# Patient Record
Sex: FEMALE | Race: WHITE | NOT HISPANIC OR LATINO | Employment: OTHER | URBAN - METROPOLITAN AREA
[De-identification: names, ages, dates, MRNs, and addresses within clinical notes are randomized per-mention and may not be internally consistent; named-entity substitution may affect disease eponyms.]

---

## 2019-02-04 ENCOUNTER — TRANSFERRED RECORDS (OUTPATIENT)
Dept: HEALTH INFORMATION MANAGEMENT | Facility: CLINIC | Age: 54
End: 2019-02-04

## 2019-02-18 ENCOUNTER — TRANSFERRED RECORDS (OUTPATIENT)
Dept: HEALTH INFORMATION MANAGEMENT | Facility: CLINIC | Age: 54
End: 2019-02-18

## 2019-02-18 LAB — TSH SERPL-ACNC: 0.65 UIU/ML (ref 0.45–4.5)

## 2019-05-28 ENCOUNTER — HOSPITAL ENCOUNTER (OUTPATIENT)
Dept: LAB | Facility: CLINIC | Age: 54
Discharge: HOME | End: 2019-05-28
Attending: OPHTHALMOLOGY | Admitting: OPHTHALMOLOGY
Payer: COMMERCIAL

## 2019-05-28 DIAGNOSIS — H05.241 CONSTANT EXOPHTHALMOS OF RIGHT EYE: Primary | ICD-10-CM

## 2019-05-28 PROCEDURE — 36415 COLL VENOUS BLD VENIPUNCTURE: CPT | Performed by: OPHTHALMOLOGY

## 2019-05-28 PROCEDURE — 84445 ASSAY OF TSI GLOBULIN: CPT | Performed by: OPHTHALMOLOGY

## 2019-05-29 LAB — MAMMOGRAM: NORMAL

## 2019-05-31 LAB — TSI SER-ACNC: <1 TSI INDEX

## 2020-05-22 DIAGNOSIS — F33.2 MDD (MAJOR DEPRESSIVE DISORDER), RECURRENT SEVERE, WITHOUT PSYCHOSIS (H): Primary | ICD-10-CM

## 2020-08-27 NOTE — TELEPHONE ENCOUNTER
FUTURE VISIT INFORMATION      FUTURE VISIT INFORMATION:    Date: 11/2/20    Time: 11:45 AM    [place] [facility]  REFERRAL INFORMATION:    Referring provider:  Dr. Mercy Oliveros    Referring providers clinic:  [facility]    Reason for visit/diagnosis: Chronic Sinusitis with Chronic Polyp    RECORDS REQUESTED FROM:       Clinic name Comments Records Status Imaging Status   [facility] 6/19/20 - ENT TV with Dr. Oliveros Care Everywhere    [facility] 2/18/20 - UC OV with Dr. Vo Care Everywhere    [facility] 12/17/19 - UC OV with LEVAR Ferrer Care Everywhere    [facility] 12/4/19 - ENT OV with Dr. Blackman  7/23/19 - ALLERGY OV with Dr. Carlos  3/18/19 - ED OV with LEVAR Daily  2/1/19 - REUM OV with Dr. Koch Care Everywhere    [facility] 4/1/19 - OP Note for FUNCTIONAL ENDOSCOPY SINUS SURGERY, NAVIGATION with Dr. Hutchinson  8/8/18 - OP Note for ENDOSCOPIC MAXILLARY ANTROSTOMY, TISSUE REMOVAL with Dr. Yin Care Everywhere    [facility] - Imaging 3/19/19 - CT Maxillofacial W  3/4/19 -CT Sinuses WO  12/4/18 - CT Sinuses WO  10/4/18 - CT Sinuses WO  7/20/18 - CT Head and Maxillofacial WO  4/25/18 - MRI Face Neck And Orbits Care Everywhere 8/27 Req - In PACs   [facility] 4/29/19 - ED OV with LEVAR Solorio Care Everywhere      * 8/27/20 10:41 AM Faxed req to [facility] for images to be pushed to PACs. - Tere  * 9/10/20 7:38 AM Faxed 2nd req to [facility] for images to be pushed to [facility]s. - Tere  * 9/10/20 3:20 PM Images received from [facility] and attached to Patient in PACs. - Tere

## 2020-09-16 ENCOUNTER — TELEPHONE (OUTPATIENT)
Dept: OTOLARYNGOLOGY | Facility: CLINIC | Age: 55
End: 2020-09-16

## 2020-09-16 NOTE — TELEPHONE ENCOUNTER
Health Call Center    Phone Message    May a detailed message be left on voicemail: yes     Reason for Call: Symptoms or Concerns     If patient has red-flag symptoms, warm transfer to triage line    Current symptom or concern: severe sinus infection/blocked/painful    Symptoms have been present for:   day(s)    Has patient previously been seen for this? No            Are there any new or worsening symptoms? Yes: Pt is new and is being referred to Dr. Jaimes and her appointment isn't until November.  Pt is wondering if she can speak with a nurse about her symptoms to relieve them until her appt.  Please follow up.       Action Taken: Message routed to:  [facility]    Travel Screening: Not Applicable

## 2020-09-16 NOTE — TELEPHONE ENCOUNTER
Spoke with patient in regards to symptoms. Pt is reporting pain in her nose as well as outside, to the point of she can not wear her glasses due to the pain. She is rating her pain a 9 out of 10.she also reports swelling and eyes being puffy. No fever, no drainage. Sinus rinses are not working. Did a virtual visit with urgent care and was prescribed antibiotics, which she has been on for 5 days. pts appt has been moved up due to recent availability.pt verbalized understanding and provider notified.

## 2020-09-21 ENCOUNTER — OFFICE VISIT (OUTPATIENT)
Dept: OTOLARYNGOLOGY | Facility: CLINIC | Age: 55
End: 2020-09-21
Payer: COMMERCIAL

## 2020-09-21 VITALS — WEIGHT: 127 LBS | BODY MASS INDEX: 21.16 KG/M2 | HEIGHT: 65 IN

## 2020-09-21 DIAGNOSIS — F33.2 MDD (MAJOR DEPRESSIVE DISORDER), RECURRENT SEVERE, WITHOUT PSYCHOSIS (H): ICD-10-CM

## 2020-09-21 DIAGNOSIS — J32.4 CHRONIC PANSINUSITIS: Primary | ICD-10-CM

## 2020-09-21 LAB
ANION GAP SERPL CALCULATED.3IONS-SCNC: 7 MMOL/L (ref 3–14)
BASOPHILS # BLD AUTO: 0.1 10E9/L (ref 0–0.2)
BASOPHILS NFR BLD AUTO: 0.6 %
BUN SERPL-MCNC: 18 MG/DL (ref 7–30)
CALCIUM SERPL-MCNC: 9.2 MG/DL (ref 8.5–10.1)
CHLORIDE SERPL-SCNC: 106 MMOL/L (ref 94–109)
CO2 SERPL-SCNC: 26 MMOL/L (ref 20–32)
CREAT SERPL-MCNC: 0.78 MG/DL (ref 0.52–1.04)
CRP SERPL-MCNC: <2.9 MG/L (ref 0–8)
DIFFERENTIAL METHOD BLD: ABNORMAL
EOSINOPHIL # BLD AUTO: 0.3 10E9/L (ref 0–0.7)
EOSINOPHIL NFR BLD AUTO: 3.3 %
ERYTHROCYTE [DISTWIDTH] IN BLOOD BY AUTOMATED COUNT: 13 % (ref 10–15)
GFR SERPL CREATININE-BSD FRML MDRD: 84 ML/MIN/{1.73_M2}
GLUCOSE SERPL-MCNC: 87 MG/DL (ref 70–99)
HCT VFR BLD AUTO: 43.5 % (ref 35–47)
HGB BLD-MCNC: 14.9 G/DL (ref 11.7–15.7)
IMM GRANULOCYTES # BLD: 0 10E9/L (ref 0–0.4)
IMM GRANULOCYTES NFR BLD: 0.3 %
LYMPHOCYTES # BLD AUTO: 2.7 10E9/L (ref 0.8–5.3)
LYMPHOCYTES NFR BLD AUTO: 28 %
MCH RBC QN AUTO: 34.1 PG (ref 26.5–33)
MCHC RBC AUTO-ENTMCNC: 34.3 G/DL (ref 31.5–36.5)
MCV RBC AUTO: 100 FL (ref 78–100)
MONOCYTES # BLD AUTO: 0.6 10E9/L (ref 0–1.3)
MONOCYTES NFR BLD AUTO: 5.9 %
NEUTROPHILS # BLD AUTO: 6 10E9/L (ref 1.6–8.3)
NEUTROPHILS NFR BLD AUTO: 61.9 %
NRBC # BLD AUTO: 0 10*3/UL
NRBC BLD AUTO-RTO: 0 /100
PLATELET # BLD AUTO: 363 10E9/L (ref 150–450)
POTASSIUM SERPL-SCNC: 4 MMOL/L (ref 3.4–5.3)
RBC # BLD AUTO: 4.37 10E12/L (ref 3.8–5.2)
SODIUM SERPL-SCNC: 139 MMOL/L (ref 133–144)
WBC # BLD AUTO: 9.7 10E9/L (ref 4–11)

## 2020-09-21 RX ORDER — DULOXETINE HCL 60 MG
60 CAPSULE,DELAYED RELEASE (ENTERIC COATED) ORAL EVERY MORNING
COMMUNITY
Start: 2020-09-04 | End: 2022-12-27

## 2020-09-21 RX ORDER — ALBUTEROL SULFATE 90 UG/1
1-2 AEROSOL, METERED RESPIRATORY (INHALATION)
COMMUNITY
Start: 2019-11-27 | End: 2021-06-09

## 2020-09-21 RX ORDER — LAMOTRIGINE 200 MG/1
200 TABLET ORAL EVERY MORNING
COMMUNITY
Start: 2020-09-12

## 2020-09-21 RX ORDER — DEXTROAMPHETAMINE SULFATE, DEXTROAMPHETAMINE SACCHARATE, AMPHETAMINE SULFATE AND AMPHETAMINE ASPARTATE 5; 5; 5; 5 MG/1; MG/1; MG/1; MG/1
20 CAPSULE, EXTENDED RELEASE ORAL DAILY PRN
COMMUNITY
Start: 2020-09-04 | End: 2022-07-21

## 2020-09-21 ASSESSMENT — PAIN SCALES - GENERAL: PAINLEVEL: SEVERE PAIN (7)

## 2020-09-21 ASSESSMENT — MIFFLIN-ST. JEOR: SCORE: 1171.95

## 2020-09-21 NOTE — PROGRESS NOTES
Otolaryngology Adult Consultation    Patient: Gracy Bautista   : 1965     Patient presents with:  Consult:   Chief Complaint   Patient presents with     Consult     Chronic sinusitis and polyps        Referring Provider: Mercy Oliveros   Consulting Physician:  Digna Jaimes MD       Assessment/Plan: Patient with long history of sinus disease. Likely contributing factors include low IgG, smoking, chronic infection/inflammation. She has had a bit of a lapse in care with regards to assessment of immune dysfunction. Will order labs today and baseline imaging. Will also start gentamicin irrigations. Will contact patient with results and next steps.      HPI: Gracy Bautista is a 54 year old female seen today in the Otolaryngology Clinic in consultation from Dr. Oliveros for a history of chronic sinus issues.     Prior sinus care at [facility]. Several sinus surgeries and infections. Several infections this year. Was seen at [facility]. Low IgG and ASA sensitivity. Was sent here for care given complex disease and prior immunodeficiency. No recent labs, had been on IV Ig. Did well on that. Last surgery was last summer, did well after that. 3 infection since January. Was on Augmentin 7 days at a time. Not on antibiotics now. C/O fatigue. Blind in R eye, optic nerve issue. Severe arthritis, antiphospholipid syndrome in past, negative forother rheumatic diseases, borderline Sjogren's.     PMH: hard to breath - on albuterol. Had negative allergy testing.     CXR done recently clear.     Smokes tobacco. No EtOH, or drugs.    Work - looking now.         ADDERALL XR 20 MG 24 hr capsule, TK 1 C PO BID  albuterol (PROAIR HFA/PROVENTIL HFA/VENTOLIN HFA) 108 (90 Base) MCG/ACT inhaler, Inhale 1-2 puffs into the lungs  CYMBALTA 60 MG capsule,   LAMICTAL 200 MG tablet, TK 1 T PO D    No current facility-administered medications on file prior to visit.          Allergies   Allergen Reactions     Bee Venom Angioedema and  Swelling     Other reaction(s): Angioedema       Cefdinir GI Disturbance and Other (See Comments)     diarrhea  diarrhea       Levofloxacin Muscle Pain (Myalgia)     Prednisone Anxiety and Other (See Comments)     Hyperactivity and moodiness         No past medical history on file.    Social History     Occupational History     Not on file   Tobacco Use     Smoking status: Current Some Day Smoker     Types: Cigarettes     Smokeless tobacco: Never Used     Tobacco comment: 6 cigarettes per week   Substance and Sexual Activity     Alcohol use: Not on file     Drug use: Not on file     Sexual activity: Not on file        Review of Systems  No flowsheet data found.     14 point ROS neg other than the symptoms noted above.    Physical Exam:    General Assessment   The patient is alert, oriented and in no acute distress.   Head/Face/Scalp  Grossly normal.   Ears  Normal canals, auricles and tympanic membranes.   Nose  External nose is straight, skin is normal. Intranasal exam (anterior rhinoscopy) reveals normal moist mucosa, turbinate tissue without edema, erythema or crusting.  Septum mainly in the midline.    Mouth  Oral cavity shows healthy mucosa with out ulceration, masses or other lesions involving the tongue, palate, buccal mucosa, floor of mouth or gingiva.  Dentition in good repair.  Oropharynx with normal tonsils, posterior wall and base of tongue.  Neck  No significant adenopathy, thyroid or salivary gland abnormality.       Procedure:  Endoscopy indicated for exam of sinus cavities  Topical anesthetic/decongestant spray applied.  Rigid scope used for visualization.  Findings: Thick clear/white discharge, polypoid changes. No purulence or crusting.

## 2020-09-21 NOTE — NURSING NOTE
"Chief Complaint   Patient presents with     Consult     Chronic sinusitis and polyps         Height 1.651 m (5' 5\"), weight 57.6 kg (127 lb).    [place] Davidson, EMT    "

## 2020-09-21 NOTE — PATIENT INSTRUCTIONS
Thank You for choosing [facility]. You were seen in the ENT Clinic today.     has recommended the followin. Labs ordered today, will call with results  2. CT facial bones ordered, will call with results  3. Start Gentimiacin irrigations, prescription sent to [facility]. Instructions below    Gentamicin Nasal Irrigations  -Gentamicin is a compounded antibiotic which means it is made only when ordered and by compounding pharmacies.  [facility]  [address] [place] [address]  [phone]  -Always store your solution in your refrigerator and administer at room temperature.  -The solution should be okay to use for 14 days from the day it was made by the pharmacy.  -Clean your irrigating device often. Wash it with warm, soapy water, and let it air dry. Or follow device cleaning instructions.     Instructions:  1) Measure out the amount of the gentamicin solution you will need.   The usual dose is 30 mL.  2) Let the solution you measure out warm up for 30 minutes before you use it.   Keep the rest of the solution in the refrigerator.  3) With a large syringe, sinus rinse squeeze bottle, or neti pot complete the rinse per doctor s head position recommendation (lying head back, lying head lateral, or head down and forward).  -Use   of the solution for the first nostril.  - the shower or lean over the sink.  -Tilt your head forward and turn it to one side.  -Place the irrigating device in your upper nostril.   -Pour or squirt the solution gently into your nostril.  -Aim for the back of your head, not the top of your head.  -Gravity or gentle pressure should help the solution move through your sinuses and out your lower nostril.  -Never force the solution through your nostrils.  -Some solution may drain into your throat. This is normal. Try gently breathing out through your nose to help the solution drain through your lower nostril and not down your  throat.  4) Repeat these steps with your other nostril.  5) At first, you may feel mild burning or stinging when you rinse with the solution. This should go away after the first few times you use it.          If you have any questions or concerns after your appointment, please  Call [phone].

## 2020-09-21 NOTE — LETTER
2020       RE: Gracy Bautista  [address] [place] [id number]     Dear Colleague,    Thank you for referring your patient, Gracy Bautista, to the [facility] EAR NOSE AND THROAT at [facility]. Please see a copy of my visit note below.    Otolaryngology Adult Consultation    Patient: Gracy Bautista   : 1965     Patient presents with:  Consult:   Chief Complaint   Patient presents with     Consult     Chronic sinusitis and polyps        Referring Provider: Mercy Oliveros   Consulting Physician:  Digna Jaimes MD       Assessment/Plan: Patient with long history of sinus disease. Likely contributing factors include low IgG, smoking, chronic infection/inflammation. She has had a bit of a lapse in care with regards to assessment of immune dysfunction. Will order labs today and baseline imaging. Will also start gentamicin irrigations. Will contact patient with results and next steps.      HPI: Gracy Bautista is a 54 year old female seen today in the Otolaryngology Clinic in consultation from Dr. Oliveros for a history of chronic sinus issues.     Prior sinus care at [place]. Several sinus surgeries and infections. Several infections this year. Was seen at [facility]. Low IgG and ASA sensitivity. Was sent here for care given complex disease and prior immunodeficiency. No recent labs, had been on IV Ig. Did well on that. Last surgery was last summer, did well after that. 3 infection since January. Was on Augmentin 7 days at a time. Not on antibiotics now. C/O fatigue. Blind in R eye, optic nerve issue. Severe arthritis, antiphospholipid syndrome in past, negative forother rheumatic diseases, borderline Sjogren's.     PMH: hard to breath - on albuterol. Had negative allergy testing.     CXR done recently clear.     Smokes tobacco. No EtOH, or drugs.    Work - looking now.         ADDERALL XR 20 MG 24 hr capsule, TK 1 C PO BID  albuterol (PROAIR HFA/PROVENTIL  HFA/VENTOLIN HFA) 108 (90 Base) MCG/ACT inhaler, Inhale 1-2 puffs into the lungs  CYMBALTA 60 MG capsule,   LAMICTAL 200 MG tablet, TK 1 T PO D    No current facility-administered medications on file prior to visit.          Allergies   Allergen Reactions     Bee Venom Angioedema and Swelling     Other reaction(s): Angioedema       Cefdinir GI Disturbance and Other (See Comments)     diarrhea  diarrhea       Levofloxacin Muscle Pain (Myalgia)     Prednisone Anxiety and Other (See Comments)     Hyperactivity and moodiness         No past medical history on file.    Social History     Occupational History     Not on file   Tobacco Use     Smoking status: Current Some Day Smoker     Types: Cigarettes     Smokeless tobacco: Never Used     Tobacco comment: 6 cigarettes per week   Substance and Sexual Activity     Alcohol use: Not on file     Drug use: Not on file     Sexual activity: Not on file        Review of Systems  No flowsheet data found.     14 point ROS neg other than the symptoms noted above.    Physical Exam:    General Assessment   The patient is alert, oriented and in no acute distress.   Head/Face/Scalp  Grossly normal.   Ears  Normal canals, auricles and tympanic membranes.   Nose  External nose is straight, skin is normal. Intranasal exam (anterior rhinoscopy) reveals normal moist mucosa, turbinate tissue without edema, erythema or crusting.  Septum mainly in the midline.    Mouth  Oral cavity shows healthy mucosa with out ulceration, masses or other lesions involving the tongue, palate, buccal mucosa, floor of mouth or gingiva.  Dentition in good repair.  Oropharynx with normal tonsils, posterior wall and base of tongue.  Neck  No significant adenopathy, thyroid or salivary gland abnormality.       Procedure:  Endoscopy indicated for exam of sinus cavities  Topical anesthetic/decongestant spray applied.  Rigid scope used for visualization.  Findings: Thick clear/white discharge, polypoid changes. No  purulence or crusting.           Again, thank you for allowing me to participate in the care of your patient.      Sincerely,    Digna Jaimes MD

## 2020-09-22 ENCOUNTER — TELEPHONE (OUTPATIENT)
Dept: OTOLARYNGOLOGY | Facility: CLINIC | Age: 55
End: 2020-09-22

## 2020-09-22 LAB
IGA SERPL-MCNC: 128 MG/DL (ref 84–499)
IGE SERPL-ACNC: 233 KIU/L (ref 0–114)
IGG SERPL-MCNC: 586 MG/DL (ref 610–1616)
IGG1 SER-MCNC: 354 MG/DL (ref 382–929)
IGG2 SER-MCNC: 124 MG/DL (ref 242–700)
IGG3 SER-MCNC: 29 MG/DL (ref 22–176)
IGG4 SER-MCNC: 2 MG/DL (ref 4–86)
IGM SERPL-MCNC: 244 MG/DL (ref 35–242)

## 2020-09-22 NOTE — TELEPHONE ENCOUNTER
Called patient to schedule CT scan, per 9/21 checkout note. LVM with scheduling instructions and the Imaging Call Center number.

## 2020-09-23 ENCOUNTER — ANCILLARY PROCEDURE (OUTPATIENT)
Dept: CT IMAGING | Facility: CLINIC | Age: 55
End: 2020-09-23
Attending: OTOLARYNGOLOGY
Payer: COMMERCIAL

## 2020-09-23 DIAGNOSIS — J32.4 CHRONIC PANSINUSITIS: ICD-10-CM

## 2020-09-30 ENCOUNTER — TELEPHONE (OUTPATIENT)
Dept: OTOLARYNGOLOGY | Facility: CLINIC | Age: 55
End: 2020-09-30

## 2020-09-30 DIAGNOSIS — D80.8: Primary | ICD-10-CM

## 2020-09-30 NOTE — TELEPHONE ENCOUNTER
Spoke to patient and relayed results of CT scan as per provider. Also advised pt of referral to hematology recommended by provider due to abnormal immunoglobulins. Pt has also been scheduled to see provider on 10/5 at 12:45. Pt verbalized understanding to all of the above information .

## 2020-09-30 NOTE — RESULT ENCOUNTER NOTE
Anh please contact patient to let her know that CT shows sinus disease. I see she is asking for antibiotics. Ideally we should culture her, maybe Monday? She also has abnormal immunoglobulins and should see adult benign hematology. Please place consult. Please contact the patient with these results.     Digna Jaimes MD

## 2020-09-30 NOTE — TELEPHONE ENCOUNTER
Left voicemail message for patient in regards to message received. Wanted to check up on patient and discuss current symptoms.writers call back number for today provided on voicemail.

## 2020-10-02 NOTE — TELEPHONE ENCOUNTER
ONCOLOGY INTAKE: Records Information      APPT INFORMATION:  Referring provider:  Dr. Digna Jaimes MD  Referring provider s clinic:  [facility]  Reason for visit/diagnosis:  Primary immunoglobulin catabolism abnormality   Has patient been notified of appointment date and time?: Yes    RECORDS INFORMATION:  Were the records received with the referral (via [employer])? No,Internal Referral      Has patient been seen for any external appt for this diagnosis? No    If yes, where? NA      ADDITIONAL INFORMATION:  Pt declined virtual visit, scheduled in person.

## 2020-10-05 ENCOUNTER — TELEPHONE (OUTPATIENT)
Dept: OTOLARYNGOLOGY | Facility: CLINIC | Age: 55
End: 2020-10-05

## 2020-10-05 ENCOUNTER — OFFICE VISIT (OUTPATIENT)
Dept: OTOLARYNGOLOGY | Facility: CLINIC | Age: 55
End: 2020-10-05
Payer: COMMERCIAL

## 2020-10-05 VITALS — WEIGHT: 125 LBS | HEART RATE: 77 BPM | BODY MASS INDEX: 20.8 KG/M2 | OXYGEN SATURATION: 98 %

## 2020-10-05 DIAGNOSIS — D80.8: ICD-10-CM

## 2020-10-05 DIAGNOSIS — J32.4 CHRONIC PANSINUSITIS: Primary | ICD-10-CM

## 2020-10-05 PROCEDURE — 99000 SPECIMEN HANDLING OFFICE-LAB: CPT | Performed by: PATHOLOGY

## 2020-10-05 PROCEDURE — 31231 NASAL ENDOSCOPY DX: CPT | Performed by: OTOLARYNGOLOGY

## 2020-10-05 PROCEDURE — 87102 FUNGUS ISOLATION CULTURE: CPT | Mod: 90 | Performed by: PATHOLOGY

## 2020-10-05 PROCEDURE — 99213 OFFICE O/P EST LOW 20 MIN: CPT | Mod: 25 | Performed by: OTOLARYNGOLOGY

## 2020-10-05 PROCEDURE — 87070 CULTURE OTHR SPECIMN AEROBIC: CPT | Mod: 90 | Performed by: PATHOLOGY

## 2020-10-05 ASSESSMENT — PAIN SCALES - GENERAL: PAINLEVEL: EXTREME PAIN (8)

## 2020-10-05 NOTE — TELEPHONE ENCOUNTER
[facility]    Phone Message    May a detailed message be left on voicemail: yes     Reason for Call: Other: Pt states they had an appointment today with Digna Jaimes and a culture was done.  Pt states they discussed waiting for the culture to come back before taking anything but the Pt is wondering if there is any antibiotic she can take before the culture comes back.  Please follow up.      Action Taken: Message routed to:  [facility] ENT    Travel Screening: Not Applicable

## 2020-10-05 NOTE — PROGRESS NOTES
Patient seen recently with history of CRS, immunodeficiency. We confirmed this is an ongoing issue with labs, and she is scheduled to see hematology in 2 weeks. She has increased fatigue and frontal sinus pressure. Has been using antibiotic rinses. She feels like this is a typical sinus infection. I am recommending culture directed antibiotic therapy.      Procedure:  Endoscopy indicated for exam and culture  Topical anesthetic/decongestant spray applied.  Rigid scope used for visualization.  Findings: Scant secretions in R posterior nasal cavity.    A/P:  (J32.4) Chronic pansinusitis  (primary encounter diagnosis)  Comment: exacerbation based on history  Plan: Sinus Culture Aerobic Bacterial, Fungus         Culture, non-blood    Culture pending, start antibiotics based on result.

## 2020-10-05 NOTE — LETTER
Date:October 6, 2020      Patient was self referred, no letter generated. Do not send.        [facility] Health Information

## 2020-10-05 NOTE — NURSING NOTE
Chief Complaint   Patient presents with     RECHECK     Follow up, possible culture         Pulse 77, weight 56.7 kg (125 lb), SpO2 98 %.    Yue Davidson EMT

## 2020-10-05 NOTE — TELEPHONE ENCOUNTER
Returned patient's call to explain that culture was taken in clinic today to direct any antibiotic therapy if needed. Will need to wait for culture to see if any bacteria is growing and, if so, what type of antibiotics the bacteria is sensitive to in order to prescribe the appropriate antibiotic. Culture may take a couple days to result. Will call patient as soon as resulted with recommended antibiotics per Dr. Jaimes. Clinic call back number left on message.    Anh García RN, DNP.  10/5/2020 3:33 PM

## 2020-10-05 NOTE — LETTER
10/5/2020       RE: Gracy Bautista  [address] [place] [id number]     Dear Colleague,    Thank you for referring your patient, Gracy Bautista, to the [facility] EAR NOSE AND THROAT CLINIC [place] at [facility]. Please see a copy of my visit note below.    Patient seen recently with history of CRS, immunodeficiency. We confirmed this is an ongoing issue with labs, and she is scheduled to see hematology in 2 weeks. She has increased fatigue and frontal sinus pressure. Has been using antibiotic rinses. She feels like this is a typical sinus infection. I am recommending culture directed antibiotic therapy.      Procedure:  Endoscopy indicated for exam and culture  Topical anesthetic/decongestant spray applied.  Rigid scope used for visualization.  Findings: Scant secretions in R posterior nasal cavity.    A/P:  (J32.4) Chronic pansinusitis  (primary encounter diagnosis)  Comment: exacerbation based on history  Plan: Sinus Culture Aerobic Bacterial, Fungus         Culture, non-blood    Culture pending, start antibiotics based on result.       Again, thank you for allowing me to participate in the care of your patient.      Sincerely,    Digna Jaimes MD

## 2020-10-06 NOTE — TELEPHONE ENCOUNTER
RECORDS STATUS - ALL OTHER DIAGNOSIS      RECORDS RECEIVED FROM: Epic/ [facility]    DATE RECEIVED: 10/21/2020    NOTES STATUS DETAILS   OFFICE NOTE from referring provider Complete Dr. Digna Jaimes MD  [facility] ENT   OFFICE NOTE from medical oncologist N/A ENT Notes are in Epic    I emailed historical records from [facility] to HIM on 10/21/2020 at 8:22am    DISCHARGE SUMMARY from hospital Complete Epic 1/30/2018 Chronic Pansinusitis in EPIC   DISCHARGE REPORT from the ER     OPERATIVE REPORT Complete [facility]- Nasal Endoscopy 10/5/2020    MEDICATION LIST Complete [facility]   CLINICAL TRIAL TREATMENTS TO DATE     LABS     PATHOLOGY REPORTS N/A    ANYTHING RELATED TO DIAGNOSIS Complete Labs last updated on 10/5/2020    GENONOMIC TESTING     TYPE:     IMAGING (NEED IMAGES & REPORT)     CT SCANS [facility]    [facility] 9/23/2020    Xray Chest  Requested- [facility]  8/26/2019    MRI     MAMMO     ULTRASOUND     PET       Action    Action Taken 10/6/2020 2:34pm   I called pt Gracy - her phone went to  twice.     Email: [email or web address]    1998- [facility] Dr. Jameson Smith - there's mention of Gracy's historical records in CE but the complete notes aren't included     Gracy is going to work on filling out her release tonight. I'm going to give her a call back tomorrow to see how things are going.     10/14/2020 11:59am   I called pt Gracy - she's not feeling well. Gracy requested that I call her back on Friday to follow up on the CAR[place] form.     10/16/2020 12:07pm   I left a  for pt Gracy requesting a call back.     Gracy called me back and Returned her CAROLINA.     I faxed over a request form and CAROLINA to [facility]'s medical records dept.     10/20/2020 8:37am   I called [facility]'s Med Rec Dept [phone] #[phone] to follow up on the request I submitted last Friday. They sent the request to O and the request is being worked on right away.

## 2020-10-07 LAB
BACTERIA SPEC CULT: NORMAL
Lab: NORMAL
SPECIMEN SOURCE: NORMAL

## 2020-10-12 ENCOUNTER — TELEPHONE (OUTPATIENT)
Dept: OTOLARYNGOLOGY | Facility: CLINIC | Age: 55
End: 2020-10-12

## 2020-10-12 NOTE — TELEPHONE ENCOUNTER
[facility] Call [facility]    Phone Message    May a detailed message be left on voicemail: yes     Reason for Call: Symptoms or Concerns     If patient has red-flag symptoms, warm transfer to triage line    Current symptom or concern: swollen eyes.  Throat and ears hurt    Symptoms have been present for:    day(s)    Has patient previously been seen for this? Yes    By Theodore    Date:     Are there any new or worsening symptoms? Yes: Pt is requesting a call back from a nurse.      Action Taken: Message routed to:  [facility] ENT    Travel Screening: Not Applicable

## 2020-10-14 NOTE — TELEPHONE ENCOUNTER
[facility]    Phone Message    May a detailed message be left on voicemail: yes     Reason for Call: Other: Pt calling back as nobody has called her back about her symptoms , Pt would like a call back ASAP, Please call Pt back to discuss  Thank you,    Action Taken: Message routed to:  [facility] ENT    Travel Screening: Not Applicable

## 2020-10-14 NOTE — TELEPHONE ENCOUNTER
[facility] Call [facility]    Phone Message    May a detailed message be left on voicemail: yes     Reason for Call: Other:   Pt calling back about her sinus infection. Writer spoke with clinic team. Clinic is waiting on Jaimes's response based on culture results and is unable to give recommendation until they hear back. Pt was notified of this.     Pt states that Theodore knows her situation and history. Pt is weak, and her head hurts. There is no fever. The nasal irrigation/solution makes situation worst. Pt does not want to go to urgent care and would like a call back asap.     Action Taken: Other:  ENT    Travel Screening: Not Applicable

## 2020-10-15 DIAGNOSIS — J32.4 CHRONIC PANSINUSITIS: Primary | ICD-10-CM

## 2020-10-15 RX ORDER — DOXYCYCLINE HYCLATE 100 MG
100 TABLET ORAL 2 TIMES DAILY
Qty: 14 TABLET | Refills: 0 | Status: SHIPPED | OUTPATIENT
Start: 2020-10-15 | End: 2020-10-21

## 2020-10-15 NOTE — TELEPHONE ENCOUNTER
Spoke to patient. Reviewed culture results as per provider and advised pt a prescription for antibiotic could be sent to her pharmacy if she so wished. Pt would like prescription sent. Pt also asked about a follow up appt, advised pt this would be discussed with provider. Pt verbalized understanding

## 2020-10-21 ENCOUNTER — ONCOLOGY VISIT (OUTPATIENT)
Dept: ONCOLOGY | Facility: CLINIC | Age: 55
End: 2020-10-21
Attending: OTOLARYNGOLOGY
Payer: COMMERCIAL

## 2020-10-21 ENCOUNTER — PRE VISIT (OUTPATIENT)
Dept: ONCOLOGY | Facility: CLINIC | Age: 55
End: 2020-10-21

## 2020-10-21 VITALS
HEIGHT: 65 IN | BODY MASS INDEX: 21.44 KG/M2 | TEMPERATURE: 99 F | WEIGHT: 128.7 LBS | OXYGEN SATURATION: 99 % | HEART RATE: 67 BPM | SYSTOLIC BLOOD PRESSURE: 110 MMHG | DIASTOLIC BLOOD PRESSURE: 74 MMHG | RESPIRATION RATE: 18 BRPM

## 2020-10-21 DIAGNOSIS — R53.83 FATIGUE, UNSPECIFIED TYPE: ICD-10-CM

## 2020-10-21 DIAGNOSIS — J45.909 UNCOMPLICATED ASTHMA, UNSPECIFIED ASTHMA SEVERITY, UNSPECIFIED WHETHER PERSISTENT: ICD-10-CM

## 2020-10-21 DIAGNOSIS — M79.7 FIBROMYALGIA: ICD-10-CM

## 2020-10-21 DIAGNOSIS — D69.9 HEMORRHAGIC DIATHESIS (H): Primary | ICD-10-CM

## 2020-10-21 DIAGNOSIS — D80.8: ICD-10-CM

## 2020-10-21 LAB
APTT PPP: 30 SEC (ref 22–37)
BASOPHILS # BLD AUTO: 0 10E9/L (ref 0–0.2)
BASOPHILS NFR BLD AUTO: 0.3 %
CLOSURE TME COLL+EPINEP BLD: NORMAL SEC
DIFFERENTIAL METHOD BLD: ABNORMAL
EOSINOPHIL # BLD AUTO: 0.2 10E9/L (ref 0–0.7)
EOSINOPHIL NFR BLD AUTO: 2.4 %
ERYTHROCYTE [DISTWIDTH] IN BLOOD BY AUTOMATED COUNT: 12.8 % (ref 10–15)
FIBRINOGEN PPP-MCNC: 328 MG/DL (ref 200–420)
HCT VFR BLD AUTO: 42.2 % (ref 35–47)
HGB BLD-MCNC: 14.6 G/DL (ref 11.7–15.7)
IMM GRANULOCYTES # BLD: 0 10E9/L (ref 0–0.4)
IMM GRANULOCYTES NFR BLD: 0.3 %
INR PPP: 0.91 (ref 0.86–1.14)
LYMPHOCYTES # BLD AUTO: 2.5 10E9/L (ref 0.8–5.3)
LYMPHOCYTES NFR BLD AUTO: 27.3 %
MCH RBC QN AUTO: 33.9 PG (ref 26.5–33)
MCHC RBC AUTO-ENTMCNC: 34.6 G/DL (ref 31.5–36.5)
MCV RBC AUTO: 98 FL (ref 78–100)
MONOCYTES # BLD AUTO: 0.5 10E9/L (ref 0–1.3)
MONOCYTES NFR BLD AUTO: 5.1 %
NEUTROPHILS # BLD AUTO: 5.8 10E9/L (ref 1.6–8.3)
NEUTROPHILS NFR BLD AUTO: 64.6 %
PLATELET # BLD AUTO: 353 10E9/L (ref 150–450)
RBC # BLD AUTO: 4.31 10E12/L (ref 3.8–5.2)
RETICS # AUTO: 56.9 10E9/L (ref 25–95)
RETICS/RBC NFR AUTO: 1.3 % (ref 0.5–2)
TSH SERPL DL<=0.005 MIU/L-ACNC: 0.65 MU/L (ref 0.4–4)
WBC # BLD AUTO: 9.1 10E9/L (ref 4–11)

## 2020-10-21 PROCEDURE — 85290 CLOT FACTOR XIII FIBRIN STAB: CPT | Performed by: INTERNAL MEDICINE

## 2020-10-21 PROCEDURE — 85613 RUSSELL VIPER VENOM DILUTED: CPT | Performed by: INTERNAL MEDICINE

## 2020-10-21 PROCEDURE — 85245 CLOT FACTOR VIII VW RISTOCTN: CPT | Performed by: INTERNAL MEDICINE

## 2020-10-21 PROCEDURE — 85730 THROMBOPLASTIN TIME PARTIAL: CPT | Performed by: INTERNAL MEDICINE

## 2020-10-21 PROCEDURE — 85384 FIBRINOGEN ACTIVITY: CPT | Performed by: INTERNAL MEDICINE

## 2020-10-21 PROCEDURE — 36415 COLL VENOUS BLD VENIPUNCTURE: CPT | Performed by: INTERNAL MEDICINE

## 2020-10-21 PROCEDURE — 85610 PROTHROMBIN TIME: CPT | Performed by: INTERNAL MEDICINE

## 2020-10-21 PROCEDURE — 86146 BETA-2 GLYCOPROTEIN ANTIBODY: CPT | Performed by: INTERNAL MEDICINE

## 2020-10-21 PROCEDURE — 99N1037 PR STATISTIC VON WILLEBRAND MULTIMERS: Performed by: INTERNAL MEDICINE

## 2020-10-21 PROCEDURE — 84443 ASSAY THYROID STIM HORMONE: CPT | Performed by: INTERNAL MEDICINE

## 2020-10-21 PROCEDURE — 82306 VITAMIN D 25 HYDROXY: CPT | Performed by: INTERNAL MEDICINE

## 2020-10-21 PROCEDURE — 85240 CLOT FACTOR VIII AHG 1 STAGE: CPT | Performed by: INTERNAL MEDICINE

## 2020-10-21 PROCEDURE — 85246 CLOT FACTOR VIII VW ANTIGEN: CPT | Performed by: INTERNAL MEDICINE

## 2020-10-21 PROCEDURE — 99204 OFFICE O/P NEW MOD 45 MIN: CPT | Performed by: INTERNAL MEDICINE

## 2020-10-21 PROCEDURE — 86147 CARDIOLIPIN ANTIBODY EA IG: CPT | Performed by: INTERNAL MEDICINE

## 2020-10-21 PROCEDURE — 85025 COMPLETE CBC W/AUTO DIFF WBC: CPT | Performed by: INTERNAL MEDICINE

## 2020-10-21 PROCEDURE — 85060 BLOOD SMEAR INTERPRETATION: CPT | Performed by: INTERNAL MEDICINE

## 2020-10-21 PROCEDURE — 85045 AUTOMATED RETICULOCYTE COUNT: CPT | Performed by: INTERNAL MEDICINE

## 2020-10-21 RX ORDER — ACETAMINOPHEN 500 MG
500-1000 TABLET ORAL EVERY 6 HOURS PRN
COMMUNITY
End: 2022-07-08

## 2020-10-21 ASSESSMENT — MIFFLIN-ST. JEOR: SCORE: 1179.66

## 2020-10-21 ASSESSMENT — PAIN SCALES - GENERAL: PAINLEVEL: MODERATE PAIN (5)

## 2020-10-21 NOTE — NURSING NOTE
"Oncology Rooming Note    October 21, 2020 12:40 PM   Gracy Bautista is a 54 year old female who presents for:    Chief Complaint   Patient presents with     Oncology Clinic Visit     New Patient     Initial Vitals: /74 (BP Location: Left arm)   Pulse 67   Temp 99  F (37.2  C) (Oral)   Resp 18   Ht 1.651 m (5' 5\")   Wt 58.4 kg (128 lb 11.2 oz)   SpO2 99%   BMI 21.42 kg/m   Estimated body mass index is 21.42 kg/m  as calculated from the following:    Height as of this encounter: 1.651 m (5' 5\").    Weight as of this encounter: 58.4 kg (128 lb 11.2 oz). Body surface area is 1.64 meters squared.  Moderate Pain (5) Comment: Data Unavailable   No LMP recorded. Patient is postmenopausal.  Allergies reviewed: Yes  Medications reviewed: Yes    Medications: Medication refills not needed today.  Pharmacy name entered into [facility]: [id number] - [place], [place] - [address] [facility] [address]  & [place] [address] LPN              "

## 2020-10-21 NOTE — PROGRESS NOTES
Hematology initial visit:  Date on this visit: 10/21/2020    Gracy Bautista  is referred by Dr.Holly POOL Jaimes for a hematology consultation. She requires evaluation for chronic sinusitis, easy bruising.    Primary Physician: No Ref-Primary, Physician     History Of Present Illness:  Ms. Bautista is a 54 year old female who has a complicated past medical history and I do not have all the past medical records but I have reviewed her records available to me.  She has a history of severe nasal polyposis and chronic pansinusitis requiring several surgeries in the past, hypogammaglobulinemia who comes in today for evaluation.  She tells me that over the last several years she has had issues with chronic pansinusitis requiring multiple surgeries.  She also has had issues with asthma and pneumonia and she continues to smoke.  About 5 years ago she was noted to have IgG level in high 400s and low 500s so she was given IVIG for about 2-1/2 years but it did not improve her symptoms.  She was also on Nucala for several months about 1-1/2 years ago but it was a stopped because she did not notice much improvement.  She was following with [facility] at that time.  She tells me that over the last several years she has been feeling fatigued although over the last several months it has increased.  She feels exhausted all the time.  She has chronic shortness of breath from asthma.  She continues to have issues with facial pain from sinusitis.  She was diagnosed with fibromyalgia as well as depression.  She has also noticed off and on spontaneous bruising and she bleeds more when she cuts herself.  The bruising is mostly on her forearms.  It usually resolves quickly.  In the past when she has had surgeries, she does not remember heavy bleeding apart from the first sinus surgery when she developed significant bleeding postoperatively.  She has had 2 C-sections, total abdominal hysterectomy/bilateral salpingo-oophorectomy and 4  sinus surgeries.  She denies any other spontaneous bleeding like melena hematochezia or hematuria or hemoptysis, hematemesis or epistaxis.  She denies any drenching night sweats or significant weight loss.  No new swellings apart from off-and-on bilateral leg swelling.  In the past she had positive anticardiolipin IgM antibody and she was on heparin and aspirin during pregnancy in 2001 and she took warfarin for some time after the delivery.  She never has history of clots.  She notices occasional nausea but denies vomiting.  No mouth sores.        ROS:  A comprehensive ROS was otherwise neg    Past Medical/Surgical History:  No past medical history on file.  No past surgical history on file.     Chronic pansinusitis.  Recurrent pneumonia.  Hypogammaglobulinemia.  Asthma.  Anticardiolipin IgM antibody positive.  Fibromyalgia.  Depression.    Allergies:  Allergies as of 10/21/2020 - Reviewed 10/21/2020   Allergen Reaction Noted     Bee venom Angioedema and Swelling 11/05/2013     Cefdinir GI Disturbance and Other (See Comments) 01/30/2018     Levofloxacin Muscle Pain (Myalgia) 04/30/2019     Prednisone Anxiety and Other (See Comments) 09/07/2012     Current Medications:  Current Outpatient Medications   Medication Sig Dispense Refill     acetaminophen (TYLENOL) 500 MG tablet Take 500-1,000 mg by mouth every 6 hours as needed for mild pain       ADDERALL XR 20 MG 24 hr capsule TK 1 C PO BID       albuterol (PROAIR HFA/PROVENTIL HFA/VENTOLIN HFA) 108 (90 Base) MCG/ACT inhaler Inhale 1-2 puffs into the lungs       CYMBALTA 60 MG capsule        LAMICTAL 200 MG tablet TK 1 T PO D       gentamicin 80 mg in 1000 ml 0.9% NaCl (GARAMYCIN) SOLN nasal irrigation Spray 30 mLs in nostril daily Irrigate 30 mL in each nostril once a day (Patient not taking: Reported on 10/21/2020) 1000 mL 3      Family History:  No family history on file.    Mother had vasculitis and atrial fibrillation.  Her brother has blood clots including  pulmonary embolism.  Father had CVA in his 50s.  He was also diabetic which was not well controlled.  She has 2 sons.  1 son has epilepsy and a benign brain tumor.  Other son is healthy.    Social History:  Social History     Socioeconomic History     Marital status:      Spouse name: Not on file     Number of children: Not on file     Years of education: Not on file     Highest education level: Not on file   Occupational History     Not on file   Social Needs     Financial resource strain: Not on file     Food insecurity     Worry: Not on file     Inability: Not on file     Transportation needs     Medical: Not on file     Non-medical: Not on file   Tobacco Use     Smoking status: Current Some Day Smoker     Types: Cigarettes     Smokeless tobacco: Never Used     Tobacco comment: 6 cigarettes per week   Substance and Sexual Activity     Alcohol use: Not on file     Drug use: Not on file     Sexual activity: Not on file   Lifestyle     Physical activity     Days per week: Not on file     Minutes per session: Not on file     Stress: Not on file   Relationships     Social connections     Talks on phone: Not on file     Gets together: Not on file     Attends Scientologist service: Not on file     Active member of club or organization: Not on file     Attends meetings of clubs or organizations: Not on file     Relationship status: Not on file     Intimate partner violence     Fear of current or ex partner: Not on file     Emotionally abused: Not on file     Physically abused: Not on file     Forced sexual activity: Not on file   Other Topics Concern     Not on file   Social History Narrative     Not on file     She has been a chronic a smoker and currently smokes 3 cigarettes a day.  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  She lives with her .  She volunteers at [employer].  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    Physical Exam:  /74 (BP Location: Left arm)    "Pulse 67   Temp 99  F (37.2  C) (Oral)   Resp 18   Ht 1.651 m (5' 5\")   Wt 58.4 kg (128 lb 11.2 oz)   SpO2 99%   BMI 21.42 kg/m      Wt Readings from Last 4 Encounters:   10/21/20 58.4 kg (128 lb 11.2 oz)   10/05/20 56.7 kg (125 lb)   09/21/20 57.6 kg (127 lb)     CONSTITUTIONAL: no acute distress  EYES: PERRLA, no palor or icterus.   ENT/MOUTH: no mouth lesions. Ears normal  CVS: s1s2 no m r g .   RESPIRATORY: clear to auscultation b/l but has decreased bilateral breath sounds.  GI: soft non tender no hepatosplenomegaly  NEURO: AAOX3  Grossly non focal neuro exam  INTEGUMENT: no obvious rashes  LYMPHATIC: no palpable cervical, supraclavicular, axillary or inguinal LAD  MUSCULOSKELETAL: Unremarkable. No bony tenderness.   EXTREMITIES: no edema  PSYCH: Mentation, mood and affect are normal. Decision making capacity is intact    Laboratory/Imaging Studies      Reviewed    ASSESSMENT/PLAN:    Chronic pansinusitis and recurrent pneumonia.  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  She was given IVIG in the past for a couple of years but she did not notice significant improvement in her symptoms although IgG levels were normalized.  Otherwise her CBC/differential is unremarkable.  I would like to check peripheral blood smear.  We will try to get records from [facility].  I recommend follow-up with immunologist to determine if she has some immunodeficiency requiring treatment.  In the past there was some notes which mention eosinophilia but looking back at her labs from last several years I do not find that she had persistent eosinophilia so my suspicion for a primary myeloproliferative hypereosinophilic syndrome is pretty low.  As mentioned above we will check peripheral blood smear.  Serum IgE level is elevated.    Shortness of breath.  She has asthma and she is a chronic a smoker.  She has had recurrent pneumonia.  I strongly encouraged her to stop " smoking and I would recommend that she follows up with pulmonologist.  I gave her referral for that.    Easy bruising/spontaneous bruising.  She could have an underlying bleeding diathesis although for the most part with her previous surgeries she did not have significant bleeding.  She also does not have any other spontaneous bleeding.  I will check a peripheral smear, Comprehensive metabolic panel and baseline coagulation profile- PT, PTT and fibrinogen.  We will check von Willebrand panel and factor XIII levels. I will also check platelet function assay.  Depending on the workup, we can check platelet aggregation,TEG and platelet electron microscopy.    I advised her to avoid aspirin, anti-inflammatory medications, over-the-counter or herbal products as they can affect platelet function.    Fatigue.  It could be multifactorial from fibromyalgia, recurrent chronic pan sinusitis, depression.  I will check TSH and vitamin D screen.  I also believe she would benefit from rheumatology evaluation and I gave her referral for that.    Anticardiolipin IgM antibody positive.  It was mildly positive at 14 in 2011.  I would like to check lupus panel and antibeta-2 glycoprotein antibody and anticardiolipin antibodies.  She does not have any history of clots but previously she received heparin and aspirin during pregnancy and warfarin thereafter.      We will determine the follow-up accordingly.    All questions were answered to her satisfaction.  She is agreeable and comfortable with the plan.    Eunice Heart MD

## 2020-10-21 NOTE — LETTER
10/21/2020         RE: Gracy Bautista  [address] [place] [id number]        Dear Colleague,    Thank you for referring your patient, Gracy Bautista, to the [facility]. Please see a copy of my visit note below.    Hematology initial visit:  Date on this visit: 10/21/2020    Gracy Bautista  is referred by Dr.Holly POOL Jaimes for a hematology consultation. She requires evaluation for chronic sinusitis, easy bruising.    Primary Physician: No Ref-Primary, Physician     History Of Present Illness:  Ms. Bautista is a 54 year old female who has a complicated past medical history and I do not have all the past medical records but I have reviewed her records available to me.  She has a history of severe nasal polyposis and chronic pansinusitis requiring several surgeries in the past, hypogammaglobulinemia who comes in today for evaluation.  She tells me that over the last several years she has had issues with chronic pansinusitis requiring multiple surgeries.  She also has had issues with asthma and pneumonia and she continues to smoke.  About 5 years ago she was noted to have IgG level in high 400s and low 500s so she was given IVIG for about 2-1/2 years but it did not improve her symptoms.  She was also on Nucala for several months about 1-1/2 years ago but it was a stopped because she did not notice much improvement.  She was following with [facility] at that time.  She tells me that over the last several years she has been feeling fatigued although over the last several months it has increased.  She feels exhausted all the time.  She has chronic shortness of breath from asthma.  She continues to have issues with facial pain from sinusitis.  She was diagnosed with fibromyalgia as well as depression.  She has also noticed off and on spontaneous bruising and she bleeds more when she cuts herself.  The bruising is mostly on her forearms.  It usually resolves quickly.  In  the past when she has had surgeries, she does not remember heavy bleeding apart from the first sinus surgery when she developed significant bleeding postoperatively.  She has had 2 C-sections, total abdominal hysterectomy/bilateral salpingo-oophorectomy and 4 sinus surgeries.  She denies any other spontaneous bleeding like melena hematochezia or hematuria or hemoptysis, hematemesis or epistaxis.  She denies any drenching night sweats or significant weight loss.  No new swellings apart from off-and-on bilateral leg swelling.  In the past she had positive anticardiolipin IgM antibody and she was on heparin and aspirin during pregnancy in 2001 and she took warfarin for some time after the delivery.  She never has history of clots.  She notices occasional nausea but denies vomiting.  No mouth sores.        ROS:  A comprehensive ROS was otherwise neg    Past Medical/Surgical History:  No past medical history on file.  No past surgical history on file.     Chronic pansinusitis.  Recurrent pneumonia.  Hypogammaglobulinemia.  Asthma.  Anticardiolipin IgM antibody positive.  Fibromyalgia.  Depression.    Allergies:  Allergies as of 10/21/2020 - Reviewed 10/21/2020   Allergen Reaction Noted     Bee venom Angioedema and Swelling 11/05/2013     Cefdinir GI Disturbance and Other (See Comments) 01/30/2018     Levofloxacin Muscle Pain (Myalgia) 04/30/2019     Prednisone Anxiety and Other (See Comments) 09/07/2012     Current Medications:  Current Outpatient Medications   Medication Sig Dispense Refill     acetaminophen (TYLENOL) 500 MG tablet Take 500-1,000 mg by mouth every 6 hours as needed for mild pain       ADDERALL XR 20 MG 24 hr capsule TK 1 C PO BID       albuterol (PROAIR HFA/PROVENTIL HFA/VENTOLIN HFA) 108 (90 Base) MCG/ACT inhaler Inhale 1-2 puffs into the lungs       CYMBALTA 60 MG capsule        LAMICTAL 200 MG tablet TK 1 T PO D       gentamicin 80 mg in 1000 ml 0.9% NaCl (GARAMYCIN) SOLN nasal irrigation Spray 30  mLs in nostril daily Irrigate 30 mL in each nostril once a day (Patient not taking: Reported on 10/21/2020) 1000 mL 3      Family History:  No family history on file.    Mother had vasculitis and atrial fibrillation.  Her brother has blood clots including pulmonary embolism.  Father had CVA in his 50s.  He was also diabetic which was not well controlled.  She has 2 sons.  1 son has epilepsy and a benign brain tumor.  Other son is healthy.    Social History:  Social History     Socioeconomic History     Marital status:      Spouse name: Not on file     Number of children: Not on file     Years of education: Not on file     Highest education level: Not on file   Occupational History     Not on file   Social Needs     Financial resource strain: Not on file     Food insecurity     Worry: Not on file     Inability: Not on file     Transportation needs     Medical: Not on file     Non-medical: Not on file   Tobacco Use     Smoking status: Current Some Day Smoker     Types: Cigarettes     Smokeless tobacco: Never Used     Tobacco comment: 6 cigarettes per week   Substance and Sexual Activity     Alcohol use: Not on file     Drug use: Not on file     Sexual activity: Not on file   Lifestyle     Physical activity     Days per week: Not on file     Minutes per session: Not on file     Stress: Not on file   Relationships     Social connections     Talks on phone: Not on file     Gets together: Not on file     Attends Yazdanism service: Not on file     Active member of club or organization: Not on file     Attends meetings of clubs or organizations: Not on file     Relationship status: Not on file     Intimate partner violence     Fear of current or ex partner: Not on file     Emotionally abused: Not on file     Physically abused: Not on file     Forced sexual activity: Not on file   Other Topics Concern     Not on file   Social History Narrative     Not on file     She has been a chronic a smoker and currently smokes 3  "cigarettes a day.  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  She lives with her .  She volunteers at [employer].  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    Physical Exam:  /74 (BP Location: Left arm)   Pulse 67   Temp 99  F (37.2  C) (Oral)   Resp 18   Ht 1.651 m (5' 5\")   Wt 58.4 kg (128 lb 11.2 oz)   SpO2 99%   BMI 21.42 kg/m      Wt Readings from Last 4 Encounters:   10/21/20 58.4 kg (128 lb 11.2 oz)   10/05/20 56.7 kg (125 lb)   09/21/20 57.6 kg (127 lb)     CONSTITUTIONAL: no acute distress  EYES: PERRLA, no palor or icterus.   ENT/MOUTH: no mouth lesions. Ears normal  CVS: s1s2 no m r g .   RESPIRATORY: clear to auscultation b/l but has decreased bilateral breath sounds.  GI: soft non tender no hepatosplenomegaly  NEURO: AAOX3  Grossly non focal neuro exam  INTEGUMENT: no obvious rashes  LYMPHATIC: no palpable cervical, supraclavicular, axillary or inguinal LAD  MUSCULOSKELETAL: Unremarkable. No bony tenderness.   EXTREMITIES: no edema  PSYCH: Mentation, mood and affect are normal. Decision making capacity is intact    Laboratory/Imaging Studies      Reviewed    ASSESSMENT/PLAN:    Chronic pansinusitis and recurrent pneumonia.  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  She was given IVIG in the past for a couple of years but she did not notice significant improvement in her symptoms although IgG levels were normalized.  Otherwise her CBC/differential is unremarkable.  I would like to check peripheral blood smear.  We will try to get records from [facility].  I recommend follow-up with immunologist to determine if she has some immunodeficiency requiring treatment.  In the past there was some notes which mention eosinophilia but looking back at her labs from last several years I do not find that she had persistent eosinophilia so my suspicion for a primary myeloproliferative hypereosinophilic " syndrome is pretty low.  As mentioned above we will check peripheral blood smear.  Serum IgE level is elevated.    Shortness of breath.  She has asthma and she is a chronic a smoker.  She has had recurrent pneumonia.  I strongly encouraged her to stop smoking and I would recommend that she follows up with pulmonologist.  I gave her referral for that.    Easy bruising/spontaneous bruising.  She could have an underlying bleeding diathesis although for the most part with her previous surgeries she did not have significant bleeding.  She also does not have any other spontaneous bleeding.  I will check a peripheral smear, Comprehensive metabolic panel and baseline coagulation profile- PT, PTT and fibrinogen.  We will check von Willebrand panel and factor XIII levels. I will also check platelet function assay.  Depending on the workup, we can check platelet aggregation,TEG and platelet electron microscopy.    I advised her to avoid aspirin, anti-inflammatory medications, over-the-counter or herbal products as they can affect platelet function.    Fatigue.  It could be multifactorial from fibromyalgia, recurrent chronic pan sinusitis, depression.  I will check TSH and vitamin D screen.  I also believe she would benefit from rheumatology evaluation and I gave her referral for that.    Anticardiolipin IgM antibody positive.  It was mildly positive at 14 in 2011.  I would like to check lupus panel and antibeta-2 glycoprotein antibody and anticardiolipin antibodies.  She does not have any history of clots but previously she received heparin and aspirin during pregnancy and warfarin thereafter.      We will determine the follow-up accordingly.    All questions were answered to her satisfaction.  She is agreeable and comfortable with the plan.    Eunice Heart MD             Again, thank you for allowing me to participate in the care of your patient.        Sincerely,        Eunice Heart MD

## 2020-10-21 NOTE — PATIENT INSTRUCTIONS
Labs today    Refer to Pulmonology and Rheumatology    Please follow with Immunologist and ENT    We will determine the follow up accordingly

## 2020-10-22 ENCOUNTER — PATIENT OUTREACH (OUTPATIENT)
Dept: ONCOLOGY | Facility: CLINIC | Age: 55
End: 2020-10-22

## 2020-10-22 DIAGNOSIS — M35.9 AUTOIMMUNE DISEASE (H): Primary | ICD-10-CM

## 2020-10-22 DIAGNOSIS — M35.9 AUTOIMMUNE DISEASE (H): ICD-10-CM

## 2020-10-22 DIAGNOSIS — D69.9 HEMORRHAGIC DIATHESIS (H): ICD-10-CM

## 2020-10-22 DIAGNOSIS — D80.8: Primary | ICD-10-CM

## 2020-10-22 DIAGNOSIS — D80.8: ICD-10-CM

## 2020-10-22 LAB
B2 GLYCOPROT1 IGG SERPL IA-ACNC: 0.7 U/ML
B2 GLYCOPROT1 IGM SERPL IA-ACNC: <2.9 U/ML
CARDIOLIPIN ANTIBODY IGG: <1.6 GPL-U/ML (ref 0–19.9)
CARDIOLIPIN ANTIBODY IGM: 9.9 MPL-U/ML (ref 0–19.9)
CLOSURE TME COLL+EPINEP BLD: 91 SEC
COPATH REPORT: NORMAL
DEPRECATED CALCIDIOL+CALCIFEROL SERPL-MC: 34 UG/L (ref 20–75)
FACT VIII ACT/NOR PPP: 149 % (ref 55–200)
VWF CBA/VWF AG PPP IA-RTO: 139 % (ref 50–200)
VWF:AC ACT/NOR PPP IA: 118 % (ref 50–180)

## 2020-10-22 NOTE — PROGRESS NOTES
Patient was contacted that her platelet function closure with reflex was not centrifuged and will need to be redrawn.  Patient will come in today for redraw.  She will not be charged.  Lab is aware.

## 2020-10-23 DIAGNOSIS — R91.8 LUNG NODULES: Primary | ICD-10-CM

## 2020-10-23 LAB
LA PPP-IMP: NEGATIVE
VWF MULTIMERS PPP QL: NORMAL

## 2020-10-23 NOTE — TELEPHONE ENCOUNTER
ONCOLOGY INTAKE: Records Information      APPT INFORMATION:  Referring provider:  WILFRED  Referring provider s clinic:  [facility]  Reason for visit/diagnosis:  Per FLACO [facility]no IB schedule pt for lung nodule and chest CT  Has patient been notified of appointment date and time?: Yes    RECORDS INFORMATION:  Were the records received with the referral (via [employer])? No    Has patient been seen for any external appt for this diagnosis? No    If yes, where? NA    ADDITIONAL INFORMATION:  None

## 2020-10-26 NOTE — PROGRESS NOTES
RHEUMATOLOGY INITIAL CONSULT NOTE    Chief Complaint:    Chief Complaint   Patient presents with     Consult     Possible lupus        Reason for consult: Dr. Heart from hematology/oncology has requested consultation for this patient for evaluation of underlying autoimmune disease    History of Present Illness:    Ms Gracy Bautista is a 55 yo female with pmhx of chronic sinusitis, PNA, nasal polyposis, asthma (diagnosed as an adult ~6 years ago), immunoglobulin (IgG) deficiency, eosinophilia, elevated IgE, severe OA of bilateral hands s/p R CMC suspension arthroplasty and R 3rrd PIP arthroplasty last year, referred to rheumatology for evaluation of autoimmune disease. Pt has a complex medical history and has been evaluated by three other rheumatologists in the past. Pt was diagnosed with asthma around 6 years ago when she presented with severe SOB and went to the ED.   She was seen by ENT most recently on 10/5 and underwent cx of her sinuses, which returned negative. She has been seen by neurology for R hand and R foot hand numbness (felt to be 2/2 R CTS, chronic C7 and chronic L5 radiculopathy) and pulmonology for pulmonary nodules and rheumatology at [facility]. She was started on Nucala (for her sinus issues and her eosinophilia per pt) around 2 years ago and was on it for ~6 months ago, and has been off since 1-1.5 years as she did not feel like it helped her with her sinus infections or asthma symptoms. Pt reports that the biggest thing that is bothering her is b/l leg pain from knee down. She was having lower leg pain 1-2 years ago too and Nucala did not help with that. She feels very weak in the morning. She states she has depression but she does not feel well physically. She has been trying to wear stockings recently and reports that the touch of that made her feel very sensitive. Cold makes her ache in her legs and her hands. L CMC joint pain is worse in the cold too. She does not feel that her asthma is  "active but she used her rescue inhaler several times last week. She is not on any other inhalers. She is scheduled to see pulmonology regarding her pulmonary nodules. She had an episode of weakness in her arms due to weakness while washing her hair. She is able to climb the stairs but it's an effort for her. She states these symptoms of weakness are not persistent but happen on a daily basis, worse in the morning. She also mentions some burning pain in her head sometimes that comes on when she has sinus issues. Pt reports some \"brain zaps\" feeling in her head and feeling \"foggy\" since January, similar to if someone is getting off duloxetine. She is still on duloxetine.  She has tried taking tylenol arthritis strength 1300 mg daily, which helps a little with the pain but not so much. She reports tylenol gives her diarrhea.     Per pt, she has been diagnosed with seronegative Sjogren's by ophthalmology. She likes to do yoga, walking, gardening and biking to get her exercise.     She has had extensive work-up in the past and has had negative BRICE, SSA, SSB, RF, RNP, Smith, Scl-70, C-ANCA, p-ANCA, MPO, PR3. She was previously prescribed methotrexate and HCQ but pt reports she never took it. She has also been on IVIG infusions as well as Nucala in the past, but is currently off all those medications.    OB/GYN hx: 2 living children through IVF, hx of 3 miscarriages (all in the first trimester), denies any personal hx of blood clots    Brief Rheumatological History:  Previous Rheumatologists: Dr Helen Moseley, Dr Isha Belle ([facility]), Dr Haroldo Koch ("[employer] [facility] -6/4/2019)     Per Dr Haroldo Koch's note: \"HPI: I have seen her periodically over the years. She has a history of fibromyalgia and bipolar disorder. In the past she has been on Cymbalta, Lyrica, Depakote, trazodone, cyclobenzaprine, gabapentin, Mirapex. She has a history of alcoholism and has been involved with Alcoholics Anonymous. She had " "concerns about Sjogren's but BRICE, SSA, SSB were negative. She has had some livedo changes and acrocyanosis but as noted, connective tissue disease workup has been negative. She has osteoarthritis of the hands, has had prior cortisone injections in several joints.. She continues to follow with psychiatry for bipolar disorder, PTSD and ADHD and past history of alcohol dependence. I reviewed some outside records from [facility]. 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 It was not felt that her arthritis was related to the acquired hypogammaglobulinemia in fact, clinical findings were suggestive of early degenerative arthritis. She was treated for a while with IVIG. She does have some dryness of the eyes. It was felt she may have seronegative Sjogren's, was given Plaquenil but did not tolerate it. Outside lab work from 2014 showed normal CBC, sedimentation rate of 2, uric acid 4.4, normal creatinine, mildly low IgG level, negative rheumatoid factor, normal CRP, negative CCP, negative BRICE, negative GUNNER. X-rays of the hand showed mild scattered degenerative arthritis, similar findings on the right knee.\"    She reestablished care [facility] 10, 2017. She has been getting care done at [facility] where they diagnosed her with an ill-defined seronegative inflammatory arthritis because of visible swelling at the right 3rd PIP. However my impression was that she had osteoarthritis. She was getting local cortisone injections. She still has some chronic leg pain, etiology uncertain. She had been on Plaquenil in the past, did not tolerate it and she has hypogammaglobulinemia so I did not feel we should use immunosuppressive drug. We stopped the IVIG. Creatinine, CBC were normal, Lyme negative, undetectable CRP, IgG level looked excellent at 1005, negative rheumatoid factor, negative CCP, negative " HLA-B27.    We have injected some of her joints for osteoarthritic pain.    Her IgG level has remained in the normal range.    From an arthritis standpoint she is again having pain at the right 1st CMC and right 3rd PIP joints. At last visit, those joints were injected.    She has been getting a lot of care down at [place] or she had recurrence of significant nasal sinus polyposis and chronic sinusitis and a culture growing Pseudomonas. She was placed on Bactrim and prednisone and had another sinus surgery. She says she will do her very best to avoid any further sinus surgery. She has been told to avoid NSAIDs as it may aggravate her nasal sinus polyposis. She does have an elevated IgE level and leukotriene E4 level. They have worked her up extensively for rheumatic disease including ANCA levels and so forth and those were negative. Sed rate was 3 in 2018.    She continues to be treated by allergy at [facility] and more recently at [facility]. They gave her a trial of Nucala. I did check her ANCA which was negative.       Per Dr Belle's note:  garding the specific questions for me today,   1. Regarding a diagnosis of seronegative Sjogren's syndrome.   She would not meet 2016 ACR Sjogren's criteria at this time given her negative SSA, no documented evidence of positive ocular staining score or Schirmer's test, nor saliva flow rate measurements or positive labial salivary gland biopsy. There has been question brought up along the way for a minor salivary gland lip biopsy. I did offer this to the patient today through our ENT services. She is interested in pursuing this, therefore referral placed. I do think even if she were to have a positive lip biopsy and a diagnosis of seronegative Sjogren's syndrome was made, I do not think this would significantly change treatment strategies at this time, as treatment is typically symptomatic. I would not offer additional immunomodulatory/immunosuppressive therapy  to her if seronegative Sjogren's was diagnosed at this time. I did discuss options including cevimeline or pilocarpine to improve dry mouth. She is interested in trying one of these, therefore she was given a prescription for cevimeline, with recommendations to try once or twice daily as needed to ensure tolerance, and increasing up to 3-4x daily as needed. Reviewed with her conservative strategies including over the counter biotene products, etc, many of which she is already doing. She was encouraged to continue close follow up with her dentist every 6 months.     2. Regarding an autoimmune rheumatic component to her chronic eosinophilic rhinosinusitis.  I do not see that her allergiest/immunologist nor ENT physician have been classifying her disease as a vasculitis (primarily EGPA). She has however had transient pulmonary nodules on chest CT, she does carry a diagnosis of asthma, she has had greater than 10% eosinophilia, and she does have paranasal sinus abnormality, which can suggest EGPA. She has not had a biopsy showing eosinophilic granulomatous vasculitis nor does she have documented mononeuropathy/polyneuropathy (although is now complaining of bilateral upper extremity paresthesias). Given her new paresthesias, I would recommend bilateral upper extremity EMG. I agree with allergy/immunology with the referral to pulmonary and agree with consideration of updating PFT's and repeating CT chest. Regarding her progression in proptosis, it is reassuring a recent CT sinuses does not suggest periorbital/retro-orbital mass. I do agree with ophthalmology follow up. I would seek the above work up and would reconvene with her ENT, allergist, and pulmonlogist before considering shift in management strategies toward a stronger immunosuppressive strategy (such as higher dose nucala).       Past Medical History:   Chronic sinusitis  PNA  nasal polyposis  asthma (diagnosed as an adult ~6 years ago)  immunoglobulin (IgG)  deficiency  Eosinophilia  elevated IgE  severe OA of bilateral hands s/p R CMC suspension arthroplasty and R 3rrd PIP arthroplasty    Past Surgical History:    R CMC suspension arthroplasty and R 3rrd PIP arthroplasty    Total abdominal hysterectomy  Tonsillectomy    Family History:   Mother: hx of vasculitis, GPA, A-fib  Younger brother: PE, A-fib  Denies any known hx of vasculitis  Father:  from a stroke at age of 46    Social History:   Social History     Socioeconomic History     Marital status:      Spouse name: Not on file     Number of children: Not on file     Years of education: Not on file     Highest education level: Not on file   Occupational History     Not on file   Social Needs     Financial resource strain: Not on file     Food insecurity     Worry: Not on file     Inability: Not on file     Transportation needs     Medical: Not on file     Non-medical: Not on file   Tobacco Use     Smoking status: Current Some Day Smoker     Types: Cigarettes     Smokeless tobacco: Never Used     Tobacco comment: 6 cigarettes per week   Substance and Sexual Activity     Alcohol use: Not on file     Drug use: Not on file     Sexual activity: Not on file   Lifestyle     Physical activity     Days per week: Not on file     Minutes per session: Not on file     Stress: Not on file   Relationships     Social connections     Talks on phone: Not on file     Gets together: Not on file     Attends Zoroastrianism service: Not on file     Active member of club or organization: Not on file     Attends meetings of clubs or organizations: Not on file     Relationship status: Not on file     Intimate partner violence     Fear of current or ex partner: Not on file     Emotionally abused: Not on file     Physically abused: Not on file     Forced sexual activity: Not on file   Other Topics Concern     Not on file   Social History Narrative     Not on file     Alcohol use: none  Tobacco use: current smokers, 4 cigs/day;  smoker since age of 17  Occupational hx: currently doing volunteer work  Denies any illicit drug use    Allergies   Allergen Reactions     Bee Venom Angioedema and Swelling     Other reaction(s): Angioedema       Cefdinir GI Disturbance and Other (See Comments)     diarrhea  diarrhea       Levofloxacin Muscle Pain (Myalgia)     Prednisone Anxiety and Other (See Comments)     Hyperactivity and moodiness          There is no immunization history on file for this patient.       Medications:  Current Outpatient Medications   Medication Sig Dispense Refill     acetaminophen (TYLENOL) 500 MG tablet Take 500-1,000 mg by mouth every 6 hours as needed for mild pain       ADDERALL XR 20 MG 24 hr capsule TK 1 C PO BID       albuterol (PROAIR HFA/PROVENTIL HFA/VENTOLIN HFA) 108 (90 Base) MCG/ACT inhaler Inhale 1-2 puffs into the lungs       CYMBALTA 60 MG capsule        LAMICTAL 200 MG tablet TK 1 T PO D       gentamicin 80 mg in 1000 ml 0.9% NaCl (GARAMYCIN) SOLN nasal irrigation Spray 30 mLs in nostril daily Irrigate 30 mL in each nostril once a day (Patient not taking: Reported on 10/21/2020) 1000 mL 3     REVIEW OF SYSTEMS:  Constitutional: Denies fevers, chills, +fatigue, denies weight loss or night sweats  HEENT: +headaches and fullness with her sinus infections, denies blurry vision, redness, drainage, dry eyes, dry mouth, oral/nasal ulcers, hair loss/thinning, +chronic sinus infections  Dermatologic: Reports hx of UE rash that she describes as purplish/red, non-raised, dime sized, some are smaller and some are bigger lesions that do not pro (she does not have picture today) off and on for 7 years. She has some hypopigmented scarring over her UEs recently, most recent episode was 1.5 months ago. She denies any pain or burning with it. skin rash, raynaud's  Respiratory: Denies cough, +shortness of breath w exertion, denies pleurisy  Cardiovascular: Denies chest pain, edema, lightheadedness, dizziness  Gastrointestinal:  "Denies heartburn, difficulty swallowing, abdominal pain, nausea, vomiting, diarrhea, constipation, +intermittent bloating, denies hematochezia or melena  Genitourinary: Denies dysuria or hematuria  Musculoskeletal: see HPI, denies back pain, dactylitis, enthesitis  Neurologic: +intermittent numbness in hands and feet sometimes, +intermittent weakness, reports hx of R carpal tunnel repair last year, denies tingling, falls    PHYSICAL EXAMINATION:   Vital signs:  /72   Pulse 67   Ht 1.648 m (5' 4.88\")   Wt 58.3 kg (128 lb 9.6 oz)   SpO2 100%   BMI 21.48 kg/m      Gen: alert, awake, NAD  Skin: warm, dry, no rashes, LUE with scattered areas of hypopigmentation  HEENT: EOMI, PERRL, MMM, no oral ulcers/lesions, no alopecia  CV: RRR, normal s1 and s2, no m/r/g  Pulm: CTAB, non-labored respirations, no c/r/w  GI: +BS, soft, no TTP  MSK: R 3rd and 4th PIP synovitis (4th >3rd), R CMC and L CMC tenderness, no synovitis of any other joints, no effusions  Neuro: A&O x3, no focal deficits, per pt sensation to superficial touch over dorsal aspects of her b/l feet feels decreased than her thighs, sensation to superficial touch feels decreased over her shins compared to the thighs, she reports the shins and feet feel \"hard\", strength 5/5 throughout  Psych: pleasant, cooperative    Labs:      Antiphospholipid Antibodies  Recent Labs   Lab Test 10/21/20  1353   B2GPG 0.7   B2GPM <2.9   CARDG <1.6   CARDM 9.9   LUPINT Negative     IgG  Recent Labs   Lab Test 09/21/20  1517   *   IGG1 354*   IGG2 124*   IGG3 29   IGG4 2*      *     CBC  Recent Labs   Lab Test 10/21/20  1354 09/21/20  1517   WBC 9.1 9.7   RBC 4.31 4.37   HGB 14.6 14.9   HCT 42.2 43.5   MCV 98 100   RDW 12.8 13.0    363   MCH 33.9* 34.1*   MCHC 34.6 34.3   NEUTROPHIL 64.6 61.9   LYMPH 27.3 28.0   MONOCYTE 5.1 5.9   EOSINOPHIL 2.4 3.3   BASOPHIL 0.3 0.6   ANEU 5.8 6.0   ALYM 2.5 2.7   MARY JO 0.5 0.6   AEOS 0.2 0.3   ABAS 0.0 0.1 "     CMP  Recent Labs   Lab Test 09/21/20  1518      POTASSIUM 4.0   CHLORIDE 106   CO2 26   ANIONGAP 7   GLC 87   BUN 18   CR 0.78   GFRESTIMATED 84   GFRESTBLACK >90   YOAN 9.2     Calcium/VitaminD  Recent Labs   Lab Test 10/21/20  1353 09/21/20  1518   YOAN  --  9.2   VITDT 34  --      ESR/CRP  Recent Labs   Lab Test 09/21/20  1517   CRP <2.9     TSH/T4  Recent Labs   Lab Test 10/21/20  1353 08/27/13  1130   TSH 0.65 0.66     Component      Latest Ref Rng & Units 10/21/2020 10/22/2020   Cardiolipin Antibody IgG      0.0 - 19.9 GPL-U/mL <1.6    Cardiolipin Antibody IgM      0.0 - 19.9 MPL-U/mL 9.9    Lupus Result      NEG:Negative Negative    Beta 2 Glycoprotein 1 Antibody IgM      <7 U/mL <2.9    Beta 2 Glycoprotein 1 Antibody IgG      <7 U/mL 0.7    PLT Funct COL/EPI      <170 sec  91     10/14/18:  IgE elevated (245)  ANCA, MPO, PR3 negative    2/1/19:  ANCA negative    Histopathology:  FINAL DIAGNOSIS 8/2018 surgical path sinus  A.  Sinonasal cavity, right sinonasal contents, curettage:   Polypoid sinonasal mucosa with acute and chronic   inflammation and numerous eosinophils.   B.  Sinonasal cavity, left sinonasal contents, curettage:   Polypoid sinonasal mucosa with acute and chronic   inflammation, numerous eosinophils and eosinophilic mucin.   Special stain (Grocott's methenamine silver) performed on   paraffin sections (blocks B3 and B4) is negative for fungal   Organisms.    4/1/19: surgical path, sinuses  FINAL DIAGNOSIS   A. Sinonasal cavity, bilateral contents, excision:   Sinonasal mucosa with moderate chronic inflammation   including eosinophils.     Component      Latest Ref Rng & Units 9/21/2020   IGG      610 - 1,616 mg/dL 586 (L)   IgG1      382 - 929 mg/dL 354 (L)   IgG2      242 - 700 mg/dL 124 (L)   IgG3      22 - 176 mg/dL 29   IgG4      4 - 86 mg/dL 2 (L)   IGA      84 - 499 mg/dL 128   IGM      35 - 242 mg/dL 244 (H)   CRP Inflammation      0.0 - 8.0 mg/L <2.9   IGE      0 - 114 KIU/L  "233 (H)           Procedures:  EMG (5/24/19)  CLINICAL INTERPRETATION: Abnormal study. There is electrodiagnostic evidence of: 1) an old,   inactive right L5 radiculopathy; and 2) a mild median mononeuropathy at the right wrist (carpal   tunnel syndrome). The borderline low amplitude medial plantar sensory response is of uncertain   significance, but in this clinical context could be in keeping with a very mild distal sensory   peripheral neuropathy. The mild neurogenic motor unit potentials in the right triceps in isolation   could be due to local factors (i.e. prior trauma to the arm) or represent sequelae of an old C7   radiculopathy.      Imaging: CT facial bones (9/23/2020)  Impression:  1. Postsurgical changes of functional endoscopic sinus surgery which  includes bilateral medial antrectomies, bilateral sphenoidotomies,  near complete ethmoidectomies.  2. Continued pansinusitis with residual mucosal thickening, most  pronounced within the superior ethmoid sinuses and frontal sinuses.  Complete opacification of the left frontoethmoidal recess.    Assessment / Plan:    Ms Gracy Bautista is a 53 yo female with pmhx of chronic sinusitis, PNA, nasal polyposis, asthma (diagnosed as an adult ~6 years ago), immunoglobulin (IgG) deficiency, eosinophilia, elevated IgE, severe OA of bilateral hands s/p R CMC suspension arthroplasty and R 3rrd PIP arthroplasty last year, referred to rheumatology for evaluation of autoimmune disease. Pt has a complex medical history and I am the fourth rheumatologist involved in her care. She describes vague symptoms today, particularly b/l LE pain and \"legs feeling hard\" along with \"brain zaps\" and subjective weakness of her UEs and LEs. She has prior hx of immunodeficiency (IgG), chronic sinusitis with eosinophilia on sinus biopsies, and peripheral eosinophilia few years ago, but does not carry a diagnosis of vasculitis. She describes a rash over her UEs which sounds like palpable " purpura; unfortunately, she does not have any pictures of the rash today and does not have a rash on my exam. Etiology of her b/l LE pain/abnormal sensation and transient weakness is unclear. She underwent a recent EMG at [facility] on 5/2019 which showed R CTS and very mild distal sensory peripheral neuropathy. She does not have any symptoms of CTS anymore since her surgery. Constellation of hx of adult onset asthma, peripheral eosinophilia (though no recent eosinophilia noted despite being off Nucala for over a year), elevated IgE, chronic sinusitis, pulmonary nodules, possible purpuric rash, abnormal sensation in LE, and her hand arthritis (though she has been diagnosed with severe OA at [facility] [place] [facility]) makes me suspicious for possible EGPA. However, she has not noted significant symptom improvement with prior treatment of Nucala. She is also hesitant to be on many medications and prefers to keep testing and medication therapies to a minimum if possible. We discussed the possibility of this diagnosis and discussed work-up. She has had ANCA, MPO and PR3 checked on several occasions and they have returned negative, so I'm unsure if it needs to be repeated again today. Her recent inflammatory markers have been negative, which makes me less suspicious for active vasculitic process. At this time, it would be helpful to has histopathology to look for vasculitis - possible sites would include skin biopsy if she has recurrence of her rash vs lung nodule vs nerve vs renal if she has evidence of active sediment in the urine. She is agreeable to the current plan of care.    1. B/l LE pain, transient weakness, abnormal sensation in LEs  -check c3, c4, UA, UPC, SPEP, CK today. No eosinophilia noted on her most recent CBC  -repeat EMG LE pending above labs, will discuss neurology referral with patient for further evaluation of her symptoms  -she is going to have a CT scan soon and is going to see pulmonary soon which I think is  a great idea given that she reports she continues to have SOB on exertion    2. R hand PIP swelling on exam  -has been evaluated for this at [facility] who felt that her symptoms are consistent with severe OA  -x-rays of hands to evaluate for inflammatory changes    3. Hx of rash  -possible purpura?  -advised pt to inform me if she has another episode of rash over her UEs so we can refer her to dermatology and potentially get a biopsy for more information    4. Hx of (IgG) immunodeficiency  -was recently seen by hematology  -advised pt to re-establish with allergy/immunology    RTC 2 months, sooner LUCIA Treviño MD

## 2020-10-28 ENCOUNTER — ANCILLARY PROCEDURE (OUTPATIENT)
Dept: GENERAL RADIOLOGY | Facility: CLINIC | Age: 55
End: 2020-10-28
Attending: STUDENT IN AN ORGANIZED HEALTH CARE EDUCATION/TRAINING PROGRAM
Payer: COMMERCIAL

## 2020-10-28 ENCOUNTER — OFFICE VISIT (OUTPATIENT)
Dept: RHEUMATOLOGY | Facility: CLINIC | Age: 55
End: 2020-10-28
Attending: INTERNAL MEDICINE
Payer: COMMERCIAL

## 2020-10-28 ENCOUNTER — ANCILLARY PROCEDURE (OUTPATIENT)
Dept: CT IMAGING | Facility: CLINIC | Age: 55
End: 2020-10-28
Payer: COMMERCIAL

## 2020-10-28 VITALS
OXYGEN SATURATION: 100 % | HEART RATE: 67 BPM | DIASTOLIC BLOOD PRESSURE: 72 MMHG | WEIGHT: 128.6 LBS | SYSTOLIC BLOOD PRESSURE: 117 MMHG | HEIGHT: 65 IN | BODY MASS INDEX: 21.43 KG/M2

## 2020-10-28 DIAGNOSIS — R91.8 LUNG NODULES: ICD-10-CM

## 2020-10-28 DIAGNOSIS — M35.9 AUTOIMMUNE DISEASE (H): ICD-10-CM

## 2020-10-28 LAB
ALBUMIN UR-MCNC: NEGATIVE MG/DL
APPEARANCE UR: CLEAR
BILIRUB UR QL STRIP: NEGATIVE
CK SERPL-CCNC: 109 U/L (ref 30–225)
COLOR UR AUTO: NORMAL
CREAT UR-MCNC: 28 MG/DL
FACT XIII AG ACT/NOR PPP IA: 102 % (ref 75–155)
GLUCOSE UR STRIP-MCNC: NEGATIVE MG/DL
HGB UR QL STRIP: NEGATIVE
KETONES UR STRIP-MCNC: NEGATIVE MG/DL
LEUKOCYTE ESTERASE UR QL STRIP: NEGATIVE
NITRATE UR QL: NEGATIVE
PH UR STRIP: 6.5 PH (ref 5–7)
PROT UR-MCNC: <0.05 G/L
PROT/CREAT 24H UR: NORMAL G/G CR (ref 0–0.2)
RADIOLOGIST FLAGS: NORMAL
RBC #/AREA URNS AUTO: NORMAL /HPF
SOURCE: NORMAL
SP GR UR STRIP: 1.01 (ref 1–1.03)
UROBILINOGEN UR STRIP-MCNC: NORMAL MG/DL (ref 0–2)
WBC #/AREA URNS AUTO: NORMAL /HPF

## 2020-10-28 PROCEDURE — 82550 ASSAY OF CK (CPK): CPT | Performed by: STUDENT IN AN ORGANIZED HEALTH CARE EDUCATION/TRAINING PROGRAM

## 2020-10-28 PROCEDURE — 84156 ASSAY OF PROTEIN URINE: CPT | Performed by: STUDENT IN AN ORGANIZED HEALTH CARE EDUCATION/TRAINING PROGRAM

## 2020-10-28 PROCEDURE — 36415 COLL VENOUS BLD VENIPUNCTURE: CPT | Performed by: STUDENT IN AN ORGANIZED HEALTH CARE EDUCATION/TRAINING PROGRAM

## 2020-10-28 PROCEDURE — 99204 OFFICE O/P NEW MOD 45 MIN: CPT | Performed by: STUDENT IN AN ORGANIZED HEALTH CARE EDUCATION/TRAINING PROGRAM

## 2020-10-28 PROCEDURE — 84165 PROTEIN E-PHORESIS SERUM: CPT | Performed by: PATHOLOGY

## 2020-10-28 PROCEDURE — 99N1036 PR STATISTIC TOTAL PROTEIN: Performed by: STUDENT IN AN ORGANIZED HEALTH CARE EDUCATION/TRAINING PROGRAM

## 2020-10-28 PROCEDURE — 86160 COMPLEMENT ANTIGEN: CPT | Performed by: STUDENT IN AN ORGANIZED HEALTH CARE EDUCATION/TRAINING PROGRAM

## 2020-10-28 PROCEDURE — 81001 URINALYSIS AUTO W/SCOPE: CPT | Performed by: STUDENT IN AN ORGANIZED HEALTH CARE EDUCATION/TRAINING PROGRAM

## 2020-10-28 PROCEDURE — 73130 X-RAY EXAM OF HAND: CPT | Mod: LT | Performed by: RADIOLOGY

## 2020-10-28 PROCEDURE — 71250 CT THORAX DX C-: CPT | Performed by: RADIOLOGY

## 2020-10-28 ASSESSMENT — MIFFLIN-ST. JEOR: SCORE: 1177.33

## 2020-10-28 ASSESSMENT — PAIN SCALES - GENERAL: PAINLEVEL: MODERATE PAIN (4)

## 2020-10-28 NOTE — PROGRESS NOTES
"Gracy Bautista's goals for this visit include: Consult   She requests these members of her care team be copied on today's visit information:     PCP: No Ref-Primary, Physician    Referring Provider:  Eunice Heart MD  [address] [place] [id number]    /72   Pulse 67   Ht 1.648 m (5' 4.88\")   Wt 58.3 kg (128 lb 9.6 oz)   SpO2 100%   BMI 21.48 kg/m      Do you need any medication refills at today's visit? No    Patricia Aquino CMA    "

## 2020-10-28 NOTE — PATIENT INSTRUCTIONS
1. Please get blood work today and hand x-rays  2. Next time you have an episode of skin rash and take a picture of it. We would like you to see dermatology when that happens so we can get a biopsy  3. I will contact you if we need to do any further tests  4. Return to clinic in 2 months

## 2020-10-28 NOTE — LETTER
10/28/2020        RE: rGacy Bautista  [address] [place] [id number]        RHEUMATOLOGY INITIAL CONSULT NOTE    Chief Complaint:    Chief Complaint   Patient presents with     Consult     Possible lupus        Reason for consult: Dr. Heart from hematology/oncology has requested consultation for this patient for evaluation of underlying autoimmune disease    History of Present Illness:    Ms Gracy Bautista is a 55 yo female with pmhx of chronic sinusitis, PNA, nasal polyposis, asthma (diagnosed as an adult ~6 years ago), immunoglobulin (IgG) deficiency, eosinophilia, elevated IgE, severe OA of bilateral hands s/p R CMC suspension arthroplasty and R 3rrd PIP arthroplasty last year, referred to rheumatology for evaluation of autoimmune disease. Pt has a complex medical history and has been evaluated by three other rheumatologists in the past. Pt was diagnosed with asthma around 6 years ago when she presented with severe SOB and went to the ED.   She was seen by ENT most recently on 10/5 and underwent cx of her sinuses, which returned negative. She has been seen by neurology for R hand and R foot hand numbness (felt to be 2/2 R CTS, chronic C7 and chronic L5 radiculopathy) and pulmonology for pulmonary nodules and rheumatology at [facility]. She was started on Nucala (for her sinus issues and her eosinophilia per pt) around 2 years ago and was on it for ~6 months ago, and has been off since 1-1.5 years as she did not feel like it helped her with her sinus infections or asthma symptoms. Pt reports that the biggest thing that is bothering her is b/l leg pain from knee down. She was having lower leg pain 1-2 years ago too and Nucala did not help with that. She feels very weak in the morning. She states she has depression but she does not feel well physically. She has been trying to wear stockings recently and reports that the touch of that made her feel very sensitive. Cold makes her ache in her legs and her  "hands. L CMC joint pain is worse in the cold too. She does not feel that her asthma is active but she used her rescue inhaler several times last week. She is not on any other inhalers. She is scheduled to see pulmonology regarding her pulmonary nodules. She had an episode of weakness in her arms due to weakness while washing her hair. She is able to climb the stairs but it's an effort for her. She states these symptoms of weakness are not persistent but happen on a daily basis, worse in the morning. She also mentions some burning pain in her head sometimes that comes on when she has sinus issues. Pt reports some \"brain zaps\" feeling in her head and feeling \"foggy\" since January, similar to if someone is getting off duloxetine. She is still on duloxetine.  She has tried taking tylenol arthritis strength 1300 mg daily, which helps a little with the pain but not so much. She reports tylenol gives her diarrhea.     Per pt, she has been diagnosed with seronegative Sjogren's by ophthalmology. She likes to do yoga, walking, gardening and biking to get her exercise.     She has had extensive work-up in the past and has had negative BRICE, SSA, SSB, RF, RNP, Smith, Scl-70, C-ANCA, p-ANCA, MPO, PR3. She was previously prescribed methotrexate and HCQ but pt reports she never took it. She has also been on IVIG infusions as well as Nucala in the past, but is currently off all those medications.    OB/GYN hx: 2 living children through IVF, hx of 3 miscarriages (all in the first trimester), denies any personal hx of blood clots    Brief Rheumatological History:  Previous Rheumatologists: Dr Helen Moseley, Dr Isha Belle ([facility]), Dr Haroldo Koch ([facility] -6/4/2019)     Per Dr Haroldo Koch's note: \"HPI: I have seen her periodically over the years. She has a history of fibromyalgia and bipolar disorder. In the past she has been on Cymbalta, Lyrica, Depakote, trazodone, cyclobenzaprine, gabapentin, Mirapex. She " "has a history of alcoholism and has been involved with Alcoholics Anonymous. She had concerns about Sjogren's but BRICE, SSA, SSB were negative. She has had some livedo changes and acrocyanosis but as noted, connective tissue disease workup has been negative. She has osteoarthritis of the hands, has had prior cortisone injections in several joints.. She continues to follow with psychiatry for bipolar disorder, PTSD and ADHD and past history of alcohol dependence. I reviewed some outside records from [facility]. 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 It was not felt that her arthritis was related to the acquired hypogammaglobulinemia in fact, clinical findings were suggestive of early degenerative arthritis. She was treated for a while with IVIG. She does have some dryness of the eyes. It was felt she may have seronegative Sjogren's, was given Plaquenil but did not tolerate it. Outside lab work from 2014 showed normal CBC, sedimentation rate of 2, uric acid 4.4, normal creatinine, mildly low IgG level, negative rheumatoid factor, normal CRP, negative CCP, negative BRICE, negative GUNNER. X-rays of the hand showed mild scattered degenerative arthritis, similar findings on the right knee.\"    She reestablished care [facility] 10, 2017. She has been getting care done at [facility] where they diagnosed her with an ill-defined seronegative inflammatory arthritis because of visible swelling at the right 3rd PIP. However my impression was that she had osteoarthritis. She was getting local cortisone injections. She still has some chronic leg pain, etiology uncertain. She had been on Plaquenil in the past, did not tolerate it and she has hypogammaglobulinemia so I did not feel we should use immunosuppressive drug. We stopped the IVIG. Creatinine, CBC were normal, Lyme negative, undetectable CRP, IgG level " looked excellent at 1005, negative rheumatoid factor, negative CCP, negative HLA-B27.    We have injected some of her joints for osteoarthritic pain.    Her IgG level has remained in the normal range.    From an arthritis standpoint she is again having pain at the right 1st CMC and right 3rd PIP joints. At last visit, those joints were injected.    She has been getting a lot of care down at [place] or she had recurrence of significant nasal sinus polyposis and chronic sinusitis and a culture growing Pseudomonas. She was placed on Bactrim and prednisone and had another sinus surgery. She says she will do her very best to avoid any further sinus surgery. She has been told to avoid NSAIDs as it may aggravate her nasal sinus polyposis. She does have an elevated IgE level and leukotriene E4 level. They have worked her up extensively for rheumatic disease including ANCA levels and so forth and those were negative. Sed rate was 3 in 2018.    She continues to be treated by allergy at [facility] and more recently at [facility]. They gave her a trial of Nucala. I did check her ANCA which was negative.       Per Dr Belle's note:  garding the specific questions for me today,   1. Regarding a diagnosis of seronegative Sjogren's syndrome.   She would not meet 2016 ACR Sjogren's criteria at this time given her negative SSA, no documented evidence of positive ocular staining score or Schirmer's test, nor saliva flow rate measurements or positive labial salivary gland biopsy. There has been question brought up along the way for a minor salivary gland lip biopsy. I did offer this to the patient today through our ENT services. She is interested in pursuing this, therefore referral placed. I do think even if she were to have a positive lip biopsy and a diagnosis of seronegative Sjogren's syndrome was made, I do not think this would significantly change treatment strategies at this time, as treatment is typically  symptomatic. I would not offer additional immunomodulatory/immunosuppressive therapy to her if seronegative Sjogren's was diagnosed at this time. I did discuss options including cevimeline or pilocarpine to improve dry mouth. She is interested in trying one of these, therefore she was given a prescription for cevimeline, with recommendations to try once or twice daily as needed to ensure tolerance, and increasing up to 3-4x daily as needed. Reviewed with her conservative strategies including over the counter biotene products, etc, many of which she is already doing. She was encouraged to continue close follow up with her dentist every 6 months.     2. Regarding an autoimmune rheumatic component to her chronic eosinophilic rhinosinusitis.  I do not see that her allergiest/immunologist nor ENT physician have been classifying her disease as a vasculitis (primarily EGPA). She has however had transient pulmonary nodules on chest CT, she does carry a diagnosis of asthma, she has had greater than 10% eosinophilia, and she does have paranasal sinus abnormality, which can suggest EGPA. She has not had a biopsy showing eosinophilic granulomatous vasculitis nor does she have documented mononeuropathy/polyneuropathy (although is now complaining of bilateral upper extremity paresthesias). Given her new paresthesias, I would recommend bilateral upper extremity EMG. I agree with allergy/immunology with the referral to pulmonary and agree with consideration of updating PFT's and repeating CT chest. Regarding her progression in proptosis, it is reassuring a recent CT sinuses does not suggest periorbital/retro-orbital mass. I do agree with ophthalmology follow up. I would seek the above work up and would reconvene with her ENT, allergist, and pulmonlogist before considering shift in management strategies toward a stronger immunosuppressive strategy (such as higher dose nucala).       Past Medical History:   Chronic  sinusitis  PNA  nasal polyposis  asthma (diagnosed as an adult ~6 years ago)  immunoglobulin (IgG) deficiency  Eosinophilia  elevated IgE  severe OA of bilateral hands s/p R CMC suspension arthroplasty and R 3rrd PIP arthroplasty    Past Surgical History:    R CMC suspension arthroplasty and R 3rrd PIP arthroplasty    Total abdominal hysterectomy  Tonsillectomy    Family History:   Mother: hx of vasculitis, GPA, A-fib  Younger brother: PE, A-fib  Denies any known hx of vasculitis  Father:  from a stroke at age of 46    Social History:   Social History     Socioeconomic History     Marital status:      Spouse name: Not on file     Number of children: Not on file     Years of education: Not on file     Highest education level: Not on file   Occupational History     Not on file   Social Needs     Financial resource strain: Not on file     Food insecurity     Worry: Not on file     Inability: Not on file     Transportation needs     Medical: Not on file     Non-medical: Not on file   Tobacco Use     Smoking status: Current Some Day Smoker     Types: Cigarettes     Smokeless tobacco: Never Used     Tobacco comment: 6 cigarettes per week   Substance and Sexual Activity     Alcohol use: Not on file     Drug use: Not on file     Sexual activity: Not on file   Lifestyle     Physical activity     Days per week: Not on file     Minutes per session: Not on file     Stress: Not on file   Relationships     Social connections     Talks on phone: Not on file     Gets together: Not on file     Attends [employer] service: Not on file     Active member of club or organization: Not on file     Attends meetings of clubs or organizations: Not on file     Relationship status: Not on file     Intimate partner violence     Fear of current or ex partner: Not on file     Emotionally abused: Not on file     Physically abused: Not on file     Forced sexual activity: Not on file   Other Topics Concern     Not on file   Social  History Narrative     Not on file     Alcohol use: none  Tobacco use: current smokers, 4 cigs/day; smoker since age of 17  Occupational hx: currently doing volunteer work  Denies any illicit drug use    Allergies   Allergen Reactions     Bee Venom Angioedema and Swelling     Other reaction(s): Angioedema       Cefdinir GI Disturbance and Other (See Comments)     diarrhea  diarrhea       Levofloxacin Muscle Pain (Myalgia)     Prednisone Anxiety and Other (See Comments)     Hyperactivity and moodiness          There is no immunization history on file for this patient.       Medications:  Current Outpatient Medications   Medication Sig Dispense Refill     acetaminophen (TYLENOL) 500 MG tablet Take 500-1,000 mg by mouth every 6 hours as needed for mild pain       ADDERALL XR 20 MG 24 hr capsule TK 1 C PO BID       albuterol (PROAIR HFA/PROVENTIL HFA/VENTOLIN HFA) 108 (90 Base) MCG/ACT inhaler Inhale 1-2 puffs into the lungs       CYMBALTA 60 MG capsule        LAMICTAL 200 MG tablet TK 1 T PO D       gentamicin 80 mg in 1000 ml 0.9% NaCl (GARAMYCIN) SOLN nasal irrigation Spray 30 mLs in nostril daily Irrigate 30 mL in each nostril once a day (Patient not taking: Reported on 10/21/2020) 1000 mL 3     REVIEW OF SYSTEMS:  Constitutional: Denies fevers, chills, +fatigue, denies weight loss or night sweats  HEENT: +headaches and fullness with her sinus infections, denies blurry vision, redness, drainage, dry eyes, dry mouth, oral/nasal ulcers, hair loss/thinning, +chronic sinus infections  Dermatologic: Reports hx of UE rash that she describes as purplish/red, non-raised, dime sized, some are smaller and some are bigger lesions that do not pro (she does not have picture today) off and on for 7 years. She has some hypopigmented scarring over her UEs recently, most recent episode was 1.5 months ago. She denies any pain or burning with it. skin rash, raynaud's  Respiratory: Denies cough, +shortness of breath w exertion,  "denies pleurisy  Cardiovascular: Denies chest pain, edema, lightheadedness, dizziness  Gastrointestinal: Denies heartburn, difficulty swallowing, abdominal pain, nausea, vomiting, diarrhea, constipation, +intermittent bloating, denies hematochezia or melena  Genitourinary: Denies dysuria or hematuria  Musculoskeletal: see HPI, denies back pain, dactylitis, enthesitis  Neurologic: +intermittent numbness in hands and feet sometimes, +intermittent weakness, reports hx of R carpal tunnel repair last year, denies tingling, falls    PHYSICAL EXAMINATION:   Vital signs:  /72   Pulse 67   Ht 1.648 m (5' 4.88\")   Wt 58.3 kg (128 lb 9.6 oz)   SpO2 100%   BMI 21.48 kg/m      Gen: alert, awake, NAD  Skin: warm, dry, no rashes, LUE with scattered areas of hypopigmentation  HEENT: EOMI, PERRL, MMM, no oral ulcers/lesions, no alopecia  CV: RRR, normal s1 and s2, no m/r/g  Pulm: CTAB, non-labored respirations, no c/r/w  GI: +BS, soft, no TTP  MSK: R 3rd and 4th PIP synovitis (4th >3rd), R CMC and L CMC tenderness, no synovitis of any other joints, no effusions  Neuro: A&O x3, no focal deficits, per pt sensation to superficial touch over dorsal aspects of her b/l feet feels decreased than her thighs, sensation to superficial touch feels decreased over her shins compared to the thighs, she reports the shins and feet feel \"hard\", strength 5/5 throughout  Psych: pleasant, cooperative    Labs:      Antiphospholipid Antibodies  Recent Labs   Lab Test 10/21/20  1353   B2GPG 0.7   B2GPM <2.9   CARDG <1.6   CARDM 9.9   LUPINT Negative     IgG  Recent Labs   Lab Test 09/21/20  1517   *   IGG1 354*   IGG2 124*   IGG3 29   IGG4 2*      *     CBC  Recent Labs   Lab Test 10/21/20  1354 09/21/20  1517   WBC 9.1 9.7   RBC 4.31 4.37   HGB 14.6 14.9   HCT 42.2 43.5   MCV 98 100   RDW 12.8 13.0    363   MCH 33.9* 34.1*   MCHC 34.6 34.3   NEUTROPHIL 64.6 61.9   LYMPH 27.3 28.0   MONOCYTE 5.1 5.9   EOSINOPHIL 2.4 " 3.3   BASOPHIL 0.3 0.6   ANEU 5.8 6.0   ALYM 2.5 2.7   MARY JO 0.5 0.6   AEOS 0.2 0.3   ABAS 0.0 0.1     CMP  Recent Labs   Lab Test 09/21/20  1518      POTASSIUM 4.0   CHLORIDE 106   CO2 26   ANIONGAP 7   GLC 87   BUN 18   CR 0.78   GFRESTIMATED 84   GFRESTBLACK >90   YOAN 9.2     Calcium/VitaminD  Recent Labs   Lab Test 10/21/20  1353 09/21/20  1518   YOAN  --  9.2   VITDT 34  --      ESR/CRP  Recent Labs   Lab Test 09/21/20  1517   CRP <2.9     TSH/T4  Recent Labs   Lab Test 10/21/20  1353 08/27/13  1130   TSH 0.65 0.66     Component      Latest Ref Rng & Units 10/21/2020 10/22/2020   Cardiolipin Antibody IgG      0.0 - 19.9 GPL-U/mL <1.6    Cardiolipin Antibody IgM      0.0 - 19.9 MPL-U/mL 9.9    Lupus Result      NEG:Negative Negative    Beta 2 Glycoprotein 1 Antibody IgM      <7 U/mL <2.9    Beta 2 Glycoprotein 1 Antibody IgG      <7 U/mL 0.7    PLT Funct COL/EPI      <170 sec  91     10/14/18:  IgE elevated (245)  ANCA, MPO, PR3 negative    2/1/19:  ANCA negative    Histopathology:  FINAL DIAGNOSIS 8/2018 surgical path sinus  A.  Sinonasal cavity, right sinonasal contents, curettage:   Polypoid sinonasal mucosa with acute and chronic   inflammation and numerous eosinophils.   B.  Sinonasal cavity, left sinonasal contents, curettage:   Polypoid sinonasal mucosa with acute and chronic   inflammation, numerous eosinophils and eosinophilic mucin.   Special stain (Grocott's methenamine silver) performed on   paraffin sections (blocks B3 and B4) is negative for fungal   Organisms.    4/1/19: surgical path, sinuses  FINAL DIAGNOSIS   A. Sinonasal cavity, bilateral contents, excision:   Sinonasal mucosa with moderate chronic inflammation   including eosinophils.     Component      Latest Ref Rng & Units 9/21/2020   IGG      610 - 1,616 mg/dL 586 (L)   IgG1      382 - 929 mg/dL 354 (L)   IgG2      242 - 700 mg/dL 124 (L)   IgG3      22 - 176 mg/dL 29   IgG4      4 - 86 mg/dL 2 (L)   IGA      84 - 499 mg/dL 128  "  IGM      35 - 242 mg/dL 244 (H)   CRP Inflammation      0.0 - 8.0 mg/L <2.9   IGE      0 - 114 KIU/L 233 (H)           Procedures:  EMG (5/24/19)  CLINICAL INTERPRETATION: Abnormal study. There is electrodiagnostic evidence of: 1) an old,   inactive right L5 radiculopathy; and 2) a mild median mononeuropathy at the right wrist (carpal   tunnel syndrome). The borderline low amplitude medial plantar sensory response is of uncertain   significance, but in this clinical context could be in keeping with a very mild distal sensory   peripheral neuropathy. The mild neurogenic motor unit potentials in the right triceps in isolation   could be due to local factors (i.e. prior trauma to the arm) or represent sequelae of an old C7   radiculopathy.      Imaging: CT facial bones (9/23/2020)  Impression:  1. Postsurgical changes of functional endoscopic sinus surgery which  includes bilateral medial antrectomies, bilateral sphenoidotomies,  near complete ethmoidectomies.  2. Continued pansinusitis with residual mucosal thickening, most  pronounced within the superior ethmoid sinuses and frontal sinuses.  Complete opacification of the left frontoethmoidal recess.    Assessment / Plan:    Ms Gracy Bautista is a 55 yo female with pmhx of chronic sinusitis, PNA, nasal polyposis, asthma (diagnosed as an adult ~6 years ago), immunoglobulin (IgG) deficiency, eosinophilia, elevated IgE, severe OA of bilateral hands s/p R CMC suspension arthroplasty and R 3rrd PIP arthroplasty last year, referred to rheumatology for evaluation of autoimmune disease. Pt has a complex medical history and I am the fourth rheumatologist involved in her care. She describes vague symptoms today, particularly b/l LE pain and \"legs feeling hard\" along with \"brain zaps\" and subjective weakness of her UEs and LEs. She has prior hx of immunodeficiency (IgG), chronic sinusitis with eosinophilia on sinus biopsies, and peripheral eosinophilia few years ago, but does " not carry a diagnosis of vasculitis. She describes a rash over her UEs which sounds like palpable purpura; unfortunately, she does not have any pictures of the rash today and does not have a rash on my exam. Etiology of her b/l LE pain/abnormal sensation and transient weakness is unclear. She underwent a recent EMG at [facility] on 5/2019 which showed R CTS and very mild distal sensory peripheral neuropathy. She does not have any symptoms of CTS anymore since her surgery. Constellation of hx of adult onset asthma, peripheral eosinophilia (though no recent eosinophilia noted despite being off Nucala for over a year), elevated IgE, chronic sinusitis, pulmonary nodules, possible purpuric rash, abnormal sensation in LE, and her hand arthritis (though she has been diagnosed with severe OA at [facility]) makes me suspicious for possible EGPA. However, she has not noted significant symptom improvement with prior treatment of Nucala. She is also hesitant to be on many medications and prefers to keep testing and medication therapies to a minimum if possible. We discussed the possibility of this diagnosis and discussed work-up. She has had ANCA, MPO and PR3 checked on several occasions and they have returned negative, so I'm unsure if it needs to be repeated again today. Her recent inflammatory markers have been negative, which makes me less suspicious for active vasculitic process. At this time, it would be helpful to has histopathology to look for vasculitis - possible sites would include skin biopsy if she has recurrence of her rash vs lung nodule vs nerve vs renal if she has evidence of active sediment in the urine. She is agreeable to the current plan of care.    1. B/l LE pain, transient weakness, abnormal sensation in LEs  -check c3, c4, UA, UPC, SPEP, CK today. No eosinophilia noted on her most recent CBC  -repeat EMG LE pending above labs, will discuss neurology referral with patient for further evaluation of her  "symptoms  -she is going to have a CT scan soon and is going to see pulmonary soon which I think is a great idea given that she reports she continues to have SOB on exertion    2. R hand PIP swelling on exam  -has been evaluated for this at [facility] who felt that her symptoms are consistent with severe OA  -x-rays of hands to evaluate for inflammatory changes    3. Hx of rash  -possible purpura?  -advised pt to inform me if she has another episode of rash over her UEs so we can refer her to dermatology and potentially get a biopsy for more information    4. Hx of (IgG) immunodeficiency  -was recently seen by hematology  -advised pt to re-establish with allergy/immunology    RTC 2 months, sooner PRN    MD Gracy Marin's goals for this visit include: Consult   She requests these members of her care team be copied on today's visit information:     PCP: No Ref-Primary, Physician    Referring Provider:  Eunice Heart MD  [address] [place] [address]    /72   Pulse 67   Ht 1.648 m (5' 4.88\")   Wt 58.3 kg (128 lb 9.6 oz)   SpO2 100%   BMI 21.48 kg/m      Do you need any medication refills at today's visit? No    Patricia Aquino [facility]          Sincerely,        Tiarra Treviño MD    "

## 2020-10-29 LAB
ALBUMIN SERPL ELPH-MCNC: 4.8 G/DL (ref 3.7–5.1)
ALPHA1 GLOB SERPL ELPH-MCNC: 0.3 G/DL (ref 0.2–0.4)
ALPHA2 GLOB SERPL ELPH-MCNC: 0.8 G/DL (ref 0.5–0.9)
B-GLOBULIN SERPL ELPH-MCNC: 0.8 G/DL (ref 0.6–1)
C3 SERPL-MCNC: 101 MG/DL (ref 81–157)
C4 SERPL-MCNC: 34 MG/DL (ref 13–39)
GAMMA GLOB SERPL ELPH-MCNC: 0.7 G/DL (ref 0.7–1.6)
M PROTEIN SERPL ELPH-MCNC: 0 G/DL
PROT PATTERN SERPL ELPH-IMP: NORMAL

## 2020-11-02 ENCOUNTER — PRE VISIT (OUTPATIENT)
Dept: OTOLARYNGOLOGY | Facility: CLINIC | Age: 55
End: 2020-11-02

## 2020-11-02 ENCOUNTER — MYC MEDICAL ADVICE (OUTPATIENT)
Dept: RHEUMATOLOGY | Facility: CLINIC | Age: 55
End: 2020-11-02

## 2020-11-02 DIAGNOSIS — R29.898 LEG WEAKNESS, BILATERAL: Primary | ICD-10-CM

## 2020-11-02 LAB
FUNGUS SPEC CULT: NORMAL
Lab: NORMAL
SPECIMEN SOURCE: NORMAL

## 2020-11-02 NOTE — TELEPHONE ENCOUNTER
"[facility] message sent:    \"Dear Gracy,       Your labs came back unremarkable for any protein/blood in the urine, muscle inflammation or any abnormal protein in the blood. Given these labs, especially normal urine, and other recent normal labs including normal inflammatory markers, my suspicion for an active vasculitis (inflammation of the blood vessels) or any other rheumatologic condition is low.  I would recommend seeing neurology for your leg symptoms - I will place a referral. Your x-rays of hands also showed wear and tear type of arthritis, which you have already been diagnosed with at [facility]. For the swelling over your R 3rd finger, I would recommend calling your orthopedic doctor (who did the surgery) to make sure there is no infection.     I would recommend seeing your allergist/immunologist and continuing to follow with pulmonology for your lung nodule and shortness of breath.     You can follow up with me as needed.     Please let us know if you have any questions or concerns.       Regards,   Tiarra Treviño MD\"  "

## 2020-11-03 ENCOUNTER — TELEPHONE (OUTPATIENT)
Dept: OTOLARYNGOLOGY | Facility: CLINIC | Age: 55
End: 2020-11-03

## 2020-11-03 ENCOUNTER — DOCUMENTATION ONLY (OUTPATIENT)
Dept: CARE COORDINATION | Facility: CLINIC | Age: 55
End: 2020-11-03

## 2020-11-03 NOTE — TELEPHONE ENCOUNTER
[facility] Call Center    Phone Message    May a detailed message be left on voicemail: yes     Reason for Call: Other: Pt would like Nurse for Dr Jaimes to call pt back, She wanted to schedule with Dr Jaimes but No availability until after 2/2021 , she said she needed to be seen in Nov and requested her Nurse to call her, Please call Pt back to discuss  Thank you,    Action Taken: Message routed to:  [facility] ENT    Travel Screening: Not Applicable

## 2020-11-11 ENCOUNTER — OFFICE VISIT (OUTPATIENT)
Dept: NURSING | Facility: CLINIC | Age: 55
End: 2020-11-11
Payer: COMMERCIAL

## 2020-11-11 VITALS — HEIGHT: 65 IN | WEIGHT: 126.7 LBS | OXYGEN SATURATION: 97 % | BODY MASS INDEX: 21.11 KG/M2 | HEART RATE: 72 BPM

## 2020-11-11 DIAGNOSIS — R06.02 SHORTNESS OF BREATH: Primary | ICD-10-CM

## 2020-11-11 LAB
DLCOUNC-%PRED-PRE: 87 %
DLCOUNC-PRE: 18.59 ML/MIN/MMHG
DLCOUNC-PRED: 21.34 ML/MIN/MMHG
ERV-%PRED-PRE: 131 %
ERV-PRE: 1.46 L
ERV-PRED: 1.1 L
EXPTIME-PRE: 4.8 SEC
FEF2575-%PRED-PRE: 108 %
FEF2575-PRE: 2.78 L/SEC
FEF2575-PRED: 2.55 L/SEC
FEFMAX-%PRED-PRE: 88 %
FEFMAX-PRE: 5.95 L/SEC
FEFMAX-PRED: 6.7 L/SEC
FEV1-%PRED-PRE: 118 %
FEV1-PRE: 3.21 L
FEV1FEV6-PRE: 76 %
FEV1FEV6-PRED: 82 %
FEV1FVC-PRE: 76 %
FEV1FVC-PRED: 80 %
FEV1SVC-PRE: 74 %
FEV1SVC-PRED: 78 %
FIFMAX-PRE: 3.51 L/SEC
FRCPLETH-%PRED-PRE: 128 %
FRCPLETH-PRE: 3.53 L
FRCPLETH-PRED: 2.75 L
FVC-%PRED-PRE: 122 %
FVC-PRE: 4.22 L
FVC-PRED: 3.44 L
IC-%PRED-PRE: 121 %
IC-PRE: 2.91 L
IC-PRED: 2.4 L
RVPLETH-%PRED-PRE: 111 %
RVPLETH-PRE: 2.08 L
RVPLETH-PRED: 1.87 L
TLCPLETH-%PRED-PRE: 126 %
TLCPLETH-PRE: 6.44 L
TLCPLETH-PRED: 5.11 L
VA-%PRED-PRE: 123 %
VA-PRE: 6.24 L
VC-%PRED-PRE: 124 %
VC-PRE: 4.36 L
VC-PRED: 3.5 L

## 2020-11-11 PROCEDURE — 94726 PLETHYSMOGRAPHY LUNG VOLUMES: CPT | Performed by: INTERNAL MEDICINE

## 2020-11-11 PROCEDURE — 94729 DIFFUSING CAPACITY: CPT | Performed by: INTERNAL MEDICINE

## 2020-11-11 PROCEDURE — 94375 RESPIRATORY FLOW VOLUME LOOP: CPT | Performed by: INTERNAL MEDICINE

## 2020-11-11 ASSESSMENT — MIFFLIN-ST. JEOR: SCORE: 1170.59

## 2020-11-20 ENCOUNTER — PATIENT OUTREACH (OUTPATIENT)
Dept: ONCOLOGY | Facility: CLINIC | Age: 55
End: 2020-11-20

## 2020-11-20 ENCOUNTER — TELEPHONE (OUTPATIENT)
Dept: NEUROLOGY | Facility: CLINIC | Age: 55
End: 2020-11-20

## 2020-11-20 NOTE — PROGRESS NOTES
Patient returned call.  Reviewed results from Dr. Heart as noted:      Repeat Lupus test was negative.     All initial tests for an underlying bleeding problem, were unremarkable. We can check platelet aggregation,TEG and platelet electron microscopy if she wants to pursue it further, but most likely, at the end of the day the recommendation would be to avoid aspirin, anti-inflammatory medications, over-the-counter or herbal products as they can affect platelet function     Please ask her what she prefers.    Patient prefers to not have further lab draws.  She states that she does take many herbal supplements.  She will take the advice of Dr. Heart and stop those.  All questions answered and she is satisfied with this plan.

## 2020-11-24 ENCOUNTER — PRE VISIT (OUTPATIENT)
Dept: PULMONOLOGY | Facility: CLINIC | Age: 55
End: 2020-11-24

## 2020-12-16 ENCOUNTER — VIRTUAL VISIT (OUTPATIENT)
Dept: PULMONOLOGY | Facility: CLINIC | Age: 55
End: 2020-12-16
Payer: COMMERCIAL

## 2020-12-16 DIAGNOSIS — J45.41 MODERATE PERSISTENT ASTHMA WITH EXACERBATION: Primary | ICD-10-CM

## 2020-12-16 PROCEDURE — 99204 OFFICE O/P NEW MOD 45 MIN: CPT | Mod: 95 | Performed by: INTERNAL MEDICINE

## 2020-12-16 RX ORDER — MONTELUKAST SODIUM 10 MG/1
10 TABLET ORAL AT BEDTIME
Qty: 30 TABLET | Refills: 3 | Status: SHIPPED | OUTPATIENT
Start: 2020-12-16 | End: 2021-03-17

## 2020-12-16 RX ORDER — BUDESONIDE AND FORMOTEROL FUMARATE DIHYDRATE 80; 4.5 UG/1; UG/1
2 AEROSOL RESPIRATORY (INHALATION)
COMMUNITY
Start: 2020-12-09 | End: 2021-01-04

## 2020-12-16 RX ORDER — PREDNISONE 20 MG/1
40 TABLET ORAL DAILY
Qty: 10 TABLET | Refills: 0 | Status: SHIPPED | OUTPATIENT
Start: 2020-12-16 | End: 2021-01-04

## 2020-12-16 NOTE — PROGRESS NOTES
"Gracy Bautista is a 55 year old female who is being evaluated via a billable video visit.      The patient has been notified of following:     \"This video visit will be conducted via a call between you and your physician/provider. We have found that certain health care needs can be provided without the need for an in-person physical exam.  This service lets us provide the care you need with a video conversation.  If a prescription is necessary we can send it directly to your pharmacy.  If lab work is needed we can place an order for that and you can then stop by our lab to have the test done at a later time.    Video visits are billed at different rates depending on your insurance coverage.  Please reach out to your insurance provider with any questions.    If during the course of the call the physician/provider feels a video visit is not appropriate, you will not be charged for this service.\"    Patient has given verbal consent for Video visit? Yes  How would you like to obtain your AVS? MyChart  If you are dropped from the video visit, the video invite should be resent to: Text to cell phone: Mobile  Will anyone else be joining your video visit? No        Video-Visit Details    Type of service:  Video Visit    Video Start Time: 4:00 PM  Video End Time: 4:45 PM    Originating Location (pt. Location): Home    Distant Location (provider location):  [facility]     Platform used for Video Visit: Suly Bean MD    Pulmonary Clinic New Patient Consult  Reason for Consult: Lung Nodule and asthma  History of Present Illness  I have the pleasure of seeing Ms. Gracy Bautista, who is a pleasant 55 yr old female with a complex history of aspirin induced asthma with severe nasal polyposis, chronic rhinosinusitis with multiple surgeries, IgG deficiency  and active smoking. She presents to clinic for an evaluation of lung nodules and asthma.    Detailed review of records show that she was " closely followed at the [facility] by ENT (Dr. Sandoval) and pulmonary (Dr. Carlos).  She was being managed on a regimen of Nucala, Advair inhaler, Singulair and albuterol as needed. She was started on Nucala based on peripheral eosinophilia, nasal polyps and ? sinus biopsy. However, she doesn't think that the therapy was helpful, she got infusions for about 6 weeks. She has not had any infusions > 1 year and no changes in her symptoms.  She had previously underwent an aspirin challenge. A DRAF III surgery was also performed in April of 2019 for chronic rhinosinusitis and she continued with irrigations of budesonide and prednisolone. She has had additional sinus surgeries in the past. Her asthma was fairly controlled for the most part this year while off Nucala.  Interestingly, for the last several months, she had only albuterol inhaler which she used sparingly.  However, in November 2020, she contracted COVID- 19 and was symptomatic with fevers, increased SOB, cough and wheezing. She presented to the urgent care for asthma exacerbation and was treated with Decadron shot, Duonebs and was given a prescription of Symbicort. Unfortunately, the pharmacy was not stocked with Symbicort and she was unable to get it.  Since her COVID 19 infection, she has been using her albuterol inhaler about 4 times per day, she still doesn't have any ICS. She has daily night time symptoms and she continues to have significant coughing.   - She now wishes to establish care at the [facility] as she cannot handle the long drives to [facility] at [place].  - Recently seen and evaluated by ENT at the [facility] who was concerned about her additional symptoms of arthritis, bruising and other symptoms. She was seen by rheumatology and Hem/onc and they are ongoing evaluations for vasculitis, bleeding disorders etc.  - Previously seen multiple nodules on prior CT scans  - Active smoker, 4 cigarettes per day, <15 pack years of smoking  - Continues  to have joint pain, occasional rash, easy bruisability.    Review of Systems:  10 of 14 systems reviewed and are negative unless otherwise stated in HPI.    No past medical history on file.  Asthma  No past surgical history on file.  DRAF III  No family history on file.    Social History     Socioeconomic History     Marital status:      Spouse name: Not on file     Number of children: Not on file     Years of education: Not on file     Highest education level: Not on file   Occupational History     Not on file   Social Needs     Financial resource strain: Not on file     Food insecurity     Worry: Not on file     Inability: Not on file     Transportation needs     Medical: Not on file     Non-medical: Not on file   Tobacco Use     Smoking status: Current Some Day Smoker     Types: Cigarettes     Smokeless tobacco: Never Used     Tobacco comment: 6 cigarettes per week   Substance and Sexual Activity     Alcohol use: Not on file     Drug use: Not on file     Sexual activity: Not on file   Lifestyle     Physical activity     Days per week: Not on file     Minutes per session: Not on file     Stress: Not on file   Relationships     Social connections     Talks on phone: Not on file     Gets together: Not on file     Attends Jain service: Not on file     Active member of club or organization: Not on file     Attends meetings of clubs or organizations: Not on file     Relationship status: Not on file     Intimate partner violence     Fear of current or ex partner: Not on file     Emotionally abused: Not on file     Physically abused: Not on file     Forced sexual activity: Not on file   Other Topics Concern     Not on file   Social History Narrative     Not on file         Allergies   Allergen Reactions     Bee Venom Angioedema and Swelling     Other reaction(s): Angioedema       Cefdinir GI Disturbance and Other (See Comments)     diarrhea  diarrhea       Levofloxacin Muscle Pain (Myalgia)     Prednisone  Anxiety and Other (See Comments)     Hyperactivity and moodiness           Current Outpatient Medications:      acetaminophen (TYLENOL) 500 MG tablet, Take 500-1,000 mg by mouth every 6 hours as needed for mild pain, Disp: , Rfl:      ADDERALL XR 20 MG 24 hr capsule, PRN, Disp: , Rfl:      albuterol (PROAIR HFA/PROVENTIL HFA/VENTOLIN HFA) 108 (90 Base) MCG/ACT inhaler, Inhale 1-2 puffs into the lungs, Disp: , Rfl:      CYMBALTA 60 MG capsule, , Disp: , Rfl:      LAMICTAL 200 MG tablet, TK 1 T PO D, Disp: , Rfl:      budesonide-formoterol (SYMBICORT) 80-4.5 MCG/ACT Inhaler, Inhale 2 puffs into the lungs Did not get this inhaler filled, Disp: , Rfl:      gentamicin 80 mg in 1000 ml 0.9% NaCl (GARAMYCIN) SOLN nasal irrigation, Spray 30 mLs in nostril daily Irrigate 30 mL in each nostril once a day (Patient not taking: Reported on 10/21/2020), Disp: 1000 mL, Rfl: 3      Physical Exam:  There were no vitals taken for this visit.  GENERAL: Well developed, well nourished, alert, and in no apparent distress.  HEENT: Normocephalic, atraumatic.   RESP:  Normal respiratory effort.    NEURO: AAOx3.  Normal gait.  Fluent speech.  PSYCH: mentation appears normal.   Results:  PFTs:  Most Recent [facility] Pulmonary Function Testing    FVC-Pred   Date Value Ref Range Status   11/11/2020 3.44 L      FVC-Pre   Date Value Ref Range Status   11/11/2020 4.22 L      FVC-%Pred-Pre   Date Value Ref Range Status   11/11/2020 122 %      FEV1-Pre   Date Value Ref Range Status   11/11/2020 3.21 L      FEV1-%Pred-Pre   Date Value Ref Range Status   11/11/2020 118 %      FEV1FVC-Pred   Date Value Ref Range Status   11/11/2020 80 %      FEV1FVC-Pre   Date Value Ref Range Status   11/11/2020 76 %      No results found for: 20029  FEFMax-Pred   Date Value Ref Range Status   11/11/2020 6.70 L/sec      FEFMax-Pre   Date Value Ref Range Status   11/11/2020 5.95 L/sec      FEFMax-%Pred-Pre   Date Value Ref Range Status   11/11/2020 88 %      ExpTime-Pre    Date Value Ref Range Status   11/11/2020 4.80 sec      FIFMax-Pre   Date Value Ref Range Status   11/11/2020 3.51 L/sec      FEV1FEV6-Pred   Date Value Ref Range Status   11/11/2020 82 %      FEV1FEV6-Pre   Date Value Ref Range Status   11/11/2020 76 %      No results found for: 20055  Imaging (personally reviewed in clinic today): 10/2020  Calcified granuloma    Assessment and Plan:   Gracy Bautista is a 55 year old female with a complex history of aspirin induced asthma with severe nasal polyposis, chronic rhinosinusitis with multiple surgeries, IgG deficiency and active smoking who recently contracted COVID 19 with exacerbating symptoms of asthma.  Asthma with mild exacerbation  She has a complex history. However, on review of her medications, it bothers me that she has not been on any ICS, probably has not used them adherently in the past and is not aware of the significance of ICS in asthma therapy. I educated her that ICS are the cornerstone for asthma therapy in general. Explained to her that over reliance on albuterol inhaler instead of ICS can lead to worse outcomes. I will go ahead and prescribe Advair 250-50 mg 2 puffs BID for her to use adherently until her symptoms are controlled. She can use it once a day when her symptoms are controlled. Restart Singulair 10 mg po daily. Continue albuterol as needed. I will give 5 days worth of prednisone 40 mg po.  Not sure Nucala is indicated now given that she likely has not been adherent on ICS and nasal polyposis is not an ongoing issue. I will not recommend continuation for now. Moreover, she has been off Nucala without any eosinophilia on recent testings.  I will obtain an IgE level, eosinophil count and specific allergen IgE levels for reevaluation of biologics needs. It doesn't appear nasal polyps are a big issues and she also follows with ENT.  Constellation of hx of adult onset asthma, peripheral eosinophilia (though no recent eosinophilia noted despite  being off Nucala for over a year), elevated IgE, chronic sinusitis, pulmonary nodules, possible purpuric rash, may be suggestive of possible EGPA, it is very unlikely given spontaneous resolution of previously seen lung nodules, negative ANCA and symptoms not responsive to Nucala. She is being followed closely with Rheumatology and Hem/onc and further work up are being performed.  Lung Nodules  Calcified granuloma on CT scan, and nothing further to do.  Active smoking  Educated on smoking cessation and presence of emphysema on her CT scan. Will refer to [facility]  IgG deficiency  Has received IgG infusions in the past. Recurrent pneumonias are not an issue per se. However, she continues to have sinus issues and she is being followed by ENT. IgG levels continue to be low, she may benefit from IgG replacements.  Chronic Rhinosinusitis  Follows with ENT and Hem/Onc. She continues to have low levels of IgG and she is being evaluated by Hem/onc for possible culture directed antibiotic therapy  Questions and concerns were answered to the patient's satisfaction.  she was provided with my contact information should new questions or concerns arise in the interim.  She should return to clinic in 3 months with PFTs  Up to date on vaccinations  Marco Bean MD  Pulmonary, Critical Care and Sleep Medicine  [facility]  Pager: [phone]      The above note was dictated using voice recognition software and may include typographical errors. Please contact the author for any clarifications.

## 2020-12-16 NOTE — PROGRESS NOTES
"Gracy Bautista is a 55 year old female who is being evaluated via a billable video visit.      The patient has been notified of following:     \"This video visit will be conducted via a call between you and your physician/provider. We have found that certain health care needs can be provided without the need for an in-person physical exam.  This service lets us provide the care you need with a video conversation.  If a prescription is necessary we can send it directly to your pharmacy.  If lab work is needed we can place an order for that and you can then stop by our lab to have the test done at a later time.    Video visits are billed at different rates depending on your insurance coverage.  Please reach out to your insurance provider with any questions.    If during the course of the call the physician/provider feels a video visit is not appropriate, you will not be charged for this service.\"    Video visit - patient will login to My Chart  Patient has given verbal consent for Video visit? Yes  How would you like to obtain your AVS? My Chart  If you are dropped from the video visit, the video invite should be resent to: [phone] (H)  Will anyone else be joining your video visit? No      Donna Gaspar LPN    Rheumatology / Pulmonology  [facility]            "

## 2020-12-18 DIAGNOSIS — J45.41 MODERATE PERSISTENT ASTHMA WITH EXACERBATION: ICD-10-CM

## 2020-12-18 PROCEDURE — 36415 COLL VENOUS BLD VENIPUNCTURE: CPT | Performed by: INTERNAL MEDICINE

## 2020-12-18 PROCEDURE — 82785 ASSAY OF IGE: CPT | Performed by: INTERNAL MEDICINE

## 2020-12-18 PROCEDURE — 86003 ALLG SPEC IGE CRUDE XTRC EA: CPT | Performed by: INTERNAL MEDICINE

## 2020-12-23 ENCOUNTER — TELEPHONE (OUTPATIENT)
Dept: PULMONOLOGY | Facility: CLINIC | Age: 55
End: 2020-12-23

## 2020-12-23 LAB
A ALTERNATA IGE QN: <0.1 KU(A)/L
A FUMIGATUS IGE QN: <0.1 KU(A)/L
C HERBARUM IGE QN: <0.1 KU(A)/L
CAT DANDER IGG QN: <0.1 KU(A)/L
CEDAR IGE QN: <0.1 KU(A)/L
COCKSFOOT IGE QN: <0.1 KU(A)/L
COMMON RAGWEED IGE QN: <0.1 KU(A)/L
COTTONWOOD IGE QN: <0.1 KU(A)/L
D FARINAE IGE QN: <0.1 KU(A)/L
D PTERONYSS IGE QN: <0.1 KU(A)/L
DOG DANDER+EPITH IGE QN: <0.1 KU(A)/L
E PURPURASCENS IGE QN: <0.1 KU(A)/L
EAST WHITE PINE IGE QN: <0.1 KU(A)/L
ENGL PLANTAIN IGE QN: <0.1 KU(A)/L
FIREBUSH IGE QN: <0.1 KU(A)/L
GIANT RAGWEED IGE QN: <0.1 KU(A)/L
GOOSEFOOT IGE QN: <0.1 KU(A)/L
IGE SERPL-ACNC: 174 KIU/L (ref 0–114)
JOHNSON GRASS IGE QN: <0.1 KU(A)/L
MAPLE IGE QN: <0.1 KU(A)/L
MUGWORT IGE QN: <0.1 KU(A)/L
NETTLE IGE QN: <0.1 KU(A)/L
P NOTATUM IGE QN: <0.1 KU(A)/L
RED MULBERRY IGE QN: <0.1 KU(A)/L
SALTWORT IGE QN: <0.1 KU(A)/L
SHEEP SORREL IGE QN: <0.1 KU(A)/L
SILVER BIRCH IGE QN: <0.1 KU(A)/L
TIMOTHY IGE QN: <0.1 KU(A)/L
WHITE ASH IGE QN: <0.1 KU(A)/L
WHITE ELM IGE QN: <0.1 KU(A)/L
WHITE MULBERRY IGE QN: <0.1 KU(A)/L
WHITE OAK IGE QN: <0.1 KU(A)/L
WORMWOOD IGE QN: <0.1 KU(A)/L

## 2020-12-23 NOTE — TELEPHONE ENCOUNTER
Central Prior Authorization Team   Phone: [phone]      PA Initiation    Medication: fluticasone-salmeterol (ADVAIR) 250-50 MCG/DOSE inhaler  Insurance Company: [employer] - Phone [phone] Fax [phone]  Pharmacy Filling the Rx: [id number] - [place], [place] - [address] AT [facility] [address]  Filling Pharmacy Phone: [phone]  Filling Pharmacy Fax:    Start Date: 12/23/2020

## 2020-12-23 NOTE — TELEPHONE ENCOUNTER
Prior Authorization Retail Medication Request    Medication/Dose: fluticasone-salmeterol (ADVAIR) 250-50 MCG/DOSE inhaler    ICD code (if different than what is on RX):    Previously Tried and Failed:    Rationale:      Insurance Name:    Insurance ID:        Pharmacy Information (if different than what is on RX)  Name:    Phone:

## 2020-12-24 LAB — CALIF WALNUT POLN IGE QN: <0.1 KU(A)/L

## 2020-12-28 DIAGNOSIS — J45.41 MODERATE PERSISTENT ASTHMA WITH EXACERBATION: Primary | ICD-10-CM

## 2020-12-28 RX ORDER — BUDESONIDE AND FORMOTEROL FUMARATE DIHYDRATE 160; 4.5 UG/1; UG/1
2 AEROSOL RESPIRATORY (INHALATION) 2 TIMES DAILY
Qty: 1 INHALER | Refills: 3 | Status: SHIPPED | OUTPATIENT
Start: 2020-12-28 | End: 2021-03-17

## 2020-12-28 NOTE — RESULT ENCOUNTER NOTE
I called patient with results. Your IgE is elevated but the environmental allergen panel is mostly negative.

## 2020-12-29 ENCOUNTER — VIRTUAL VISIT (OUTPATIENT)
Dept: RHEUMATOLOGY | Facility: CLINIC | Age: 55
End: 2020-12-29
Payer: COMMERCIAL

## 2020-12-29 DIAGNOSIS — Z91.199 NO-SHOW FOR APPOINTMENT: Primary | ICD-10-CM

## 2020-12-30 NOTE — TELEPHONE ENCOUNTER
PRIOR AUTHORIZATION DENIED    Medication: fluticasone-salmeterol (ADVAIR) 250-50 MCG/DOSE inhaler- DENIED    Denial Date: 12/24/2020    Denial Rational:           Appeal Information:       Phone: [phone]  Fax: [phone]

## 2021-01-04 ENCOUNTER — TELEPHONE (OUTPATIENT)
Dept: PULMONOLOGY | Facility: CLINIC | Age: 56
End: 2021-01-04

## 2021-01-04 NOTE — TELEPHONE ENCOUNTER
Patient called to inform Dr Bean that she was reinfected with COVID.     Patient had her original positive test on December 3. She tested negative via nasal swab PCR testing on December 23.     Patient states that starting last Thursday she was feeling sick, had chills, was exhausted like the last time she had COVID. Patient got a rapid COVID test yesterday that came back positive.    Patient states that her wheezing and cough are back. She also has chills. Right now patient states her symptoms are manageable.     She is wondering if Dr. Bean thinks the inhalers she is using are suppressing her immune system. She is wondering is she should stop her inhalers. She is wondering if he has any other suggestions on how to boost her immune system at this point.      I did confirm patient is currently using her Symbicort inhaler and taking Singulair daily. Patient had not been needing albuterol at all but did use twice yesterday.     Will route to Dr. Bean to get recommendations.     Vonnie Juarez RN   Medical Specialty Clinic Care Coordinator  [facility]   Phone: [phone]  Fax: [phone]

## 2021-01-04 NOTE — TELEPHONE ENCOUNTER
[facility] Call Center    Phone Message    May a detailed message be left on voicemail: yes     Reason for Call: Patient calling stating she is reinfected with covid and wanted to talk to Dr. Bean or someone from his care team to discuss.  Please advise and call patient back. Thank you.    Action Taken: Message routed to:  Adult Clinics: Pulmonology p [id number]    Travel Screening: Not Applicable

## 2021-01-06 NOTE — TELEPHONE ENCOUNTER
Hi Ali,    Inhalers are unlikely the culprit. It is very unlikely that the inhaled steroids is suppressing her immunity. Gracy should reinforce proper measures to prevent her david the virus in the future. She needs the inhalers even more now to prevent her asthma from getting worse in light of the viral infection.    Thank you.    CELESTINA

## 2021-01-06 NOTE — TELEPHONE ENCOUNTER
Left detailed message for the patient. I asked her to call back with questions.     Vonnie Juarez RN   Medical Specialty Clinic Care Coordinator  [facility]   Phone: [phone]  Fax: [phone]

## 2021-01-15 ENCOUNTER — HEALTH MAINTENANCE LETTER (OUTPATIENT)
Age: 56
End: 2021-01-15

## 2021-03-11 DIAGNOSIS — J45.41 MODERATE PERSISTENT ASTHMA WITH EXACERBATION: Primary | ICD-10-CM

## 2021-03-15 DIAGNOSIS — J45.41 MODERATE PERSISTENT ASTHMA WITH EXACERBATION: ICD-10-CM

## 2021-03-15 PROCEDURE — 86003 ALLG SPEC IGE CRUDE XTRC EA: CPT | Performed by: INTERNAL MEDICINE

## 2021-03-15 PROCEDURE — 36415 COLL VENOUS BLD VENIPUNCTURE: CPT | Performed by: INTERNAL MEDICINE

## 2021-03-15 PROCEDURE — 82785 ASSAY OF IGE: CPT | Performed by: INTERNAL MEDICINE

## 2021-03-16 LAB
C HERBARUM IGE QN: <0.1 KU(A)/L
CEDAR IGE QN: <0.1 KU(A)/L
COTTONWOOD IGE QN: <0.1 KU(A)/L
D PTERONYSS IGE QN: <0.1 KU(A)/L
DOG DANDER+EPITH IGE QN: <0.1 KU(A)/L
MAPLE IGE QN: <0.1 KU(A)/L
MUGWORT IGE QN: <0.1 KU(A)/L
SALTWORT IGE QN: <0.1 KU(A)/L
SHEEP SORREL IGE QN: <0.1 KU(A)/L
SILVER BIRCH IGE QN: <0.1 KU(A)/L
WHITE ELM IGE QN: <0.1 KU(A)/L

## 2021-03-17 ENCOUNTER — VIRTUAL VISIT (OUTPATIENT)
Dept: PULMONOLOGY | Facility: CLINIC | Age: 56
End: 2021-03-17
Payer: COMMERCIAL

## 2021-03-17 DIAGNOSIS — J45.41 MODERATE PERSISTENT ASTHMA WITH EXACERBATION: ICD-10-CM

## 2021-03-17 LAB
A ALTERNATA IGE QN: <0.1 KU(A)/L
A FUMIGATUS IGE QN: 0.11 KU(A)/L
CALIF WALNUT POLN IGE QN: <0.1 KU(A)/L
CAT DANDER IGG QN: <0.1 KU(A)/L
COCKSFOOT IGE QN: <0.1 KU(A)/L
COMMON RAGWEED IGE QN: <0.1 KU(A)/L
D FARINAE IGE QN: <0.1 KU(A)/L
E PURPURASCENS IGE QN: <0.1 KU(A)/L
EAST WHITE PINE IGE QN: <0.1 KU(A)/L
ENGL PLANTAIN IGE QN: <0.1 KU(A)/L
FIREBUSH IGE QN: <0.1 KU(A)/L
GIANT RAGWEED IGE QN: <0.1 KU(A)/L
GOOSEFOOT IGE QN: <0.1 KU(A)/L
IGE SERPL-ACNC: 255 KIU/L (ref 0–114)
JOHNSON GRASS IGE QN: <0.1 KU(A)/L
NETTLE IGE QN: <0.1 KU(A)/L
P NOTATUM IGE QN: <0.1 KU(A)/L
RED MULBERRY IGE QN: <0.1 KU(A)/L
TIMOTHY IGE QN: <0.1 KU(A)/L
WHITE ASH IGE QN: <0.1 KU(A)/L
WHITE MULBERRY IGE QN: <0.1 KU(A)/L
WHITE OAK IGE QN: <0.1 KU(A)/L
WORMWOOD IGE QN: <0.1 KU(A)/L

## 2021-03-17 PROCEDURE — 99214 OFFICE O/P EST MOD 30 MIN: CPT | Mod: 95 | Performed by: INTERNAL MEDICINE

## 2021-03-17 RX ORDER — BUDESONIDE AND FORMOTEROL FUMARATE DIHYDRATE 160; 4.5 UG/1; UG/1
2 AEROSOL RESPIRATORY (INHALATION) 2 TIMES DAILY
Qty: 1 G | Refills: 11 | Status: SHIPPED | OUTPATIENT
Start: 2021-03-17 | End: 2021-10-18

## 2021-03-17 RX ORDER — FLUTICASONE PROPIONATE 50 MCG
1 SPRAY, SUSPENSION (ML) NASAL DAILY
Qty: 1 G | Refills: 3 | Status: SHIPPED | OUTPATIENT
Start: 2021-03-17 | End: 2021-06-09

## 2021-03-17 RX ORDER — MONTELUKAST SODIUM 10 MG/1
10 TABLET ORAL AT BEDTIME
Qty: 90 TABLET | Refills: 3 | Status: SHIPPED | OUTPATIENT
Start: 2021-03-17 | End: 2021-10-18

## 2021-03-17 RX ORDER — LORATADINE 10 MG/1
10 TABLET ORAL DAILY
Qty: 30 TABLET | Refills: 4 | Status: SHIPPED | OUTPATIENT
Start: 2021-03-17 | End: 2021-10-18

## 2021-03-17 NOTE — PROGRESS NOTES
Gracy is a 55 year old who is being evaluated via a billable video visit.      How would you like to obtain your AVS? MyChart  If the video visit is dropped, the invitation should be resent by: Text to cell phone: Mobile  Will anyone else be joining your video visit? No      Video Start Time: 11:00 AM        Parish Dubose is a 55 year old who presents for severe persistent asthma    HPI   Ms. Gracy Bautista is a pleasant 55 yr old female with a complex history of aspirin induced asthma with severe nasal polyposis, chronic rhinosinusitis with multiple surgeries, IgG deficiency  and active smoking. She presents to clinic for follow up for lung nodules and asthma.     To briefly review, she was closely followed at the [facility] by ENT (Dr. Sandoval) and pulmonary (Dr. Carlos). She was being managed on a regimen of Nucala, Advair inhaler, Singulair and albuterol as needed. She was started on Nucala based on peripheral eosinophilia, nasal polyps and ? sinus biopsy. However, she doesn't think that theNuclala therapy was helpful and they were stopped for more than a year prior to her initial visit. She did not notice any worsening in her asthma control on stopping Nucala  She had previously underwent an aspirin challenge. A DRAF III surgery was also performed in April of 2019 for chronic rhinosinusitis and she continued with irrigations of budesonide and prednisolone.  She has had additional sinus surgeries in the past.   During the last clinic visit, I was concerned because Gracy was not on any ICS nor Singulair. She has an overdependency on albuterol inhalers and has never been adherent on prescribed ICS nor Singulair. I also wondered if biologics were indicated in the first place given that her adherence to the basic asthma therapy was questionable. I prescribed Advair and Singulair with comprehensive education of the treatment of asthma. I also gave her an asthma action plan. I ordered labs including IgE,  eosinophils and IgE allergens for reevaluation of biologics needs. She was seen and evaluated by ENT at the [facility] who was concerned about her additional symptoms of arthritis, bruising and other symptoms. PFTs obtained showed normal flow volumes and lung volumes with preserved DLCO. CT chest did not show any pulmonary nodules and was unremarkable. She was seen by rheumatology and Hem/onc and they are ongoing evaluations for vasculitis such as Churg Misti syndrome and bleeding disorders etc.    Today, she has some improvement in her asthma control. She is less reliant on her albuterol and hardly uses it. She continues to have significant coughing and sinus symptoms. She has lots of rhinorrhea and post nasal drip. She has not been using the nasal rinse prescribed to her by ENT as they make her symptoms worse.   She also has periodic wheezing. She swears to compliance with her inhalers including high dose Symbicort and Singulair.  Has not had any flares requiring prednisone pills or shots. Denies any chest pain, no fevers, no leg swellings and no GERD.    Gracy got infected with covid 19 twice, most recent in 01/2021. Had very mild symptoms.    - Active smoker, 2 cigarettes per day, <15 pack years of smoking  - Continues to have joint pain, occasional rash, easy bruisability.    Review of Systems   Constitutional, HEENT, cardiovascular, pulmonary, GI, , musculoskeletal, neuro, skin, endocrine and psych systems are negative, except as otherwise noted.      Objective           Vitals:  No vitals were obtained today due to virtual visit.    Physical Exam   GENERAL: Healthy, alert and no distress  EYES: Eyes grossly normal to inspection.  No discharge or erythema, or obvious scleral/conjunctival abnormalities.  RESP: No audible wheeze, cough, or visible cyanosis.  No visible retractions or increased work of breathing.    SKIN: Visible skin clear. No significant rash, abnormal pigmentation or lesions.  NEURO: Cranial  nerves grossly intact.  Mentation and speech appropriate for age.  PSYCH: Mentation appears normal, affect normal/bright, judgement and insight intact, normal speech and appearance well-groomed.       Assessment and Plan:     Severe persistent Asthma with fair control  Complex history with multi-systemic constellation of symptoms which appear to be related including pulmonary symptoms.. Ongoing work up for vasculitis and autoimmune disease. Extensive work up and therapy in the past includinf sinus surgeries and treatment with Nucala.  PFTs showed no obstruction with preserved DLCO. CT chest is reassuring.   Not able to obtain ACT but appears to be some improved control albeit with sub-optimal control. Currently on high dose Symbicort and Singulair. It appears that coughing is her most bothersome symptoms which is likely due to significant sinus and post nasal drip. Unfortunately, she is not on the prescribed nasal rinse from her ENT. I will go ahead and start her on Flonase. She also appears to have a component of seasonal allergies and I advised that she takes daily Claritin. Prescriptions for Symbicort, Singulair, Flonase and Claritin were provided today.  Her labs including IgE and eosinophils are still pending and I will follow to address her possible needs for biologic. She had been on Nucala in the past.  I encouraged her to continue to follow up with her other providers including rheumatology, hem/onc for continued evaluation for vasculitis and systemic disease process that will explain her constellations of symptoms.    Active Smoking  Light smoking now, encouraged to quit. Still cutting back. Not candidate for annual lung cancer screening.    IgG deficiency  Has received IgG infusions in the past. Recurrent pneumonias are not an issue per se. However, she continues to have sinus issues and she is being followed by ENT. IgG levels continue to be low, she may benefit from IgG replacements.No evidence of ILD  on imaging.     Chronic Rhinosinusitis  Follows with ENT. She continues to have low levels of IgG and she is being evaluated by Hem/onc for possible culture directed antibiotic therapy. Started her on Flonase      Questions and concerns were answered to the patient's satisfaction.  she was provided with my contact information should new questions or concerns arise in the interim.    She should return to clinic in 3 months    Up to date on vaccinations  Marco Bean MD  Pulmonary, Critical Care and Sleep Medicine  [facility]  Pager: [phone]           Video-Visit Details    Type of service:  Video Visit    Video End Time:11:54 AM    Originating Location (pt. Location): Home    Distant Location (provider location):  [facility]     Platform used for Video Visit: [employer]

## 2021-03-17 NOTE — PROGRESS NOTES
Gracy is a 55 year old who is being evaluated via a billable video visit.      Video visit - please text invite to: [phone] (H)    How would you like to obtain your AVS? MyChart  If the video visit is dropped, the invitation should be resent by: Text to cell phone: [phone] (H)  Will anyone else be joining your video visit? No       Donna Gaspar LPN  Pulmonary Medicine:  [facility]  Phone: [phone] Fax: [phone]

## 2021-03-20 DIAGNOSIS — J45.41 MODERATE PERSISTENT ASTHMA WITH EXACERBATION: Primary | ICD-10-CM

## 2021-03-22 NOTE — RESULT ENCOUNTER NOTE
I called patient with results. IgE levels are elevated. Environmental IgE allergen tests are overall normal.

## 2021-03-23 ENCOUNTER — TELEPHONE (OUTPATIENT)
Dept: PULMONOLOGY | Facility: CLINIC | Age: 56
End: 2021-03-23

## 2021-03-23 DIAGNOSIS — J45.41 MODERATE PERSISTENT ASTHMA WITH EXACERBATION: Primary | ICD-10-CM

## 2021-03-23 NOTE — TELEPHONE ENCOUNTER
[facility] Call Center    Phone Message    May a detailed message be left on voicemail: yes     Reason for Call: Pt said that Dr. Bean was ordering a test for her and she was supposed to get a call to schedule it and she hasn't heard from anyone yet.  She's requesting a call back to get it scheduled.  She doesn't know the name of the test.  Thanks.    Action Taken: Message routed to:  Adult Clinics: Pulmonology p [id number]    Travel Screening: Not Applicable

## 2021-03-23 NOTE — TELEPHONE ENCOUNTER
left informing patient that Dr. Bean has ordered an eosinophil count blood draw for her.     Informed pt via  that she may contact any [facility] lab to have this blood work drawn. Scheduling # to [facility] lab also given to pt via .     Requested CB to clinic with any further questions.      Donna Gaspar LPN  Pulmonary Medicine:  [facility]  Phone: [phone] Fax: [phone]

## 2021-03-30 DIAGNOSIS — J45.41 MODERATE PERSISTENT ASTHMA WITH EXACERBATION: ICD-10-CM

## 2021-03-30 LAB
BASOPHILS # BLD AUTO: 0 10E9/L (ref 0–0.2)
BASOPHILS NFR BLD AUTO: 0.5 %
DIFFERENTIAL METHOD BLD: NORMAL
EOSINOPHIL # BLD AUTO: 0.4 10E9/L (ref 0–0.7)
EOSINOPHIL NFR BLD AUTO: 4.8 %
LYMPHOCYTES # BLD AUTO: 2.3 10E9/L (ref 0.8–5.3)
LYMPHOCYTES NFR BLD AUTO: 26.8 %
MONOCYTES # BLD AUTO: 0.6 10E9/L (ref 0–1.3)
MONOCYTES NFR BLD AUTO: 6.5 %
NEUTROPHILS # BLD AUTO: 5.4 10E9/L (ref 1.6–8.3)
NEUTROPHILS NFR BLD AUTO: 61.4 %
WBC # BLD AUTO: 8.7 10E9/L (ref 4–11)

## 2021-03-30 PROCEDURE — 85004 AUTOMATED DIFF WBC COUNT: CPT | Performed by: INTERNAL MEDICINE

## 2021-03-30 PROCEDURE — 85048 AUTOMATED LEUKOCYTE COUNT: CPT | Performed by: INTERNAL MEDICINE

## 2021-03-30 PROCEDURE — 36415 COLL VENOUS BLD VENIPUNCTURE: CPT | Performed by: INTERNAL MEDICINE

## 2021-04-07 ENCOUNTER — TELEPHONE (OUTPATIENT)
Dept: PULMONOLOGY | Facility: CLINIC | Age: 56
End: 2021-04-07

## 2021-04-07 NOTE — TELEPHONE ENCOUNTER
Patient called stating she is wheezing and coughing more lately. Wheezing more, coughing frequently. Patient states she has been using her albuterol lately 1-2 times a day, it does help with the wheezing.     She is wondering about starting biologics or maybe a different inhaler.     Will discuss with Dr. Bean and get back to the patient soon.     Vonnie Juarez RN   Medical Specialty Clinic Care Coordinator  [facility]   Phone: [phone]  Fax: [phone]

## 2021-04-07 NOTE — TELEPHONE ENCOUNTER
[facility] Call Center    Phone Message    May a detailed message be left on voicemail: yes     Reason for Call: Symptoms or Concerns     Patient has been wheezing and using inhaler a lot. Wanting to know if a new med maybe should be prescribed?    Note: patient coughing a lot during call.    Action Taken: Message routed to:  Adult Clinics: Pulmonology p [id number]    Travel Screening: Not Applicable

## 2021-04-09 ENCOUNTER — MYC MEDICAL ADVICE (OUTPATIENT)
Dept: PULMONOLOGY | Facility: CLINIC | Age: 56
End: 2021-04-09

## 2021-04-09 DIAGNOSIS — J45.41 MODERATE PERSISTENT ASTHMA WITH EXACERBATION: Primary | ICD-10-CM

## 2021-04-09 NOTE — TELEPHONE ENCOUNTER
Partha Adamsy,    Sorry your breathing is not at the best now. I decided to add on another inhaler - Spiriva to your regimen. You take 2 puffs in the morning only. You will also continue to use your Symbicort (you can go ahead and pick it up). Like we discussed, your blood eosinophils are not elevated and so Nucala is not indicated. Your IgE levels are quite high and we may be able to get you to use a different kind of biologics- Xolair in the future if your asthma continues to be uncontrolled.    Best wishes,    Marco Bean MD  Pulmonary, Critical Care and Sleep Medicine  [facility]  Pager: [phone]

## 2021-04-15 ENCOUNTER — TELEPHONE (OUTPATIENT)
Dept: PULMONOLOGY | Facility: CLINIC | Age: 56
End: 2021-04-15

## 2021-04-15 NOTE — TELEPHONE ENCOUNTER
[facility] Call Center    Phone Message    May a detailed message be left on voicemail: yes     Reason for Call: Symptoms or Concerns     If patient has red-flag symptoms, warm transfer to triage line    Current symptom or concern: Wheezing, coughing    Symptoms have been present for: month(s)    Has patient previously been seen for this? Yes    By: [place]    Date: Ongoing    Are there any new or worsening symptoms? No      Action Taken: Message routed to:  Adult Clinics: Pulmonology p [id number]    Travel Screening: Not Applicable

## 2021-04-15 NOTE — TELEPHONE ENCOUNTER
Patient started her Spiriva on Monday. Since she started she states that her coughing is more frequent, and has a lot of mucus. Her mucus is clear and has no color to it. She also has no fever. Patient also states that her voice has been hoarse since she started the Spiriva.     She is wondering what Dr. Bean would advise.     Vonine Juarez RN   Medical Specialty Clinic Care Coordinator  [facility]   Phone: [phone]  Fax: [phone]

## 2021-04-16 NOTE — TELEPHONE ENCOUNTER
Patient returned CB to the pulmonary clinic to discuss pulmonary recommendations from Dr. Bean.     Writer asked patient if she was rinsing her mouth after each time Symbicort was used. Patient states that she is rinsing her mouth after each time, however, patient states that she will rinse better.    Informed patient of Dr. Bean's recommendations. Patient expressed understanding and had no further questions.      Donna Gaspar LPN  Pulmonary Medicine:  [facility]  Phone: [phone] Fax: [phone]

## 2021-04-16 NOTE — TELEPHONE ENCOUNTER
Hi,    Gracy's hoarseness is not likely due to Spiriva. It sounds more like a side effect from her Symbicort. Is she rinsing her mouth after each use of her Symbicort? Also, it is not unusual to have cough with increased sputum expectoration when getting started on a bronchodilator like Spiriva. She should continue the Spiriva for an additional week and if the symptoms are persistent, she should reach out to let us know.      Thank you.

## 2021-04-16 NOTE — TELEPHONE ENCOUNTER
VM left for patient requesting a CB to the clinic to discuss Dr. Bean's recommendations.      Donna Gaspar LPN  Pulmonary Medicine:  [facility]  Phone: [phone] Fax: [phone]

## 2021-04-19 ENCOUNTER — TELEPHONE (OUTPATIENT)
Dept: PULMONOLOGY | Facility: CLINIC | Age: 56
End: 2021-04-19

## 2021-04-19 NOTE — TELEPHONE ENCOUNTER
"Received budesonide-formoterol (SYMBICORT) 160-4.5 MCG/ACT Inhaler substitution fax request from [facility].     [facility] states that \"this prescription is available in a cost saving generic alternative\". Dr. Bean has reviewed and signed the generic alternative form.     Signed form has been faxed to [place] @ [phone]. A copy has been placed into scanning.      Donna Gaspar LPN  Pulmonary Medicine:  [facility]  Phone: [phone] Fax: [phone]    " No specimen collected. Estimated Blood Loss: <30 mL.

## 2021-06-08 ENCOUNTER — TELEPHONE (OUTPATIENT)
Dept: PULMONOLOGY | Facility: CLINIC | Age: 56
End: 2021-06-08

## 2021-06-08 NOTE — TELEPHONE ENCOUNTER
Contacted patient regarding pulmonary appointments.     Patient states that  06/10/2021 pulmonary appointment was cancelled by mistake. Patient requesting to come into clinic for follow up in order to have an in person exam. Patient was seen in the ED 06/07/2021 for exacerbation of persistent asthma, unspecified asthma severity.      Patient has been scheduled for an in person clinic visit on Wednesday 06/09/2021 @ 0930 with Dr. Bean.     Patient expressed gratitude for the call back and has agreed to date/time of follow up.      Donna Gaspar LPN  Pulmonary Medicine:  [facility]  Phone: [phone] Fax: [phone]

## 2021-06-08 NOTE — TELEPHONE ENCOUNTER
[facility] Call Center    Phone Message    May a detailed message be left on voicemail: no     Reason for Call: Other: Pt had to cancel her PFT due to a family emergency.  She did not know that her doctor appt would be canceled too on 6.10.21.  Pt asking for a call back. Was in the ER with difficulty breathing and still wants to come in without PFT.  Please advise.      Action Taken: Message routed to:  Adult Clinics: Pulmonology p [id number]    Travel Screening: Not Applicable

## 2021-06-09 ENCOUNTER — OFFICE VISIT (OUTPATIENT)
Dept: PULMONOLOGY | Facility: CLINIC | Age: 56
End: 2021-06-09
Payer: COMMERCIAL

## 2021-06-09 VITALS
SYSTOLIC BLOOD PRESSURE: 124 MMHG | TEMPERATURE: 98.4 F | WEIGHT: 119.5 LBS | HEIGHT: 64 IN | OXYGEN SATURATION: 97 % | DIASTOLIC BLOOD PRESSURE: 85 MMHG | RESPIRATION RATE: 18 BRPM | BODY MASS INDEX: 20.4 KG/M2 | HEART RATE: 96 BPM

## 2021-06-09 DIAGNOSIS — J01.40 ACUTE PANSINUSITIS, RECURRENCE NOT SPECIFIED: ICD-10-CM

## 2021-06-09 DIAGNOSIS — J45.50 SEVERE PERSISTENT ASTHMA WITH ALLERGIC RHINITIS, UNSPECIFIED WHETHER COMPLICATED (H): ICD-10-CM

## 2021-06-09 DIAGNOSIS — J45.41 MODERATE PERSISTENT ASTHMA WITH EXACERBATION: Primary | ICD-10-CM

## 2021-06-09 PROBLEM — J45.909: Status: ACTIVE | Noted: 2021-06-09

## 2021-06-09 PROCEDURE — 99214 OFFICE O/P EST MOD 30 MIN: CPT | Performed by: INTERNAL MEDICINE

## 2021-06-09 RX ORDER — ALBUTEROL SULFATE 90 UG/1
1-2 AEROSOL, METERED RESPIRATORY (INHALATION) EVERY 6 HOURS PRN
Qty: 8 G | Refills: 11 | Status: SHIPPED | OUTPATIENT
Start: 2021-06-09 | End: 2022-05-09

## 2021-06-09 RX ORDER — ALBUTEROL SULFATE 0.83 MG/ML
2.5 SOLUTION RESPIRATORY (INHALATION)
Status: CANCELLED | OUTPATIENT
Start: 2021-06-09

## 2021-06-09 RX ORDER — ALBUTEROL SULFATE 90 UG/1
1-2 AEROSOL, METERED RESPIRATORY (INHALATION)
Status: CANCELLED
Start: 2021-06-09

## 2021-06-09 RX ORDER — IBUPROFEN 200 MG
200 TABLET ORAL EVERY 4 HOURS PRN
COMMUNITY
End: 2022-04-18

## 2021-06-09 RX ORDER — DIPHENHYDRAMINE HYDROCHLORIDE 50 MG/ML
50 INJECTION INTRAMUSCULAR; INTRAVENOUS
Status: CANCELLED
Start: 2021-06-09

## 2021-06-09 RX ORDER — PREDNISONE 20 MG/1
20 TABLET ORAL
COMMUNITY
Start: 2021-06-07 | End: 2021-06-12

## 2021-06-09 RX ORDER — FEXOFENADINE HCL 180 MG/1
180 TABLET ORAL DAILY
Qty: 30 TABLET | Refills: 3 | Status: SHIPPED | OUTPATIENT
Start: 2021-06-09 | End: 2021-10-18

## 2021-06-09 RX ORDER — FLUTICASONE PROPIONATE 50 MCG
1 SPRAY, SUSPENSION (ML) NASAL DAILY
Qty: 1 G | Refills: 11 | Status: SHIPPED | OUTPATIENT
Start: 2021-06-09 | End: 2021-12-14

## 2021-06-09 RX ORDER — EPINEPHRINE 1 MG/ML
0.3 INJECTION, SOLUTION, CONCENTRATE INTRAVENOUS EVERY 5 MIN PRN
Status: CANCELLED | OUTPATIENT
Start: 2021-06-09

## 2021-06-09 RX ORDER — MEPERIDINE HYDROCHLORIDE 25 MG/ML
25 INJECTION INTRAMUSCULAR; INTRAVENOUS; SUBCUTANEOUS EVERY 30 MIN PRN
Status: CANCELLED | OUTPATIENT
Start: 2021-06-09

## 2021-06-09 RX ORDER — LORATADINE 10 MG/1
10 TABLET ORAL DAILY
Qty: 90 TABLET | Refills: 3 | Status: CANCELLED | OUTPATIENT
Start: 2021-06-09

## 2021-06-09 RX ORDER — NALOXONE HYDROCHLORIDE 0.4 MG/ML
0.2 INJECTION, SOLUTION INTRAMUSCULAR; INTRAVENOUS; SUBCUTANEOUS
Status: CANCELLED | OUTPATIENT
Start: 2021-06-09

## 2021-06-09 RX ORDER — INHALER, ASSIST DEVICES
SPACER (EA) MISCELLANEOUS
Qty: 1 EACH | Refills: 0 | Status: SHIPPED | OUTPATIENT
Start: 2021-06-09 | End: 2022-04-29

## 2021-06-09 ASSESSMENT — ASTHMA QUESTIONNAIRES
QUESTION_4 LAST FOUR WEEKS HOW OFTEN HAVE YOU USED YOUR RESCUE INHALER OR NEBULIZER MEDICATION (SUCH AS ALBUTEROL): THREE OR MORE TIMES PER DAY
QUESTION_5 LAST FOUR WEEKS HOW WOULD YOU RATE YOUR ASTHMA CONTROL: NOT CONTROLLED AT ALL
QUESTION_1 LAST FOUR WEEKS HOW MUCH OF THE TIME DID YOUR ASTHMA KEEP YOU FROM GETTING AS MUCH DONE AT WORK, SCHOOL OR AT HOME: ALL OF THE TIME
QUESTION_2 LAST FOUR WEEKS HOW OFTEN HAVE YOU HAD SHORTNESS OF BREATH: MORE THAN ONCE A DAY
ACUTE_EXACERBATION_TODAY: NO
ACT_TOTALSCORE: 5
QUESTION_3 LAST FOUR WEEKS HOW OFTEN DID YOUR ASTHMA SYMPTOMS (WHEEZING, COUGHING, SHORTNESS OF BREATH, CHEST TIGHTNESS OR PAIN) WAKE YOU UP AT NIGHT OR EARLIER THAN USUAL IN THE MORNING: FOUR OR MORE NIGHTS A WEEK
EMERGENCY_ROOM_LAST_YEAR_TOTAL: THREE OR MORE

## 2021-06-09 ASSESSMENT — MIFFLIN-ST. JEOR: SCORE: 1117.05

## 2021-06-09 ASSESSMENT — PAIN SCALES - GENERAL: PAINLEVEL: MODERATE PAIN (4)

## 2021-06-09 NOTE — PROGRESS NOTES
"Gracy Bautista's goals for this visit include: Return  She requests these members of her care team be copied on today's visit information: PCP    PCP: [facility]    Referring Provider:  No referring provider defined for this encounter.    /85   Pulse 96   Temp 98.4  F (36.9  C)   Resp 18   Ht 1.626 m (5' 4\")   Wt 54.2 kg (119 lb 8 oz)   SpO2 97%   BMI 20.51 kg/m      Do you need any medication refills at today's visit? TAYLOR Gaspar LPN  Pulmonary Medicine:  [facility]  Phone: [phone] Fax: [phone]    "

## 2021-06-09 NOTE — PROGRESS NOTES
Pulmonary Clinic Return Patient Visit  Reason for Visit: Severe asthma    History of Present Illness  Ms. Gracy Bautista is a pleasant 55 yr old female with a complex history of aspirin induced asthma with severe nasal polyposis, chronic rhinosinusitis with multiple surgeries, IgG deficiency. She presents to clinic for follow up for lung nodules and asthma. I last saw her in clinic on 03/17/2021.  To briefly review, she was closely followed at the [facility] by ENT (Dr. Sandoval) and pulmonary (Dr. Carlos). She was being managed on a regimen of Nucala, Advair inhaler, Singulair and albuterol as needed. She was started on Nucala based on peripheral eosinophilia, nasal polyps and ? sinus biopsy. However, she doesn't think that Nuclala therapy was helpful and they were stopped for more than a year prior to her initial visit. She did not notice any worsening in her asthma control on stopping Nucala  She had previously underwent an aspirin challenge. Multiple surgeries in the past for rhinosinusitis including a DRAF III surgery in April of 2019  When I saw her, she had been off biologics > 1 yr with no change in symptoms. She was not on any ICS with overdependency on albuterol. I prescribed high dose Symbicort and Singulair with comprehensive education of the treatment of asthma. I also gave her an asthma action plan. She still continued to have uncontrolled symptoms and I added Spiriva to her regimen. Gracy has a long history of smoking and quit in 02/2021 and appears to have restarted smoking. I also reevaluated her need for biologics and checked blood eosinophils which were not elevated.Total IgE levels were slightly elevated at 255 but her environmental IgE allergen tests were normal. Allergen A fumigatus was borderline which I determined was of no clinical utility. A CT chest from 10/2020 showed granulomatous disease with no suspicious pulmonary nodule and her PFTs were normal with no overt signs of  obstruction.  She was also seen by rheumatology- Dr Treviño and extensive work up for autoimmune disease and vasculitis returned negative. She was also seen by hematology- Dr. Heart for easy bruising and extensive work up was negative. 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  Unfortunately, she was seen at the [facility] ER for asthma exacerbation and was given a steroid burst as well as nebulizer yesterday and she presents today for a post ER visit. She had > 3- 4 ER visits for asthma exacerbation in the last 6 months.  Today, she continues to have increased sinus pressure, nasal congestion, rhinitis as well as increased SOB, cough and wheezing. She continues to use her regimen of high dose Symbicort, Spiriva, Singulair, Claritin as well as albuterol as needed. She uses albuterol round the clock and continues to struggle with daily activities with walking. She describes a recent incident of increased SOB with picking up her grandchild from the child. She continues to have occasional skin changes due purplish rash mostly on her fingers and toes. She denies any joint changes.  She denies any fevers, no night sweats and no chest pain.  She is back to smoking but she smokes lightly with 1 ppd lasting over several weeks. She also started using nicotine gum which is helpful.        Review of Systems:  10 of 14 systems reviewed and are negative unless otherwise stated in HPI.    No past medical history on file.    No past surgical history on file.    No family history on file.    Social History     Socioeconomic History     Marital status:      Spouse name: Not on file     Number of children: Not on file     Years of education: Not on file     Highest education level: Not on file   Occupational History     Not on file   Social Needs     Financial resource strain: Not on file     Food insecurity     Worry: Not on  file     Inability: Not on file     Transportation needs     Medical: Not on file     Non-medical: Not on file   Tobacco Use     Smoking status: Current Some Day Smoker     Types: Cigarettes     Smokeless tobacco: Never Used     Tobacco comment: 6 cigarettes per week   Substance and Sexual Activity     Alcohol use: Not on file     Drug use: Not on file     Sexual activity: Not on file   Lifestyle     Physical activity     Days per week: Not on file     Minutes per session: Not on file     Stress: Not on file   Relationships     Social connections     Talks on phone: Not on file     Gets together: Not on file     Attends Jain service: Not on file     Active member of club or organization: Not on file     Attends meetings of clubs or organizations: Not on file     Relationship status: Not on file     Intimate partner violence     Fear of current or ex partner: Not on file     Emotionally abused: Not on file     Physically abused: Not on file     Forced sexual activity: Not on file   Other Topics Concern     Not on file   Social History Narrative     Not on file         Allergies   Allergen Reactions     Bee Venom Angioedema and Swelling     Other reaction(s): Angioedema       Cefdinir GI Disturbance and Other (See Comments)     diarrhea  diarrhea       Levofloxacin Muscle Pain (Myalgia)     Prednisone Anxiety and Other (See Comments)     Hyperactivity and moodiness           Current Outpatient Medications:      acetaminophen (TYLENOL) 500 MG tablet, Take 500-1,000 mg by mouth every 6 hours as needed for mild pain, Disp: , Rfl:      ADDERALL XR 20 MG 24 hr capsule, PRN, Disp: , Rfl:      albuterol (PROAIR HFA/PROVENTIL HFA/VENTOLIN HFA) 108 (90 Base) MCG/ACT inhaler, Inhale 1-2 puffs into the lungs, Disp: , Rfl:      budesonide-formoterol (SYMBICORT) 160-4.5 MCG/ACT Inhaler, Inhale 2 puffs into the lungs 2 times daily, Disp: 1 g, Rfl: 11     CYMBALTA 60 MG capsule, , Disp: , Rfl:      fluticasone (FLONASE) 50  "MCG/ACT nasal spray, Spray 1 spray into both nostrils daily, Disp: 1 g, Rfl: 3     gentamicin 80 mg in 1000 ml 0.9% NaCl (GARAMYCIN) SOLN nasal irrigation, Spray 30 mLs in nostril daily Irrigate 30 mL in each nostril once a day (Patient not taking: Reported on 3/17/2021), Disp: 1000 mL, Rfl: 3     LAMICTAL 200 MG tablet, TK 1 T PO D, Disp: , Rfl:      loratadine (CLARITIN) 10 MG tablet, Take 1 tablet (10 mg) by mouth daily, Disp: 30 tablet, Rfl: 4     montelukast (SINGULAIR) 10 MG tablet, Take 1 tablet (10 mg) by mouth At Bedtime, Disp: 90 tablet, Rfl: 3     tiotropium (SPIRIVA RESPIMAT) 2.5 MCG/ACT inhaler, Inhale 2 puffs into the lungs daily, Disp: 1 g, Rfl: 3      Physical Exam:  /85   Pulse 96   Temp 98.4  F (36.9  C)   Resp 18   Ht 1.626 m (5' 4\")   Wt 54.2 kg (119 lb 8 oz)   SpO2 97%   BMI 20.51 kg/m    GENERAL: Well developed, well nourished, alert, and in no apparent distress.  HEENT: Normocephalic, atraumatic. PERRL, EOMI. Oral mucosa is moist. No perioral cyanosis.  NECK: supple, no masses, no thyromegaly.  RESP:  Normal respiratory effort.  Diffuse expiratory wheezes  CV: Normal S1, S2, regular rhythm, normal rate. No murmur.  No LE edema.   ABDOMEN:  Soft, non-tender, non-distended.   SKIN: warm and dry. No rash.  NEURO: AAOx3.  Normal gait.  Fluent speech.  PSYCH: mentation appears normal.       Results:  PFTs:  Imaging (personally reviewed in clinic today):      Assessment and Plan:   Severe persistent Asthma with poor control  ACT score today is 5  Complex history with multi-systemic constellation of symptoms which appear to be related including pulmonary symptoms. Work up for vasculitis and autoimmune disease is negative. Extensive work up and therapy in the past including sinus surgeries and treatment with Nucala.  PFTs showed no obstruction with preserved DLCO. CT chest is reassuring with no evidence of nodularity or ILD changes.  Recent blood eosinophils are not elevated (she was not " on steroids at the time). Total IgE levels were slightly elevated at 255 but her environmental IgE allergen tests were normal. Allergen A fumigatus was borderline. Given her prior use of Nucala with no improvement in symptoms as well as no evidence of peripheral eosinophilia, I am not sure that biologics are indicated. Biologics such as Dupilumab may be indicated for the treatment of chronic rhinosinusitis with nasal polyps but I don't see any evidence of ongoing polyps and she has not been to the ENT doctor lately. I will like for her to reestablish care with our ENT here at [place] as some of her symptoms may be related to her significant sinus disease. I have asked that she calls to get a reppointment.  From an asthma stand point, even though that her total IgE was elevated at 255, blood lab testing for allergen-specific IgE was negative and therefore traditionally Xolair is not overtly indicated. Regardless, given the significance of her symptoms, I am willing to try her on Xolair so I will start a therapy plan and hopefully get a approval from her insurance. I discussed possible side effects from Xolair which includes anaphylaxis. She also knows that I will require infusions from ur infusion center.  Bronchopulmonary aspergillosis is less likely due to the botherline Allergen A fumigatus IgE (which has been negative in the recent past), relatively normal CT chest and IgE that is not very elevated.  In the meantime, will continue her current regimen of high dose Symbicort, Spiriva, Flonase, Singulair and albuterol as needed. Prescriptions were given. I will also switch her Claritin to Allegra for better antihistamine effect as her symptoms are allergic in nature.       Active Smoking  Light smoking now, encouraged to quit. On nicotine gum. 1/2 packs per month.     IgG deficiency  Has received IgG infusions in the past. Recurrent pneumonias are not an issue per se. However, she continues to have sinus issues and  she is being followed by ENT. IgG levels continue to be low, she may benefit from IgG replacements.No evidence of ILD on imaging.      Chronic Rhinosinusitis  Follows with ENT. She continues to have low levels of IgG and she is being evaluated by Hem/onc for possible culture directed antibiotic therapy. Started her on Flonase which she can continue. Appears to have significant changes in nasal secretions with color changes. Will treat with 7 days of Augmentin. Prescriptions were given        Questions and concerns were answered to the patient's satisfaction.  she was provided with my contact information should new questions or concerns arise in the interim.     She should return to clinic in 3 months     Up to date on vaccinations    Marco Bean MD  Pulmonary, Critical Care and Sleep Medicine  [facility]  Pager: [phone]     Marco Bean MD  Pulmonary, Critical Care and Sleep Medicine  [facility]  Office: [phone]      The above note was dictated using voice recognition software and may include typographical errors. Please contact the author for any clarifications.

## 2021-06-10 ENCOUNTER — TELEPHONE (OUTPATIENT)
Dept: PULMONOLOGY | Facility: CLINIC | Age: 56
End: 2021-06-10

## 2021-06-10 ASSESSMENT — ASTHMA QUESTIONNAIRES: ACT_TOTALSCORE: 5

## 2021-06-10 NOTE — TELEPHONE ENCOUNTER
----- Message from ALBIN DONOVAN sent at 6/10/2021  8:38 AM CDT -----  Regarding: Xolair start  Patient starting Xolair - please contact pt to set up Xolair infusion. If patient wanting to start Xolair in [place], have her contact infusion scheduling at [phone] to initiate the PA for insurance approval. If pt not wanting to have Xolair in [place], fax orders to the infusion center of her choice. Patient will need to schedule infusion herself.

## 2021-06-14 NOTE — TELEPHONE ENCOUNTER
Called patient to set up appointment with infusion center to start Xolair PA process. Phone number given to patient. Patient verbalized that she would be calling right now to set up appointment.

## 2021-06-18 ENCOUNTER — TELEPHONE (OUTPATIENT)
Dept: PULMONOLOGY | Facility: CLINIC | Age: 56
End: 2021-06-18

## 2021-06-18 NOTE — TELEPHONE ENCOUNTER
[facility] Call Center    Phone Message    May a detailed message be left on voicemail: yes     Reason for Call: The patient called asking to speak with someone regarding wheezing, cough and sore throat since starting inhaler. Writer sent lync to care team but patient hung up. Please advise. Thank you.    Action Taken: Message routed to:  Adult Clinics: Pulmonology p [phone]    Travel Screening: Not Applicable

## 2021-06-21 NOTE — TELEPHONE ENCOUNTER
"Complaint: ED visit on Saturday from wheezing, coughing persistent, and sore throat. No new inhaler as mentioned from Friday call.     Duration: a month-chronic cough, wheezing. States no different from last visit with Dr. Bean.     Difficult for any type of activity, \"can't even walk my dog\" per patient.     Fever: denies    Sputum: yes, worried for pneumonia. CXR from ED at [facility] negative for pneumonia.     Medications: started nebulizer and mucinex after ED visit. Not improving. Has not started xolair infusions yet, appointment set up.    Patient states CT scan and PFTs suggested by ED PA along with pulmonology follow up. Reports the PA \"believes it is more than asthma\". Will route to Dr. Bean to follow.     Natacha Pabon RN, BSN  Pulmonary Nurse Care Coordinator  Phone: [phone]  Fax: [phone]    "

## 2021-06-21 NOTE — TELEPHONE ENCOUNTER
Complaint:  wheezing, cough and sore throat since starting inhaler. Left voicemail to call back.     Natacha Pabon, RN, BSN  Pulmonary Nurse Care Coordinator  Phone: [phone]  Fax: [phone]

## 2021-06-23 ENCOUNTER — TELEPHONE (OUTPATIENT)
Dept: PULMONOLOGY | Facility: CLINIC | Age: 56
End: 2021-06-23

## 2021-06-23 ENCOUNTER — VIRTUAL VISIT (OUTPATIENT)
Dept: PULMONOLOGY | Facility: CLINIC | Age: 56
End: 2021-06-23
Payer: COMMERCIAL

## 2021-06-23 DIAGNOSIS — J45.909 SEVERE ASTHMA, UNSPECIFIED WHETHER COMPLICATED, UNSPECIFIED WHETHER PERSISTENT: Primary | ICD-10-CM

## 2021-06-23 PROCEDURE — 99214 OFFICE O/P EST MOD 30 MIN: CPT | Mod: 95 | Performed by: INTERNAL MEDICINE

## 2021-06-23 RX ORDER — GUAIFENESIN 1200 MG/1
1200 TABLET, EXTENDED RELEASE ORAL
COMMUNITY
Start: 2021-06-19 | End: 2021-07-03

## 2021-06-23 NOTE — PROGRESS NOTES
Gracy is a 55 year old who is being evaluated via a billable telephone visit.      What phone number would you like to be contacted at? [phone] (H)  How would you like to obtain your AVS? Dee Gaspar LPN  Pulmonary Medicine:  [facility]  Phone: [phone] Fax: [phone]

## 2021-06-23 NOTE — TELEPHONE ENCOUNTER
Added on to schedule for today for appointment with Dr. Bean.     Natacha Pabon, RN, BSN  Pulmonary Nurse Care Coordinator  Phone: [phone]  Fax: [phone]

## 2021-06-23 NOTE — TELEPHONE ENCOUNTER
Contacted [facility] @ [phone]. Requested all CXR from [phone] be pushed to  PACS for review.      Donna Gaspar LPN  Pulmonary Medicine:  [facility]  Phone: [phone] Fax: [phone]

## 2021-06-27 ENCOUNTER — APPOINTMENT (OUTPATIENT)
Dept: CT IMAGING | Facility: CLINIC | Age: 56
End: 2021-06-27
Attending: EMERGENCY MEDICINE
Payer: COMMERCIAL

## 2021-06-27 ENCOUNTER — HOSPITAL ENCOUNTER (EMERGENCY)
Facility: CLINIC | Age: 56
Discharge: HOME | End: 2021-06-27
Attending: EMERGENCY MEDICINE | Admitting: EMERGENCY MEDICINE
Payer: COMMERCIAL

## 2021-06-27 VITALS
RESPIRATION RATE: 26 BRPM | DIASTOLIC BLOOD PRESSURE: 48 MMHG | WEIGHT: 120 LBS | OXYGEN SATURATION: 95 % | HEART RATE: 77 BPM | SYSTOLIC BLOOD PRESSURE: 102 MMHG | TEMPERATURE: 96.8 F | BODY MASS INDEX: 19.99 KG/M2 | HEIGHT: 65 IN

## 2021-06-27 DIAGNOSIS — R91.8 PULMONARY NODULES: ICD-10-CM

## 2021-06-27 DIAGNOSIS — J44.1 COPD EXACERBATION (H): ICD-10-CM

## 2021-06-27 LAB
ALBUMIN SERPL-MCNC: 3.8 G/DL (ref 3.4–5)
ALP SERPL-CCNC: 118 U/L (ref 40–150)
ALT SERPL W P-5'-P-CCNC: 27 U/L (ref 0–50)
ANION GAP SERPL CALCULATED.3IONS-SCNC: 4 MMOL/L (ref 3–14)
AST SERPL W P-5'-P-CCNC: 18 U/L (ref 0–45)
BASOPHILS # BLD AUTO: 0.1 10E9/L (ref 0–0.2)
BASOPHILS NFR BLD AUTO: 0.4 %
BILIRUB SERPL-MCNC: 0.5 MG/DL (ref 0.2–1.3)
BUN SERPL-MCNC: 15 MG/DL (ref 7–30)
CALCIUM SERPL-MCNC: 9.3 MG/DL (ref 8.5–10.1)
CHLORIDE SERPL-SCNC: 106 MMOL/L (ref 94–109)
CO2 SERPL-SCNC: 26 MMOL/L (ref 20–32)
CREAT SERPL-MCNC: 0.89 MG/DL (ref 0.52–1.04)
DIFFERENTIAL METHOD BLD: ABNORMAL
EOSINOPHIL # BLD AUTO: 0.5 10E9/L (ref 0–0.7)
EOSINOPHIL NFR BLD AUTO: 3.4 %
ERYTHROCYTE [DISTWIDTH] IN BLOOD BY AUTOMATED COUNT: 12.7 % (ref 10–15)
GFR SERPL CREATININE-BSD FRML MDRD: 72 ML/MIN/{1.73_M2}
GLUCOSE SERPL-MCNC: 107 MG/DL (ref 70–99)
HCT VFR BLD AUTO: 44.9 % (ref 35–47)
HGB BLD-MCNC: 15.7 G/DL (ref 11.7–15.7)
IMM GRANULOCYTES # BLD: 0.1 10E9/L (ref 0–0.4)
IMM GRANULOCYTES NFR BLD: 0.5 %
LACTATE BLD-SCNC: 1.4 MMOL/L (ref 0.7–2)
LYMPHOCYTES # BLD AUTO: 2.1 10E9/L (ref 0.8–5.3)
LYMPHOCYTES NFR BLD AUTO: 15.3 %
MCH RBC QN AUTO: 33.8 PG (ref 26.5–33)
MCHC RBC AUTO-ENTMCNC: 35 G/DL (ref 31.5–36.5)
MCV RBC AUTO: 97 FL (ref 78–100)
MONOCYTES # BLD AUTO: 0.8 10E9/L (ref 0–1.3)
MONOCYTES NFR BLD AUTO: 5.6 %
NEUTROPHILS # BLD AUTO: 10 10E9/L (ref 1.6–8.3)
NEUTROPHILS NFR BLD AUTO: 74.8 %
NRBC # BLD AUTO: 0 10*3/UL
NRBC BLD AUTO-RTO: 0 /100
NT-PROBNP SERPL-MCNC: 58 PG/ML (ref 0–900)
PLATELET # BLD AUTO: 341 10E9/L (ref 150–450)
POTASSIUM SERPL-SCNC: 4.7 MMOL/L (ref 3.4–5.3)
PROT SERPL-MCNC: 7.4 G/DL (ref 6.8–8.8)
RBC # BLD AUTO: 4.64 10E12/L (ref 3.8–5.2)
SODIUM SERPL-SCNC: 136 MMOL/L (ref 133–144)
TROPONIN I SERPL-MCNC: <0.015 UG/L (ref 0–0.04)
WBC # BLD AUTO: 13.4 10E9/L (ref 4–11)

## 2021-06-27 PROCEDURE — 83880 ASSAY OF NATRIURETIC PEPTIDE: CPT | Performed by: EMERGENCY MEDICINE

## 2021-06-27 PROCEDURE — 99285 EMERGENCY DEPT VISIT HI MDM: CPT | Mod: 25

## 2021-06-27 PROCEDURE — 71275 CT ANGIOGRAPHY CHEST: CPT

## 2021-06-27 PROCEDURE — 84484 ASSAY OF TROPONIN QUANT: CPT | Performed by: EMERGENCY MEDICINE

## 2021-06-27 PROCEDURE — 80053 COMPREHEN METABOLIC PANEL: CPT | Performed by: EMERGENCY MEDICINE

## 2021-06-27 PROCEDURE — 85025 COMPLETE CBC W/AUTO DIFF WBC: CPT | Performed by: EMERGENCY MEDICINE

## 2021-06-27 PROCEDURE — 250N000011 HC RX IP 250 OP 636: Performed by: EMERGENCY MEDICINE

## 2021-06-27 PROCEDURE — 94640 AIRWAY INHALATION TREATMENT: CPT

## 2021-06-27 PROCEDURE — 250N000009 HC RX 250: Performed by: EMERGENCY MEDICINE

## 2021-06-27 PROCEDURE — 250N000013 HC RX MED GY IP 250 OP 250 PS 637: Performed by: EMERGENCY MEDICINE

## 2021-06-27 PROCEDURE — 93005 ELECTROCARDIOGRAM TRACING: CPT

## 2021-06-27 PROCEDURE — 96374 THER/PROPH/DIAG INJ IV PUSH: CPT | Mod: 59

## 2021-06-27 PROCEDURE — 83605 ASSAY OF LACTIC ACID: CPT | Performed by: EMERGENCY MEDICINE

## 2021-06-27 RX ORDER — DOXYCYCLINE 100 MG/1
100 CAPSULE ORAL ONCE
Status: COMPLETED | OUTPATIENT
Start: 2021-06-27 | End: 2021-06-27

## 2021-06-27 RX ORDER — IPRATROPIUM BROMIDE AND ALBUTEROL SULFATE 2.5; .5 MG/3ML; MG/3ML
3 SOLUTION RESPIRATORY (INHALATION) ONCE
Status: COMPLETED | OUTPATIENT
Start: 2021-06-27 | End: 2021-06-27

## 2021-06-27 RX ORDER — PREDNISONE 20 MG/1
TABLET ORAL
Qty: 10 TABLET | Refills: 0 | Status: SHIPPED | OUTPATIENT
Start: 2021-06-27 | End: 2021-09-17

## 2021-06-27 RX ORDER — DOXYCYCLINE 100 MG/1
100 CAPSULE ORAL 2 TIMES DAILY
Qty: 20 CAPSULE | Refills: 0 | Status: SHIPPED | OUTPATIENT
Start: 2021-06-27 | End: 2021-07-07

## 2021-06-27 RX ORDER — IOPAMIDOL 755 MG/ML
556 INJECTION, SOLUTION INTRAVASCULAR ONCE
Status: COMPLETED | OUTPATIENT
Start: 2021-06-27 | End: 2021-06-27

## 2021-06-27 RX ORDER — IPRATROPIUM BROMIDE AND ALBUTEROL SULFATE 2.5; .5 MG/3ML; MG/3ML
3 SOLUTION RESPIRATORY (INHALATION) ONCE
Status: DISCONTINUED | OUTPATIENT
Start: 2021-06-27 | End: 2021-06-27 | Stop reason: HOSPADM

## 2021-06-27 RX ORDER — METHYLPREDNISOLONE SODIUM SUCCINATE 125 MG/2ML
125 INJECTION, POWDER, LYOPHILIZED, FOR SOLUTION INTRAMUSCULAR; INTRAVENOUS ONCE
Status: COMPLETED | OUTPATIENT
Start: 2021-06-27 | End: 2021-06-27

## 2021-06-27 RX ADMIN — IPRATROPIUM BROMIDE AND ALBUTEROL SULFATE 3 ML: .5; 3 SOLUTION RESPIRATORY (INHALATION) at 15:35

## 2021-06-27 RX ADMIN — SODIUM CHLORIDE 83 ML: 9 INJECTION, SOLUTION INTRAVENOUS at 16:41

## 2021-06-27 RX ADMIN — METHYLPREDNISOLONE SODIUM SUCCINATE 125 MG: 125 INJECTION, POWDER, FOR SOLUTION INTRAMUSCULAR; INTRAVENOUS at 16:10

## 2021-06-27 RX ADMIN — DOXYCYCLINE 100 MG: 100 CAPSULE ORAL at 18:23

## 2021-06-27 RX ADMIN — IOPAMIDOL 56 ML: 755 INJECTION, SOLUTION INTRAVENOUS at 16:40

## 2021-06-27 ASSESSMENT — MIFFLIN-ST. JEOR: SCORE: 1135.2

## 2021-06-27 NOTE — ED TRIAGE NOTES
Pt reports shortness of breath despite use of inhalers and nebulizer speaking in full sentences, audible wheezes and cough noted in triage

## 2021-06-27 NOTE — ED PROVIDER NOTES
"  History   Chief Complaint:  Shortness of Breath       HPI   Gracy Bautista is a 55 year old female with history of COPD who presents with shortness of breath.  She states that she has been dealing with this for the last 4 to 6 weeks.  She has been on Augmentin and prednisone, as recently as 2 weeks ago.  She uses her nebulizers and rescue inhaler frequently without much relief.  She continues to cough quite a bit and has ongoing wheezing.  She denies any known fevers.  She does have some chest tightness, but no other symptoms other than fatigue. Of note, she has been vaccinated against COVID-19 and apparently has had Covid twice as well.    Review of Systems   All other systems reviewed and are negative.        Allergies:  Cefdinir  Levofloxacin  Prednisone    Medications:  Albuterol inhaler  Adderall  Lamictal  Singulair  Cymbalta    Past Medical History:    Vasculitis  Sjogren's disease  Alcohol dependence  PTSD  ADD  COPD  Fibromyalgia  Hyperlipidemia    Past Surgical History:    Total hysterectomy  Blepharoplasty    Tonsillectomy  Sinus surgery  Joint replacement  Wrist surgery    Family History:    Mood disorder  Diabetes    Social History:  She is here alone.    Physical Exam     Patient Vitals for the past 24 hrs:   BP Temp Temp src Pulse Resp SpO2 Height Weight   21 1520 102/48 96.8  F (36  C) Temporal 80 26 95 % 1.651 m (5' 5\") 54.4 kg (120 lb)       Physical Exam  Nursing note and vitals reviewed.  Constitutional:  Oriented to person, place, and time. Cooperative.  In moderate respiratory distress.  HENT:   Nose:    Nose normal.   Mouth/Throat:   Mucous membranes are normal.   Eyes:    Conjunctivae normal and EOM are normal.      Pupils are equal, round, and reactive to light.   Neck:    Trachea normal.   Cardiovascular:  Normal rate, regular rhythm, normal heart sounds and normal pulses. No murmur heard.  Pulmonary/Chest:  Increased work of breathing and tachypneic.  Diffuse expiratory " wheezes present.  Abdominal:   Soft. Normal appearance and bowel sounds are normal.      There is no tenderness.      There is no rebound and no CVA tenderness.   Musculoskeletal:  Extremities atraumatic x 4.   Lymphadenopathy:  No cervical adenopathy.   Neurological:   Alert and oriented to person, place, and time. Normal strength.      No cranial nerve deficit or sensory deficit. GCS eye subscore is 4. GCS verbal subscore is 5. GCS motor subscore is 6.   Skin:    Skin is intact. No rash noted.   Psychiatric:   Normal mood and affect.      Emergency Department Course   ECG  ECG taken at 1526, ECG read at 1528  NSR with sinus arrthymia  Biatrial enlargement  Possible Anterior infarct, age undetermined   Rate 85 bpm. IA interval 138 ms. QRS duration 74  ms. QT/QTc 380/452 ms. P-R-T axes 82 75 73.     Imaging:  CT Chest Pulmonary Embolism w Contrast   Final Result   IMPRESSION:   1.  No evidence of pulmonary embolism. Thoracic aorta is unremarkable.      2.  Multiple bilateral mucoid impactions without significant   bronchiectasis. Centrilobular emphysema is present. Question whether   findings could reflect chronic bronchitis. Follow-up as clinically   warranted.      3.  Indeterminate right upper lobe lung nodule measuring 0.3 cm. This   was not definitely evident on prior exam.      Recommendations for one or multiple incidental lung nodules < 6mm :     Low risk patients: No routine follow-up.     High risk patients: Optional follow-up CT at 12 months; if   unchanged, no further follow-up.      *Low Risk: Minimal or absent history of smoking or other known risk   factors.   *Nonsolid (ground glass) or partly solid nodules may require longer   follow-up to exclude indolent adenocarcinoma.   *Recommendations based on Guidelines for the Management of Incidental   Pulmonary Nodules Detected at CT: From the Fleischner Society 2017,   Radiology 2017.      TALAT HORTON MD          Laboratory:  CBC: WBC 13.4(H), HGB 15.7,     CMP: Normal  Troponin : <0.015  Lactic acid (Resulted 1621): 1.4  BNP: 58         Emergency Department Course:    Reviewed:  I reviewed nursing notes, vitals, past medical history and care everywhere    Assessments:  1542 I obtained history and examined the patient as noted above.       Interventions:  1535 Albuterol-Ipratropium 2.5mg-0.5mg/3ml, inhalation solution, Nebulizer  1610 Methylprednisolone sodium succinate, 125 mg, IV  Medications   ipratropium - albuterol 0.5 mg/2.5 mg/3 mL (DUONEB) neb solution 3 mL (has no administration in time range)   ipratropium - albuterol 0.5 mg/2.5 mg/3 mL (DUONEB) neb solution 3 mL (3 mLs Nebulization Given 6/27/21 1535)   methylPREDNISolone sodium succinate (solu-MEDROL) injection 125 mg (125 mg Intravenous Given 6/27/21 1610)   Saline (83 mLs As instructed Given 6/27/21 1641)   iopamidol (ISOVUE-370) solution 556 mL (56 mLs Intravenous Given 6/27/21 1640)       Disposition:  The patient was discharged to home.       Impression & Plan   Medical Decision Making:  This is a 55-year-old female with known COPD, who came in for further evaluation of worsening shortness of breath. She was not hypoxic here, but she did have diffuse expiratory wheezes and had some increased work of breathing. I felt it was reasonable and appropriate to proceed with the above work-up here including blood work, EKG, and CT scan of her chest. I wanted to rule out pulmonary embolism but also pneumonia. She was provided the above interventions here with some relief of her symptoms. Her CT scan shows findings consistent with chronic bronchitis but no pneumonia. She also does have a pulmonary nodule, which I informed her of. I discussed staying in the hospital, but she prefers to go home. She understands that there is some risk in going home. I think it is reasonable to treat her with prednisone again as well as antibiotics, and she was provided her first oral dose of doxycycline here. She knows to  return with any concerns or worsening symptoms and to follow-up with her pulmonologist as soon as possible.    Diagnosis:    ICD-10-CM    1. COPD exacerbation (H)  J44.1    2. 3mm Right upper lobe pulmonary nodule  R91.8        Discharge Medications:  Discharge Medication List as of 6/27/2021  6:28 PM      START taking these medications    Details   doxycycline hyclate (VIBRAMYCIN) 100 MG capsule Take 1 capsule (100 mg) by mouth 2 times daily for 10 days, Disp-20 capsule, R-0, Local Print      predniSONE (DELTASONE) 20 MG tablet Take two tablets (= 40mg) each day for 5 (five) days, Disp-10 tablet, R-0, Local Print             Scribe Disclosure:  I, Randolph Davidson, am serving as a scribe at 3:41 PM on 6/27/2021 to document services personally performed by Marshal Barrios MD based on my observations and the provider's statements to me.            Marshal Barrios MD  06/27/21 1936

## 2021-06-28 ENCOUNTER — TELEPHONE (OUTPATIENT)
Dept: PULMONOLOGY | Facility: CLINIC | Age: 56
End: 2021-06-28

## 2021-06-28 LAB — INTERPRETATION ECG - MUSE: NORMAL

## 2021-06-28 NOTE — TELEPHONE ENCOUNTER
[facility] Call Center    Phone Message    May a detailed message be left on voicemail: no     Reason for Call: Other: Pt was in [facility] yesterday. Scan as done on lungs and pt needs to talk to [facility] regarding symptoms.  Please call.      Action Taken: Message routed to:  Adult Clinics: Pulmonology p [id number]    Travel Screening: Not Applicable

## 2021-06-29 DIAGNOSIS — J44.1 COPD EXACERBATION (H): Primary | ICD-10-CM

## 2021-06-29 RX ORDER — AZITHROMYCIN 250 MG/1
250 TABLET, FILM COATED ORAL
Qty: 12 TABLET | Refills: 3 | Status: SHIPPED | OUTPATIENT
Start: 2021-06-29 | End: 2022-10-17

## 2021-06-29 NOTE — TELEPHONE ENCOUNTER
I did call her and discuss her recent admission. I sent a prescription for azithromycin 250 mg MWF and she can pick it up today.    Thank you

## 2021-06-30 DIAGNOSIS — J45.901 SEVERE ASTHMA WITH ACUTE EXACERBATION, UNSPECIFIED WHETHER PERSISTENT: Primary | ICD-10-CM

## 2021-07-09 DIAGNOSIS — J45.50 SEVERE PERSISTENT ASTHMA, UNSPECIFIED WHETHER COMPLICATED (H): Primary | ICD-10-CM

## 2021-07-09 RX ORDER — PREDNISONE 20 MG/1
40 TABLET ORAL DAILY
Qty: 10 TABLET | Refills: 0 | Status: SHIPPED | OUTPATIENT
Start: 2021-07-09 | End: 2021-07-14

## 2021-07-27 ENCOUNTER — INFUSION THERAPY VISIT (OUTPATIENT)
Dept: INFUSION THERAPY | Facility: CLINIC | Age: 56
End: 2021-07-27
Payer: COMMERCIAL

## 2021-07-27 VITALS
BODY MASS INDEX: 20.62 KG/M2 | OXYGEN SATURATION: 98 % | DIASTOLIC BLOOD PRESSURE: 78 MMHG | HEART RATE: 76 BPM | WEIGHT: 123.9 LBS | RESPIRATION RATE: 18 BRPM | TEMPERATURE: 98.5 F | SYSTOLIC BLOOD PRESSURE: 128 MMHG

## 2021-07-27 DIAGNOSIS — J45.50 SEVERE PERSISTENT ASTHMA WITH ALLERGIC RHINITIS, UNSPECIFIED WHETHER COMPLICATED (H): Primary | ICD-10-CM

## 2021-07-27 PROCEDURE — 99207 PR NO CHARGE LOS: CPT | Performed by: NURSE PRACTITIONER

## 2021-07-27 PROCEDURE — 96372 THER/PROPH/DIAG INJ SC/IM: CPT | Performed by: NURSE PRACTITIONER

## 2021-07-27 RX ORDER — ALBUTEROL SULFATE 0.83 MG/ML
2.5 SOLUTION RESPIRATORY (INHALATION)
Status: CANCELLED | OUTPATIENT
Start: 2021-08-03

## 2021-07-27 RX ORDER — METHYLPREDNISOLONE SODIUM SUCCINATE 125 MG/2ML
125 INJECTION, POWDER, LYOPHILIZED, FOR SOLUTION INTRAMUSCULAR; INTRAVENOUS
Status: CANCELLED
Start: 2021-08-03

## 2021-07-27 RX ORDER — ALBUTEROL SULFATE 90 UG/1
1-2 AEROSOL, METERED RESPIRATORY (INHALATION)
Status: CANCELLED
Start: 2021-08-03

## 2021-07-27 RX ORDER — DIPHENHYDRAMINE HYDROCHLORIDE 50 MG/ML
50 INJECTION INTRAMUSCULAR; INTRAVENOUS
Status: CANCELLED
Start: 2021-08-03

## 2021-07-27 RX ORDER — NALOXONE HYDROCHLORIDE 0.4 MG/ML
0.2 INJECTION, SOLUTION INTRAMUSCULAR; INTRAVENOUS; SUBCUTANEOUS
Status: CANCELLED | OUTPATIENT
Start: 2021-08-03

## 2021-07-27 RX ORDER — EPINEPHRINE 1 MG/ML
0.3 INJECTION, SOLUTION INTRAMUSCULAR; SUBCUTANEOUS EVERY 5 MIN PRN
Status: CANCELLED | OUTPATIENT
Start: 2021-08-03

## 2021-07-27 RX ORDER — MEPERIDINE HYDROCHLORIDE 25 MG/ML
25 INJECTION INTRAMUSCULAR; INTRAVENOUS; SUBCUTANEOUS EVERY 30 MIN PRN
Status: CANCELLED | OUTPATIENT
Start: 2021-08-03

## 2021-07-27 ASSESSMENT — PAIN SCALES - GENERAL: PAINLEVEL: NO PAIN (0)

## 2021-07-27 NOTE — PROGRESS NOTES
Infusion Nursing Note:  Gracy Bautista presents today for C1 Xolair.    Patient seen by provider today: No   present during visit today: Not Applicable.    Note: Today is Gracy's first Xoliar.  Patient declined the covid-19 test.    Intravenous Access:  No Intravenous access/labs at this visit.    Treatment Conditions:  Not Applicable.      Post Infusion Assessment:  Patient tolerated injection without incident.  Patient observed for 120 minutes post injection per protocol.       Discharge Plan:   Patient discharged in stable condition accompanied by: self.      Dariana Fernández RN

## 2021-08-31 ENCOUNTER — TELEPHONE (OUTPATIENT)
Dept: PULMONOLOGY | Facility: CLINIC | Age: 56
End: 2021-08-31

## 2021-08-31 DIAGNOSIS — J45.50 SEVERE PERSISTENT ASTHMA, UNSPECIFIED WHETHER COMPLICATED (H): Primary | ICD-10-CM

## 2021-08-31 RX ORDER — PREDNISONE 20 MG/1
40 TABLET ORAL DAILY
Qty: 10 TABLET | Refills: 0 | Status: SHIPPED | OUTPATIENT
Start: 2021-08-31 | End: 2021-09-05

## 2021-09-02 ENCOUNTER — TELEPHONE (OUTPATIENT)
Dept: OTOLARYNGOLOGY | Facility: CLINIC | Age: 56
End: 2021-09-02

## 2021-09-02 NOTE — TELEPHONE ENCOUNTER
[facility] Call Center    Phone Message    May a detailed message be left on voicemail: yes     Reason for Call:  Please call Pt as she is having a lot of brown/black mucus from her nose. Asthma has been flaring up as well. She is unable to get in with Dr. Jaimes until January. Can she see a different provider? Please call to discuss options.  Thank you.    Action Taken: Message routed to:  [facility] ENT    Travel Screening: Not Applicable

## 2021-09-02 NOTE — TELEPHONE ENCOUNTER
Writer called pt and scheduled a sooner appointment with Dr. Jaimes for 10/4/21 at 11:15am. Pt expressed understanding.      Ashley Rosen, EMT

## 2021-09-04 ENCOUNTER — HEALTH MAINTENANCE LETTER (OUTPATIENT)
Age: 56
End: 2021-09-04

## 2021-09-08 ENCOUNTER — TELEPHONE (OUTPATIENT)
Dept: OTOLARYNGOLOGY | Facility: CLINIC | Age: 56
End: 2021-09-08

## 2021-09-08 DIAGNOSIS — J32.4 CHRONIC PANSINUSITIS: ICD-10-CM

## 2021-09-08 NOTE — TELEPHONE ENCOUNTER
Spoke to patient in regards to pts visit to urgent care and scan done at that time and plan of care from that visit. Pt was prescribed antibiotics and prednisone for treatment. Advised pt, this information would be relayed to provider in case there was anything additional the provider recommended. In meantime, writer sent prescription for gentamicin irrigations, as previously recommended by provider, to the [facility]. Pt verbalized understanding on how to use irrigations and copy of instructions also mailed to patient. Encouraged pt to call if any further questions or concerns.

## 2021-09-08 NOTE — TELEPHONE ENCOUNTER
[facility] Call Center    Phone Message    May a detailed message be left on voicemail: yes     Reason for Call: Other:   Pt would like Anh, Nurse to call pt back to discuss pt's scans and prescription.     Action Taken: Other:  ent    Travel Screening: Not Applicable

## 2021-09-10 ENCOUNTER — TELEPHONE (OUTPATIENT)
Dept: OTOLARYNGOLOGY | Facility: CLINIC | Age: 56
End: 2021-09-10

## 2021-09-10 NOTE — TELEPHONE ENCOUNTER
[facility] Call [facility]    Phone Message    May a detailed message be left on voicemail: yes     Reason for Call: Other: PTt calling back said she needs a call back ASAP, she is in severe  Pain ,Please call Pt back to discuss    Thank you,    Action Taken: Message routed to:  [facility]    Travel Screening: Not Applicable

## 2021-09-10 NOTE — TELEPHONE ENCOUNTER
[facility] Call [facility]    Phone Message    May a detailed message be left on voicemail: yes     Reason for Call: Symptoms or Concerns     If patient has red-flag symptoms, warm transfer to triage line    Current symptom or concern: Sinus/eye pain. Pt states she performed the gentamicin irrigations twice yesterday and once today, and nothing is coming out and Pt isn't getting any relief yet. Wants to speak w/Dr. Jaimes's nurse.    Symptoms have been present for:  3 day(s)    Has patient previously been seen for this? Yes    By : Dr. Jaimes    Date:     Are there any new or worsening symptoms? Yes: Sinus and eye pain/pressure      Action Taken: Message routed to:  [facility] ENT    Travel Screening: Not Applicable

## 2021-09-10 NOTE — TELEPHONE ENCOUNTER
Attempted to call patient to discuss symptoms and recommendations.unable to reach patient so left  message. Advised pt that if irrigations are not helping in any way, she should discontinue at this point and if she continues to be in severe pain which she is unable to control, she should go to urgent care or ED. Advised pt, would update provider, but most likely will not have a response until Monday morning.advised this writer would follow up with her on Monday. Writers call back number left on vm.

## 2021-09-14 ENCOUNTER — TELEPHONE (OUTPATIENT)
Dept: OTOLARYNGOLOGY | Facility: CLINIC | Age: 56
End: 2021-09-14

## 2021-09-14 NOTE — TELEPHONE ENCOUNTER
[facility] Call [facility]    Phone Message    May a detailed message be left on voicemail: yes     Reason for Call: Other: Pt calling to update Dr Jaimes/ Anh Nurse from last weeks Symptoms , Pt wouldn't provide any information just wanted a call back, Please call pt back to discuss    Thank you,    Action Taken: Message routed to:  [facility] ENT    Travel Screening: Not Applicable

## 2021-09-15 NOTE — TELEPHONE ENCOUNTER
Attempted calling patient but call disconnected. Pt returned call to writer via call center. Spoke to claudia in regards to symptoms she is experiencing. She is not wanting to go in to urgent care or ED again as she doesn't feel they are helping. Advised patient writer would follow up with provider for any recommendations and would be in touch with patient with a plan of care. Pt verbalized understanding.

## 2021-09-15 NOTE — TELEPHONE ENCOUNTER
[facility] Call [facility]    Phone Message    May a detailed message be left on voicemail: yes     Reason for Call: Other: Pt called again to speak w/Anh and would like a call back.     Action Taken: Message routed to:  [facility]    Travel Screening: Not Applicable

## 2021-09-17 ENCOUNTER — OFFICE VISIT (OUTPATIENT)
Dept: OTOLARYNGOLOGY | Facility: CLINIC | Age: 56
End: 2021-09-17
Payer: COMMERCIAL

## 2021-09-17 VITALS
TEMPERATURE: 97.2 F | DIASTOLIC BLOOD PRESSURE: 78 MMHG | OXYGEN SATURATION: 100 % | SYSTOLIC BLOOD PRESSURE: 119 MMHG | BODY MASS INDEX: 19.81 KG/M2 | WEIGHT: 119.05 LBS | HEART RATE: 69 BPM

## 2021-09-17 DIAGNOSIS — J32.4 CHRONIC PANSINUSITIS: Primary | ICD-10-CM

## 2021-09-17 DIAGNOSIS — D80.8: ICD-10-CM

## 2021-09-17 PROCEDURE — 31231 NASAL ENDOSCOPY DX: CPT | Performed by: OTOLARYNGOLOGY

## 2021-09-17 PROCEDURE — 87077 CULTURE AEROBIC IDENTIFY: CPT | Mod: 90 | Performed by: PATHOLOGY

## 2021-09-17 PROCEDURE — 87070 CULTURE OTHR SPECIMN AEROBIC: CPT | Mod: 90 | Performed by: PATHOLOGY

## 2021-09-17 PROCEDURE — 87186 SC STD MICRODIL/AGAR DIL: CPT | Mod: 90 | Performed by: PATHOLOGY

## 2021-09-17 PROCEDURE — 99212 OFFICE O/P EST SF 10 MIN: CPT | Mod: 25 | Performed by: OTOLARYNGOLOGY

## 2021-09-17 PROCEDURE — 87102 FUNGUS ISOLATION CULTURE: CPT | Mod: 90 | Performed by: PATHOLOGY

## 2021-09-17 RX ORDER — PREDNISONE 20 MG/1
TABLET ORAL
Qty: 15 TABLET | Refills: 0 | Status: SHIPPED | OUTPATIENT
Start: 2021-09-17 | End: 2021-10-07

## 2021-09-17 ASSESSMENT — PAIN SCALES - GENERAL: PAINLEVEL: EXTREME PAIN (8)

## 2021-09-17 NOTE — PATIENT INSTRUCTIONS
1.  You were seen in the ENT Clinic today by . If you have any questions or concerns after your appointment, please call [phone]. Press option #1 for scheduling related needs. Press option #3 for Nurse advice.    2.    has recommended the following:   - Prednisone as instructed. Prescription sent to your pharmacy   -Referral to Allergy- Dr.Yuriy Lee   -sinus culture taken. We will contact you with results    3.  Plan is to return to clinic dependent on above.      Anh Trujillo LPN  [phone]  [facility] Otolaryngology

## 2021-09-17 NOTE — PROGRESS NOTES
Gracy has an acute exacerbation of sinus symptoms. She has periorbital pain and pressure, thick secretions. Did not respond to antibiotics  (Augmentin) and prednisone. Feels lungs are affected. Will be starting Xolair. Prior sinus surgery and is known to have immune dysfunction.     Exam:  Procedure:  Endoscopy indicated for exam, cleaning and culture  Topical anesthetic/decongestant spray applied.  Rigid scope used for visualization.  Findings: thick secretion bilateral, left worse than right. Culture taken. Moderate inflammatory disease upper ethmoids bilateral.     A/P: Appears mostly inflammatory - will repeat prednisone course. Culture pending, will review for need for antibiotics. Will also initiate an allergy/immunology referral. Continue to follow with pulmonary. She will contact us by [employer] to let us know how she is doing.     10 minutes spent on the date of the encounter doing chart review, history and exam, documentation and further activities per the note. This time is in addition to separately billable procedure.

## 2021-09-17 NOTE — LETTER
9/17/2021       RE: Gracy Bautista  [address] [place] [id number]     Dear Colleague,    Thank you for referring your patient, Gracy Bautista, to the [facility] [place] at [facility]. Please see a copy of my visit note below.    Gracy has an acute exacerbation of sinus symptoms. She has periorbital pain and pressure, thick secretions. Did not respond to antibiotics  (Augmentin) and prednisone. Feels lungs are affected. Will be starting Xolair. Prior sinus surgery and is known to have immune dysfunction.     Exam:  Procedure:  Endoscopy indicated for exam, cleaning and culture  Topical anesthetic/decongestant spray applied.  Rigid scope used for visualization.  Findings: thick secretion bilateral, left worse than right. Culture taken. Moderate inflammatory disease upper ethmoids bilateral.     A/P: Appears mostly inflammatory - will repeat prednisone course. Culture pending, will review for need for antibiotics. Will also initiate an allergy/immunology referral. Continue to follow with pulmonary. She will contact us by [email or web address] to let us know how she is doing.     10 minutes spent on the date of the encounter doing chart review, history and exam, documentation and further activities per the note. This time is in addition to separately billable procedure.        Again, thank you for allowing me to participate in the care of your patient.      Sincerely,    Digna Jaimes MD

## 2021-09-17 NOTE — NURSING NOTE
Chief Complaint   Patient presents with     RECHECK     follow up-facial pain ,post nasal drip     Blood pressure 119/78, pulse 69, temperature 97.2  F (36.2  C), weight 54 kg (119 lb 0.8 oz), SpO2 100 %.    Jer Cherry LPN

## 2021-09-20 ENCOUNTER — INFUSION THERAPY VISIT (OUTPATIENT)
Dept: INFUSION THERAPY | Facility: CLINIC | Age: 56
End: 2021-09-20
Payer: COMMERCIAL

## 2021-09-20 VITALS
TEMPERATURE: 98.9 F | HEART RATE: 64 BPM | DIASTOLIC BLOOD PRESSURE: 74 MMHG | WEIGHT: 119.7 LBS | OXYGEN SATURATION: 95 % | SYSTOLIC BLOOD PRESSURE: 113 MMHG | RESPIRATION RATE: 18 BRPM | BODY MASS INDEX: 19.92 KG/M2

## 2021-09-20 DIAGNOSIS — J45.50 SEVERE PERSISTENT ASTHMA WITH ALLERGIC RHINITIS, UNSPECIFIED WHETHER COMPLICATED (H): Primary | ICD-10-CM

## 2021-09-20 PROCEDURE — 99207 PR NO CHARGE LOS: CPT

## 2021-09-20 PROCEDURE — 96372 THER/PROPH/DIAG INJ SC/IM: CPT | Performed by: NURSE PRACTITIONER

## 2021-09-20 RX ORDER — DIPHENHYDRAMINE HYDROCHLORIDE 50 MG/ML
50 INJECTION INTRAMUSCULAR; INTRAVENOUS
Status: CANCELLED
Start: 2021-09-21

## 2021-09-20 RX ORDER — METHYLPREDNISOLONE SODIUM SUCCINATE 125 MG/2ML
125 INJECTION, POWDER, LYOPHILIZED, FOR SOLUTION INTRAMUSCULAR; INTRAVENOUS
Status: CANCELLED
Start: 2021-09-21

## 2021-09-20 RX ORDER — EPINEPHRINE 1 MG/ML
0.3 INJECTION, SOLUTION INTRAMUSCULAR; SUBCUTANEOUS EVERY 5 MIN PRN
Status: CANCELLED | OUTPATIENT
Start: 2021-09-21

## 2021-09-20 RX ORDER — MEPERIDINE HYDROCHLORIDE 25 MG/ML
25 INJECTION INTRAMUSCULAR; INTRAVENOUS; SUBCUTANEOUS EVERY 30 MIN PRN
Status: CANCELLED | OUTPATIENT
Start: 2021-09-21

## 2021-09-20 RX ORDER — NALOXONE HYDROCHLORIDE 0.4 MG/ML
0.2 INJECTION, SOLUTION INTRAMUSCULAR; INTRAVENOUS; SUBCUTANEOUS
Status: CANCELLED | OUTPATIENT
Start: 2021-09-21

## 2021-09-20 RX ORDER — ALBUTEROL SULFATE 0.83 MG/ML
2.5 SOLUTION RESPIRATORY (INHALATION)
Status: CANCELLED | OUTPATIENT
Start: 2021-09-21

## 2021-09-20 RX ORDER — ALBUTEROL SULFATE 90 UG/1
1-2 AEROSOL, METERED RESPIRATORY (INHALATION)
Status: CANCELLED
Start: 2021-09-21

## 2021-09-20 ASSESSMENT — PAIN SCALES - GENERAL: PAINLEVEL: NO PAIN (0)

## 2021-09-22 LAB
BACTERIA SPEC CULT: ABNORMAL

## 2021-09-23 ENCOUNTER — TELEPHONE (OUTPATIENT)
Dept: OTOLARYNGOLOGY | Facility: CLINIC | Age: 56
End: 2021-09-23

## 2021-09-23 NOTE — TELEPHONE ENCOUNTER
Spoke to patient and discussed her symptoms. Gracy is reporting not feeling any better. She does not feel that the prednisone is helping she is coughing a lot and can not sleep. She feels exhausted. She feels a bit short of breath. Still reports nothing is coming out of her sinuses, pain in head and chest and still reporting a bad taste in her mouth. Advised patient that this information would be relayed to provider and also have provider look at culture results for recommendations on antibiotics if any. Pt is aware that she may not receive a response until Monday as this writer will be out tomorrow, but pt was advised to present to urgent care or ED, which she prefers not to do, if things become worse.

## 2021-09-23 NOTE — TELEPHONE ENCOUNTER
[facility] Call [facility]    Phone Message    May a detailed message be left on voicemail: yes     Reason for Call: Other:   Pt would like to speak to Jaimes's nurse, Anh about her condition. Pt states that she hasn't gotten any better and is actually getting worst. Please follow-up asap.     Action Taken: Other:  ent    Travel Screening:

## 2021-09-24 NOTE — RESULT ENCOUNTER NOTE
Could retreat with a longer course of Augmentin. The bacteria are sensitive to that. Could also try bactrim 1 DS PO BID for 14 days. Will discuss with patient that may need further surgery to deal with fungus in sinus. It is not dangerous, but may be increasing the inflammation in her sinuses. If she wants to go that route I will try to get her scheduled soon.  Please contact the patient.    Digna Jaimes MD

## 2021-09-27 ENCOUNTER — TELEPHONE (OUTPATIENT)
Dept: PULMONOLOGY | Facility: CLINIC | Age: 56
End: 2021-09-27

## 2021-09-27 DIAGNOSIS — J45.50 SEVERE PERSISTENT ASTHMA WITH ALLERGIC RHINITIS, UNSPECIFIED WHETHER COMPLICATED (H): Primary | ICD-10-CM

## 2021-09-27 NOTE — TELEPHONE ENCOUNTER
"Patient called as her symptoms have not improved. Still having persistent harsh cough, both dry and productive at times. Reports white, thick mucus when productive. Difficulty tolerating activity, having to stop often. Reports wheezing/crackles in chest. Reports lungs feel like they are \"burning like when your throat hurts with strep throat\".    Went to urgent care on 9/17 and was prescribed Augmentin for sinus infection. Prescribed prednisone as well. On [id number] followed by [id number]. Extended Augmentin due to cultures resulting.     Patient reports having 2 infusions of Xolair completed, next scheduled for 10/18. RN explained it a few infusions to gain benefits and with some delay between infusions for her due to difficulty getting to appointments, this may be part of the reason she isn't feeling benefits yet. RN stated she would send to Dr Bean to ask for plan and any recommendations as patient reports feeling miserable.    Natacha Pabon RN, BSN  GI/Pulmonary Nurse Care Coordinator  Phone: [phone]  Fax: [phone]        "

## 2021-09-27 NOTE — TELEPHONE ENCOUNTER
[facility] Call Center    Phone Message    May a detailed message be left on voicemail: yes     Reason for Call: patient called wanting to discuss her asthma and that it isn't getting better. Please advise. Thank you.    Action Taken: Message routed to:  Adult Clinics: Pulmonology p [id number]    Travel Screening: Not Applicable

## 2021-09-28 ENCOUNTER — TELEPHONE (OUTPATIENT)
Dept: PULMONOLOGY | Facility: CLINIC | Age: 56
End: 2021-09-28

## 2021-09-28 RX ORDER — AZITHROMYCIN 250 MG/1
250 TABLET, FILM COATED ORAL
Qty: 12 TABLET | Refills: 3 | Status: SHIPPED | OUTPATIENT
Start: 2021-09-29 | End: 2022-01-12

## 2021-09-28 RX ORDER — PREDNISONE 20 MG/1
40 TABLET ORAL DAILY
Qty: 10 TABLET | Refills: 0 | Status: SHIPPED | OUTPATIENT
Start: 2021-09-28 | End: 2021-10-03

## 2021-09-28 NOTE — TELEPHONE ENCOUNTER
See prior encounter from 9/27/2021.    Natacha Pabon, RN, BSN  GI/Pulmonary Nurse Care Coordinator  Phone: [phone]  Fax: [phone]

## 2021-09-28 NOTE — TELEPHONE ENCOUNTER
Partha Manzano,    I called Gracy but left a voice mail.  She should get back on schedule with the Xolair infusions. It may take several doses of Xolair to get a significant response. She should continue to use her inhalers adherently for now. If she feels like she is having an exacerbation, I can write for additional prednisone for her until her next infusion.    Thank you

## 2021-09-28 NOTE — TELEPHONE ENCOUNTER
Partha Manzano,    Chronic azithromycin may be helpful as she does have radiologic evidence of emphysema. I have also written for 5 days of prednisone for her. I reviewed her EKG, her QTc is borderline, she can use azithromycin    Thank you.

## 2021-09-28 NOTE — TELEPHONE ENCOUNTER
[facility] Call Center    Phone Message    May a detailed message be left on voicemail: yes     Reason for Call: Pt didn't want to explain what she is calling about.  She said it's too long of a story and is requesting a call back from Natacha.  Thanks.    Action Taken: Message routed to:  Adult Clinics: Pulmonology p [id number]    Travel Screening: Not Applicable

## 2021-09-28 NOTE — TELEPHONE ENCOUNTER
Patient called back. RN gave message from Dr Bean. Patient reports feeling miserable and as she does not want to be on prednisone again and again, she is miserable and feels like she is having exacerbation.     Patient voiced that she is sorry for bothering us over and over. RN explained that we are here for her and working on this with her to get her feeling better. She doesn't want to go to the ED again. Patient very emotional and states she is miserable and sick of feeling sick and she has been for so long. RN states she will reach out to Dr Bean about prednisone. Also asking about azithromycin every other day and she has not been taking this. RN does not see this listed in her meds. Will ask Dr Bean about this as well.    Routing to Dr Bean.    Natacha Pabon RN, BSN  GI/Pulmonary Nurse Care Coordinator  Phone: [phone]  Fax: [phone]

## 2021-10-01 ENCOUNTER — TELEPHONE (OUTPATIENT)
Dept: OTOLARYNGOLOGY | Facility: CLINIC | Age: 56
End: 2021-10-01

## 2021-10-01 NOTE — TELEPHONE ENCOUNTER
[facility] Call Center    Phone Message    May a detailed message be left on voicemail: yes     Reason for Call: Other: Pt calling in to notify care team that she would like to hold off on surgery until the end of November, and is looking for recommendation on continued use of nasal irrigation. Pt states she is also okay with having the antibitoics (augmentin) called in to her pharmacy, [facility] [address] in [place]. Please follow up with Pt if there are futher concerns.     Action Taken: Message routed to:  [facility] E[facility]    Travel Screening: Not Applicable

## 2021-10-04 ENCOUNTER — TELEPHONE (OUTPATIENT)
Dept: ALLERGY | Facility: CLINIC | Age: 56
End: 2021-10-04

## 2021-10-04 DIAGNOSIS — J32.4 CHRONIC PANSINUSITIS: Primary | ICD-10-CM

## 2021-10-04 NOTE — TELEPHONE ENCOUNTER
Reason for Call:  Other call back    Detailed comments: Pt called stating she would like to get a sooner appt than scheduled (11/4/2021) Pt states she has been sick for over a month and a half -having asthma attacks, sinuses have been cultured and there is signs of fungus in the sinuses. Pt states it is a chore to walk her dog, for reference. Pt also has a lot of mucus. Pt states she would only like to see Dr. Lee.  Please advise.     Phone Number Patient can be reached at: Home number on file [phone] (home)    Best Time: any    Can we leave a detailed message on this number? YES    Call taken on 10/4/2021 at 11:18 AM by Tiera Jurado

## 2021-10-04 NOTE — TELEPHONE ENCOUNTER
Spoke with patient regarding a sooner appointment. Appointment changed to Oct. 27 at the [facility].  Patient informed that he has been added to the wait list.  Allergy staff will also contact patient if Dr. Lee has cancellation before Oct. 27 at either location.   Patient is available anytime for appointment.    Brina MAI RN  Specialty/Allergy Clinic

## 2021-10-05 DIAGNOSIS — J32.4 CHRONIC PANSINUSITIS: Primary | ICD-10-CM

## 2021-10-05 RX ORDER — SULFAMETHOXAZOLE/TRIMETHOPRIM 800-160 MG
1 TABLET ORAL 2 TIMES DAILY
Qty: 28 TABLET | Refills: 0 | Status: SHIPPED | OUTPATIENT
Start: 2021-10-05 | End: 2021-10-18

## 2021-10-05 NOTE — TELEPHONE ENCOUNTER
Left vm message for patient in regards to recommendations from provider and plan of care at this time. Writers call back number provided on vm.

## 2021-10-05 NOTE — TELEPHONE ENCOUNTER
Called patient to confirm she got prednisone and chronic azithromycin. Checking in how patient was feeling.     Left VM and waiting for call back.    Natacha Pabon RN, BSN  GI/Pulmonary Nurse Care Coordinator  Phone: [phone]  Fax: [phone]

## 2021-10-06 ENCOUNTER — ANCILLARY PROCEDURE (OUTPATIENT)
Dept: CT IMAGING | Facility: CLINIC | Age: 56
End: 2021-10-06
Attending: OTOLARYNGOLOGY
Payer: COMMERCIAL

## 2021-10-06 ENCOUNTER — ANCILLARY PROCEDURE (OUTPATIENT)
Dept: CT IMAGING | Facility: CLINIC | Age: 56
End: 2021-10-06
Attending: INTERNAL MEDICINE
Payer: COMMERCIAL

## 2021-10-06 DIAGNOSIS — J45.50 SEVERE PERSISTENT ASTHMA WITH ALLERGIC RHINITIS, UNSPECIFIED WHETHER COMPLICATED (H): ICD-10-CM

## 2021-10-06 DIAGNOSIS — J32.4 CHRONIC PANSINUSITIS: ICD-10-CM

## 2021-10-06 PROCEDURE — 71250 CT THORAX DX C-: CPT | Performed by: RADIOLOGY

## 2021-10-06 PROCEDURE — 70486 CT MAXILLOFACIAL W/O DYE: CPT | Mod: GC | Performed by: RADIOLOGY

## 2021-10-06 NOTE — TELEPHONE ENCOUNTER
Partha Manzano,     It is reasonable for her to get a ct chest while she gets the scans for her sinuses. I have placed an order.     Thank you.

## 2021-10-06 NOTE — TELEPHONE ENCOUNTER
Left detailed message for patient about ordered chest CT to get scheduled with her facial bones CT tomorrow in [place]. To please call back with any further questions.    Natacha Pabon RN, BSN  GI/Pulmonary Nurse Care Coordinator  Phone: [phone]  Fax: [phone]

## 2021-10-07 ENCOUNTER — OFFICE VISIT (OUTPATIENT)
Dept: ALLERGY | Facility: CLINIC | Age: 56
End: 2021-10-07
Payer: COMMERCIAL

## 2021-10-07 VITALS
HEART RATE: 67 BPM | BODY MASS INDEX: 19.99 KG/M2 | WEIGHT: 120.15 LBS | TEMPERATURE: 97 F | DIASTOLIC BLOOD PRESSURE: 80 MMHG | SYSTOLIC BLOOD PRESSURE: 124 MMHG | OXYGEN SATURATION: 95 %

## 2021-10-07 DIAGNOSIS — J32.8 OTHER CHRONIC SINUSITIS: ICD-10-CM

## 2021-10-07 DIAGNOSIS — J45.50 SEVERE PERSISTENT ASTHMA, UNSPECIFIED WHETHER COMPLICATED (H): Primary | ICD-10-CM

## 2021-10-07 DIAGNOSIS — D80.1 HYPOGAMMAGLOBULINEMIA (H): ICD-10-CM

## 2021-10-07 DIAGNOSIS — Z23 NEED FOR PROPHYLACTIC VACCINATION AND INOCULATION AGAINST INFLUENZA: ICD-10-CM

## 2021-10-07 LAB
ALBUMIN SERPL-MCNC: 3.9 G/DL (ref 3.4–5)
ALP SERPL-CCNC: 80 U/L (ref 40–150)
ALT SERPL W P-5'-P-CCNC: 26 U/L (ref 0–50)
ANION GAP SERPL CALCULATED.3IONS-SCNC: 7 MMOL/L (ref 3–14)
AST SERPL W P-5'-P-CCNC: 20 U/L (ref 0–45)
BASOPHILS # BLD AUTO: 0 10E3/UL (ref 0–0.2)
BASOPHILS NFR BLD AUTO: 0 %
BILIRUB SERPL-MCNC: 0.3 MG/DL (ref 0.2–1.3)
BUN SERPL-MCNC: 21 MG/DL (ref 7–30)
CALCIUM SERPL-MCNC: 9 MG/DL (ref 8.5–10.1)
CD19 CELLS # BLD: 447 CELLS/UL (ref 107–698)
CD19 CELLS NFR BLD: 13 % (ref 6–27)
CD3 CELLS # BLD: 2840 CELLS/UL (ref 603–2990)
CD3 CELLS NFR BLD: 82 % (ref 49–84)
CD3+CD4+ CELLS # BLD: 2050 CELLS/UL (ref 441–2156)
CD3+CD4+ CELLS NFR BLD: 59 % (ref 28–63)
CD3+CD4+ CELLS/CD3+CD8+ CLL BLD: 2.49 % (ref 1.4–2.6)
CD3+CD8+ CELLS # BLD: 822 CELLS/UL (ref 125–1312)
CD3+CD8+ CELLS NFR BLD: 24 % (ref 10–40)
CD3-CD16+CD56+ CELLS # BLD: 148 CELLS/UL (ref 95–640)
CD3-CD16+CD56+ CELLS NFR BLD: 4 % (ref 4–25)
CHLORIDE BLD-SCNC: 103 MMOL/L (ref 94–109)
CO2 SERPL-SCNC: 26 MMOL/L (ref 20–32)
CREAT SERPL-MCNC: 0.75 MG/DL (ref 0.52–1.04)
EOSINOPHIL # BLD AUTO: 0.2 10E3/UL (ref 0–0.7)
EOSINOPHIL NFR BLD AUTO: 2 %
ERYTHROCYTE [DISTWIDTH] IN BLOOD BY AUTOMATED COUNT: 11.9 % (ref 10–15)
GFR SERPL CREATININE-BSD FRML MDRD: 89 ML/MIN/1.73M2
GLUCOSE BLD-MCNC: 87 MG/DL (ref 70–99)
HCT VFR BLD AUTO: 45.9 % (ref 35–47)
HGB BLD-MCNC: 16.3 G/DL (ref 11.7–15.7)
LYMPHOCYTES # BLD AUTO: 3.2 10E3/UL (ref 0.8–5.3)
LYMPHOCYTES NFR BLD AUTO: 33 %
MCH RBC QN AUTO: 34.8 PG (ref 26.5–33)
MCHC RBC AUTO-ENTMCNC: 35.5 G/DL (ref 31.5–36.5)
MCV RBC AUTO: 98 FL (ref 78–100)
MONOCYTES # BLD AUTO: 0.9 10E3/UL (ref 0–1.3)
MONOCYTES NFR BLD AUTO: 9 %
NEUTROPHILS # BLD AUTO: 5.4 10E3/UL (ref 1.6–8.3)
NEUTROPHILS NFR BLD AUTO: 56 %
PLATELET # BLD AUTO: 375 10E3/UL (ref 150–450)
POTASSIUM BLD-SCNC: 3.7 MMOL/L (ref 3.4–5.3)
PROT SERPL-MCNC: 7.5 G/DL (ref 6.8–8.8)
RBC # BLD AUTO: 4.68 10E6/UL (ref 3.8–5.2)
SODIUM SERPL-SCNC: 136 MMOL/L (ref 133–144)
T CELL EXTENDED COMMENT: NORMAL
WBC # BLD AUTO: 9.6 10E3/UL (ref 4–11)

## 2021-10-07 PROCEDURE — 82784 ASSAY IGA/IGD/IGG/IGM EACH: CPT | Performed by: ALLERGY & IMMUNOLOGY

## 2021-10-07 PROCEDURE — 36415 COLL VENOUS BLD VENIPUNCTURE: CPT | Performed by: ALLERGY & IMMUNOLOGY

## 2021-10-07 PROCEDURE — 86317 IMMUNOASSAY INFECTIOUS AGENT: CPT | Mod: 90 | Performed by: ALLERGY & IMMUNOLOGY

## 2021-10-07 PROCEDURE — 90682 RIV4 VACC RECOMBINANT DNA IM: CPT | Performed by: ALLERGY & IMMUNOLOGY

## 2021-10-07 PROCEDURE — 82785 ASSAY OF IGE: CPT | Mod: 90 | Performed by: ALLERGY & IMMUNOLOGY

## 2021-10-07 PROCEDURE — 86003 ALLG SPEC IGE CRUDE XTRC EA: CPT | Mod: 90 | Performed by: ALLERGY & IMMUNOLOGY

## 2021-10-07 PROCEDURE — 82104 ALPHA-1-ANTITRYPSIN PHENO: CPT | Mod: 90 | Performed by: ALLERGY & IMMUNOLOGY

## 2021-10-07 PROCEDURE — 86606 ASPERGILLUS ANTIBODY: CPT | Mod: 90 | Performed by: ALLERGY & IMMUNOLOGY

## 2021-10-07 PROCEDURE — 99205 OFFICE O/P NEW HI 60 MIN: CPT | Mod: 25 | Performed by: ALLERGY & IMMUNOLOGY

## 2021-10-07 PROCEDURE — 86359 T CELLS TOTAL COUNT: CPT | Performed by: ALLERGY & IMMUNOLOGY

## 2021-10-07 PROCEDURE — 82785 ASSAY OF IGE: CPT | Performed by: ALLERGY & IMMUNOLOGY

## 2021-10-07 PROCEDURE — 90471 IMMUNIZATION ADMIN: CPT | Performed by: ALLERGY & IMMUNOLOGY

## 2021-10-07 PROCEDURE — 86255 FLUORESCENT ANTIBODY SCREEN: CPT | Performed by: ALLERGY & IMMUNOLOGY

## 2021-10-07 PROCEDURE — 86355 B CELLS TOTAL COUNT: CPT | Performed by: ALLERGY & IMMUNOLOGY

## 2021-10-07 PROCEDURE — 83516 IMMUNOASSAY NONANTIBODY: CPT | Performed by: ALLERGY & IMMUNOLOGY

## 2021-10-07 PROCEDURE — 86357 NK CELLS TOTAL COUNT: CPT | Performed by: ALLERGY & IMMUNOLOGY

## 2021-10-07 PROCEDURE — 80053 COMPREHEN METABOLIC PANEL: CPT | Performed by: ALLERGY & IMMUNOLOGY

## 2021-10-07 PROCEDURE — 83876 ASSAY MYELOPEROXIDASE: CPT | Performed by: ALLERGY & IMMUNOLOGY

## 2021-10-07 PROCEDURE — 86256 FLUORESCENT ANTIBODY TITER: CPT | Performed by: ALLERGY & IMMUNOLOGY

## 2021-10-07 PROCEDURE — 82103 ALPHA-1-ANTITRYPSIN TOTAL: CPT | Mod: 90 | Performed by: ALLERGY & IMMUNOLOGY

## 2021-10-07 PROCEDURE — 86360 T CELL ABSOLUTE COUNT/RATIO: CPT | Performed by: ALLERGY & IMMUNOLOGY

## 2021-10-07 PROCEDURE — 85025 COMPLETE CBC W/AUTO DIFF WBC: CPT | Performed by: ALLERGY & IMMUNOLOGY

## 2021-10-07 RX ORDER — IPRATROPIUM BROMIDE AND ALBUTEROL SULFATE 2.5; .5 MG/3ML; MG/3ML
SOLUTION RESPIRATORY (INHALATION)
COMMUNITY
Start: 2021-09-04 | End: 2021-10-18

## 2021-10-07 RX ORDER — AZELASTINE 1 MG/ML
2 SPRAY, METERED NASAL 2 TIMES DAILY PRN
Qty: 30 ML | Refills: 3 | Status: SHIPPED | OUTPATIENT
Start: 2021-10-07 | End: 2021-11-10

## 2021-10-07 ASSESSMENT — ENCOUNTER SYMPTOMS
ARTHRALGIAS: 0
CHEST TIGHTNESS: 0
FEVER: 0
RHINORRHEA: 0
MYALGIAS: 0
DIZZINESS: 1
WEAKNESS: 1
SINUS PAIN: 1
FACIAL SWELLING: 0
CHILLS: 0
EYE ITCHING: 0
JOINT SWELLING: 0
EYE DISCHARGE: 0
VOMITING: 0
HEADACHES: 0
WHEEZING: 1
ADENOPATHY: 0
SHORTNESS OF BREATH: 1
NAUSEA: 0
COUGH: 1
SINUS PRESSURE: 1
DIARRHEA: 0
ACTIVITY CHANGE: 0
EYE REDNESS: 0

## 2021-10-07 NOTE — PATIENT INSTRUCTIONS
Stop Symbicort and Spiriva.  We will start Trelegy Ellipta 200/62.5/25 mcg 1 puff once daily. Make sure that you rinse, gargle, spit water after using it.  Continue Singulair 10 mg by mouth once daily at bedtime.  Continue using albuterol inhaler 2 to 4 puffs every 4 hours as needed for persistent cough or chest tightness or wheezing or shortness of breath.    Continue with Flonase 1-2 sprays in each nostril once daily.  -Use azelastine 2 sprays in each nostril twice a day when necessary.    Get the blood work done.    Complete Bactrim as prescribed.  Restart azithromycin 3 times a week after you are done with Bactrim.     Get the lab work done.    Make Claritin as needed medicine                Allergy Staff Appt Hours Shot Hours Locations    Physician     Doni Lee MD       Support Staff     Aleksandra Gaitan LPN     Fahad CMA    Tuesday:   [place] 7-5 Wednesday:  [place]: 7-5 Thursday:         [facility] [place] 7-5 Friday:  [place] 7-3  [place]        Thursday: 7-5:20        Friday: 7-12:20     [place]        Tuesday: 7- 4:20 Wednesday: 7-4:20 [place] Monday: 7-2:20 Tuesday: 9-5:20 Thursday: 7-2:20    [place]       Tues & Wed: 7-5:20 Mon & Thurs: 7-4:20       Fri: 7-2:20           [facility]  [address] [place] [address]  Appt Line: [phone]      [facility]  [address] [place] [address]  Appt Line: [phone]       Important Scheduling Information (if recommended by provider):  Aspirin Desensitization: Appt will last 2 clinic days. Please call the Allergy RN line for your clinic to schedule. Discontinue antihistamines 7 days prior to the appointment.     Food Challenges: Appt will last 3-4 hours. Please call the Allergy RN line for your clinic to schedule. Discontinue antihistamines 7 days prior to the appointment.     Penicillin Testing: Appt will last 2-3 hours. Please call the Allergy RN line for your clinic to  schedule. Discontinue antihistamines 7 days prior to the appointment.     Skin Testing: Appt will about 40 minutes. Call the appointment line for your clinic to schedule. Discontinue antihistamines 7 days prior to the appointment.     Thank you for trusting us with your Allergy, Asthma, and Immunology care. Please feel free to contact us with any questions or concerns you may have.

## 2021-10-07 NOTE — PROGRESS NOTES
SUBJECTIVE:                                                                   Gracy Bautista presents today to our Allergy Clinic at [facility]; She is being seen in consultation at the request of Digna Jaimes MD for a new patient visit. She is a 55-year-old female with chronic sinusitis and a history of asthma.  The patient states that approximately when she was [age]-cohh-old, she developed episodes of significant facial pressure and pain in the frontal, periorbital, and maxillary areas.  Would be associated with headaches, nasal congestion, and rhinorrhea.  Had sinus CT in the past that was consistent with pansinusitis.  Since then, she has had at least 3 sinus surgeries.  Ever since she got her surgeries, she has had daily issues with sinus pressure and sinus pain, frequently with postnasal drainage.  She states that she could not tolerate budesonide rinses or other sinus irrigations.  She uses intranasal fluticasone 1 spray in each nostril once daily.  Sometimes, she would use 2 sprays in each nostril once daily, but she cannot use that dose all the time because it hurts her nose.  She takes loratadine 10 mg by mouth once daily, but does not find it helpful.  At some point, she was told that she had nasal polyps.  Was on will call about stopped it because it was not effective.  Most recent culture showed Alternaria growth.  Each time she is on antibiotics, they seem to help, but once she stops them she has issues again.  She was recently started on azithromycin on Mondays, Wednesdays, and Fridays.  These days, she stopped it because she was started on Bactrim for 14 days.  She denies having any issues with NSAIDs in the past but went through an aspirin challenge was on 650 mg twice daily.  Because she did not feel that it was helpful, she stopped it.  She was on Nucala and did not find it helpful either.  She stopped it.    She has smoked cigarettes 3 to 4/day since the age of 15  years.  She states that she quit smoking 3 weeks ago, but still with occasional breakthrough episodes of smoking.  She started having wheezing, shortness of breath, chest tightness, and mainly productive cough at the same time when she started having issues with sinuses.  She finds that exertion, heat, and cold air would trigger symptoms.  Albuterol inhaler seems to help.  She was prescribed Symbicort 160/4.5 mcg and Spiriva 2.5 mcg.  She admits using Spiriva 2.5 mcg 1 puff once daily.  She uses Symbicort just 1 puff once daily and not all the time.  Admits that it is difficult for her to adhere to the regimen.  She has been taking montelukast for years.  She has been on and off prednisone.  She is not sure whether it is helpful or not.  Serum IgE for the regional aeroallergen panel in December 2020 was negative.  In March 2021, the panel was repeated.  Slight sensitivity to Aspergillus was noted.  The rest was negative.  Since her symptoms were not well controlled, she was recently started on omalizumab 300 mg every 28 days.  So far, she has not noticed any difference since she started it.  She had a pulmonary function test done and November 2020.  It showed normal pulmonary function.  The patient reports a history of hypogammaglobulinemia.  She states that she was on IVIG, and it was stopped because her levels normalized.  She does not remember whether infusions helped with her sinus and/or chest symptoms.      CT chest without contrast     INDICATION: Severe persistent asthma with allergic rhinitis.     Contrast: None     COMPARISON: CT pulmonary angiogram 6/27/2021     FINDINGS: Included thyroid appears unremarkable. Nonenlarged  aortopulmonary window lymph nodes appears similar, as does a  nonenlarged right lower paratracheal node. No pleural or pericardial  effusion. No enlarged axillary or hilar lymph nodes.  The included upper abdomen appears unremarkable.  4 mm right upper lobe nodule (series 6 image 61)  unchanged. Calcified  5 mm right upper lobe granuloma (series 6 image 132). Calcified  paravertebral right lower lobe granuloma measuring 3 mm (series 6  image 252). 2 mm lateral left lower lobe nodule (series 6 image 266).  [place] 5 x 2 mm left lower lobe nodule (series 6 image 283) is less  rounded than previous. Therefore, this likely represents  intrapulmonary lymph node. Right lower lobe mucous plug (series 6  image 245) noted. Other previous right lower lobe mucus plugging has  cleared. Bones multilevel degenerative changes throughout the thoracic  spine with prominent bone spurring. The bones are osteopenic.  Extensive lower cervical spondylosis also present. There is  hemidiaphragm flattening with upper lung oligemia.                                                                      IMPRESSION: Unchanged 4 mm right upper lobe nodule. Unchanged right  lower lobe mucous plug with clearing of other right lower lobe mucous  plug. Hyperinflated lungs. Nonenlarged mediastinal nodes.  Cervicothoracic spondylosis. Osteopenia.     RICHARDSON KELLY MD       [facility] CT imaging for purposes of stereotactic evaluation     Provided History: Chronic pansinusitis  ICD-10: Chronic pansinusitis     Comparison: CT head 9/23/2020     Technique: CT imaging performed with axial, sagittal, and coronal  reconstructed images obtained without intravenous contrast.     Findings: Limited imaging for stereotactic localization. Stable  postsurgical changes of endoscopic sinus surgery including bilateral  medial antrectomies, bilateral sphenoidotomies, near complete  ethmoidectomies and bilateral frontal sinusotomies. The antrectomy  defects are patent bilaterally. There is moderate diffuse mucosal  thickening involving the maxillary, sphenoid, frontal, and residual  ethmoid sinuses. Bilateral lateral turbinectomies. The bony walls of  the paranasal sinuses are intact. There is no evidence of acute  intracranial hemorrhage, mass  effect, or midline shift. Mastoid air  cells are clear. The ventricles are not enlarged. The orbits are  grossly unremarkable.     Normal retromaxillary and pterygopalatine fat. In prone mandibular  joints are normal bilaterally.     The adenoid tonsils in the nasopharynx are unremarkable.                                                                      Impression:   1. Limited imaging performed primarily for the purposes of  stereotactic localization.  2. Stable postsurgical changes of functional endoscopic sinus surgery  including bilateral medial lumpectomy, bilateral sphenoidotomies, and  near-complete ethmoidectomies.  3. Continued pansinusitis with moderate diffuse mucosal thickening of  the sinuses.     I have personally reviewed the examination and initial interpretation  and I agree with the findings.     MARIO LI MD          Patient Active Problem List   Diagnosis     Severe asthma with allergic rhinitis       Past Medical History:   Diagnosis Date     Allergic rhinitis      Arthritis      Chronic sinusitis      Depressive disorder      Hoarseness      Reduced vision      Uncomplicated asthma       Problem (# of Occurrences) Relation (Name,Age of Onset)    Bleeding Disorder (2) Brother (Bill), Mother (Darlyn)    Cancer (1) Brother (Bill)    Cerebrovascular Disease (1) Father (Jer)    Diabetes (1) Father (Jer)    Heart Disease (1) Father (Jer)        Past Surgical History:   Procedure Laterality Date     NASAL/SINUS POLYPECTOMY       Social History     Socioeconomic History     Marital status:      Spouse name: None     Number of children: None     Years of education: None     Highest education level: None   Occupational History     Occupation: HOMEMAKER   Tobacco Use     Smoking status: Light Tobacco Smoker     Packs/day: 0.00     Years: 15.00     Pack years: 0.00     Types: Cigarettes     Start date: 9/17/2021     Smokeless tobacco: Never Used     Tobacco comment: 6 cigarettes  per week   Substance and Sexual Activity     Alcohol use: Not Currently     Drug use: Never     Sexual activity: Yes     Partners: Male     Birth control/protection: None   Other Topics Concern     None   Social History Narrative    October 7, 2021    ENVIRONMENTAL HISTORY: [place] The home is heated with a electric furnace. They do have central air conditioning. The patient's bedroom is furnished with carpeting in bedroom, allergen mattress cover, and allergen pillowcase cover.  Pets inside the house include 1 dog. There is no history of cockroach or mice infestation. There is/are 0 smokers in the house.  The house does have a damp basement.      Social Determinants of Health     Financial Resource Strain:      Difficulty of Paying Living Expenses:    Food Insecurity:      Worried About Running Out of Food in the Last Year:      Ran Out of Food in the Last Year:    Transportation Needs:      Lack of Transportation (Medical):      Lack of Transportation (Non-Medical):    Physical Activity:      Days of Exercise per Week:      Minutes of Exercise per Session:    Stress:      Feeling of Stress :    Social Connections:      Frequency of Communication with Friends and Family:      Frequency of Social Gatherings with Friends and Family:      Attends Sikhism Services:      Active Member of Clubs or Organizations:      Attends Club or Organization Meetings:      Marital Status:    Intimate Partner Violence:      Fear of Current or Ex-Partner:      Emotionally Abused:      Physically Abused:      Sexually Abused:            Review of Systems   Constitutional: Negative for activity change, chills and fever.   HENT: Positive for sinus pressure and sinus pain. Negative for congestion, dental problem, ear pain, facial swelling, nosebleeds, postnasal drip, rhinorrhea and sneezing.    Eyes: Negative for discharge, redness and itching.   Respiratory: Positive for cough, shortness of  breath and wheezing. Negative for chest tightness.    Cardiovascular: Negative for chest pain.        Rapid heartbeat   Gastrointestinal: Negative for diarrhea, nausea and vomiting.   Musculoskeletal: Negative for arthralgias, joint swelling and myalgias.   Skin: Negative for rash.   Neurological: Positive for dizziness and weakness. Negative for headaches.   Hematological: Negative for adenopathy.   Psychiatric/Behavioral: Negative for behavioral problems and self-injury.           Current Outpatient Medications:      acetaminophen (TYLENOL) 500 MG tablet, Take 500-1,000 mg by mouth every 6 hours as needed for mild pain, Disp: , Rfl:      albuterol (PROAIR HFA/PROVENTIL HFA/VENTOLIN HFA) 108 (90 Base) MCG/ACT inhaler, Inhale 1-2 puffs into the lungs every 6 hours as needed for shortness of breath / dyspnea or wheezing, Disp: 8 g, Rfl: 11     azelastine (ASTELIN) 0.1 % nasal spray, Spray 2 sprays into both nostrils 2 times daily as needed for rhinitis, Disp: 30 mL, Rfl: 3     azithromycin (ZITHROMAX) 250 MG tablet, Take 1 tablet (250 mg) by mouth Every Mon, Wed, Fri Morning, Disp: 12 tablet, Rfl: 3     budesonide-formoterol (SYMBICORT) 160-4.5 MCG/ACT Inhaler, Inhale 2 puffs into the lungs 2 times daily, Disp: 1 g, Rfl: 11     CYMBALTA 60 MG capsule, , Disp: , Rfl:      Fluticasone-Umeclidin-Vilanterol (TRELEGY ELLIPTA) 200-62.5-25 MCG/INH oral inhaler, Inhale 1 puff into the lungs daily, Disp: 60 each, Rfl: 3     guaiFENesin (MUCINEX PO), , Disp: , Rfl:      ibuprofen (ADVIL/MOTRIN) 200 MG tablet, Take 200 mg by mouth every 4 hours as needed for mild pain, Disp: , Rfl:      LAMICTAL 200 MG tablet, TK 1 T PO D, Disp: , Rfl:      loratadine (CLARITIN) 10 MG tablet, Take 1 tablet (10 mg) by mouth daily, Disp: 30 tablet, Rfl: 4     montelukast (SINGULAIR) 10 MG tablet, Take 1 tablet (10 mg) by mouth At Bedtime, Disp: 90 tablet, Rfl: 3     nicotine polacrilex (NICORETTE) 4 MG gum, TAKE 1 BY MOUTH FOUR TIMES DAILY, Disp:  , Rfl:      spacer (OPTICHAMBER ELBA) holding chamber, Use with albuterol (PROAIR HFA/PROVENTIL HFA/VENTOLIN HFA) 108 (90 Base) MCG/ACT inhaler, Disp: 1 each, Rfl: 0     sulfamethoxazole-trimethoprim (BACTRIM DS) 800-160 MG tablet, Take 1 tablet by mouth 2 times daily for 14 days, Disp: 28 tablet, Rfl: 0     tiotropium (SPIRIVA RESPIMAT) 2.5 MCG/ACT inhaler, Inhale 2 puffs into the lungs daily, Disp: 1 g, Rfl: 11     ADDERALL XR 20 MG 24 hr capsule, PRN (Patient not taking: Reported on 10/7/2021), Disp: , Rfl:      fexofenadine (ALLEGRA) 180 MG tablet, Take 1 tablet (180 mg) by mouth daily (Patient not taking: Reported on 10/7/2021), Disp: 30 tablet, Rfl: 3     fluticasone (FLONASE) 50 MCG/ACT nasal spray, Spray 1 spray into both nostrils daily (Patient not taking: Reported on 10/7/2021), Disp: 1 g, Rfl: 11     gentamicin 80 mg in 1000 ml 0.9% NaCl (GARAMYCIN) SOLN nasal irrigation, Irrigate 30 mL between each nostril twice daily (Patient not taking: Reported on 10/7/2021), Disp: 1000 mL, Rfl: 3     ipratropium - albuterol 0.5 mg/2.5 mg/3 mL (DUONEB) 0.5-2.5 (3) MG/3ML neb solution, USE 3 ML VIA NEBULIZER EVERY 6 HOURS AS NEEDED FOR WHEEZING, Disp: , Rfl:   Immunization History   Administered Date(s) Administered     COVID-19,PF,Pfizer 03/20/2021, 04/10/2021, 08/19/2021     FLU 6-35 months 11/21/2011     Influenza (IIV3) PF 11/04/2005, 10/23/2006, 10/20/2009     Influenza Quad, Recombinant, pf(RIV4) (Flublok) 10/07/2021     Influenza Vaccine IM > 6 months Valent IIV4 (Alfuria,Fluzone) 10/13/2016, 09/07/2017, 10/25/2019     Pneumo Conj 13-V (2010&after) 09/07/2017     Pneumococcal 23 valent 11/04/2005, 02/18/2019     Td (Adult), Adsorbed 01/01/2001, 10/25/2008     Tdap (Adacel,Boostrix) 08/03/2011     Allergies   Allergen Reactions     Bee Venom Angioedema and Swelling     Other reaction(s): Angioedema       Cefdinir Diarrhea and GI Disturbance              Hydrocodone-Acetaminophen      Other reaction(s):  Vomiting     Levofloxacin Muscle Pain (Myalgia)     Prednisone Anxiety and Other (See Comments)     Hyperactivity and moodiness. Doesn't like how it makes her feel.       OBJECTIVE:                                                                 /80 (BP Location: Left arm, Patient Position: Sitting, Cuff Size: Adult Regular)   Pulse 67   Temp 97  F (36.1  C) (Tympanic)   Wt 54.5 kg (120 lb 2.4 oz)   SpO2 95%   BMI 19.99 kg/m          Physical Exam  Vitals and nursing note reviewed.   Constitutional:       General: She is not in acute distress.     Appearance: She is not ill-appearing, toxic-appearing or diaphoretic.   HENT:      Head: Normocephalic and atraumatic.      Right Ear: Tympanic membrane, ear canal and external ear normal.      Left Ear: Tympanic membrane, ear canal and external ear normal.      Nose: No congestion or rhinorrhea.      Right Turbinates: Enlarged (Erythematous).      Left Turbinates: Enlarged (Erythematous).      Mouth/Throat:      Lips: Pink.      Mouth: Mucous membranes are moist.      Pharynx: Oropharynx is clear. No pharyngeal swelling, oropharyngeal exudate, posterior oropharyngeal erythema or uvula swelling.   Eyes:      General:         Right eye: No discharge.         Left eye: No discharge.      Conjunctiva/sclera: Conjunctivae normal.   Cardiovascular:      Rate and Rhythm: Normal rate and regular rhythm.      Heart sounds: Normal heart sounds.   Pulmonary:      Effort: Pulmonary effort is normal. No respiratory distress.      Breath sounds: No stridor. Wheezing (End expiratory) present. No decreased breath sounds or rales.   Lymphadenopathy:      Cervical: No cervical adenopathy.   Skin:     General: Skin is warm.      Capillary Refill: Capillary refill takes less than 2 seconds.   Neurological:      Mental Status: She is alert and oriented to person, place, and time.   Psychiatric:         Mood and Affect: Mood normal.         Behavior: Behavior normal.            ASSESSMENT/PLAN:    Severe persistent asthma, unspecified whether complicated  Historical diagnosis.  Chronic bronchitis and ABPA perhaps could be considered in the differential as well.  Interestingly, her pulmonary function test was not as bad as her current clinical presentation.  She is not compliant with Symbicort.  Uses Spiriva 1 inhalation once daily.  -Stop Spiriva and Symbicort.  -Start Trelegy Ellipta 200/62.5/25 mcg 1 puff once daily.  Hopefully, that will improve compliance with the controller med.  -Continue montelukast 10 mg by mouth once daily.  -Continue albuterol inhaler 2 to 4 puffs every 4 hours as needed for persistent cough/chest tightness/wheezing/shortness of breath.  -Also ordered ABPA panel and alpha-1 antitrypsin.  She did have modestly elevated total serum IgE, but negative serum IgE for regional aeroallergen panel, except slightly elevated serum IgE for Aspergillus.  Since there is not much evidence of sensitivity to a perennial allergen, omalizumab may not necessarily be helpful.  -I would suggest to complete 4 months of the treatment, and if there is no improvement, to consider dupilumab.  Failed mepolizumab in the past.    - Fluticasone-Umeclidin-Vilanterol (TRELEGY ELLIPTA) 200-62.5-25 MCG/INH oral inhaler  Dispense: 60 each; Refill: 3  - Bronchopulm Aspergillosis Allergic Panel  - Alpha 1 Antitrypsin      Other chronic sinusitis  Hypogammaglobulinemia (H)  Chronic pansinusitis with a history of nasal polyposis.  Unclear presentation of aspirin sensitization, questioning AERD as the diagnosis.    -Continue to nasal fluticasone 1 spray in each nostril once daily.  -Start azelastine 2 sprays in each nostril twice daily as needed.  -Once she completes Bactrim, I recommend to restart azithromycin on Mondays, Wednesdays, and Fridays.  I would hope that helps with both sinus and chest symptoms.    Considering history of hypogammaglobinemia, I ordered immunoglobulin levels,  comprehensive metabolic panel (protein loss?),  Tetanus and strep pneumo antibody levels, CBC with differential, vasculitis markers, and total complement.  If she does have primary immunodeficiency, it could explain chronic bronchitis and pansinusitis.  No reason for skin testing for aeroallergens since she he is on omalizumab.    - azelastine (ASTELIN) 0.1 % nasal spray  Dispense: 30 mL; Refill: 3  - Comprehensive metabolic panel  - IgA  - IgE  - IgG  - IgM  - Strep pneumo IgG Abys 23 Serotypes  - T cell subset extended profile  - Tetanus antibody  - CBC with Platelets & Differential  - Myeloperoxidase and Proteinase 3 Panel  - ANCA IgG by IFA with Reflex to Titer  -Complement CH 50      Need for prophylactic vaccination and inoculation against influenza    - INFLUENZA QUAD, RECOMBINANT, P-FREE (RIV4) (FLUBLOK)       75 minutes spent on the date of the encounter during chart review, history and exam, documentation, and further activities as noted above.    Return in about 6 weeks (around 11/18/2021), or if symptoms worsen or fail to improve.    Thank you for allowing us to participate in the care of this patient. Please feel free to contact us if there are any questions or concerns about the patient.    Disclaimer: This note consists of symbols derived from keyboarding, dictation and/or voice recognition software. As a result, there may be errors in the script that have gone undetected. Please consider this when interpreting information found in this chart.    Doni Lee MD, FAAAAI, FACAAI  Allergy, Asthma and Immunology     [facility]

## 2021-10-07 NOTE — LETTER
10/7/2021         RE: Gracy Bautista  [address] [place] [address]        Dear Colleague,    Thank you for referring your patient, Gracy Bautista, to the [facility]. Please see a copy of my visit note below.    SUBJECTIVE:                                                                   Gracy Bautista presents today to our Allergy Clinic at [facility]; She is being seen in consultation at the request of Digna Jaimes MD for a new patient visit. She is a 55-year-old female with chronic sinusitis and a history of asthma.  The patient states that approximately when she was 41-67-yvty-old, she developed episodes of significant facial pressure and pain in the frontal, periorbital, and maxillary areas.  Would be associated with headaches, nasal congestion, and rhinorrhea.  Had sinus CT in the past that was consistent with pansinusitis.  Since then, she has had at least 3 sinus surgeries.  Ever since she got her surgeries, she has had daily issues with sinus pressure and sinus pain, frequently with postnasal drainage.  She states that she could not tolerate budesonide rinses or other sinus irrigations.  She uses intranasal fluticasone 1 spray in each nostril once daily.  Sometimes, she would use 2 sprays in each nostril once daily, but she cannot use that dose all the time because it hurts her nose.  She takes loratadine 10 mg by mouth once daily, but does not find it helpful.  At some point, she was told that she had nasal polyps.  Was on will call about stopped it because it was not effective.  Most recent culture showed Alternaria growth.  Each time she is on antibiotics, they seem to help, but once she stops them she has issues again.  She was recently started on azithromycin on Mondays, Wednesdays, and Fridays.  These days, she stopped it because she was started on Bactrim for 14 days.  She denies having any issues with NSAIDs in the past but went  through an aspirin challenge was on 650 mg twice daily.  Because she did not feel that it was helpful, she stopped it.  She was on Nucala and did not find it helpful either.  She stopped it.    She has smoked cigarettes 3 to 4/day since the age of 15 years.  She states that she quit smoking 3 weeks ago, but still with occasional breakthrough episodes of smoking.  She started having wheezing, shortness of breath, chest tightness, and mainly productive cough at the same time when she started having issues with sinuses.  She finds that exertion, heat, and cold air would trigger symptoms.  Albuterol inhaler seems to help.  She was prescribed Symbicort 160/4.5 mcg and Spiriva 2.5 mcg.  She admits using Spiriva 2.5 mcg 1 puff once daily.  She uses Symbicort just 1 puff once daily and not all the time.  Admits that it is difficult for her to adhere to the regimen.  She has been taking montelukast for years.  She has been on and off prednisone.  She is not sure whether it is helpful or not.  Serum IgE for the regional aeroallergen panel in December 2020 was negative.  In March 2021, the panel was repeated.  Slight sensitivity to Aspergillus was noted.  The rest was negative.  Since her symptoms were not well controlled, she was recently started on omalizumab 300 mg every 28 days.  So far, she has not noticed any difference since she started it.  She had a pulmonary function test done and November 2020.  It showed normal pulmonary function.  The patient reports a history of hypogammaglobulinemia.  She states that she was on IVIG, and it was stopped because her levels normalized.  She does not remember whether infusions helped with her sinus and/or chest symptoms.      CT chest without contrast     INDICATION: Severe persistent asthma with allergic rhinitis.     Contrast: None     COMPARISON: CT pulmonary angiogram 6/27/2021     FINDINGS: Included thyroid appears unremarkable. Nonenlarged  aortopulmonary window lymph nodes  appears similar, as does a  nonenlarged right lower paratracheal node. No pleural or pericardial  effusion. No enlarged axillary or hilar lymph nodes.  The included upper abdomen appears unremarkable.  4 mm right upper lobe nodule (series 6 image 61) unchanged. Calcified  5 mm right upper lobe granuloma (series 6 image 132). Calcified  paravertebral right lower lobe granuloma measuring 3 mm (series 6  image 252). 2 mm lateral left lower lobe nodule (series 6 image 266).  Bonsall 5 x 2 mm left lower lobe nodule (series 6 image 283) is less  rounded than previous. Therefore, this likely represents  intrapulmonary lymph node. Right lower lobe mucous plug (series 6  image 245) noted. Other previous right lower lobe mucus plugging has  cleared. Bones multilevel degenerative changes throughout the thoracic  spine with prominent bone spurring. The bones are osteopenic.  Extensive lower cervical spondylosis also present. There is  hemidiaphragm flattening with upper lung oligemia.                                                                      IMPRESSION: Unchanged 4 mm right upper lobe nodule. Unchanged right  lower lobe mucous plug with clearing of other right lower lobe mucous  plug. Hyperinflated lungs. Nonenlarged mediastinal nodes.  Cervicothoracic spondylosis. Osteopenia.     RICHARDSON KELLY MD       [facility] CT imaging for purposes of stereotactic evaluation     Provided History: Chronic pansinusitis  ICD-10: Chronic pansinusitis     Comparison: CT head 9/23/2020     Technique: CT imaging performed with axial, sagittal, and coronal  reconstructed images obtained without intravenous contrast.     Findings: Limited imaging for stereotactic localization. Stable  postsurgical changes of endoscopic sinus surgery including bilateral  medial antrectomies, bilateral sphenoidotomies, near complete  ethmoidectomies and bilateral frontal sinusotomies. The antrectomy  defects are patent bilaterally. There is moderate  diffuse mucosal  thickening involving the maxillary, sphenoid, frontal, and residual  ethmoid sinuses. Bilateral lateral turbinectomies. The bony walls of  the paranasal sinuses are intact. There is no evidence of acute  intracranial hemorrhage, mass effect, or midline shift. Mastoid air  cells are clear. The ventricles are not enlarged. The orbits are  grossly unremarkable.     Normal retromaxillary and pterygopalatine fat. In prone mandibular  joints are normal bilaterally.     The adenoid tonsils in the nasopharynx are unremarkable.                                                                      Impression:   1. Limited imaging performed primarily for the purposes of  stereotactic localization.  2. Stable postsurgical changes of functional endoscopic sinus surgery  including bilateral medial lumpectomy, bilateral sphenoidotomies, and  near-complete ethmoidectomies.  3. Continued pansinusitis with moderate diffuse mucosal thickening of  the sinuses.     I have personally reviewed the examination and initial interpretation  and I agree with the findings.     MARIO LI MD          Patient Active Problem List   Diagnosis     Severe asthma with allergic rhinitis       Past Medical History:   Diagnosis Date     Allergic rhinitis      Arthritis      Chronic sinusitis      Depressive disorder      Hoarseness      Reduced vision      Uncomplicated asthma       Problem (# of Occurrences) Relation (Name,Age of Onset)    Bleeding Disorder (2) Brother (Bill), Mother (Darlyn)    Cancer (1) Brother (Bill)    Cerebrovascular Disease (1) Father (Jer)    Diabetes (1) Father (Jer)    Heart Disease (1) Father (Jer)        Past Surgical History:   Procedure Laterality Date     NASAL/SINUS POLYPECTOMY       Social History     Socioeconomic History     Marital status:      Spouse name: None     Number of children: None     Years of education: None     Highest education level: None   Occupational History      Occupation: HOMEMAKER   Tobacco Use     Smoking status: Light Tobacco Smoker     Packs/day: 0.00     Years: 15.00     Pack years: 0.00     Types: Cigarettes     Start date: 9/17/2021     Smokeless tobacco: Never Used     Tobacco comment: 6 cigarettes per week   Substance and Sexual Activity     Alcohol use: Not Currently     Drug use: Never     Sexual activity: Yes     Partners: Male     Birth control/protection: None   Other Topics Concern     None   Social History Narrative    October 7, 2021    ENVIRONMENTAL HISTORY: The [place] The home is heated with a electric furnace. They do have central air conditioning. The patient's bedroom is furnished with carpeting in bedroom, allergen mattress cover, and allergen pillowcase cover.  Pets inside the house include 1 dog. There is no history of cockroach or mice infestation. There is/are 0 smokers in the house.  The house does have a damp basement.      Social Determinants of Health     Financial Resource Strain:      Difficulty of Paying Living Expenses:    Food Insecurity:      Worried About Running Out of Food in the Last Year:      Ran Out of Food in the Last Year:    Transportation Needs:      Lack of Transportation (Medical):      Lack of Transportation (Non-Medical):    Physical Activity:      Days of Exercise per Week:      Minutes of Exercise per Session:    Stress:      Feeling of Stress :    Social Connections:      Frequency of Communication with Friends and Family:      Frequency of Social Gatherings with Friends and Family:      Attends [employer] Services:      Active Member of Clubs or Organizations:      Attends Club or Organization Meetings:      Marital Status:    Intimate Partner Violence:      Fear of Current or Ex-Partner:      Emotionally Abused:      Physically Abused:      Sexually Abused:            Review of Systems   Constitutional: Negative for activity change, chills and fever.   HENT: Positive for sinus  pressure and sinus pain. Negative for congestion, dental problem, ear pain, facial swelling, nosebleeds, postnasal drip, rhinorrhea and sneezing.    Eyes: Negative for discharge, redness and itching.   Respiratory: Positive for cough, shortness of breath and wheezing. Negative for chest tightness.    Cardiovascular: Negative for chest pain.        Rapid heartbeat   Gastrointestinal: Negative for diarrhea, nausea and vomiting.   Musculoskeletal: Negative for arthralgias, joint swelling and myalgias.   Skin: Negative for rash.   Neurological: Positive for dizziness and weakness. Negative for headaches.   Hematological: Negative for adenopathy.   Psychiatric/Behavioral: Negative for behavioral problems and self-injury.           Current Outpatient Medications:      acetaminophen (TYLENOL) 500 MG tablet, Take 500-1,000 mg by mouth every 6 hours as needed for mild pain, Disp: , Rfl:      albuterol (PROAIR HFA/PROVENTIL HFA/VENTOLIN HFA) 108 (90 Base) MCG/ACT inhaler, Inhale 1-2 puffs into the lungs every 6 hours as needed for shortness of breath / dyspnea or wheezing, Disp: 8 g, Rfl: 11     azelastine (ASTELIN) 0.1 % nasal spray, Spray 2 sprays into both nostrils 2 times daily as needed for rhinitis, Disp: 30 mL, Rfl: 3     azithromycin (ZITHROMAX) 250 MG tablet, Take 1 tablet (250 mg) by mouth Every Mon, Wed, Fri Morning, Disp: 12 tablet, Rfl: 3     budesonide-formoterol (SYMBICORT) 160-4.5 MCG/ACT Inhaler, Inhale 2 puffs into the lungs 2 times daily, Disp: 1 g, Rfl: 11     CYMBALTA 60 MG capsule, , Disp: , Rfl:      Fluticasone-Umeclidin-Vilanterol (TRELEGY ELLIPTA) 200-62.5-25 MCG/INH oral inhaler, Inhale 1 puff into the lungs daily, Disp: 60 each, Rfl: 3     guaiFENesin (MUCINEX PO), , Disp: , Rfl:      ibuprofen (ADVIL/MOTRIN) 200 MG tablet, Take 200 mg by mouth every 4 hours as needed for mild pain, Disp: , Rfl:      LAMICTAL 200 MG tablet, TK 1 T PO D, Disp: , Rfl:      loratadine (CLARITIN) 10 MG tablet, Take 1  tablet (10 mg) by mouth daily, Disp: 30 tablet, Rfl: 4     montelukast (SINGULAIR) 10 MG tablet, Take 1 tablet (10 mg) by mouth At Bedtime, Disp: 90 tablet, Rfl: 3     nicotine polacrilex (NICORETTE) 4 MG gum, TAKE 1 BY MOUTH FOUR TIMES DAILY, Disp: , Rfl:      spacer (OPTICHAMBER ELBA) holding chamber, Use with albuterol (PROAIR HFA/PROVENTIL HFA/VENTOLIN HFA) 108 (90 Base) MCG/ACT inhaler, Disp: 1 each, Rfl: 0     sulfamethoxazole-trimethoprim (BACTRIM DS) 800-160 MG tablet, Take 1 tablet by mouth 2 times daily for 14 days, Disp: 28 tablet, Rfl: 0     tiotropium (SPIRIVA RESPIMAT) 2.5 MCG/ACT inhaler, Inhale 2 puffs into the lungs daily, Disp: 1 g, Rfl: 11     ADDERALL XR 20 MG 24 hr capsule, PRN (Patient not taking: Reported on 10/7/2021), Disp: , Rfl:      fexofenadine (ALLEGRA) 180 MG tablet, Take 1 tablet (180 mg) by mouth daily (Patient not taking: Reported on 10/7/2021), Disp: 30 tablet, Rfl: 3     fluticasone (FLONASE) 50 MCG/ACT nasal spray, Spray 1 spray into both nostrils daily (Patient not taking: Reported on 10/7/2021), Disp: 1 g, Rfl: 11     gentamicin 80 mg in 1000 ml 0.9% NaCl (GARAMYCIN) SOLN nasal irrigation, Irrigate 30 mL between each nostril twice daily (Patient not taking: Reported on 10/7/2021), Disp: 1000 mL, Rfl: 3     ipratropium - albuterol 0.5 mg/2.5 mg/3 mL (DUONEB) 0.5-2.5 (3) MG/3ML neb solution, USE 3 ML VIA NEBULIZER EVERY 6 HOURS AS NEEDED FOR WHEEZING, Disp: , Rfl:   Immunization History   Administered Date(s) Administered     COVID-19,PF,Pfizer 03/20/2021, 04/10/2021, 08/19/2021     FLU 6-35 months 11/21/2011     Influenza (IIV3) PF 11/04/2005, 10/23/2006, 10/20/2009     Influenza Quad, Recombinant, pf(RIV4) (Flublok) 10/07/2021     Influenza Vaccine IM > 6 months Valent IIV4 (Alfuria,Fluzone) 10/13/2016, 09/07/2017, 10/25/2019     Pneumo Conj 13-V (2010&after) 09/07/2017     Pneumococcal 23 valent 11/04/2005, 02/18/2019     Td (Adult), Adsorbed 01/01/2001, 10/25/2008     Tdap  (Adacel,Boostrix) 08/03/2011     Allergies   Allergen Reactions     Bee Venom Angioedema and Swelling     Other reaction(s): Angioedema       Cefdinir Diarrhea and GI Disturbance              Hydrocodone-Acetaminophen      Other reaction(s): Vomiting     Levofloxacin Muscle Pain (Myalgia)     Prednisone Anxiety and Other (See Comments)     Hyperactivity and moodiness. Doesn't like how it makes her feel.       OBJECTIVE:                                                                 /80 (BP Location: Left arm, Patient Position: Sitting, Cuff Size: Adult Regular)   Pulse 67   Temp 97  F (36.1  C) (Tympanic)   Wt 54.5 kg (120 lb 2.4 oz)   SpO2 95%   BMI 19.99 kg/m          Physical Exam  Vitals and nursing note reviewed.   Constitutional:       General: She is not in acute distress.     Appearance: She is not ill-appearing, toxic-appearing or diaphoretic.   HENT:      Head: Normocephalic and atraumatic.      Right Ear: Tympanic membrane, ear canal and external ear normal.      Left Ear: Tympanic membrane, ear canal and external ear normal.      Nose: No congestion or rhinorrhea.      Right Turbinates: Enlarged (Erythematous).      Left Turbinates: Enlarged (Erythematous).      Mouth/Throat:      Lips: Pink.      Mouth: Mucous membranes are moist.      Pharynx: Oropharynx is clear. No pharyngeal swelling, oropharyngeal exudate, posterior oropharyngeal erythema or uvula swelling.   Eyes:      General:         Right eye: No discharge.         Left eye: No discharge.      Conjunctiva/sclera: Conjunctivae normal.   Cardiovascular:      Rate and Rhythm: Normal rate and regular rhythm.      Heart sounds: Normal heart sounds.   Pulmonary:      Effort: Pulmonary effort is normal. No respiratory distress.      Breath sounds: No stridor. Wheezing (End expiratory) present. No decreased breath sounds or rales.   Lymphadenopathy:      Cervical: No cervical adenopathy.   Skin:     General: Skin is warm.      Capillary  Refill: Capillary refill takes less than 2 seconds.   Neurological:      Mental Status: She is alert and oriented to person, place, and time.   Psychiatric:         Mood and Affect: Mood normal.         Behavior: Behavior normal.           ASSESSMENT/PLAN:    Severe persistent asthma, unspecified whether complicated  Historical diagnosis.  Chronic bronchitis and ABPA perhaps could be considered in the differential as well.  Interestingly, her pulmonary function test was not as bad as her current clinical presentation.  She is not compliant with Symbicort.  Uses Spiriva 1 inhalation once daily.  -Stop Spiriva and Symbicort.  -Start Trelegy Ellipta 200/62.5/25 mcg 1 puff once daily.  Hopefully, that will improve compliance with the controller med.  -Continue montelukast 10 mg by mouth once daily.  -Continue albuterol inhaler 2 to 4 puffs every 4 hours as needed for persistent cough/chest tightness/wheezing/shortness of breath.  -Also ordered ABPA panel and alpha-1 antitrypsin.  She did have modestly elevated total serum IgE, but negative serum IgE for regional aeroallergen panel, except slightly elevated serum IgE for Aspergillus.  Since there is not much evidence of sensitivity to a perennial allergen, omalizumab may not necessarily be helpful.  -I would suggest to complete 4 months of the treatment, and if there is no improvement, to consider dupilumab.  Failed mepolizumab in the past.    - Fluticasone-Umeclidin-Vilanterol (TRELEGY ELLIPTA) 200-62.5-25 MCG/INH oral inhaler  Dispense: 60 each; Refill: 3  - Bronchopulm Aspergillosis Allergic Panel  - Alpha 1 Antitrypsin      Other chronic sinusitis  Hypogammaglobulinemia (H)  Chronic pansinusitis with a history of nasal polyposis.  Unclear presentation of aspirin sensitization, questioning AERD as the diagnosis.    -Continue to nasal fluticasone 1 spray in each nostril once daily.  -Start azelastine 2 sprays in each nostril twice daily as needed.  -Once she completes  Bactrim, I recommend to restart azithromycin on Mondays, Wednesdays, and Fridays.  I would hope that helps with both sinus and chest symptoms.    Considering history of hypogammaglobinemia, I ordered immunoglobulin levels, comprehensive metabolic panel (protein loss?),  Tetanus and strep pneumo antibody levels, CBC with differential, vasculitis markers, and total complement.  If she does have primary immunodeficiency, it could explain chronic bronchitis and pansinusitis.  No reason for skin testing for aeroallergens since she he is on omalizumab.    - azelastine (ASTELIN) 0.1 % nasal spray  Dispense: 30 mL; Refill: 3  - Comprehensive metabolic panel  - IgA  - IgE  - IgG  - IgM  - Strep pneumo IgG Abys 23 Serotypes  - T cell subset extended profile  - Tetanus antibody  - CBC with Platelets & Differential  - Myeloperoxidase and Proteinase 3 Panel  - ANCA IgG by IFA with Reflex to Titer  -Complement CH 50      Need for prophylactic vaccination and inoculation against influenza    - INFLUENZA QUAD, RECOMBINANT, P-FREE (RIV4) (FLUBLOK)       75 minutes spent on the date of the encounter during chart review, history and exam, documentation, and further activities as noted above.    Return in about 6 weeks (around 11/18/2021), or if symptoms worsen or fail to improve.    Thank you for allowing us to participate in the care of this patient. Please feel free to contact us if there are any questions or concerns about the patient.    Disclaimer: This note consists of symbols derived from keyboarding, dictation and/or voice recognition software. As a result, there may be errors in the script that have gone undetected. Please consider this when interpreting information found in this chart.    Doni Lee MD, FAAAAI, FACAAI  Allergy, Asthma and Immunology     [facility]         Again, thank you for allowing me to participate in the care of your patient.         Sincerely,        Doni Lee MD

## 2021-10-08 ENCOUNTER — TELEPHONE (OUTPATIENT)
Dept: OTOLARYNGOLOGY | Facility: CLINIC | Age: 56
End: 2021-10-08

## 2021-10-08 DIAGNOSIS — J32.4 CHRONIC PANSINUSITIS: Primary | ICD-10-CM

## 2021-10-08 LAB
ANCA AB PATTERN SER IF-IMP: NORMAL
C-ANCA TITR SER IF: NORMAL {TITER}
IGA SERPL-MCNC: 141 MG/DL (ref 84–499)
IGG SERPL-MCNC: 643 MG/DL (ref 610–1616)
IGM SERPL-MCNC: 313 MG/DL (ref 35–242)
MYELOPEROXIDASE AB SER IA-ACNC: <0.3 U/ML
MYELOPEROXIDASE AB SER IA-ACNC: NEGATIVE
PROTEINASE3 AB SER IA-ACNC: <1 U/ML
PROTEINASE3 AB SER IA-ACNC: NEGATIVE

## 2021-10-08 NOTE — RESULT ENCOUNTER NOTE
Ct chest reveals some emphysematous changes. Stable findings of unchanged 4 mm right upper lobe nodule. Unchanged right lower lobe mucous plug with clearing of other right lower lobe mucous plug. Hyperinflated lungs.

## 2021-10-08 NOTE — TELEPHONE ENCOUNTER
Spoke to patient after discussing with provider and provider advised to switch back to a course of augmentin and stop the bactrim. Prescription has been sent to patients pharmacy and should be available today. Pt is notified of this information and pt verbalized understanding. Encouraged to keep us updated and call with any additional concerns.

## 2021-10-08 NOTE — TELEPHONE ENCOUNTER
[facility] Call Center    Phone Message    May a detailed message be left on voicemail: yes     Reason for Call: Other:   Pt is calling about her Bactrim antibiotic. Pt states that it is helping but pt has been throwing up for the last couple of days.    Please follow-up with pt to advise.     Action Taken: Other:  ENT    Travel Screening: Not Applicable

## 2021-10-08 NOTE — TELEPHONE ENCOUNTER
HRCT has been completed 10/06/2021. Will await results / recommendations from .      Donna Gaspar LPN  Pulmonary Medicine:  [facility]  Phone: [phone] Fax: [phone]

## 2021-10-10 LAB
A1AT PHENOTYP SERPL-IMP: NORMAL
A1AT SERPL-MCNC: 130 MG/DL
DEPRECATED MISC ALLERGEN IGE RAST QL: NORMAL

## 2021-10-11 LAB — IGE SERPL-ACNC: 119 KU/L (ref 0–114)

## 2021-10-12 DIAGNOSIS — J45.41 MODERATE PERSISTENT ASTHMA WITH EXACERBATION: Primary | ICD-10-CM

## 2021-10-12 RX ORDER — PREDNISONE 20 MG/1
TABLET ORAL
Qty: 20 TABLET | Refills: 0 | Status: SHIPPED | OUTPATIENT
Start: 2021-10-12 | End: 2021-12-28

## 2021-10-12 NOTE — TELEPHONE ENCOUNTER
Reviewed Ct chest, Mucous plugs have improved.   - She has significant sinusitis on CT facial bones. Prescribed her a longer coure of steroids taper and antibiotics.     Jesse Cassidy MD

## 2021-10-12 NOTE — TELEPHONE ENCOUNTER
VM left for pt requesting CB to pulmonary clinic to discuss HRCT results / recommendations from Dr. Cassidy. Will await CB.      Donna Gaspar LPN  Pulmonary Medicine:  [facility]  Phone: [phone] Fax: [phone]

## 2021-10-12 NOTE — TELEPHONE ENCOUNTER
Informed patient that the antibiotic prescribed today, 10/12/2021 was amoxicillin-clavulanate (AUGMENTIN) 875-125 MG tablet and not sulfamethoxazole-trimethoprim (BACTRIM DS) 800-160 MG tablet.    Informed patient that a 10 day course of Augmentin was prescribed. Patient verbalized understanding and had no further questions.      Donna Gaspar LPN  Pulmonary Medicine:  [facility]  Phone: [phone] Fax: [phone]

## 2021-10-12 NOTE — TELEPHONE ENCOUNTER
Patient returned CB to pulmonary clinic.     Explained Dr. Cassidy's recommendations. Requested patient follow up with pulmonary clinic after the Prednisone taper / 14 day Bactrim course has been completed.    Patient expressed understanding and had no further questions.      Donna Gaspar LPN  Pulmonary Medicine:  [facility]  Phone: [phone] Fax: [phone]

## 2021-10-13 LAB
A FUMIGATUS IGE QN: <0.1 KU/L
A FUMIGATUS1 AB SER QL ID: DETECTED
A FUMIGATUS6 AB SER QL ID: ABNORMAL
IGE SERPL-ACNC: 116 KU/L

## 2021-10-15 ENCOUNTER — PREP FOR PROCEDURE (OUTPATIENT)
Dept: OTOLARYNGOLOGY | Facility: CLINIC | Age: 56
End: 2021-10-15

## 2021-10-15 DIAGNOSIS — J32.4 CHRONIC PANSINUSITIS: Primary | ICD-10-CM

## 2021-10-15 LAB
BACTERIA SPEC CULT: ABNORMAL
S PN DA SERO 19F IGG SER-MCNC: 2.8 MCG/ML
S PNEUM DA 1 IGG SER-MCNC: 30.7 MCG/ML
S PNEUM DA 10A IGG SER-MCNC: 0.7 MCG/ML
S PNEUM DA 11A IGG SER-MCNC: 1 MCG/ML
S PNEUM DA 12F IGG SER-MCNC: <0.4 MCG/ML
S PNEUM DA 14 IGG SER-MCNC: 0.5 MCG/ML
S PNEUM DA 15B IGG SER-MCNC: 2.4 MCG/ML
S PNEUM DA 17F IGG SER-MCNC: 1.7 MCG/ML
S PNEUM DA 18C IGG SER-MCNC: 0.9 MCG/ML
S PNEUM DA 19A IGG SER-MCNC: 33.2 MCG/ML
S PNEUM DA 2 IGG SER-MCNC: 2.4 MCG/ML
S PNEUM DA 20A IGG SER-MCNC: 0.4 MCG/ML
S PNEUM DA 22F IGG SER-MCNC: 11.8 MCG/ML
S PNEUM DA 23F IGG SER-MCNC: 13.9 MCG/ML
S PNEUM DA 3 IGG SER-MCNC: 0.8 MCG/ML
S PNEUM DA 33F IGG SER-MCNC: 1.3 MCG/ML
S PNEUM DA 4 IGG SER-MCNC: 0.5 MCG/ML
S PNEUM DA 5 IGG SER-MCNC: 8.1 MCG/ML
S PNEUM DA 6B IGG SER-MCNC: 0.8 MCG/ML
S PNEUM DA 7F IGG SER-MCNC: 29.2 MCG/ML
S PNEUM DA 8 IGG SER-MCNC: 2.6 MCG/ML
S PNEUM DA 9N IGG SER-MCNC: NORMAL MCG/ML
S PNEUM DA 9V IGG SER-MCNC: 8 MCG/ML

## 2021-10-15 RX ORDER — DEXAMETHASONE SODIUM PHOSPHATE 4 MG/ML
10 INJECTION, SOLUTION INTRA-ARTICULAR; INTRALESIONAL; INTRAMUSCULAR; INTRAVENOUS; SOFT TISSUE ONCE
Status: CANCELLED | OUTPATIENT
Start: 2021-10-15 | End: 2021-10-15

## 2021-10-18 ENCOUNTER — INFUSION THERAPY VISIT (OUTPATIENT)
Dept: INFUSION THERAPY | Facility: CLINIC | Age: 56
End: 2021-10-18
Payer: COMMERCIAL

## 2021-10-18 ENCOUNTER — TELEPHONE (OUTPATIENT)
Dept: PULMONOLOGY | Facility: CLINIC | Age: 56
End: 2021-10-18

## 2021-10-18 VITALS
BODY MASS INDEX: 19.97 KG/M2 | OXYGEN SATURATION: 99 % | HEART RATE: 84 BPM | TEMPERATURE: 98.1 F | DIASTOLIC BLOOD PRESSURE: 79 MMHG | RESPIRATION RATE: 16 BRPM | WEIGHT: 120 LBS | SYSTOLIC BLOOD PRESSURE: 117 MMHG

## 2021-10-18 DIAGNOSIS — J45.50 SEVERE PERSISTENT ASTHMA WITH ALLERGIC RHINITIS, UNSPECIFIED WHETHER COMPLICATED (H): Primary | ICD-10-CM

## 2021-10-18 PROCEDURE — 96372 THER/PROPH/DIAG INJ SC/IM: CPT | Performed by: NURSE PRACTITIONER

## 2021-10-18 PROCEDURE — 99207 PR NO CHARGE LOS: CPT

## 2021-10-18 RX ORDER — NALOXONE HYDROCHLORIDE 0.4 MG/ML
0.2 INJECTION, SOLUTION INTRAMUSCULAR; INTRAVENOUS; SUBCUTANEOUS
Status: CANCELLED | OUTPATIENT
Start: 2021-11-15

## 2021-10-18 RX ORDER — ALBUTEROL SULFATE 0.83 MG/ML
2.5 SOLUTION RESPIRATORY (INHALATION)
Status: CANCELLED | OUTPATIENT
Start: 2021-11-15

## 2021-10-18 RX ORDER — METHYLPREDNISOLONE SODIUM SUCCINATE 125 MG/2ML
125 INJECTION, POWDER, LYOPHILIZED, FOR SOLUTION INTRAMUSCULAR; INTRAVENOUS
Status: CANCELLED
Start: 2021-11-15

## 2021-10-18 RX ORDER — MEPERIDINE HYDROCHLORIDE 25 MG/ML
25 INJECTION INTRAMUSCULAR; INTRAVENOUS; SUBCUTANEOUS EVERY 30 MIN PRN
Status: CANCELLED | OUTPATIENT
Start: 2021-11-15

## 2021-10-18 RX ORDER — EPINEPHRINE 1 MG/ML
0.3 INJECTION, SOLUTION INTRAMUSCULAR; SUBCUTANEOUS EVERY 5 MIN PRN
Status: CANCELLED | OUTPATIENT
Start: 2021-11-15

## 2021-10-18 RX ORDER — ALBUTEROL SULFATE 90 UG/1
1-2 AEROSOL, METERED RESPIRATORY (INHALATION)
Status: CANCELLED
Start: 2021-11-15

## 2021-10-18 RX ORDER — DIPHENHYDRAMINE HYDROCHLORIDE 50 MG/ML
50 INJECTION INTRAMUSCULAR; INTRAVENOUS
Status: CANCELLED
Start: 2021-11-15

## 2021-10-18 NOTE — TELEPHONE ENCOUNTER
Patient called asking if she can get Xolair infusion with prednisone taper she is on. Nurse verified with Dr Bean, beth to get infusion. Encouraged patient to please get infusion.     No further questions at this time.    Natacha Pabon RN, BSN  GI/Pulmonary Nurse Care Coordinator  Phone: [phone]  Fax: [phone]

## 2021-10-18 NOTE — PROGRESS NOTES
Infusion Nursing Note:  Gracy Bautista presents today for  Xolair.    Patient seen by provider today: No   present during visit today: Not Applicable.    Note: Patient stayed for apx 1hr of observation. Patient declined to stay the full two hours.       Intravenous Access:  No Intravenous access/labs at this visit.    Treatment Conditions:  Not Applicable.      Post Infusion Assessment:  Patient tolerated injection without incident.       Discharge Plan:   [facility]  Patient will return 11/15/2021 for next appointment.   Patient discharged in stable condition accompanied by: self.  Departure Mode: Ambulatory.      Mis Reece RN

## 2021-10-23 ENCOUNTER — TELEPHONE (OUTPATIENT)
Dept: OTOLARYNGOLOGY | Facility: CLINIC | Age: 56
End: 2021-10-23

## 2021-10-23 PROBLEM — J32.4 CHRONIC PANSINUSITIS: Status: ACTIVE | Noted: 2021-10-23

## 2021-10-23 NOTE — TELEPHONE ENCOUNTER
Called patient to schedule surgery with Dr. Jaimes. Patient was driving but agreeable to scheduling surgery. Wants specifically after 12/10. [facility]. Aware of location.     Patient is new to [place] and would like to do pre-op with [facility] PAC appt scheduled and pre-op covid-19 testing the same day. Aware that arrival time and instructions will be given at appointment.     Instructed patient to call back with any questions after she review packet.     Karen Davidson on 10/23/2021 at 1:21 PM

## 2021-10-25 NOTE — TELEPHONE ENCOUNTER
FUTURE VISIT INFORMATION      SURGERY INFORMATION:    Date: 2021    [place] [facility] [place] [place]    Surgeon:  Digna Jaimes MD    Anesthesia Type:  General    Procedure: stealth guided, bilateral ethmoidectomy, bilateral frontal sinusotomy    Consult: 2021    RECORDS REQUESTED FROM:       Primary Care Provider: Lainey Michel MD ([address] )    Most recent EKG+ Tracin2021    Most recent ECHO: 10/27/2008 ([facility])     Most recent PFT's: 2020     Most recent Sleep Study: 2019 ([facility])

## 2021-11-03 DIAGNOSIS — J32.8 OTHER CHRONIC SINUSITIS: Primary | ICD-10-CM

## 2021-11-03 NOTE — RESULT ENCOUNTER NOTE
[employer] message sent:     I have been waiting for the results of total complement and tetanus antibody level to provide a complete interpretation of the labs, and then realized that they were never done.  -The patient can get it at any [place] facility.    CBC with differential with hemoglobin being slightly high.      Comprehensive metabolic panel including liver profile is within normal limits.  T and B cell panel is within normal limits.  Immunoglobulin G is within normal limits.  I know that in the past, the patient had hypogammaglobulinemia, but it does not seem to be the case as of now.  IgA is within normal limits.    Immunoglobulin M is elevated.  It could be for a variety of reasons.  Infection, liver problem, monoclonal gammopathy.  -I will repeat IgM and will order, SPEP, antimitochondrial antibodies and antinuclear antibodies.  Immunoglobulin E is slightly elevated.  Improved compared with previous levels, most likely due to omalizumab.    Positive Aspergillus antibody, but negative serum IgE for Aspergillus and IgE is not as high.  On the other hand, it could be masked by omalizumab binding IgE.  I will forward the results to Pulmonology. Is ABPA a potential diagnosis explaining uncontrolled asthma?  Pneumococcal antibody levels are 13/22 protective.  It is a suboptimal level for somebody who was immunized in the past.  The patient received pneumococcal antibody in 2019.  I recommend repeating Pneumovax and recheck the levels.

## 2021-11-10 ENCOUNTER — OFFICE VISIT (OUTPATIENT)
Dept: PULMONOLOGY | Facility: CLINIC | Age: 56
End: 2021-11-10
Payer: COMMERCIAL

## 2021-11-10 VITALS
WEIGHT: 120 LBS | BODY MASS INDEX: 19.97 KG/M2 | DIASTOLIC BLOOD PRESSURE: 81 MMHG | HEART RATE: 84 BPM | OXYGEN SATURATION: 100 % | RESPIRATION RATE: 17 BRPM | SYSTOLIC BLOOD PRESSURE: 124 MMHG | TEMPERATURE: 98.2 F

## 2021-11-10 DIAGNOSIS — Z71.6 ENCOUNTER FOR SMOKING CESSATION COUNSELING: ICD-10-CM

## 2021-11-10 DIAGNOSIS — J45.50 SEVERE PERSISTENT ASTHMA WITH ALLERGIC RHINITIS, UNSPECIFIED WHETHER COMPLICATED (H): Primary | ICD-10-CM

## 2021-11-10 DIAGNOSIS — J43.2 CENTRILOBULAR EMPHYSEMA (H): ICD-10-CM

## 2021-11-10 PROCEDURE — 99214 OFFICE O/P EST MOD 30 MIN: CPT | Performed by: INTERNAL MEDICINE

## 2021-11-10 RX ORDER — IPRATROPIUM BROMIDE AND ALBUTEROL SULFATE 2.5; .5 MG/3ML; MG/3ML
3 SOLUTION RESPIRATORY (INHALATION)
COMMUNITY
Start: 2021-09-04 | End: 2021-12-28

## 2021-11-10 RX ORDER — MONTELUKAST SODIUM 10 MG/1
10 TABLET ORAL
COMMUNITY
Start: 2019-07-25 | End: 2022-01-28

## 2021-11-10 RX ORDER — CETIRIZINE HYDROCHLORIDE 10 MG/1
10 TABLET ORAL
COMMUNITY
End: 2022-05-02

## 2021-11-10 ASSESSMENT — ASTHMA QUESTIONNAIRES
QUESTION_3 LAST FOUR WEEKS HOW OFTEN DID YOUR ASTHMA SYMPTOMS (WHEEZING, COUGHING, SHORTNESS OF BREATH, CHEST TIGHTNESS OR PAIN) WAKE YOU UP AT NIGHT OR EARLIER THAN USUAL IN THE MORNING: FOUR OR MORE NIGHTS A WEEK
QUESTION_2 LAST FOUR WEEKS HOW OFTEN HAVE YOU HAD SHORTNESS OF BREATH: MORE THAN ONCE A DAY
ACT_TOTALSCORE: 7
QUESTION_1 LAST FOUR WEEKS HOW MUCH OF THE TIME DID YOUR ASTHMA KEEP YOU FROM GETTING AS MUCH DONE AT WORK, SCHOOL OR AT HOME: MOST OF THE TIME
ACUTE_EXACERBATION_TODAY: NO
QUESTION_5 LAST FOUR WEEKS HOW WOULD YOU RATE YOUR ASTHMA CONTROL: POORLY CONTROLLED
QUESTION_4 LAST FOUR WEEKS HOW OFTEN HAVE YOU USED YOUR RESCUE INHALER OR NEBULIZER MEDICATION (SUCH AS ALBUTEROL): THREE OR MORE TIMES PER DAY
EMERGENCY_ROOM_LAST_YEAR_TOTAL: THREE OR MORE

## 2021-11-10 ASSESSMENT — PAIN SCALES - GENERAL: PAINLEVEL: NO PAIN (0)

## 2021-11-10 NOTE — PROGRESS NOTES
Gracy Bautista's goals for this visit include: Return  She requests these members of her care team be copied on today's visit information: PCP    PCP: No Ref-Primary, Physician    Referring Provider:  No referring provider defined for this encounter.    /81   Pulse 84   Temp 98.2  F (36.8  C)   Resp 17   Wt 54.4 kg (120 lb)   SpO2 100%   BMI 19.97 kg/m      Do you need any medication refills at today's visit? N      Donna Gaspar LPN  Pulmonary Medicine:  [facility]  Phone: [phone] Fax: [phone]

## 2021-11-10 NOTE — PROGRESS NOTES
Pulmonary Clinic Return Patient Visit  Reason for visit: Severe asthma/COPD  History of Present Illness  Ms. Gracy Bautista is a pleasant 55 yr old female with a complex history of asthma with severe nasal polyposis, chronic rhinosinusitis with multiple surgeries, IgG deficiency. She presents to clinic for follow up for lung nodules and asthma. I last saw her in clinic on 06/2021  To briefly review, very complicated history. She was being managed on a regimen of Nucala, Advair inhaler, Singulair and albuterol as needed in the past while being seen at [place]. She was started on Nucala based on peripheral eosinophilia, nasal polyps and ? sinus biopsy. However, she doesn't think that Nuclala therapy was helpful and they were stopped for more than a year prior to her initial visit with me. She did not notice any worsening in her asthma control on stopping Nucala  She had previously underwent an aspirin challenge. Multiple surgeries in the past for rhinosinusitis including a DRAF III surgery in April of 2019.  She had been without any maintenance inhaler for a while prior to seeing me.  I had started her on high-dose Symbicort, Spiriva in addition to Singulair.  Gracy still continues to have uncontrolled symptoms of increased shortness of breath, wheezing and chest tightness and has had innumerable visits to the ER for steroid burst and albuterol nebulizers.  She usually quickly gets better and has not required an admission to the hospital.  Her asthma control has been somewhat difficult due to ongoing smoking.  In fact, most recent CT chest are suggestive of emphysematous changes and I had emphasized to her to try to completely quit smoking to help with her overall control of her symptoms.  I also thought that she had a combination of asthma/COPD based on the emphysematous findings on radiologic images.  Of note, she has had previous extensive surgeries.  Environmental IgE allergen test were normal.  Allergen A fumigatus  was borderline which I determined was of no clinical utility as she did not have any additional findings that was highly suggestive of allergic bronchopulmonary aspergillosis.  Her IgE was not overly elevated- 255.  Blood peripheral eosinophils were normal.  Despite that she had unremarkable environmental IgE allergy test and unimpressive IgE elevations, I elected to trial on Xolair infusions to see if she will derive any benefit and so far she has received 4 infusions with no significant benefit.  She was also seen by allergy who switched her inhaler regimen to Trelegy for improved compliance.  Today, she continues to have increased sinus pressure, nasal congestion, rhinitis as well as increased SOB, cough and wheezing.  She was recently seen in the ER at [place] on 11/5/2021 and was treated with again another round of prednisone shots with taper in addition to DuoNeb treatment.  She was discharged from the ER on the same day.  She appears to be feeling slightly better today and is less reliant on her albuterol.  She is scheduled for stealth guided, bilateral ethmoidectomy, bilateral frontal sinusotomy by Digna Jaimes MD on 12/17/2021.  Still continues to struggle with increased sinus discharges that are usually thick and purulent.  Still continues to smoke.  She tries to smoke a few cigarettes here and there.  She is also using nicotine gum to help with complete abstinence which has not been achieved yet.    Review of Systems:  10 of 14 systems reviewed and are negative unless otherwise stated in HPI.    Past Medical History:   Diagnosis Date     Allergic rhinitis      Arthritis      Chronic sinusitis      Depressive disorder      Hoarseness      Reduced vision      Uncomplicated asthma        Past Surgical History:   Procedure Laterality Date     NASAL/SINUS POLYPECTOMY         Family History   Problem Relation Age of Onset     Cancer Brother      Bleeding Disorder Brother      Diabetes Father      Heart  Disease Father      Cerebrovascular Disease Father      Bleeding Disorder Mother        Social History     Socioeconomic History     Marital status:      Spouse name: Not on file     Number of children: Not on file     Years of education: Not on file     Highest education level: Not on file   Occupational History     Occupation: HOMEMAKER   Tobacco Use     Smoking status: Light Tobacco Smoker     Packs/day: 0.00     Years: 15.00     Pack years: 0.00     Types: Cigarettes     Start date: 9/17/2021     Smokeless tobacco: Never Used     Tobacco comment: 6 cigarettes per week   Substance and Sexual Activity     Alcohol use: Not Currently     Drug use: Never     Sexual activity: Yes     Partners: Male     Birth control/protection: None   Other Topics Concern     Not on file   Social History Narrative    October 7, 2021    ENVIRONMENTAL HISTORY: The family [place]. The home is heated with a electric furnace. They do have central air conditioning. The patient's bedroom is furnished with carpeting in bedroom, allergen mattress cover, and allergen pillowcase cover.  Pets inside the house include 1 dog. There is no history of cockroach or mice infestation. There is/are 0 smokers in the house.  The house does have a damp basement.      Social Determinants of Health     Financial Resource Strain: Not on file   Food Insecurity: Not on file   Transportation Needs: Not on file   Physical Activity: Not on file   Stress: Not on file   Social Connections: Not on file   Intimate Partner Violence: Not on file   Housing Stability: Not on file         Allergies   Allergen Reactions     Bee Venom Angioedema and Swelling     Other reaction(s): Angioedema       Cefdinir Diarrhea and GI Disturbance              Hydrocodone-Acetaminophen      Other reaction(s): Vomiting     Levofloxacin Muscle Pain (Myalgia)     Prednisone Anxiety and Other (See Comments)     Hyperactivity and moodiness. Doesn't like how  it makes her feel.           Current Outpatient Medications:      acetaminophen (TYLENOL) 500 MG tablet, Take 500-1,000 mg by mouth every 6 hours as needed for mild pain, Disp: , Rfl:      ADDERALL XR 20 MG 24 hr capsule, PRN, Disp: , Rfl:      albuterol (PROAIR HFA/PROVENTIL HFA/VENTOLIN HFA) 108 (90 Base) MCG/ACT inhaler, Inhale 1-2 puffs into the lungs every 6 hours as needed for shortness of breath / dyspnea or wheezing, Disp: 8 g, Rfl: 11     cetirizine (ZYRTEC) 10 MG tablet, Take 10 mg by mouth, Disp: , Rfl:      CYMBALTA 60 MG capsule, , Disp: , Rfl:      fluticasone (FLONASE) 50 MCG/ACT nasal spray, Spray 1 spray into both nostrils daily, Disp: 1 g, Rfl: 11     ipratropium - albuterol 0.5 mg/2.5 mg/3 mL (DUONEB) 0.5-2.5 (3) MG/3ML neb solution, Inhale 3 mLs into the lungs, Disp: , Rfl:      LAMICTAL 200 MG tablet, TK 1 T PO D, Disp: , Rfl:      montelukast (SINGULAIR) 10 MG tablet, Take 10 mg by mouth, Disp: , Rfl:      nicotine (NICODERM CQ) 7 MG/24HR 24 hr patch, Place 1 patch onto the skin every 24 hours, Disp: 30 patch, Rfl: 3     nicotine polacrilex (NICORETTE) 4 MG gum, TAKE 1 BY MOUTH FOUR TIMES DAILY, Disp: , Rfl:      omalizumab (XOLAIR) 150 MG/ML SOSY injection, , Disp: , Rfl:      predniSONE (DELTASONE) 20 MG tablet, Take 3 tabs by mouth daily x 3 days, then 2 tabs daily x 3 days, then 1 tab daily x 3 days, then 1/2 tab daily x 3 days., Disp: 20 tablet, Rfl: 0     spacer (OPTICHAMBER ELBA) holding chamber, Use with albuterol (PROAIR HFA/PROVENTIL HFA/VENTOLIN HFA) 108 (90 Base) MCG/ACT inhaler, Disp: 1 each, Rfl: 0     Fluticasone-Umeclidin-Vilanterol (TRELEGY ELLIPTA) 200-62.5-25 MCG/INH oral inhaler, Inhale 1 puff into the lungs daily (Patient not taking: Reported on 11/10/2021), Disp: 60 each, Rfl: 3     ibuprofen (ADVIL/MOTRIN) 200 MG tablet, Take 200 mg by mouth every 4 hours as needed for mild pain (Patient not taking: Reported on 11/10/2021), Disp: , Rfl:       Physical Exam:  /81    Pulse 84   Temp 98.2  F (36.8  C)   Resp 17   Wt 54.4 kg (120 lb)   SpO2 100%   BMI 19.97 kg/m    GENERAL: Well developed, well nourished, alert, and in no apparent distress.  HEENT: Normocephalic, atraumatic. PERRL, EOMI. Oral mucosa is moist. No perioral cyanosis.  NECK: supple, no masses, no thyromegaly.  RESP: Mild expiratory wheezing, no cyanosis or clubbing.  CV: Normal S1, S2, regular rhythm, normal rate. No murmur.  No LE edema.   ABDOMEN:  Soft, non-tender, non-distended.   SKIN: warm and dry. No rash.  NEURO: AAOx3.  Normal gait.  Fluent speech.  PSYCH: mentation appears normal.   Assessment and Plan:   Severe persistent Asthma with poor control/COPD   Very difficult to control asthma able with triple inhaler regimens and now on high-dose trelegy in addition to albuterol as needed, Singulair and Xolair infusions.  Compliance with her maintenance inhalers has been very difficult to ascertain, for example, she reported that she was not using her Trelegy inhalers during the check-in today but she confirmed during my encounter with her that she is in fact using her inhalers.  I am quite skeptical that she is going to derive significant help from using Xolair infusions as her environment allergy IgE test were negative.  Although her Aspergillus antibody is positive, and a prior specific Aspergillus IgE test was borderline, it is unlikely that she has allergic bronchopulmonary aspergillosis given her unremarkable IgE level while she was not on any biologics and no supporting radiologic findings.  Her peripheral blood eosinophils have not been elevated on prior to use of Xolair.  More importantly, on close review of her CT chest, there are in fact some emphysematous changes although very mild.  Gracy continues to smoke and I suspect that she underestimate how much she smokes.  I explained to her that although her PFTs do not show overt obstruction, it is possible that she may have underlying COPD combined  with asthma.  I emphasized that is important that she stays completely abstinent from smoking as her symptoms may be related to underlying COPD flares.  I did order a nicotine patch for her with refills in clinic today and prescriptions were sent and she knows to continue to use her nicotine gums as needed.  I will not escalate her care until it has been established as she is completely smoking free and she reliably is adherent with her Trelegy inhaler.    Chronic Rhinosinusitis  She is scheduled for sinus surgery on 12/17/2021.  Sinus biopsy may be beneficial for further evaluation.  In the meantime, I advised that she continue to use her Flonase    Questions and concerns were answered to the patient's satisfaction.  she was provided with my contact information should new questions or concerns arise in the interim.  I spent a total of 30 minutes face to face with Gracy Bautista during today's office visit. Over 50% of this time was spent counseling the patient and/or coordinating care regarding their pulmonary disease.      She should return to clinic after 3 months with PFTs  Up to date on vaccinations  Marco Bean MD  Pulmonary, Critical Care and Sleep Medicine  [facility]  Pager: [phone]        The above note was dictated using voice recognition software and may include typographical errors. Please contact the author for any clarifications.

## 2021-11-11 ASSESSMENT — ASTHMA QUESTIONNAIRES: ACT_TOTALSCORE: 7

## 2021-11-11 NOTE — RESULT ENCOUNTER NOTE
Doubt that ABPA can explain her symptoms. Even before she was started on Xolair, her IgE was not very high and her serum IgE was barely positive. There are no findings on her ct chest that will support ABPA. Moreover, you may expect some improvement in ABPA while on Xolair. She does have mild emphysema on her imaging and she still continues to smoke. She may have underlying COPD that is not overtly evident on pfts. I am having her get repeat pfts in 3 months and I strongly encouraged her to stop smoking.

## 2021-11-26 DIAGNOSIS — Z11.59 ENCOUNTER FOR SCREENING FOR OTHER VIRAL DISEASES: ICD-10-CM

## 2021-12-13 ENCOUNTER — TELEPHONE (OUTPATIENT)
Dept: OTOLARYNGOLOGY | Facility: CLINIC | Age: 56
End: 2021-12-13

## 2021-12-13 ENCOUNTER — PRE VISIT (OUTPATIENT)
Dept: SURGERY | Facility: CLINIC | Age: 56
End: 2021-12-13

## 2021-12-13 NOTE — TELEPHONE ENCOUNTER
"Patient called and stated she was exposed to covid-19 via close contact. Patient states that she cancelled her pre-op and does not have a covid-19 test scheduled. Pt wants to wait to see if she develops symptoms. She states the only symptom that she has is she is \"tired\" but unsure if this is related. Patient states that she was does not want to reschedule her surgery at this time. She will call back to reschedule at a different time. Surgery cancelled.       Karen Davidson on 12/13/2021 at 11:36 AM    "

## 2021-12-14 DIAGNOSIS — J45.41 MODERATE PERSISTENT ASTHMA WITH EXACERBATION: ICD-10-CM

## 2021-12-14 RX ORDER — FLUTICASONE PROPIONATE 50 MCG
1 SPRAY, SUSPENSION (ML) NASAL DAILY
Qty: 1 G | Refills: 11 | Status: SHIPPED | OUTPATIENT
Start: 2021-12-14 | End: 2022-04-18

## 2021-12-14 NOTE — TELEPHONE ENCOUNTER
Writer received a refill request from: Nathanael in [place].     Medication:     fluticasone (FLONASE) 50 MCG/ACT nasal spray 1 g 11 6/9/2021  No   Sig - Route: Spray 1 spray into both nostrils daily - Both Nostrils       Date last written: 06/09/2021  Dispensed amount: 1 g  Refills: 11        Pt's last office visit: 11/10/2021  Next scheduled office visit: 02/09/2022      Donna Gaspar LPN  Pulmonary Medicine:  [facility]  Phone: [phone] Fax: [phone]

## 2021-12-28 DIAGNOSIS — Z11.59 ENCOUNTER FOR SCREENING FOR OTHER VIRAL DISEASES: ICD-10-CM

## 2021-12-28 DIAGNOSIS — J43.2 CENTRILOBULAR EMPHYSEMA (H): Primary | ICD-10-CM

## 2021-12-28 DIAGNOSIS — J45.41 MODERATE PERSISTENT ASTHMA WITH EXACERBATION: ICD-10-CM

## 2021-12-28 RX ORDER — PREDNISONE 20 MG/1
TABLET ORAL
Qty: 20 TABLET | Refills: 0 | Status: SHIPPED | OUTPATIENT
Start: 2021-12-28 | End: 2022-04-18

## 2021-12-28 RX ORDER — IPRATROPIUM BROMIDE AND ALBUTEROL SULFATE 2.5; .5 MG/3ML; MG/3ML
3 SOLUTION RESPIRATORY (INHALATION) EVERY 4 HOURS PRN
Qty: 90 ML | Refills: 3 | Status: SHIPPED | OUTPATIENT
Start: 2021-12-28 | End: 2022-02-16

## 2021-12-28 NOTE — TELEPHONE ENCOUNTER
VM left requesting CB to pulmonary clinic to discuss current sxs.    Donna Gaspar LPN  Pulmonary Medicine:  [facility]  Phone: [phone] Fax: [phone]

## 2021-12-28 NOTE — TELEPHONE ENCOUNTER
Patient contacted pulmonary clinic c/o SOB w/activity as well as wheezing. Patient states that she was to have sinus surgery, however, she cancelled the procedure because she was exposed to someone who tested positive for COVID-19 the 1st week in December. Patient herself did not test for COVID at that time.    Patient confirms that she is using Trelegy once daily, however, states that the Trelegy seems to cause an increase in mucous production and cough. Patient requesting an alternative inhaler. Confirmed that patient is using Albuterol rescue inhaler.     Encouraged patient to utilize the ipratropium - albuterol 0.5 mg/2.5 mg/3 mL (DUONEB) 0.5-2.5 (3) MG/3ML neb solution that is listed on MAR as an active medicine. Patient states that her nebulizer machine is broken so she is unable to use any nebs. Patient is requesting a Prednisone taper.     Encouraged patient to be seen in UC/ED if breathing worsens. Patient agreeable. Message sent to Dr. Bean for review.        Donna Gaspar LPN  Pulmonary Medicine:  [facility]  Phone: [phone] Fax: [phone]

## 2021-12-28 NOTE — TELEPHONE ENCOUNTER
Patient called back to reschedule. Pt states that she cancelled due to high risk exposure. Patient states she never had any symptoms. She is vaccinated. She had covid-19 test done.     Informed patient that she will need pre-op covid-19 test within 4 days. If covid-19 test comes back positive, she will need to be rescheduled. Pt states she understands.     Elected for first available 1/21/2022 at the [facility]. Wants in clinic PAC appt with covid-19 test same day. Scheduled for patient. Knows instructions will be given at that time.

## 2021-12-28 NOTE — TELEPHONE ENCOUNTER
[facility] Call Center    Phone Message    May a detailed message be left on voicemail: no     Reason for Call: Symptoms or Concerns     If patient has red-flag symptoms, warm transfer to triage line    Current symptom or concern: wheezing increasing for the past 4 days.     Ask for a call back today from nurse.  Possible prednisone???       [id number] [place] [place] - [address] AT [facility] [address]    Action Taken: Message routed to:  Adult Clinics: Pulmonology p [id number]    Travel Screening: Not Applicable

## 2021-12-29 NOTE — TELEPHONE ENCOUNTER
FUTURE VISIT INFORMATION        SURGERY INFORMATION:    Date: 22    Location: [facility] [place]    Surgeon:  Digna Jaimes MD    Anesthesia Type:  General    Procedure: stealth guided, bilateral ethmoidectomy, bilateral frontal sinusotomy    Consult: 2021     RECORDS REQUESTED FROM:         Primary Care Provider: Lainey Michel MD ([address] )     Most recent EKG+ Tracin2021     Most recent ECHO: 10/27/2008 ([facility])      Most recent PFT's: 2020     Most recent Sleep Study: 2019 ([place])

## 2021-12-29 NOTE — TELEPHONE ENCOUNTER
Verbal orders received from Dr. Bean for Prednisone taper, Rx refill of Duoneb, and a new neb machine.  Informed patient of medication refills. Orders were sent to the [facility] in [place] per patient request.     Also informed patient that alternative to Trelegy will be discussed upon Dr. Bean's return to clinic Monday 01/03/2022. Patient agreeable and appreciative of the call back.    Donna Gaspar LPN  Pulmonary Medicine:  [facility]  Phone: [phone] Fax: [phone]

## 2022-01-12 DIAGNOSIS — J45.50 SEVERE PERSISTENT ASTHMA WITH ALLERGIC RHINITIS, UNSPECIFIED WHETHER COMPLICATED (H): Primary | ICD-10-CM

## 2022-01-12 RX ORDER — AZITHROMYCIN 250 MG/1
TABLET, FILM COATED ORAL
Qty: 36 TABLET | Refills: 3 | Status: SHIPPED | OUTPATIENT
Start: 2022-01-12 | End: 2022-07-21

## 2022-01-12 NOTE — TELEPHONE ENCOUNTER
Medication:   azithromycin (ZITHROMAX) 250 MG tablet 12 tablet 3 9/29/2021 10/29/2021 --   Sig - Route: Take 1 tablet (250 mg) by mouth Every Mon, Wed, Fri Morning - Oral     Date last written: 09/29/2021  Dispensed amount: 12  Refills: 3      Pt's last office visit: 11/10/2021  Next scheduled office visit: 02/09/2022      Donna Gaspar LPN  Pulmonary Medicine:  [facility]  Phone: [phone] Fax: [phone]

## 2022-01-17 ENCOUNTER — ANESTHESIA EVENT (OUTPATIENT)
Dept: SURGERY | Facility: AMBULATORY SURGERY CENTER | Age: 57
End: 2022-01-17
Payer: COMMERCIAL

## 2022-01-17 ENCOUNTER — OFFICE VISIT (OUTPATIENT)
Dept: SURGERY | Facility: CLINIC | Age: 57
End: 2022-01-17
Payer: COMMERCIAL

## 2022-01-17 ENCOUNTER — LAB (OUTPATIENT)
Dept: LAB | Facility: CLINIC | Age: 57
End: 2022-01-17
Payer: COMMERCIAL

## 2022-01-17 ENCOUNTER — PRE VISIT (OUTPATIENT)
Dept: SURGERY | Facility: CLINIC | Age: 57
End: 2022-01-17

## 2022-01-17 VITALS
RESPIRATION RATE: 20 BRPM | HEIGHT: 65 IN | HEART RATE: 81 BPM | BODY MASS INDEX: 20.21 KG/M2 | OXYGEN SATURATION: 99 % | SYSTOLIC BLOOD PRESSURE: 123 MMHG | WEIGHT: 121.3 LBS | DIASTOLIC BLOOD PRESSURE: 86 MMHG | TEMPERATURE: 98.6 F

## 2022-01-17 DIAGNOSIS — Z01.818 PREOP EXAMINATION: Primary | ICD-10-CM

## 2022-01-17 DIAGNOSIS — J32.4 CHRONIC PANSINUSITIS: ICD-10-CM

## 2022-01-17 DIAGNOSIS — Z11.59 ENCOUNTER FOR SCREENING FOR OTHER VIRAL DISEASES: ICD-10-CM

## 2022-01-17 LAB — SARS-COV-2 RNA RESP QL NAA+PROBE: NEGATIVE

## 2022-01-17 PROCEDURE — 99203 OFFICE O/P NEW LOW 30 MIN: CPT | Performed by: PHYSICIAN ASSISTANT

## 2022-01-17 PROCEDURE — U0003 INFECTIOUS AGENT DETECTION BY NUCLEIC ACID (DNA OR RNA); SEVERE ACUTE RESPIRATORY SYNDROME CORONAVIRUS 2 (SARS-COV-2) (CORONAVIRUS DISEASE [COVID-19]), AMPLIFIED PROBE TECHNIQUE, MAKING USE OF HIGH THROUGHPUT TECHNOLOGIES AS DESCRIBED BY CMS-2020-01-R: HCPCS | Mod: 90 | Performed by: PATHOLOGY

## 2022-01-17 PROCEDURE — U0005 INFEC AGEN DETEC AMPLI PROBE: HCPCS | Mod: 90 | Performed by: PATHOLOGY

## 2022-01-17 PROCEDURE — 99000 SPECIMEN HANDLING OFFICE-LAB: CPT | Performed by: PATHOLOGY

## 2022-01-17 RX ORDER — IPRATROPIUM BROMIDE AND ALBUTEROL SULFATE 2.5; .5 MG/3ML; MG/3ML
3 SOLUTION RESPIRATORY (INHALATION) ONCE
Status: CANCELLED | OUTPATIENT
Start: 2022-01-17 | End: 2022-01-17

## 2022-01-17 RX ORDER — SODIUM CHLORIDE, SODIUM LACTATE, POTASSIUM CHLORIDE, CALCIUM CHLORIDE 600; 310; 30; 20 MG/100ML; MG/100ML; MG/100ML; MG/100ML
INJECTION, SOLUTION INTRAVENOUS CONTINUOUS
Status: CANCELLED | OUTPATIENT
Start: 2022-01-17

## 2022-01-17 RX ORDER — LIDOCAINE 40 MG/G
CREAM TOPICAL
Status: CANCELLED | OUTPATIENT
Start: 2022-01-17

## 2022-01-17 ASSESSMENT — COPD QUESTIONNAIRES: COPD: 1

## 2022-01-17 ASSESSMENT — MIFFLIN-ST. JEOR: SCORE: 1136.09

## 2022-01-17 ASSESSMENT — PAIN SCALES - GENERAL: PAINLEVEL: NO PAIN (0)

## 2022-01-17 ASSESSMENT — ENCOUNTER SYMPTOMS: SEIZURES: 0

## 2022-01-17 ASSESSMENT — LIFESTYLE VARIABLES: TOBACCO_USE: 1

## 2022-01-17 NOTE — PATIENT INSTRUCTIONS
Preparing for Your Surgery      Name:  Gracy Bautista   MRN:  [id number]   :  1965   Today's Date:  2022         Arriving for surgery:  Surgery date:  22  Arrival time:  5:45AM    Restrictions due to COVID 19:    Effective 22 [facility] are implementing a No Visitor Policy     parking is available for anyone with mobility limitations or disabilities. (Monday- Friday 7 am- 5 pm)    Please come to:    [facility]  [address] [place] [id number]    Please check in on the 5th floor at the Ambulatory Surgery Center       What can I eat or drink?    -  You may eat and drink normally until 8 hours before surgery. (Until: 22, 11:15PM)  -  You may have clear liquids up to 4 hours before surgery. (Until 22, 3:15AM)  Examples of clear liquids:  Water  Clear broth  Juices (apple, white grape, white cranberry  and cider) without pulp  Noncarbonated, powder based beverages  (lemonade and Luis-Aid)  Sodas (Sprite, 7-Up, ginger ale and seltzer)  Coffee or tea (without milk or cream)  Gatorade    --No alcohol for at least 24 hours before surgery    Which medicines can I take?    Hold Aspirin for 7 days before surgery.   Hold Multivitamins for 7 days before surgery.  Hold Supplements for 7 days before surgery.  Hold Ibuprofen (Advil, Motrin) for 1 day before surgery--unless otherwise directed by surgeon.  Hold Naproxen (Aleve) for 4 days before surgery.    -  DO NOT take the following medications the day of surgery:    Adderall   Nicorette gum    -  PLEASE TAKE the following medications the day of surgery     Acetaminophen(Tylenol) as needed      Albuterol Inhaler as needed and bring the day of surgery    Cymbalta    Flonase nasal spray    Duoneb Neb    Lamictal          How do I prepare myself?  - Please take 2 showers before surgery using Scrubcare or Hibiclens soap.    Use this soap only from the neck to your toes.      Leave the soap on your skin for one minute--then rinse thoroughly.      You may use your own shampoo and conditioner; no other hair products.   - Please remove all jewelry and body piercings.  - No lotions, deodorants or fragrance.  - No makeup or fingernail polish.   - Bring your ID and insurance card.    -If you have a Deep Brain Stimulator, a Spinal Cord Stimulator or any implanted Neuro Device you must bring the remote to the Surgery Center         - All patients are required to have a Covid-19 test within 4 days of surgery/procedure.      -Patients will be contacted by the [facility] scheduling team within 1 week of surgery to make an appointment.      - Patients may call the Scheduling team at [phone] if they have not been scheduled within 4 days of  surgery.      ALL PATIENTS ARE REQUIRED TO HAVE A RESPONSIBLE ADULT TO DRIVE AND BE IN ATTENDANCE WITH THEM FOR 24 HOURS FOLLOWING SURGERY       Questions or Concerns:    -For questions regarding the day of surgery please contact the Ambulatory Surgery Center at [phone].    -If you have health changes between today and your surgery please contact your surgeon.     For questions after surgery please call your surgeons office.

## 2022-01-17 NOTE — H&P
Pre-Operative H & P     CC:  Preoperative exam to assess for increased cardiopulmonary risk while undergoing surgery and anesthesia.    Date of Encounter: 1/17/2022  Primary Care Physician:  No Ref-Primary, Physician     Reason for visit:   Encounter Diagnoses   Name Primary?     Preop examination Yes     Chronic pansinusitis        HPI  Gracy Bautista is a 56 year old female who presents for pre-operative H & P in preparation for stealth guided, bilateral ethmoidectomy, bilateral frontal sinusotomy with Dr. Jaimes on 1/21/22 at [facility] [place].     Gracy is a 56-year-old female with past medical history significant for asthma, COPD, allergic rhinitis, chronic sinusitis, smoking, ADD, depression.  Patient is a longstanding history of allergic rhinitis and chronic sinusitis.  She has had multiple previous ENT surgeries at [facility] in the past.  She was evaluated by Dr. Jaimes and found to have acute sinusitis with periorbital pain, head pressure, and thick secretions.  This did not respond to antibiotics and prednisone.  The above procedure is now planned.    History is obtained from the patient and chart review      Hx of abnormal bleeding or anti-platelet use: denies    Menstrual history: No LMP recorded. Patient has had a hysterectomy.:     Prior to Admission Medications  Current Outpatient Medications   Medication Sig Dispense Refill     acetaminophen (TYLENOL) 500 MG tablet Take 500-1,000 mg by mouth every 6 hours as needed for mild pain       ADDERALL XR 20 MG 24 hr capsule PRN       albuterol (PROAIR HFA/PROVENTIL HFA/VENTOLIN HFA) 108 (90 Base) MCG/ACT inhaler Inhale 1-2 puffs into the lungs every 6 hours as needed for shortness of breath / dyspnea or wheezing 8 g 11     azithromycin (ZITHROMAX) 250 MG tablet TAKE 1 TABLET BY MOUTH EVERY MONDAY, WEDNESDAY, AND FRIDAY MORNING 36 tablet 3     CYMBALTA 60 MG capsule Take 60 mg by mouth every morning        fluticasone (FLONASE) 50 MCG/ACT  nasal spray Spray 1 spray into both nostrils daily (Patient taking differently: Spray 1 spray into both nostrils every morning ) 1 g 11     ipratropium - albuterol 0.5 mg/2.5 mg/3 mL (DUONEB) 0.5-2.5 (3) MG/3ML neb solution Inhale 1 vial (3 mLs) into the lungs every 4 hours as needed for shortness of breath / dyspnea or wheezing 90 mL 3     LAMICTAL 200 MG tablet Take 200 mg by mouth every morning        montelukast (SINGULAIR) 10 MG tablet Take 10 mg by mouth        nicotine polacrilex (NICORETTE) 4 MG gum TAKE 1 BY MOUTH FOUR TIMES DAILY       cetirizine (ZYRTEC) 10 MG tablet Take 10 mg by mouth (Patient not taking: Reported on 2022)       Fluticasone-Umeclidin-Vilanterol (TRELEGY ELLIPTA) 200-62.5-25 MCG/INH oral inhaler Inhale 1 puff into the lungs daily (Patient not taking: Reported on 11/10/2021) 60 each 3     ibuprofen (ADVIL/MOTRIN) 200 MG tablet Take 200 mg by mouth every 4 hours as needed for mild pain (Patient not taking: Reported on 11/10/2021)       nicotine (NICODERM CQ) 7 MG/24HR 24 hr patch Place 1 patch onto the skin every 24 hours (Patient not taking: Reported on 2022) 30 patch 3     omalizumab (XOLAIR) 150 MG/ML SOSY injection  (Patient not taking: Reported on 2022)       predniSONE (DELTASONE) 20 MG tablet Take 3 tabs by mouth daily x 3 days, then 2 tabs daily x 3 days, then 1 tab daily x 3 days, then 1/2 tab daily x 3 days. (Patient not taking: Reported on 2022) 20 tablet 0     spacer (OPTICHAMBER ELBA) holding chamber Use with albuterol (PROAIR HFA/PROVENTIL HFA/VENTOLIN HFA) 108 (90 Base) MCG/ACT inhaler 1 each 0       Family History  Family History   Problem Relation Age of Onset     Cancer Brother      Bleeding Disorder Brother      Diabetes Father      Heart Disease Father      Cerebrovascular Disease Father      Bleeding Disorder Mother            Anesthesia Pre-Procedure Evaluation    Patient: Gracy Bautista   MRN: [id number] : 1965        Preoperative  Diagnosis: * No surgery found *    Procedure :   PAC EVALUATION       Past Medical History:   Diagnosis Date     Allergic rhinitis      Arthritis      Chronic sinusitis      Depressive disorder      Hoarseness      Reduced vision      Uncomplicated asthma       Past Surgical History:   Procedure Laterality Date     NASAL/SINUS POLYPECTOMY        Allergies   Allergen Reactions     Bee Venom Angioedema, Swelling and Hives     Other reaction(s): Angioedema       Cefdinir Diarrhea and GI Disturbance              Hydrocodone-Acetaminophen      Other reaction(s): Vomiting     Levofloxacin Muscle Pain (Myalgia)     Prednisone Anxiety and Other (See Comments)     Hyperactivity and moodiness. Doesn't like how it makes her feel.        Social History     Tobacco Use     Smoking status: Light Tobacco Smoker     Packs/day: 0.00     Years: 15.00     Pack years: 0.00     Types: Cigarettes     Start date: 9/17/2021     Smokeless tobacco: Never Used     Tobacco comment: 6 cigarettes per week   Substance Use Topics     Alcohol use: Not Currently      Wt Readings from Last 1 Encounters:   11/10/21 54.4 kg (120 lb)            Anesthesia Evaluation   Pt has had prior anesthetic.     No history of anesthetic complications     The complete review of systems is negative other than noted in the HPI or here.       ROS/MED HX  ENT/Pulmonary: Comment: Pt reports negative sleep study ~3 years ago at [facility]    (+) allergic rhinitis, tobacco use (1 cigarette 2 x per week), Current use, Severe Persistent, asthma (worse in cold weather, night time cough) Treatment: Inhaler prn and Nebulizer prn,  COPD (emphasematous changes on recent CT scan),     Neurologic: Comment: ADD, Adderall prn   (-) no seizures and migraines   Cardiovascular:     (+) -----Previous cardiac testing   Echo: Date: Results:    Stress Test: Date: Results:    ECG Reviewed: Date: 1/2/22 Results:  Normal sinus rhythm   Normal ECG   Cath: Date: Results:      METS/Exercise Tolerance:  ">4 METS    Hematologic:  - neg hematologic  ROS  (-) history of blood clots and history of blood transfusion   Musculoskeletal: Comment: fibromyalgia      GI/Hepatic:  - neg GI/hepatic ROS     Renal/Genitourinary:  - neg Renal ROS     Endo:  - neg endo ROS     Psychiatric/Substance Use:     (+) psychiatric history depression     Infectious Disease:  - neg infectious disease ROS  (-) Recent Fever   Malignancy:  - neg malignancy ROS     Other:               OUTSIDE LABS:  CBC:   Lab Results   Component Value Date    WBC 9.6 10/07/2021    WBC 13.4 (H) 06/27/2021    HGB 16.3 (H) 10/07/2021    HGB 15.7 06/27/2021    HCT 45.9 10/07/2021    HCT 44.9 06/27/2021     10/07/2021     06/27/2021     BMP:   Lab Results   Component Value Date     10/07/2021     06/27/2021    POTASSIUM 3.7 10/07/2021    POTASSIUM 4.7 06/27/2021    CHLORIDE 103 10/07/2021    CHLORIDE 106 06/27/2021    CO2 26 10/07/2021    CO2 26 06/27/2021    BUN 21 10/07/2021    BUN 15 06/27/2021    CR 0.75 10/07/2021    CR 0.89 06/27/2021    GLC 87 10/07/2021     (H) 06/27/2021     COAGS:   Lab Results   Component Value Date    PTT 30 10/21/2020    INR 0.91 10/21/2020    FIBR 328 10/21/2020     POC: No results found for: BGM, HCG, HCGS  HEPATIC:   Lab Results   Component Value Date    ALBUMIN 3.9 10/07/2021    PROTTOTAL 7.5 10/07/2021    ALT 26 10/07/2021    AST 20 10/07/2021    ALKPHOS 80 10/07/2021    BILITOTAL 0.3 10/07/2021     OTHER:   Lab Results   Component Value Date    LACT 1.4 06/27/2021    YOAN 9.0 10/07/2021    TSH 0.65 10/21/2020    CRP <2.9 09/21/2020           /86 (BP Location: Right arm, Patient Position: Sitting, Cuff Size: Adult Regular)   Pulse 81   Temp 98.6  F (37  C) (Oral)   Resp 20   Ht 1.651 m (5' 5\")   Wt 55 kg (121 lb 4.8 oz)   SpO2 99%   Breastfeeding No   BMI 20.19 kg/m           Physical Exam  Constitutional: Awake, alert, cooperative, no apparent distress, and appears stated age.  Eyes: " Pupils equal, round and reactive to light, extra ocular muscles intact, sclera clear, conjunctiva normal.  HENT: Normocephalic, oral pharynx with moist mucus membranes, good dentition. No goiter appreciated.   Respiratory: Clear to auscultation bilaterally, no crackles or wheezing.  Cardiovascular: Regular rate and rhythm, normal S1 and S2, and no murmur noted.  Carotids +2, no bruits. No edema. Palpable pulses to radial and PT arteries.   GI: Normal bowel sounds, soft abdomen  Lymph/Hematologic: No cervical lymphadenopathy and no supraclavicular lymphadenopathy.  Genitourinary:  deferred  Skin: Warm and dry.  No rashes at anticipated surgical site.   Musculoskeletal: Full ROM of neck. There is no redness, warmth, or swelling of the joints. Gross motor strength is normal.    Neurologic: Awake, alert, oriented to name, place and time. Cranial nerves II-XII are grossly intact. Gait is normal.   Neuropsychiatric: Calm, cooperative. Normal affect.     PRIOR LABS/DIAGNOSTIC STUDIES:   All labs and imaging personally reviewed   1/2/22  WHITE BLOOD COUNT         4.5 - 11.0 thou/cu mm 10.2    RED BLOOD COUNT           4.00 - 5.20 mil/cu mm 4.45    HEMOGLOBIN                12.0 - 16.0 g/dL 15.1    HEMATOCRIT                33.0 - 51.0 % 43.4    MCV                       80 - 100 fL 98    MCH                       26.0 - 34.0 pg 33.9    MCHC                      32.0 - 36.0 g/dL 34.8    RDW                       11.5 - 15.5 % 12.3    PLATELET COUNT            140 - 440 thou/cu mm 413    MPV                       6.5 - 11.0 fL 8.9      SODIUM 135 - 145 mmol/L 139    POTASSIUM 3.5 - 5.0 mmol/L 4.3    CHLORIDE 98 - 110 mmol/L 104    CO2,TOTAL 21 - 31 mmol/L 23    ANION GAP 5 - 18 12    GLUCOSE 65 - 100 mg/dL 70    CALCIUM 8.5 - 10.5 mg/dL 9.9    BUN 8 - 25 mg/dL 22    CREATININE 0.57 - 1.11 mg/dL 0.83    BUN/CREAT RATIO           10 - 20 27 High     GFR if African American >60 ml/min/1.73m2 >60    GFR if not   ">60 ml/min/1.73m2 >60        EKG/ stress test - if available please see in ROS above       PFT Latest Ref Rng & Units 11/11/2020   FVC L 4.22   FEV1 L 3.21   FVC% % 122   FEV1% % 118     CXR 1/2/22:  FINDINGS:   Lungs are hyperinflated. No airspace consolidation. No pleural effusion or pneumothorax. Pulmonary vasculature and cardiomediastinal silhouette are normal. Degenerative changes of the spine.        Outside records reviewed from: care everywhere        ASSESSMENT and PLAN  Gracy Bautista is a 56 year old female scheduled for stealth guided, bilateral ethmoidectomy, bilateral frontal sinusotomy on 1/21/22 by Dr. Jaimes in treatment of chronic pansinusitis.  PAC referral for risk assessment and optimization for anesthesia with comorbid conditions of asthma, chronic sinusitis, smoking, ADD, depression:    Pre-operative considerations:  1.  Cardiac:  Functional status- METS >4.  Low risk surgery with 0.4% (RCRI #) risk of major adverse cardiac event.   --denies chest pain, chest pressure, SOB,   --no cardiac history.  Recent EKG shows SR as above    2.  Pulm:  Airway feasible.  SEBASTIAN risk: Low  --current smoker 2 cigarettes per week.  Working on quitting and using Nicorette gum  --severe, persistent asthma with poor control/COPD: on high-dose trelegy in addition to albuterol as needed, Singulair.  Trelegy increasing her phlegm and causing voice changes; she is working with pulmonology on new inhalers and checking with her insurance for coverage  --CT chest shows mild emphysematous changes.  Followed by pulmonology, Dr. Bean, last visit 11/10/21 with 3 month f/u planned with PFTs.  --pt denies recent/current wheeze or SOB.    --PFTs 11/11/20: FEV1: 3.21L, 118% predicted.  \"Normal Pulmonary Function.  The study should be interpreted with caution given the reported difficulties with testing.\"   --several recent ED visits for nebulizer treatments and steroids, no admissions.  Last ED visit in 1/2/22  --recommend DuoNeb " in preop  --continue Zithromax M/W/F  --last steroid burst/taper: 2-3 weeks ago    3.  GI:  Risk of PONV score = 2.  If > 2, anti-emetic intervention recommended.    4.  ENT:  --severe nasal polyposis, continue Flonase.  Has had several prior ENT surgeries  --allergic rhinitis    5.  Neuro/psych  --h/o depression, continue lamictal  --hold Adderall DOS    VTE risk: 0.25%    Patient is optimized and is acceptable candidate for the proposed procedure.  No further diagnostic evaluation is needed.                  On the day of service:     Prep time: 10 minutes  Visit time: 13 minutes  Documentation time: 15 minutes  ------------------------------------------  Total time: 38 minutes      Rosalinda Moraes PA-C  Preoperative Assessment Center  [facility]  Phone: [phone]  Fax: [phone]

## 2022-01-17 NOTE — ANESTHESIA PREPROCEDURE EVALUATION
Anesthesia Pre-Procedure Evaluation    Patient: Gracy Bautista   MRN: [id number] : 1965        Preoperative Diagnosis: * No surgery found *    Procedure :   PAC EVALUATION       Past Medical History:   Diagnosis Date     Allergic rhinitis      Arthritis      Chronic sinusitis      Depressive disorder      Hoarseness      Reduced vision      Uncomplicated asthma       Past Surgical History:   Procedure Laterality Date     NASAL/SINUS POLYPECTOMY        Allergies   Allergen Reactions     Bee Venom Angioedema, Swelling and Hives     Other reaction(s): Angioedema       Cefdinir Diarrhea and GI Disturbance              Hydrocodone-Acetaminophen      Other reaction(s): Vomiting     Levofloxacin Muscle Pain (Myalgia)     Prednisone Anxiety and Other (See Comments)     Hyperactivity and moodiness. Doesn't like how it makes her feel.        Social History     Tobacco Use     Smoking status: Light Tobacco Smoker     Packs/day: 0.00     Years: 15.00     Pack years: 0.00     Types: Cigarettes     Start date: 2021     Smokeless tobacco: Never Used     Tobacco comment: 6 cigarettes per week   Substance Use Topics     Alcohol use: Not Currently      Wt Readings from Last 1 Encounters:   11/10/21 54.4 kg (120 lb)        Anesthesia Evaluation   Pt has had prior anesthetic.     No history of anesthetic complications       ROS/MED HX  ENT/Pulmonary: Comment: Pt reports negative sleep study ~3 years ago at [place] [facility]    (+) allergic rhinitis, tobacco use (1 cigarette 2 x per week), Current use, Severe Persistent, asthma (worse in cold weather, night time cough) Treatment: Inhaler prn and Nebulizer prn,  COPD (emphasematous changes on recent CT scan),     Neurologic: Comment: ADD, Adderall prn   (-) no seizures and migraines   Cardiovascular:     (+) -----Previous cardiac testing   Echo: Date: Results:    Stress Test: Date: Results:    ECG Reviewed: Date: 22 Results:  Normal sinus rhythm   Normal ECG   Cath: Date:  Results:      METS/Exercise Tolerance: >4 METS    Hematologic:  - neg hematologic  ROS  (-) history of blood clots and history of blood transfusion   Musculoskeletal: Comment: fibromyalgia      GI/Hepatic:  - neg GI/hepatic ROS     Renal/Genitourinary:  - neg Renal ROS     Endo:  - neg endo ROS     Psychiatric/Substance Use:     (+) psychiatric history depression     Infectious Disease:  - neg infectious disease ROS  (-) Recent Fever   Malignancy:  - neg malignancy ROS     Other:            Physical Exam    Airway  airway exam normal           Respiratory Devices and Support         Dental  no notable dental history         Cardiovascular   cardiovascular exam normal          Pulmonary   pulmonary exam normal                OUTSIDE LABS:  CBC:   Lab Results   Component Value Date    WBC 9.6 10/07/2021    WBC 13.4 (H) 06/27/2021    HGB 16.3 (H) 10/07/2021    HGB 15.7 06/27/2021    HCT 45.9 10/07/2021    HCT 44.9 06/27/2021     10/07/2021     06/27/2021     BMP:   Lab Results   Component Value Date     10/07/2021     06/27/2021    POTASSIUM 3.7 10/07/2021    POTASSIUM 4.7 06/27/2021    CHLORIDE 103 10/07/2021    CHLORIDE 106 06/27/2021    CO2 26 10/07/2021    CO2 26 06/27/2021    BUN 21 10/07/2021    BUN 15 06/27/2021    CR 0.75 10/07/2021    CR 0.89 06/27/2021    GLC 87 10/07/2021     (H) 06/27/2021     COAGS:   Lab Results   Component Value Date    PTT 30 10/21/2020    INR 0.91 10/21/2020    FIBR 328 10/21/2020     POC: No results found for: BGM, HCG, HCGS  HEPATIC:   Lab Results   Component Value Date    ALBUMIN 3.9 10/07/2021    PROTTOTAL 7.5 10/07/2021    ALT 26 10/07/2021    AST 20 10/07/2021    ALKPHOS 80 10/07/2021    BILITOTAL 0.3 10/07/2021     OTHER:   Lab Results   Component Value Date    LACT 1.4 06/27/2021    YOAN 9.0 10/07/2021    TSH 0.65 10/21/2020    CRP <2.9 09/21/2020       Anesthesia Plan    ASA Status:  2   NPO Status:  NPO Appropriate    Anesthesia Type:  General.     - Airway: ETT   Induction: Intravenous.   Maintenance: Balanced.        Consents    Anesthesia Plan(s) and associated risks, benefits, and realistic alternatives discussed. Questions answered and patient/representative(s) expressed understanding.    - Discussed:     - Discussed with:  Patient      - Extended Intubation/Ventilatory Support Discussed: No.      - Patient is DNR/DNI Status: No    Use of blood products discussed: No .     Postoperative Care    Pain management: Multi-modal analgesia, IV analgesics.   PONV prophylaxis: Dexamethasone or Solumedrol, Ondansetron (or other 5HT-3)     Comments:    Other Comments: Duo-neb in pre-op prior to OR          PAC Discussion and Assessment    ASA Classification: 3  Case is suitable for: ASC  Anesthetic techniques and relevant risks discussed: GA  Invasive monitoring and risk discussed: No    Possibility and Risk of blood transfusion discussed: No            PAC Resident/NP Anesthesia Assessment: Gracy Bautista is a 56 year old female scheduled for stealth guided, bilateral ethmoidectomy, bilateral frontal sinusotomy on 1/21/22 by Dr. Jaimes in treatment of chronic pansinusitis.  PAC referral for risk assessment and optimization for anesthesia with comorbid conditions of asthma, COPD, allergic rhinitis, smoking, ADD, depression:    Pre-operative considerations:  1.  Cardiac:  Functional status- METS >4.  Low risk surgery with 0.4% (RCRI #) risk of major adverse cardiac event.   --denies chest pain, chest pressure, SOB,   --no cardiac history.  Recent EKG shows SR as above    2.  Pulm:  Airway feasible.  SEBASTIAN risk: Low  --current smoker 2 cigarettes per week.  Working on quitting and using Nicorette gum  --severe, persistent asthma with poor control/COPD: on high-dose trelegy in addition to albuterol as needed, Singulair.  Trelegy increasing her phlegm and causing voice changes; she is working with pulmonology on new inhalers and checking with her insurance for  "coverage  --CT chest shows mild emphysematous changes.  Followed by pulmonology, Dr. Bean, last visit 11/10/21 with 3 month f/u planned with PFTs.  --pt denies recent/current wheeze or SOB.    --PFTs 11/11/20: FEV1: 3.21L, 118% predicted.  \"Normal Pulmonary Function.  The study should be interpreted with caution given the reported difficulties with testing.\"   --several recent ED visits for nebulizer treatments and steroids, no admissions.  Last ED visit in 1/2/22  --recommend DuoNeb in preop  --continue Zithromax M/W/F  --last steroid burst/taper: 2-3 weeks ago    3.  GI:  Risk of PONV score = 2.  If > 2, anti-emetic intervention recommended.    4.  ENT:  --severe nasal polyposis, continue Flonase.  Has had several prior ENT surgeries  --allergic rhinitis    5.  Neuro/psych  --h/o depression, continue lamictal  --hold Adderall DOS, she takes this prn    VTE risk: 0.25%    Patient is optimized and is acceptable candidate for the proposed procedure.  No further diagnostic evaluation is needed.       **For further details of assessment, testing, and physical exam please see H and P completed on same date.    Final plan per attending anesthesiologist JUDIT Moraes PA-C, [facility]    Reviewed and Signed by PAC Mid-Level Provider/Resident  Mid-Level Provider/Resident: Rosalinda Moraes  Date: 1/17/22                                 JOSE Smalls MD  Staff Anesthesiologist  [phone]    "

## 2022-01-17 NOTE — H&P (VIEW-ONLY)
Pre-Operative H & P     CC:  Preoperative exam to assess for increased cardiopulmonary risk while undergoing surgery and anesthesia.    Date of Encounter: 1/17/2022  Primary Care Physician:  No Ref-Primary, Physician     Reason for visit:   Encounter Diagnoses   Name Primary?     Preop examination Yes     Chronic pansinusitis        HPI  Gracy Bautista is a 56 year old female who presents for pre-operative H & P in preparation for stealth guided, bilateral ethmoidectomy, bilateral frontal sinusotomy with Dr. Jaimes on 1/21/22 at [facility].     Gracy is a 56-year-old female with past medical history significant for asthma, COPD, allergic rhinitis, chronic sinusitis, smoking, ADD, depression.  Patient is a longstanding history of allergic rhinitis and chronic sinusitis.  She has had multiple previous ENT surgeries at [facility] in the past.  She was evaluated by Dr. Jaimes and found to have acute sinusitis with periorbital pain, head pressure, and thick secretions.  This did not respond to antibiotics and prednisone.  The above procedure is now planned.    History is obtained from the patient and chart review      Hx of abnormal bleeding or anti-platelet use: denies    Menstrual history: No LMP recorded. Patient has had a hysterectomy.:     Prior to Admission Medications  Current Outpatient Medications   Medication Sig Dispense Refill     acetaminophen (TYLENOL) 500 MG tablet Take 500-1,000 mg by mouth every 6 hours as needed for mild pain       ADDERALL XR 20 MG 24 hr capsule PRN       albuterol (PROAIR HFA/PROVENTIL HFA/VENTOLIN HFA) 108 (90 Base) MCG/ACT inhaler Inhale 1-2 puffs into the lungs every 6 hours as needed for shortness of breath / dyspnea or wheezing 8 g 11     azithromycin (ZITHROMAX) 250 MG tablet TAKE 1 TABLET BY MOUTH EVERY MONDAY, WEDNESDAY, AND FRIDAY MORNING 36 tablet 3     CYMBALTA 60 MG capsule Take 60 mg by mouth every morning        fluticasone (FLONASE) 50 MCG/ACT  nasal spray Spray 1 spray into both nostrils daily (Patient taking differently: Spray 1 spray into both nostrils every morning ) 1 g 11     ipratropium - albuterol 0.5 mg/2.5 mg/3 mL (DUONEB) 0.5-2.5 (3) MG/3ML neb solution Inhale 1 vial (3 mLs) into the lungs every 4 hours as needed for shortness of breath / dyspnea or wheezing 90 mL 3     LAMICTAL 200 MG tablet Take 200 mg by mouth every morning        montelukast (SINGULAIR) 10 MG tablet Take 10 mg by mouth        nicotine polacrilex (NICORETTE) 4 MG gum TAKE 1 BY MOUTH FOUR TIMES DAILY       cetirizine (ZYRTEC) 10 MG tablet Take 10 mg by mouth (Patient not taking: Reported on 2022)       Fluticasone-Umeclidin-Vilanterol (TRELEGY ELLIPTA) 200-62.5-25 MCG/INH oral inhaler Inhale 1 puff into the lungs daily (Patient not taking: Reported on 11/10/2021) 60 each 3     ibuprofen (ADVIL/MOTRIN) 200 MG tablet Take 200 mg by mouth every 4 hours as needed for mild pain (Patient not taking: Reported on 11/10/2021)       nicotine (NICODERM CQ) 7 MG/24HR 24 hr patch Place 1 patch onto the skin every 24 hours (Patient not taking: Reported on 2022) 30 patch 3     omalizumab (XOLAIR) 150 MG/ML SOSY injection  (Patient not taking: Reported on 2022)       predniSONE (DELTASONE) 20 MG tablet Take 3 tabs by mouth daily x 3 days, then 2 tabs daily x 3 days, then 1 tab daily x 3 days, then 1/2 tab daily x 3 days. (Patient not taking: Reported on 2022) 20 tablet 0     spacer (OPTICHAMBER ELBA) holding chamber Use with albuterol (PROAIR HFA/PROVENTIL HFA/VENTOLIN HFA) 108 (90 Base) MCG/ACT inhaler 1 each 0       Family History  Family History   Problem Relation Age of Onset     Cancer Brother      Bleeding Disorder Brother      Diabetes Father      Heart Disease Father      Cerebrovascular Disease Father      Bleeding Disorder Mother            Anesthesia Pre-Procedure Evaluation    Patient: Gracy Bautista   MRN: [id number] : 1965        Preoperative  Diagnosis: * No surgery found *    Procedure :   PAC EVALUATION       Past Medical History:   Diagnosis Date     Allergic rhinitis      Arthritis      Chronic sinusitis      Depressive disorder      Hoarseness      Reduced vision      Uncomplicated asthma       Past Surgical History:   Procedure Laterality Date     NASAL/SINUS POLYPECTOMY        Allergies   Allergen Reactions     Bee Venom Angioedema, Swelling and Hives     Other reaction(s): Angioedema       Cefdinir Diarrhea and GI Disturbance              Hydrocodone-Acetaminophen      Other reaction(s): Vomiting     Levofloxacin Muscle Pain (Myalgia)     Prednisone Anxiety and Other (See Comments)     Hyperactivity and moodiness. Doesn't like how it makes her feel.        Social History     Tobacco Use     Smoking status: Light Tobacco Smoker     Packs/day: 0.00     Years: 15.00     Pack years: 0.00     Types: Cigarettes     Start date: 9/17/2021     Smokeless tobacco: Never Used     Tobacco comment: 6 cigarettes per week   Substance Use Topics     Alcohol use: Not Currently      Wt Readings from Last 1 Encounters:   11/10/21 54.4 kg (120 lb)            Anesthesia Evaluation   Pt has had prior anesthetic.     No history of anesthetic complications     The complete review of systems is negative other than noted in the HPI or here.       ROS/MED HX  ENT/Pulmonary: Comment: Pt reports negative sleep study ~3 years ago at [facility]    (+) allergic rhinitis, tobacco use (1 cigarette 2 x per week), Current use, Severe Persistent, asthma (worse in cold weather, night time cough) Treatment: Inhaler prn and Nebulizer prn,  COPD (emphasematous changes on recent CT scan),     Neurologic: Comment: ADD, Adderall prn   (-) no seizures and migraines   Cardiovascular:     (+) -----Previous cardiac testing   Echo: Date: Results:    Stress Test: Date: Results:    ECG Reviewed: Date: 1/2/22 Results:  Normal sinus rhythm   Normal ECG   Cath: Date: Results:      METS/Exercise Tolerance:  ">4 METS    Hematologic:  - neg hematologic  ROS  (-) history of blood clots and history of blood transfusion   Musculoskeletal: Comment: fibromyalgia      GI/Hepatic:  - neg GI/hepatic ROS     Renal/Genitourinary:  - neg Renal ROS     Endo:  - neg endo ROS     Psychiatric/Substance Use:     (+) psychiatric history depression     Infectious Disease:  - neg infectious disease ROS  (-) Recent Fever   Malignancy:  - neg malignancy ROS     Other:               OUTSIDE LABS:  CBC:   Lab Results   Component Value Date    WBC 9.6 10/07/2021    WBC 13.4 (H) 06/27/2021    HGB 16.3 (H) 10/07/2021    HGB 15.7 06/27/2021    HCT 45.9 10/07/2021    HCT 44.9 06/27/2021     10/07/2021     06/27/2021     BMP:   Lab Results   Component Value Date     10/07/2021     06/27/2021    POTASSIUM 3.7 10/07/2021    POTASSIUM 4.7 06/27/2021    CHLORIDE 103 10/07/2021    CHLORIDE 106 06/27/2021    CO2 26 10/07/2021    CO2 26 06/27/2021    BUN 21 10/07/2021    BUN 15 06/27/2021    CR 0.75 10/07/2021    CR 0.89 06/27/2021    GLC 87 10/07/2021     (H) 06/27/2021     COAGS:   Lab Results   Component Value Date    PTT 30 10/21/2020    INR 0.91 10/21/2020    FIBR 328 10/21/2020     POC: No results found for: BGM, HCG, HCGS  HEPATIC:   Lab Results   Component Value Date    ALBUMIN 3.9 10/07/2021    PROTTOTAL 7.5 10/07/2021    ALT 26 10/07/2021    AST 20 10/07/2021    ALKPHOS 80 10/07/2021    BILITOTAL 0.3 10/07/2021     OTHER:   Lab Results   Component Value Date    LACT 1.4 06/27/2021    YOAN 9.0 10/07/2021    TSH 0.65 10/21/2020    CRP <2.9 09/21/2020           /86 (BP Location: Right arm, Patient Position: Sitting, Cuff Size: Adult Regular)   Pulse 81   Temp 98.6  F (37  C) (Oral)   Resp 20   Ht 1.651 m (5' 5\")   Wt 55 kg (121 lb 4.8 oz)   SpO2 99%   Breastfeeding No   BMI 20.19 kg/m           Physical Exam  Constitutional: Awake, alert, cooperative, no apparent distress, and appears stated age.  Eyes: " Pupils equal, round and reactive to light, extra ocular muscles intact, sclera clear, conjunctiva normal.  HENT: Normocephalic, oral pharynx with moist mucus membranes, good dentition. No goiter appreciated.   Respiratory: Clear to auscultation bilaterally, no crackles or wheezing.  Cardiovascular: Regular rate and rhythm, normal S1 and S2, and no murmur noted.  Carotids +2, no bruits. No edema. Palpable pulses to radial and PT arteries.   GI: Normal bowel sounds, soft abdomen  Lymph/Hematologic: No cervical lymphadenopathy and no supraclavicular lymphadenopathy.  Genitourinary:  deferred  Skin: Warm and dry.  No rashes at anticipated surgical site.   Musculoskeletal: Full ROM of neck. There is no redness, warmth, or swelling of the joints. Gross motor strength is normal.    Neurologic: Awake, alert, oriented to name, place and time. Cranial nerves II-XII are grossly intact. Gait is normal.   Neuropsychiatric: Calm, cooperative. Normal affect.     PRIOR LABS/DIAGNOSTIC STUDIES:   All labs and imaging personally reviewed   1/2/22  WHITE BLOOD COUNT         4.5 - 11.0 thou/cu mm 10.2    RED BLOOD COUNT           4.00 - 5.20 mil/cu mm 4.45    HEMOGLOBIN                12.0 - 16.0 g/dL 15.1    HEMATOCRIT                33.0 - 51.0 % 43.4    MCV                       80 - 100 fL 98    MCH                       26.0 - 34.0 pg 33.9    MCHC                      32.0 - 36.0 g/dL 34.8    RDW                       11.5 - 15.5 % 12.3    PLATELET COUNT            140 - 440 thou/cu mm 413    MPV                       6.5 - 11.0 fL 8.9      SODIUM 135 - 145 mmol/L 139    POTASSIUM 3.5 - 5.0 mmol/L 4.3    CHLORIDE 98 - 110 mmol/L 104    CO2,TOTAL 21 - 31 mmol/L 23    ANION GAP 5 - 18 12    GLUCOSE 65 - 100 mg/dL 70    CALCIUM 8.5 - 10.5 mg/dL 9.9    BUN 8 - 25 mg/dL 22    CREATININE 0.57 - 1.11 mg/dL 0.83    BUN/CREAT RATIO           10 - 20 27 High     GFR if African American >60 ml/min/1.73m2 >60    GFR if not   ">60 ml/min/1.73m2 >60        EKG/ stress test - if available please see in ROS above       PFT Latest Ref Rng & Units 11/11/2020   FVC L 4.22   FEV1 L 3.21   FVC% % 122   FEV1% % 118     CXR 1/2/22:  FINDINGS:   Lungs are hyperinflated. No airspace consolidation. No pleural effusion or pneumothorax. Pulmonary vasculature and cardiomediastinal silhouette are normal. Degenerative changes of the spine.        Outside records reviewed from: care everywhere        ASSESSMENT and PLAN  Gracy Bautista is a 56 year old female scheduled for stealth guided, bilateral ethmoidectomy, bilateral frontal sinusotomy on 1/21/22 by Dr. Jaimes in treatment of chronic pansinusitis.  PAC referral for risk assessment and optimization for anesthesia with comorbid conditions of asthma, chronic sinusitis, smoking, ADD, depression:    Pre-operative considerations:  1.  Cardiac:  Functional status- METS >4.  Low risk surgery with 0.4% (RCRI #) risk of major adverse cardiac event.   --denies chest pain, chest pressure, SOB,   --no cardiac history.  Recent EKG shows SR as above    2.  Pulm:  Airway feasible.  SEBASTIAN risk: Low  --current smoker 2 cigarettes per week.  Working on quitting and using Nicorette gum  --severe, persistent asthma with poor control/COPD: on high-dose trelegy in addition to albuterol as needed, Singulair.  Trelegy increasing her phlegm and causing voice changes; she is working with pulmonology on new inhalers and checking with her insurance for coverage  --CT chest shows mild emphysematous changes.  Followed by pulmonology, Dr. Bean, last visit 11/10/21 with 3 month f/u planned with PFTs.  --pt denies recent/current wheeze or SOB.    --PFTs 11/11/20: FEV1: 3.21L, 118% predicted.  \"Normal Pulmonary Function.  The study should be interpreted with caution given the reported difficulties with testing.\"   --several recent ED visits for nebulizer treatments and steroids, no admissions.  Last ED visit in 1/2/22  --recommend DuoNeb " in preop  --continue Zithromax M/W/F  --last steroid burst/taper: 2-3 weeks ago    3.  GI:  Risk of PONV score = 2.  If > 2, anti-emetic intervention recommended.    4.  ENT:  --severe nasal polyposis, continue Flonase.  Has had several prior ENT surgeries  --allergic rhinitis    5.  Neuro/psych  --h/o depression, continue lamictal  --hold Adderall DOS    VTE risk: 0.25%    Patient is optimized and is acceptable candidate for the proposed procedure.  No further diagnostic evaluation is needed.                  On the day of service:     Prep time: 10 minutes  Visit time: 13 minutes  Documentation time: 15 minutes  ------------------------------------------  Total time: 38 minutes      Rosalinda Moraes PA-C  Preoperative Assessment Center  [facility]  Phone: [phone]  Fax: [phone]

## 2022-01-21 ENCOUNTER — ANESTHESIA (OUTPATIENT)
Dept: SURGERY | Facility: AMBULATORY SURGERY CENTER | Age: 57
End: 2022-01-21
Payer: COMMERCIAL

## 2022-01-21 ENCOUNTER — HOSPITAL ENCOUNTER (OUTPATIENT)
Facility: AMBULATORY SURGERY CENTER | Age: 57
End: 2022-01-21
Attending: OTOLARYNGOLOGY
Payer: COMMERCIAL

## 2022-01-21 VITALS
TEMPERATURE: 97.2 F | SYSTOLIC BLOOD PRESSURE: 117 MMHG | DIASTOLIC BLOOD PRESSURE: 77 MMHG | WEIGHT: 121.3 LBS | HEIGHT: 65 IN | RESPIRATION RATE: 15 BRPM | OXYGEN SATURATION: 95 % | BODY MASS INDEX: 20.21 KG/M2 | HEART RATE: 75 BPM

## 2022-01-21 DIAGNOSIS — J32.4 CHRONIC PANSINUSITIS: ICD-10-CM

## 2022-01-21 LAB
GRAM STAIN RESULT: ABNORMAL
GRAM STAIN RESULT: ABNORMAL

## 2022-01-21 PROCEDURE — 31256 EXPLORATION MAXILLARY SINUS: CPT | Mod: 50 | Performed by: OTOLARYNGOLOGY

## 2022-01-21 PROCEDURE — 61782 SCAN PROC CRANIAL EXTRA: CPT | Performed by: OTOLARYNGOLOGY

## 2022-01-21 PROCEDURE — 31253 NSL/SINS NDSC TOTAL: CPT | Mod: LT

## 2022-01-21 PROCEDURE — 88304 TISSUE EXAM BY PATHOLOGIST: CPT | Mod: TC | Performed by: OTOLARYNGOLOGY

## 2022-01-21 PROCEDURE — 88304 TISSUE EXAM BY PATHOLOGIST: CPT | Mod: 26 | Performed by: PATHOLOGY

## 2022-01-21 PROCEDURE — 31256 EXPLORATION MAXILLARY SINUS: CPT | Mod: RT

## 2022-01-21 PROCEDURE — 31253 NSL/SINS NDSC TOTAL: CPT | Mod: 50 | Performed by: OTOLARYNGOLOGY

## 2022-01-21 RX ORDER — GLYCOPYRROLATE 0.2 MG/ML
INJECTION, SOLUTION INTRAMUSCULAR; INTRAVENOUS PRN
Status: DISCONTINUED | OUTPATIENT
Start: 2022-01-21 | End: 2022-01-21

## 2022-01-21 RX ORDER — METHYLPREDNISOLONE 4 MG
TABLET, DOSE PACK ORAL
Qty: 21 TABLET | Refills: 0 | Status: SHIPPED | OUTPATIENT
Start: 2022-01-21 | End: 2022-04-18

## 2022-01-21 RX ORDER — IPRATROPIUM BROMIDE AND ALBUTEROL SULFATE 2.5; .5 MG/3ML; MG/3ML
3 SOLUTION RESPIRATORY (INHALATION) ONCE
Status: COMPLETED | OUTPATIENT
Start: 2022-01-21 | End: 2022-01-21

## 2022-01-21 RX ORDER — LIDOCAINE HYDROCHLORIDE 20 MG/ML
INJECTION, SOLUTION INFILTRATION; PERINEURAL PRN
Status: DISCONTINUED | OUTPATIENT
Start: 2022-01-21 | End: 2022-01-21

## 2022-01-21 RX ORDER — PROPOFOL 10 MG/ML
INJECTION, EMULSION INTRAVENOUS PRN
Status: DISCONTINUED | OUTPATIENT
Start: 2022-01-21 | End: 2022-01-21

## 2022-01-21 RX ORDER — FENTANYL CITRATE 50 UG/ML
INJECTION, SOLUTION INTRAMUSCULAR; INTRAVENOUS PRN
Status: DISCONTINUED | OUTPATIENT
Start: 2022-01-21 | End: 2022-01-21

## 2022-01-21 RX ORDER — SODIUM CHLORIDE, SODIUM LACTATE, POTASSIUM CHLORIDE, CALCIUM CHLORIDE 600; 310; 30; 20 MG/100ML; MG/100ML; MG/100ML; MG/100ML
INJECTION, SOLUTION INTRAVENOUS CONTINUOUS
Status: DISCONTINUED | OUTPATIENT
Start: 2022-01-21 | End: 2022-01-21 | Stop reason: HOSPADM

## 2022-01-21 RX ORDER — FENTANYL CITRATE 50 UG/ML
25 INJECTION, SOLUTION INTRAMUSCULAR; INTRAVENOUS EVERY 5 MIN PRN
Status: DISCONTINUED | OUTPATIENT
Start: 2022-01-21 | End: 2022-01-22 | Stop reason: HOSPADM

## 2022-01-21 RX ORDER — PROPOFOL 10 MG/ML
INJECTION, EMULSION INTRAVENOUS CONTINUOUS PRN
Status: DISCONTINUED | OUTPATIENT
Start: 2022-01-21 | End: 2022-01-21

## 2022-01-21 RX ORDER — FENTANYL CITRATE 50 UG/ML
25 INJECTION, SOLUTION INTRAMUSCULAR; INTRAVENOUS
Status: DISCONTINUED | OUTPATIENT
Start: 2022-01-21 | End: 2022-01-22 | Stop reason: HOSPADM

## 2022-01-21 RX ORDER — GABAPENTIN 300 MG/1
300 CAPSULE ORAL
Status: COMPLETED | OUTPATIENT
Start: 2022-01-21 | End: 2022-01-21

## 2022-01-21 RX ORDER — LIDOCAINE 40 MG/G
CREAM TOPICAL
Status: DISCONTINUED | OUTPATIENT
Start: 2022-01-21 | End: 2022-01-21 | Stop reason: HOSPADM

## 2022-01-21 RX ORDER — OXYMETAZOLINE HYDROCHLORIDE 0.05 G/100ML
SPRAY NASAL PRN
Status: DISCONTINUED | OUTPATIENT
Start: 2022-01-21 | End: 2022-01-21 | Stop reason: HOSPADM

## 2022-01-21 RX ORDER — HYDROCODONE BITARTRATE AND ACETAMINOPHEN 5; 325 MG/1; MG/1
1-2 TABLET ORAL ONCE
Status: COMPLETED | OUTPATIENT
Start: 2022-01-21 | End: 2022-01-21

## 2022-01-21 RX ORDER — METOPROLOL TARTRATE 1 MG/ML
1-2 INJECTION, SOLUTION INTRAVENOUS EVERY 5 MIN PRN
Status: DISCONTINUED | OUTPATIENT
Start: 2022-01-21 | End: 2022-01-22 | Stop reason: HOSPADM

## 2022-01-21 RX ORDER — OXYMETAZOLINE HCL 0.05 %
SPRAY, NON-AEROSOL (ML) NASAL
Qty: 20 ML | Refills: 0 | Status: SHIPPED | OUTPATIENT
Start: 2022-01-21 | End: 2022-04-18

## 2022-01-21 RX ORDER — ONDANSETRON 4 MG/1
4 TABLET, ORALLY DISINTEGRATING ORAL EVERY 30 MIN PRN
Status: DISCONTINUED | OUTPATIENT
Start: 2022-01-21 | End: 2022-01-22 | Stop reason: HOSPADM

## 2022-01-21 RX ORDER — ONDANSETRON 2 MG/ML
INJECTION INTRAMUSCULAR; INTRAVENOUS PRN
Status: DISCONTINUED | OUTPATIENT
Start: 2022-01-21 | End: 2022-01-21

## 2022-01-21 RX ORDER — SODIUM CHLORIDE, SODIUM LACTATE, POTASSIUM CHLORIDE, CALCIUM CHLORIDE 600; 310; 30; 20 MG/100ML; MG/100ML; MG/100ML; MG/100ML
INJECTION, SOLUTION INTRAVENOUS CONTINUOUS
Status: DISCONTINUED | OUTPATIENT
Start: 2022-01-21 | End: 2022-01-22 | Stop reason: HOSPADM

## 2022-01-21 RX ORDER — OXYCODONE HYDROCHLORIDE 5 MG/1
5 TABLET ORAL EVERY 4 HOURS PRN
Status: DISCONTINUED | OUTPATIENT
Start: 2022-01-21 | End: 2022-01-22 | Stop reason: HOSPADM

## 2022-01-21 RX ORDER — ACETAMINOPHEN 325 MG/1
975 TABLET ORAL ONCE
Status: COMPLETED | OUTPATIENT
Start: 2022-01-21 | End: 2022-01-21

## 2022-01-21 RX ORDER — HYDRALAZINE HYDROCHLORIDE 20 MG/ML
2.5-5 INJECTION INTRAMUSCULAR; INTRAVENOUS EVERY 10 MIN PRN
Status: DISCONTINUED | OUTPATIENT
Start: 2022-01-21 | End: 2022-01-22 | Stop reason: HOSPADM

## 2022-01-21 RX ORDER — HYDROCODONE BITARTRATE AND ACETAMINOPHEN 5; 325 MG/1; MG/1
1-2 TABLET ORAL EVERY 4 HOURS PRN
Qty: 6 TABLET | Refills: 0 | Status: SHIPPED | OUTPATIENT
Start: 2022-01-21 | End: 2022-04-18

## 2022-01-21 RX ORDER — HYDROMORPHONE HYDROCHLORIDE 1 MG/ML
0.2 INJECTION, SOLUTION INTRAMUSCULAR; INTRAVENOUS; SUBCUTANEOUS EVERY 5 MIN PRN
Status: DISCONTINUED | OUTPATIENT
Start: 2022-01-21 | End: 2022-01-22 | Stop reason: HOSPADM

## 2022-01-21 RX ORDER — ONDANSETRON 2 MG/ML
4 INJECTION INTRAMUSCULAR; INTRAVENOUS EVERY 30 MIN PRN
Status: DISCONTINUED | OUTPATIENT
Start: 2022-01-21 | End: 2022-01-22 | Stop reason: HOSPADM

## 2022-01-21 RX ORDER — MEPERIDINE HYDROCHLORIDE 25 MG/ML
12.5 INJECTION INTRAMUSCULAR; INTRAVENOUS; SUBCUTANEOUS
Status: DISCONTINUED | OUTPATIENT
Start: 2022-01-21 | End: 2022-01-22 | Stop reason: HOSPADM

## 2022-01-21 RX ORDER — DEXAMETHASONE SODIUM PHOSPHATE 10 MG/ML
10 INJECTION, SOLUTION INTRAMUSCULAR; INTRAVENOUS ONCE
Status: COMPLETED | OUTPATIENT
Start: 2022-01-21 | End: 2022-01-21

## 2022-01-21 RX ORDER — LIDOCAINE HYDROCHLORIDE AND EPINEPHRINE 10; 10 MG/ML; UG/ML
INJECTION, SOLUTION INFILTRATION; PERINEURAL PRN
Status: DISCONTINUED | OUTPATIENT
Start: 2022-01-21 | End: 2022-01-21 | Stop reason: HOSPADM

## 2022-01-21 RX ADMIN — HYDROMORPHONE HYDROCHLORIDE 0.2 MG: 1 INJECTION, SOLUTION INTRAMUSCULAR; INTRAVENOUS; SUBCUTANEOUS at 09:40

## 2022-01-21 RX ADMIN — FENTANYL CITRATE 100 MCG: 50 INJECTION, SOLUTION INTRAMUSCULAR; INTRAVENOUS at 07:25

## 2022-01-21 RX ADMIN — Medication 0.5 MG: at 07:50

## 2022-01-21 RX ADMIN — ACETAMINOPHEN 975 MG: 325 TABLET ORAL at 06:32

## 2022-01-21 RX ADMIN — IPRATROPIUM BROMIDE AND ALBUTEROL SULFATE 3 ML: 2.5; .5 SOLUTION RESPIRATORY (INHALATION) at 07:00

## 2022-01-21 RX ADMIN — PROPOFOL 50 MG: 10 INJECTION, EMULSION INTRAVENOUS at 08:55

## 2022-01-21 RX ADMIN — FENTANYL CITRATE 25 MCG: 50 INJECTION, SOLUTION INTRAMUSCULAR; INTRAVENOUS at 09:28

## 2022-01-21 RX ADMIN — SODIUM CHLORIDE, SODIUM LACTATE, POTASSIUM CHLORIDE, CALCIUM CHLORIDE: 600; 310; 30; 20 INJECTION, SOLUTION INTRAVENOUS at 06:32

## 2022-01-21 RX ADMIN — GLYCOPYRROLATE 0.2 MG: 0.2 INJECTION, SOLUTION INTRAMUSCULAR; INTRAVENOUS at 07:25

## 2022-01-21 RX ADMIN — HYDROCODONE BITARTRATE AND ACETAMINOPHEN 1 TABLET: 5; 325 TABLET ORAL at 09:48

## 2022-01-21 RX ADMIN — PROPOFOL 50 MG: 10 INJECTION, EMULSION INTRAVENOUS at 07:55

## 2022-01-21 RX ADMIN — ONDANSETRON 4 MG: 2 INJECTION INTRAMUSCULAR; INTRAVENOUS at 07:25

## 2022-01-21 RX ADMIN — Medication 50 MG: at 07:30

## 2022-01-21 RX ADMIN — GABAPENTIN 300 MG: 300 CAPSULE ORAL at 06:32

## 2022-01-21 RX ADMIN — FENTANYL CITRATE 25 MCG: 50 INJECTION, SOLUTION INTRAMUSCULAR; INTRAVENOUS at 09:34

## 2022-01-21 RX ADMIN — PROPOFOL 50 MG: 10 INJECTION, EMULSION INTRAVENOUS at 08:26

## 2022-01-21 RX ADMIN — PROPOFOL 50 MG: 10 INJECTION, EMULSION INTRAVENOUS at 08:07

## 2022-01-21 RX ADMIN — LIDOCAINE HYDROCHLORIDE 100 MG: 20 INJECTION, SOLUTION INFILTRATION; PERINEURAL at 07:29

## 2022-01-21 RX ADMIN — PROPOFOL 150 MG: 10 INJECTION, EMULSION INTRAVENOUS at 07:29

## 2022-01-21 RX ADMIN — PROPOFOL 200 MCG/KG/MIN: 10 INJECTION, EMULSION INTRAVENOUS at 07:30

## 2022-01-21 RX ADMIN — ONDANSETRON 4 MG: 2 INJECTION INTRAMUSCULAR; INTRAVENOUS at 09:28

## 2022-01-21 RX ADMIN — DEXAMETHASONE SODIUM PHOSPHATE 10 MG: 10 INJECTION, SOLUTION INTRAMUSCULAR; INTRAVENOUS at 07:32

## 2022-01-21 ASSESSMENT — MIFFLIN-ST. JEOR: SCORE: 1136.08

## 2022-01-21 NOTE — ANESTHESIA CARE TRANSFER NOTE
Patient: Gracy Bautista    Procedure: Procedure(s):  stealth guided, bilateral ethmoidectomy, bilateral frontal sinusotomy, bilateral maxillary antrostomies       Diagnosis: Chronic pansinusitis [J32.4]  Diagnosis Additional Information: No value filed.    Anesthesia Type:   General     Note:    Oropharynx: oropharynx clear of all foreign objects  Level of Consciousness: drowsy  Oxygen Supplementation: face mask  Level of Supplemental Oxygen (L/min / FiO2): 6  Independent Airway: airway patency satisfactory and stable  Dentition: dentition unchanged  Vital Signs Stable: post-procedure vital signs reviewed and stable  Report to RN Given: handoff report given  Patient transferred to: PACU    Handoff Report: Identifed the Patient, Identified the Reponsible Provider, Reviewed the pertinent medical history, Discussed the surgical course, Reviewed Intra-OP anesthesia mangement and issues during anesthesia, Set expectations for post-procedure period and Allowed opportunity for questions and acknowledgement of understanding      Vitals:  Vitals Value Taken Time   /86 01/21/22 0918   Temp     Pulse 76 01/21/22 0919   Resp 15 01/21/22 0919   SpO2 100 % 01/21/22 0919   Vitals shown include unvalidated device data.    Electronically Signed By: PAT Nichole CRNA  January 21, 2022  9:21 AM

## 2022-01-21 NOTE — BRIEF OP NOTE
[facility] Brief Operative Note    Pre-operative diagnosis: Chronic pansinusitis [J32.4]   Post-operative diagnosis same   Procedure: Procedure(s):  stealth guided, bilateral ethmoidectomy, bilateral frontal sinusotomy, bilateral revision maxillary antrostomy   Surgeon(s): Surgeon(s) and Role:     * Digna Jaimes MD - Primary   Estimated blood loss: 50cc    Specimens: ID Type Source Tests Collected by Time Destination   1 : siun contents Tissue Other SURGICAL PATHOLOGY EXAM Digna Jaimes MD 1/21/2022  7:49 AM    A : Left maxillary sinus Tissue Sinus, Maxillary, Left GRAM STAIN, FUNGAL OR YEAST CULTURE ROUTINE, AEROBIC BACTERIAL CULTURE ROUTINE Digna Jaimes MD 1/21/2022  7:41 AM       Findings: Diffuse mucosal disease, blockage of frontal outflow, impacted secretions left maxillary sinus, mucocele left ethmoid. Impacted secretions bilateral sphenoid.      Statement Selected

## 2022-01-21 NOTE — ANESTHESIA POSTPROCEDURE EVALUATION
Patient: Gracy Bautista    Procedure: Procedure(s):  stealth guided, bilateral ethmoidectomy, bilateral frontal sinusotomy, bilateral maxillary antrostomies       Diagnosis:Chronic pansinusitis [J32.4]  Diagnosis Additional Information: No value filed.    Anesthesia Type:  General    Note:  Disposition: Outpatient   Postop Pain Control: Uneventful            Sign Out: Well controlled pain   PONV: No   Neuro/Psych: Uneventful            Sign Out: Acceptable/Baseline neuro status   Airway/Respiratory: Uneventful            Sign Out: Acceptable/Baseline resp. status   CV/Hemodynamics: Uneventful            Sign Out: Acceptable CV status; No obvious hypovolemia; No obvious fluid overload   Other NRE: NONE   DID A NON-ROUTINE EVENT OCCUR? No           Last vitals:  Vitals Value Taken Time   /86 01/21/22 0948   Temp 36.3  C (97.3  F) 01/21/22 0948   Pulse 76 01/21/22 0951   Resp 24 01/21/22 0951   SpO2 98 % 01/21/22 0951   Vitals shown include unvalidated device data.    Electronically Signed By: Dameon Garcia MD  January 21, 2022  10:57 AM

## 2022-01-21 NOTE — OP NOTE
Procedure Date: 01/21/2022    PREOPERATIVE DIAGNOSIS:  Chronic pansinusitis.    POSTOPERATIVE DIAGNOSIS:  Chronic pansinusitis.    PROCEDURE PERFORMED:  Stealth-guided endoscopic bilateral ethmoidectomy, bilateral frontal sinusotomy, bilateral revision maxillary antrostomy.    SURGEON:  Digna Jaimes MD    ASSISTANT:     BLOOD LOSS:  50 mL.      ANESTHESIA:  General endotracheal.    FINDINGS:  Diffuse mucosal disease blockage of the frontal outflow tract, impacted secretions left maxillary sinus, left ethmoid mucocele, impacted secretions bilateral sphenoid sinus.    INDICATIONS FOR PROCEDURE:  The patient presents with a history of chronic sinus disease, asthma and immunologic dysfunction.  She has had prior endoscopic sinus surgery, but presents with symptoms and exam demonstrating gross inflammation and purulence.  Imaging shows several areas of mucosal thickening, a possible mucocele in the left ethmoid and she presents for the above-stated procedure.    DESCRIPTION OF PROCEDURE:  After risks and benefits were explained to the patient, the patient signed consent was obtained.  She was taken to the operating room and placed supine on the table.  General anesthesia was administered, she was endotracheally intubated.  She was sterilely draped.  The registration calibration verification was performed and image guidance was used throughout the procedure.  A 0- and 30-degree rigid endoscope were used for visualization.  The left anterior and posterior ethmoids are dissected first using a curette and shaver.  Partition emanating from the skull base was taken down to widen the ethmoid labyrinth.  The sphenoid sinus was opened and inspissated secretions are suctioned out of the sinus.  The maxillary antrum was then addressed.  There are thick inspissated secretions sitting on the floor of the maxillary sinus.  It appears that there is a significant mucociliary dysfunction.  At this point, an inferior meatal window is  created using a curette Merrick-Cut forceps and shaver and also the posterior aspect of the previously created antrostomy site is taken down inferiorly nearly to the floor of the nose to provide better access for gravitational drainage and irrigation.  Next, the frontal sinus recess is identified and noted to be completely blocked with polypoid tissue.  Straight and curved shaver were used to remove the polypoid tissue and open the outflow tract.  Additional opening at the level of the ostium was performed as there is a shelf of bone posteriorly emanating from the skull base.  At this point, there was also noted to be a significant degree of remaining partition between the frontal sinuses despite a previous modified endoscopic [place] procedure.  This was taken down using a sinus Merrick-Cut forceps.  The procedure was repeated on the right side with the exact same findings except on the left side, there was a mucocele noted in the left ethmoid as the ethmoids were dissected on the right.  There was no evidence of mucocele.  There was diffuse polypoid thickening, some bony partitions and on this side, an inferior meatal window was not performed, just a widening of the maxillary antrostomy, especially posteriorly and inferiorly.  The patient's sinuses were copiously irrigated.  The completed the procedure.  She was turned over to Anesthesia for wakeup.    Digna Jaimes MD        D: 2022   T: 2022   MT: JOSE    Name:     ADRYAN CHAKRABORTY  MRN:      [id number]        Account:        [id number]   :      1965           Procedure Date: 2022     Document: [id number]

## 2022-01-21 NOTE — DISCHARGE INSTRUCTIONS
[facility]  Home Care Following Anesthesia  For 24 hours after surgery:  1. Get plenty of rest.  A responsible adult must stay with you for at least 24 hours after you leave the surgery center.  2. Do not drive or use heavy equipment.  If you have weakness or tingling, don't drive or use heavy equipment until this feeling goes away.   3. Do not drink alcohol.   4. Avoid strenuous or risky activities.  Ask for help when climbing stairs.  5. You may feel lightheaded.  IF so, sit for a few minutes before standing.  Have someone help you get up.   6. If you have nausea (feel sick to your stomach): Drink only clear liquids such as apple juice, ginger ale, broth or 7-Up.  Rest may also help.  Be sure to drink enough fluids.  Move to a regular diet as you feel able.   7. You may have a slight fever.  Call the doctor if your fever is over 100 F (37.7 C) (taken under the tongue) or lasts longer than 24 hours.  8. You may have a dry mouth, a sore throat, muscle aches or trouble sleeping. These should go away after 24 hours.  9. Do not make important or legal decisions.   10. It is recommended to avoid smoking.               Tips for taking pain medications  To get the best pain relief possible, remember these points:    Take pain medications as directed, before pain becomes severe.    Pain medication can upset your stomach: taking it with food may help.    Constipation is a common side effect of pain medication. Drink plenty of  fluids.    Eat foods high in fiber. Take a stool softener if recommended by your doctor or pharmacist.    Do not drink alcohol, drive or operate machinery while taking pain medications.    Ask about other ways to control pain, such as with heat, ice or relaxation.    Tylenol/Acetaminophen Consumption  To help encourage the safe use of acetaminophen, the makers of TYLENOL  have lowered the maximum daily dose for single-ingredient Extra Strength TYLENOL   (acetaminophen) products sold in the [place] from 8 pills per day (4,000 mg) to 6 pills per day (3,000 mg). The dosing interval has also changed from 2 pills every 4-6 hours to 2 pills every 6 hours.    If you feel your pain relief is insufficient, you may take Tylenol/Acetaminophen in addition to your narcotic pain medication.     Be careful not to exceed 3,000 mg of Tylenol/Acetaminophen in a 24 hour period from all sources.    If you are taking extra strength Tylenol/acetaminophen (500 mg), the maximum dose is 6 tablets in 24 hours.    If you are taking regular strength acetaminophen (325 mg), the maximum dose is 9 tablets in 24 hours.    Call a doctor for any of the followin. Signs of infection (fever, growing tenderness at the surgery site, a large amount of drainage or bleeding, severe pain, foul-smelling drainage, redness, swelling).  2. It has been over 8 to 10 hours since surgery and you are still not able to urinate (pass water).  3. Headache for over 24 hours.  4. Numbness, tingling or weakness the day after surgery (if you had spinal anesthesia).  5. Signs of Covid-19 infection (temperature over 100 degrees, shortness of breath, cough, loss of taste/smell, generalized body aches, persistent headache, chills, sore throat, nausea/vomiting/diarrhea)  Your doctor is:  Dr. Digna Jaimes, ENT Otolaryngology: [phone]                    Or dial [phone] and ask for the resident on call for:  ENT Otolaryngology  For emergency care, call the:  [facility]:  [phone] (TTY for hearing impaired: [phone])

## 2022-01-24 LAB
BACTERIA TISS BX CULT: ABNORMAL
BACTERIA TISS BX CULT: ABNORMAL

## 2022-01-26 LAB
PATH REPORT.COMMENTS IMP SPEC: NORMAL
PATH REPORT.COMMENTS IMP SPEC: NORMAL
PATH REPORT.FINAL DX SPEC: NORMAL
PATH REPORT.GROSS SPEC: NORMAL
PATH REPORT.MICROSCOPIC SPEC OTHER STN: NORMAL
PATH REPORT.RELEVANT HX SPEC: NORMAL
PHOTO IMAGE: NORMAL

## 2022-01-27 DIAGNOSIS — J43.2 CENTRILOBULAR EMPHYSEMA (H): ICD-10-CM

## 2022-01-27 DIAGNOSIS — J45.50 SEVERE PERSISTENT ASTHMA WITH ALLERGIC RHINITIS, UNSPECIFIED WHETHER COMPLICATED (H): Primary | ICD-10-CM

## 2022-01-28 RX ORDER — MONTELUKAST SODIUM 10 MG/1
TABLET ORAL
Qty: 90 TABLET | Refills: 3 | Status: SHIPPED | OUTPATIENT
Start: 2022-01-28 | End: 2022-05-02

## 2022-01-28 NOTE — TELEPHONE ENCOUNTER
Medication:   montelukast (SINGULAIR) 10 MG tablet   7/25/2019  --   Sig - Route: Take 10 mg by mouth  - Oral     Date last written: 07/25/2019  Dispensed amount: 30  Refills: 3    Pt's last office visit: 11/10/2021  Next scheduled office visit: Unknown      Donna Gaspar LPN  Pulmonary Medicine:  [facility]  Phone: [phone] Fax: [phone]

## 2022-01-31 ENCOUNTER — TELEPHONE (OUTPATIENT)
Dept: OTOLARYNGOLOGY | Facility: CLINIC | Age: 57
End: 2022-01-31
Payer: COMMERCIAL

## 2022-01-31 ENCOUNTER — TELEPHONE (OUTPATIENT)
Facility: CLINIC | Age: 57
End: 2022-01-31
Payer: COMMERCIAL

## 2022-01-31 DIAGNOSIS — J32.4 CHRONIC PANSINUSITIS: ICD-10-CM

## 2022-01-31 DIAGNOSIS — J32.9 SINUS INFECTION: Primary | ICD-10-CM

## 2022-01-31 RX ORDER — LEVOFLOXACIN 500 MG/1
500 TABLET, FILM COATED ORAL DAILY
Qty: 7 TABLET | Refills: 0 | Status: SHIPPED | OUTPATIENT
Start: 2022-01-31 | End: 2022-02-07

## 2022-01-31 NOTE — TELEPHONE ENCOUNTER
Contacted [facility] @ [phone]. Requested CXR images from today be pushed to  PACS for review.    Donna Gaspar LPN  Pulmonary Medicine:  [facility]  Phone: [phone] Fax: [phone]

## 2022-01-31 NOTE — TELEPHONE ENCOUNTER
Contacted pt regarding ED visit from 01/31/2022. Patient states that she went to the ED today for ATKINSON.    Pt had sinus surgery 01/21/2022 - was feeling well day of surgery. Patient was experiencing post nasal drainage after procedure. Pt states that Friday 01/28/2022 breathing became increasingly difficult.     Patient seen in the ED today for SOB/wheezing. Patient given Prednisone taper (40 mg X4days). Patient has been using Duoneb and Albuterol rescue inhaler more than usual. Patient is also requesting an alternative to Trelegy, as pt feels that her cough is worse with Trelegy.     Telephone visit has been scheduled with Dr. Bean on Wednesday 02/02/2022 for ED follow up as well as to discuss inhaler options. Patient agreeable.    Donna Gaspar LPN  Pulmonary Medicine:  [facility]  Phone: [phone] Fax: [phone]

## 2022-01-31 NOTE — TELEPHONE ENCOUNTER
Spoke to patient and gave recommendations as per provider. We will sent prescription for antibiotic to her pharmacy as instructed by provider and will also order gentamicin irrigation solution from the specialty pharmacy. Pt verbalized understanding of plan and will follow up with provider as scheduled on 2/7/22.

## 2022-02-02 ENCOUNTER — VIRTUAL VISIT (OUTPATIENT)
Dept: PULMONOLOGY | Facility: CLINIC | Age: 57
End: 2022-02-02
Payer: COMMERCIAL

## 2022-02-02 DIAGNOSIS — J43.2 CENTRILOBULAR EMPHYSEMA (H): ICD-10-CM

## 2022-02-02 DIAGNOSIS — J45.50 SEVERE PERSISTENT ASTHMA WITH ALLERGIC RHINITIS, UNSPECIFIED WHETHER COMPLICATED (H): Primary | ICD-10-CM

## 2022-02-02 PROCEDURE — 99215 OFFICE O/P EST HI 40 MIN: CPT | Mod: 95 | Performed by: INTERNAL MEDICINE

## 2022-02-02 RX ORDER — FLUTICASONE PROPIONATE 50 MCG
1 SPRAY, SUSPENSION (ML) NASAL DAILY
Qty: 16 G | Refills: 11 | Status: SHIPPED | OUTPATIENT
Start: 2022-02-02 | End: 2022-07-08

## 2022-02-02 NOTE — PROGRESS NOTES
Gracy is a 56 year old who is being evaluated via a billable telephone visit.      What phone number would you like to be contacted at? [phone] (H)    How would you like to obtain your AVS? Dee Gaspar LPN  Pulmonary Medicine:  [facility]  Phone: [phone] Fax: [phone]

## 2022-02-02 NOTE — PROGRESS NOTES
Gracy is a 56 year old who is being evaluated via a billable telephone visit.      What phone number would you like to be contacted at? Mobile  How would you like to obtain your AVS? Dee Dubose is a 56 year old who presents for copd/asthma    HPI   Ms. Gracy Bautista is a pleasant 56 yr old female with a complex history of asthma with severe nasal polyposis, chronic rhinosinusitis with multiple surgeries, IgG deficiency. She presents to clinic for follow up for lung nodules and asthma. I last saw her in clinic on 11/2021  To briefly review, very complicated history. She was being managed on a regimen of Nucala, Advair inhaler, Singulair and albuterol as needed in the past while being seen at [facility]. She was started on Nucala based on peripheral eosinophilia, nasal polyps and sinus biopsy. However, she doesn't think that Nuclala therapy was helpful and they were stopped for more than a year prior to her initial visit with me. She did not notice any worsening in her asthma control on stopping Nucala  She had previously underwent an aspirin challenge. Multiple surgeries in the past for rhinosinusitis including a DRAF III surgery in April of 2019.  She continued to have frequent exacerbations despite maximal inhaler therapy. I had accessed her needs for repeat biologics  And her total IgE was elevated with normal eosinophils.  Even though her environmental allergen test were unremarkable, I started her on Xolair infusions since she was having frequent ER visit and excessive dependence on prednisone.  Of note, she had continued to smoke and there was interval findings of emphysema on her more recent CT chest, and I thought that her symptoms are likely due to a combination of asthma and COPD.  Her inhaler regimen had been escalated to high-dose Trelegy in addition to chronic azithromycin as well as nasal corticosteroids and Singulair, but she continues to have frequent exacerbations and repeated ER  visits and needs for prednisone burst.  I had recently referred her to ENT and she underwent sinus surgery with Dr. Jaimes and stealth guided, bilateral ethmoidectomy, bilateral frontal sinusotomy  was performed on 12/17/2021.  Biopsy showed predominance of inflammatory cells and eosinophils as well as growth of Pseudomonas and staph epidermidis.  She has since been prescribed gentamicin saline rinses which she has not procured yet and she had recently started 7 days of Levaquin.   She still continues to struggle with increased sinus discharges that are usually thick and purulent.  She still has frequent wheezing, shortness of breath and chest tightness.  She was again recently seen in the ER, where she was given a prednisone taper in addition to duo nebs.      Review of Systems   Constitutional, HEENT, cardiovascular, pulmonary, GI, , musculoskeletal, neuro, skin, endocrine and psych systems are negative, except as otherwise noted.      Objective           Vitals:  No vitals were obtained today due to virtual visit.    Physical Exam   healthy, alert and no distress  PSYCH: Alert and oriented times 3; coherent speech, normal   rate and volume, able to articulate logical thoughts, able   to abstract reason, no tangential thoughts, no hallucinations   or delusions  Her affect is normal  RESP: No cough, no audible wheezing, able to talk in full sentences  Remainder of exam unable to be completed due to telephone visits    Diagnostics  Surgical pathology sinus-discussed and reviewed with patient  A. SINUS CONTENTS, EXCISION:  -Fragments of sinonasal mucosa with chronic inflammation and markedly increased eosinophils.    Assessment and Plan:   Severe persistent Asthma with poor control/COPD   Complicated history and continued persistent symptoms despite maximum therapy with triple inhaler regimen with Trelegy, as well as Singulair, Xolair infusions, chronic azithromycin therapy, Flonase and albuterol.  Of note, she had  been previously placed on Nucala which was not helpful.  More recent CT chest are suggestive of emphysema and I suspect that she has combined asthma and COPD.  Her history of smoking is not very clear as some of her recent clinic visits shows that she continues to smoke although she tells me that she does not.  She seems to think that Trelegy inhalers are not very helpful and I think it is reasonable to switch her to something else.  Prescriptions for high-dose Breo and Incruse were given in clinic today with refills.  She will continue with chronic azithromycin therapy, Singulair as well as Flonase.  We discussed trigger avoidance, complete smoking cessation and an asthma action plan was formulated in clinic today.  In the future, we may consider a trial of a different Biologics such as Dupixent since her sinus surgery biopsy appears to show inflammatory cells with a predominance of eosinophils.  She does not appear to have significant peripheral eosinophilia.    Eosinophilic Chronic Sinusitis    Recent sinus biopsy shows significant eosinophils indicative of eosinophilic chronic sinusitis.  Interestingly, her prior sinus biopsy done at [facility] showed similar picture.  She has been on Nucala in the past with no improvement in her symptoms.  It may be reasonable to try a different agent such as Dupixent to see if she will derive any benefit.  I will reach out to Dr. Jaimes to hear her thoughts particularly as she is starting a new nasal rinse with gentamicin rinses.       Questions and concerns were answered to the patient's satisfaction.  she was provided with my contact information should new questions or concerns arise in the interim.    I spent a total of 10 minutes minutes reviewing her chart and records in addition to 30 minutes of telephone conversation with mobile maximal with Gracy Bautista during today's office visit. Over 50% of this time was spent counseling the patient and/or coordinating care regarding  their pulmonary disease.        She should return to clinic after 6 months with PFTs as well as started doing  Up to date on vaccinations    Marco Bean MD  Pulmonary, Critical Care and Sleep Medicine  [facility]  Pager: [phone]           Phone call duration: 30 minutes

## 2022-02-07 ENCOUNTER — OFFICE VISIT (OUTPATIENT)
Dept: OTOLARYNGOLOGY | Facility: CLINIC | Age: 57
End: 2022-02-07
Payer: COMMERCIAL

## 2022-02-07 VITALS
HEIGHT: 65 IN | BODY MASS INDEX: 20.83 KG/M2 | WEIGHT: 125 LBS | OXYGEN SATURATION: 100 % | TEMPERATURE: 97.3 F | DIASTOLIC BLOOD PRESSURE: 84 MMHG | HEART RATE: 78 BPM | SYSTOLIC BLOOD PRESSURE: 127 MMHG

## 2022-02-07 DIAGNOSIS — J32.4 CHRONIC PANSINUSITIS: Primary | ICD-10-CM

## 2022-02-07 PROCEDURE — 31231 NASAL ENDOSCOPY DX: CPT | Performed by: OTOLARYNGOLOGY

## 2022-02-07 PROCEDURE — 99212 OFFICE O/P EST SF 10 MIN: CPT | Mod: 25 | Performed by: OTOLARYNGOLOGY

## 2022-02-07 ASSESSMENT — MIFFLIN-ST. JEOR: SCORE: 1152.88

## 2022-02-07 ASSESSMENT — PAIN SCALES - GENERAL: PAINLEVEL: MILD PAIN (2)

## 2022-02-07 NOTE — LETTER
Date:February 8, 2022      Patient was self referred, no letter generated. Do not send.        [facility] Health Information

## 2022-02-07 NOTE — PATIENT INSTRUCTIONS
1.  You were seen in the ENT Clinic today by . If you have any questions or concerns after your appointment, please call [phone]. Press option #1 for scheduling related needs. Press option #3 for Nurse advice.    2.    has recommended the following:   - continue with the gentamicin rinses and the steroid spray    3.  Plan is to return to clinic in 3-4 months      Ahn Trujillo LPN  [phone]  [facility] Otolaryngology

## 2022-02-07 NOTE — LETTER
2/7/2022       RE: Gracy Bautista  [address] [place] [id number]     Dear Colleague,    Thank you for referring your patient, Gracy Bautista, to the [facility] [place] [facility]. Please see a copy of my visit note below.    Gracy is s/p FESS. This is her first post op visit. She is well except an episode of breathing trouble for which she was seen in the ED.     1/21/2022:Optical tracking system endoscopic sinus surgery (Bilateral) Procedure: stealth guided, bilateral ethmoidectomy, bilateral frontal sinusotomy, bilateral maxillary antrostomies;  Surgeon: Digna Jaimes MD;  Location: [facility] [place]    Exam:    Procedure:  Endoscopy indicated for exam, cleaning   Topical anesthetic/decongestant spray applied.  Rigid scope used for visualization.  Findings:Bilateral frontal recess patent with thick secretions removed with suction.     A/P: Post op visit. No fungus on culture so far, bacteria only. Staph epi and pseudomonas sensitive to Levaquin. She took levaquin but experienced diarrhea. Will continue to use Gentamicin rinses and nasal steroid sprays. See in in 3-4 months.     10 minutes spent on the date of the encounter doing chart review, history and exam, documentation and further activities per the note. This time is in addition to separately billable procedure.        Again, thank you for allowing me to participate in the care of your patient.      Sincerely,    Digna Jaimes MD

## 2022-02-07 NOTE — PROGRESS NOTES
Gracy is s/p FESS. This is her first post op visit. She is well except an episode of breathing trouble for which she was seen in the ED.     1/21/2022:Optical tracking system endoscopic sinus surgery (Bilateral) Procedure: stealth guided, bilateral ethmoidectomy, bilateral frontal sinusotomy, bilateral maxillary antrostomies;  Surgeon: Digna Jaimes MD;  Location: [facility] OR    Exam:    Procedure:  Endoscopy indicated for exam, cleaning   Topical anesthetic/decongestant spray applied.  Rigid scope used for visualization.  Findings:Bilateral frontal recess patent with thick secretions removed with suction.     A/P: Post op visit. No fungus on culture so far, bacteria only. Staph epi and pseudomonas sensitive to Levaquin. She took levaquin but experienced diarrhea. Will continue to use Gentamicin rinses and nasal steroid sprays. See in in 3-4 months.     10 minutes spent on the date of the encounter doing chart review, history and exam, documentation and further activities per the note. This time is in addition to separately billable procedure.

## 2022-02-07 NOTE — NURSING NOTE
"Chief Complaint   Patient presents with     RECHECK     post op surgery 1/21    Blood pressure 127/84, pulse 78, temperature 97.3  F (36.3  C), temperature source Temporal, height 1.651 m (5' 5\"), weight 56.7 kg (125 lb), SpO2 100 %, not currently breastfeeding. Yue Harvey, EMT  "

## 2022-02-15 ENCOUNTER — TELEPHONE (OUTPATIENT)
Dept: PULMONOLOGY | Facility: CLINIC | Age: 57
End: 2022-02-15
Payer: COMMERCIAL

## 2022-02-15 NOTE — TELEPHONE ENCOUNTER
Returned CB to patient. Per patient, on Saturday 02/12/2022 she was visiting a friend who lives in a piero older home and pt experienced an asthma exacerbation. Patient was taken to [facility] via ambulance. Patient was admitted over the weekend for asthma exacerbation.    Patient states that prior to admission, she had been using nebs with no success. Patient states that Breo seems to be too strong and would like to discuss an alternative inhaler option at pulmonary follow up 02/16/2022.     Per patient report, during hospital admission, patient was  taken off of Duonebs and put on Xoponex nebs.  Patient was also given Prednisone taper - 40 mg, Doxycycline, and Tessalon Perles. Will discuss further at pulmonary follow up 02/16/2022.    Donna Gaspar LPN  Pulmonary Medicine:  [facility]  Phone: [phone] Fax: [phone]

## 2022-02-15 NOTE — TELEPHONE ENCOUNTER
[facility] Call Center    Phone Message    May a detailed message be left on voicemail: no     Reason for Call: Other: Pt is in the hospital [place] in [place].  Is asking for a call back from [facility] to explain the situation to her.  Please call. Pt does have an appt tomorrow with Dr Bean.      Action Taken: Message routed to:  Adult Clinics: Pulmonology p [phone]    Travel Screening: Not Applicable

## 2022-02-16 ENCOUNTER — OFFICE VISIT (OUTPATIENT)
Dept: PULMONOLOGY | Facility: CLINIC | Age: 57
End: 2022-02-16
Payer: COMMERCIAL

## 2022-02-16 VITALS
SYSTOLIC BLOOD PRESSURE: 124 MMHG | BODY MASS INDEX: 20.83 KG/M2 | WEIGHT: 125 LBS | OXYGEN SATURATION: 99 % | HEIGHT: 65 IN | DIASTOLIC BLOOD PRESSURE: 85 MMHG | HEART RATE: 72 BPM | RESPIRATION RATE: 18 BRPM

## 2022-02-16 DIAGNOSIS — J45.50 SEVERE PERSISTENT ASTHMA, UNSPECIFIED WHETHER COMPLICATED (H): Primary | ICD-10-CM

## 2022-02-16 PROCEDURE — 99215 OFFICE O/P EST HI 40 MIN: CPT | Performed by: INTERNAL MEDICINE

## 2022-02-16 RX ORDER — DOXYCYCLINE 100 MG/1
100 TABLET ORAL
COMMUNITY
End: 2022-04-18

## 2022-02-16 RX ORDER — LEVALBUTEROL INHALATION SOLUTION 0.31 MG/3ML
1 SOLUTION RESPIRATORY (INHALATION) EVERY 4 HOURS PRN
COMMUNITY
End: 2022-05-02

## 2022-02-16 ASSESSMENT — PAIN SCALES - GENERAL: PAINLEVEL: NO PAIN (0)

## 2022-02-16 NOTE — PROGRESS NOTES
"Gracy Bautista's goals for this visit include: Return  She requests these members of her care team be copied on today's visit information: PCP    PCP: No Ref-Primary, Physician    Referring Provider:  No referring provider defined for this encounter.    /85   Pulse 72   Resp 18   Ht 1.651 m (5' 5\")   Wt 56.7 kg (125 lb)   SpO2 99%   BMI 20.80 kg/m      Do you need any medication refills at today's visit? N    Donna Gaspar LPN  Pulmonary Medicine:  [facility]  Phone: [phone] Fax: [phone]      "

## 2022-02-16 NOTE — PROGRESS NOTES
Pulmonary Clinic Return Patient Visit  Reason for Visit: Severe Asthma and COPD  History of Present Illness  Ms. Gracy Bautista is a pleasant 56 yr old female with a complex history of asthma with severe nasal polyposis, chronic rhinosinusitis with multiple surgeries, IgG deficiency. She presents to clinic for follow up for recent ER visit for asthma exacerbation. I last saw her in clinic on 02/2022.  Please refer to my clinic office notes on 02/02/2022 for an overview of her complicated history.  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 Continues to have repeated asthma flares despite maximal inhaler regimen. Has been tried on Xolair and Nucala at different times with no improvement. Work up for ABPA, Churg-Misti disease and other vasculitic/autoimmune work up have returned negative. Chest imaging shows mostly subtle emphysema and low suspicion for conditions such as DIPNECH etc. I suspected that some of her persistent symptoms may be due to ongoing smoking although she states that she has quit.  Recent sinus surgery with eosinophilic predominance as well as growth of pseudomonas and was treated with Levaqui and continued with gentamicin nasal rinses.  Presented to the ER yesterday with asthma flare and treated with Nebulized albuterol/ipratropium as well as given steroid taper. She has been tried on different triple inhaler regimen and the most recent was Incruse and Breo inhalers in addition to Singulair, Flonase and Gentamicin nasal rinses. Had tried chronic azithromycin therapy in the past.  She still continues to struggle with increased sinus discharges that are usually thick and purulent.  She still has frequent wheezing, shortness of breath and chest tightness.  She was again recently seen in the ER, where she was given a prednisone taper in addition to duoNebs.  Today, she has stopped all her inhalers. She is  adamant that they cause her to to have increased tachycardia and she doesn't want to use them any longer. She has also stopped going for her Xolair infusions.  She was given Xopenex nebs and she is able to tolerate them with no issues with tachycardia.     Review of Systems:  10 of 14 systems reviewed and are negative unless otherwise stated in HPI.    Past Medical History:   Diagnosis Date     Allergic rhinitis      Arthritis      Chronic sinusitis      Depressive disorder      Hoarseness      Reduced vision      Uncomplicated asthma        Past Surgical History:   Procedure Laterality Date     NASAL/SINUS POLYPECTOMY       OPTICAL TRACKING SYSTEM ENDOSCOPIC SINUS SURGERY Bilateral 1/21/2022    Procedure: stealth guided, bilateral ethmoidectomy, bilateral frontal sinusotomy, bilateral maxillary antrostomies;  Surgeon: Digna Jaimes MD;  [place] [facility] [place]       Family History   Problem Relation Age of Onset     Cancer Brother      Bleeding Disorder Brother      Diabetes Father      Heart Disease Father      Cerebrovascular Disease Father      Bleeding Disorder Mother        Social History     Socioeconomic History     Marital status:      Spouse name: Not on file     Number of children: Not on file     Years of education: Not on file     Highest education level: Not on file   Occupational History     Occupation: HOMEMAKER   Tobacco Use     Smoking status: Light Tobacco Smoker     Packs/day: 0.00     Years: 15.00     Pack years: 0.00     Types: Cigarettes     Start date: 9/17/2021     Smokeless tobacco: Never Used     Tobacco comment: 6 cigarettes per week   Substance and Sexual Activity     Alcohol use: Not Currently     Drug use: Never     Sexual activity: Yes     Partners: Male     Birth control/protection: None   Other Topics Concern     Not on file   Social History Narrative    October 7, 2021    ENVIRONMENTAL HISTORY: The [place] The home is heated with a  electric furnace. They do have central air conditioning. The patient's bedroom is furnished with carpeting in bedroom, allergen mattress cover, and allergen pillowcase cover.  Pets inside the house include 1 dog. There is no history of cockroach or mice infestation. There is/are 0 smokers in the house.  The house does have a damp basement.      Social Determinants of Health     Financial Resource Strain: Not on file   Food Insecurity: Not on file   Transportation Needs: Not on file   Physical Activity: Not on file   Stress: Not on file   Social Connections: Not on file   Intimate Partner Violence: Not on file   Housing Stability: Not on file         Allergies   Allergen Reactions     Bee Venom Angioedema, Swelling and Hives     Other reaction(s): Angioedema       Cefdinir Diarrhea and GI Disturbance              Levofloxacin Muscle Pain (Myalgia)     Prednisone Anxiety and Other (See Comments)     Hyperactivity and moodiness. Doesn't like how it makes her feel.           Current Outpatient Medications:      acetaminophen (TYLENOL) 500 MG tablet, Take 500-1,000 mg by mouth every 6 hours as needed for mild pain, Disp: , Rfl:      ADDERALL XR 20 MG 24 hr capsule, PRN, Disp: , Rfl:      albuterol (PROAIR HFA/PROVENTIL HFA/VENTOLIN HFA) 108 (90 Base) MCG/ACT inhaler, Inhale 1-2 puffs into the lungs every 6 hours as needed for shortness of breath / dyspnea or wheezing, Disp: 8 g, Rfl: 11     azithromycin (ZITHROMAX) 250 MG tablet, TAKE 1 TABLET BY MOUTH EVERY MONDAY, WEDNESDAY, AND FRIDAY MORNING, Disp: 36 tablet, Rfl: 3     beclomethasone HFA (QVAR REDIHALER) 80 MCG/ACT inhaler, Inhale 1 puff into the lungs 2 times daily, Disp: 10.6 g, Rfl: 11     cetirizine (ZYRTEC) 10 MG tablet, Take 10 mg by mouth , Disp: , Rfl:      CYMBALTA 60 MG capsule, Take 60 mg by mouth every morning , Disp: , Rfl:      doxycycline monohydrate (ADOXA) 100 MG tablet, Take 100 mg by mouth, Disp: , Rfl:      fluticasone (FLONASE) 50 MCG/ACT nasal  spray, Spray 1 spray into both nostrils daily, Disp: 16 g, Rfl: 11     fluticasone (FLONASE) 50 MCG/ACT nasal spray, Spray 1 spray into both nostrils daily (Patient taking differently: Spray 1 spray into both nostrils every morning ), Disp: 1 g, Rfl: 11     gentamicin 80 mg in 1000 ml 0.9% NaCl (GARAMYCIN) SOLN nasal irrigation, Irrigate 30 mL between each nostril twice daily, Disp: 1000 mL, Rfl: 3     HYDROcodone-acetaminophen (NORCO) 5-325 MG tablet, Take 1-2 tablets by mouth every 4 hours as needed (Moderate to Severe Pain), Disp: 6 tablet, Rfl: 0     ibuprofen (ADVIL/MOTRIN) 200 MG tablet, Take 200 mg by mouth every 4 hours as needed for mild pain , Disp: , Rfl:      LAMICTAL 200 MG tablet, Take 200 mg by mouth every morning , Disp: , Rfl:      levalbuterol (XOPENEX) 0.31 MG/3ML neb solution, Take 1 ampule by nebulization every 4 hours as needed for wheezing or shortness of breath / dyspnea, Disp: , Rfl:      methylPREDNISolone (MEDROL DOSEPAK) 4 MG tablet therapy pack, Take as directed per patient instruction card, Disp: 21 tablet, Rfl: 0     montelukast (SINGULAIR) 10 MG tablet, TAKE 1 TABLET(10 MG) BY MOUTH AT BEDTIME, Disp: 90 tablet, Rfl: 3     nicotine (NICODERM CQ) 7 MG/24HR 24 hr patch, Place 1 patch onto the skin every 24 hours, Disp: 30 patch, Rfl: 3     nicotine polacrilex (NICORETTE) 4 MG gum, TAKE 1 BY MOUTH FOUR TIMES DAILY, Disp: , Rfl:      oxymetazoline (AFRIN NASAL SPRAY) 0.05 % nasal spray, Use 2 sprays to each nare two times daily for up to 3 days., Disp: 20 mL, Rfl: 0     predniSONE (DELTASONE) 20 MG tablet, Take 3 tabs by mouth daily x 3 days, then 2 tabs daily x 3 days, then 1 tab daily x 3 days, then 1/2 tab daily x 3 days., Disp: 20 tablet, Rfl: 0     sodium chloride (OCEAN) 0.65 % nasal spray, Spray 1-2 sprays in nostril every hour as needed for congestion (nasal dryness) Use in EACH nostril., Disp: 480 mL, Rfl: 0     spacer (OPTICHAMBER ELBA) holding chamber, Use with albuterol  "(PROAIR HFA/PROVENTIL HFA/VENTOLIN HFA) 108 (90 Base) MCG/ACT inhaler, Disp: 1 each, Rfl: 0     fluticasone-vilanterol (BREO ELLIPTA) 200-25 MCG/INH inhaler, Inhale 1 puff into the lungs daily (Patient not taking: Reported on 2/16/2022), Disp: 60 each, Rfl: 11     umeclidinium (INCRUSE ELLIPTA) 62.5 MCG/INH inhaler, Inhale 1 puff into the lungs daily (Patient not taking: Reported on 2/16/2022), Disp: 30 each, Rfl: 11      Physical Exam:  /85   Pulse 72   Resp 18   Ht 1.651 m (5' 5\")   Wt 56.7 kg (125 lb)   SpO2 99%   BMI 20.80 kg/m    GENERAL: Well developed, well nourished, alert, and in no apparent distress.  HEENT: Normocephalic, atraumatic. PERRL, EOMI. Oral mucosa is moist. No perioral cyanosis.  NECK: supple, no masses, no thyromegaly.  RESP:  Normal respiratory effort.  CTAB.  No rales, wheezes, rhonchi.  No cyanosis or clubbing.  CV: Normal S1, S2, regular rhythm, normal rate. No murmur.  No LE edema.   ABDOMEN:  Soft, non-tender, non-distended.   SKIN: warm and dry. No rash.  NEURO: AAOx3.  Normal gait.  Fluent speech.  PSYCH: mentation appears normal.     Assessment and Plan:   Severe persistent Asthma with poor control/COPD   Complicated history and continued persistent symptoms despite maximum therapy with different triple inhaler regimen  as well as Singulair, Xolair infusions, chronic azithromycin therapy, Flonase and albuterol.  Of note, she had been previously placed on Nucala which was not helpful. No longer using any inhalers as she is unable to tolerate them due to tachycardia.  Severe asthma with combined COPD (emphysema on imaging and hx of smoking) complicated with nasal polyps and eosinophilic sinus disease s/p multiple sinus surgeries. Continues to have repeated asthma flares despite maximal inhaler regimen. Has been tried on Xolair and Nucala at different times with no improvement. Work up for ABPA, Churg-Misti disease and other vasculitic/autoimmune work up have returned " negative. Chest imaging shows mostly subtle emphysema and low suspicion for conditions such as DIPNECH etc. I suspected that some of her persistent symptoms may be due to ongoing smoking although she states that she has quit.  I educated her that inhaler corticosteroids is a important part of her asthma regimen and I will start her on high dose QVAR today and prescriptions were given . She is hesitant towards long acting bronchodilators and wants no part of it. I encouraged her to use her Xopenex Nebs up to 3-4 times daily in a a scheduled fashion.  With regards Biologics, she may benefit from Dupixent as she has concomitant chronic eosinophilic rhinosinusitis  with polyps and I will go ahead and try to set her up for one. Of note, she did not have any benefit with Nucala in the past.   Eosinophilic Chronic Sinusitis   Recent sinus biopsy shows significant eosinophils indicative of eosinophilic chronic sinusitis.  Interestingly, her prior sinus biopsy done at [place] showed similar picture.  She has been on Nucala in the past with no improvement in her symptoms.  It may be reasonable to try a different agent such as Dupixent to see if she will derive any benefit. I will try another agent- Dupixent to see if she derives any benefit from using it.      Questions and concerns were answered to the patient's satisfaction.  she was provided with my contact information should new questions or concerns arise in the interim.  I spent a total of 40 minutes face to face with Gracy Bautista during today's office visit. Over 50% of this time was spent counseling the patient and/or coordinating care regarding their pulmonary disease.    She should return to clinic after 3 months   Up to date on vaccinations  Marco Bean MD  Pulmonary, Critical Care and Sleep Medicine  [facility]  Pager: [phone]              The above note was dictated using voice recognition software and may include typographical errors.  Please contact the author for any clarifications.

## 2022-02-18 LAB — BACTERIA TISS BX CULT: NO GROWTH

## 2022-02-19 ENCOUNTER — HEALTH MAINTENANCE LETTER (OUTPATIENT)
Age: 57
End: 2022-02-19

## 2022-03-09 ENCOUNTER — TRANSFERRED RECORDS (OUTPATIENT)
Dept: HEALTH INFORMATION MANAGEMENT | Facility: CLINIC | Age: 57
End: 2022-03-09
Payer: COMMERCIAL

## 2022-03-10 ENCOUNTER — TRANSFERRED RECORDS (OUTPATIENT)
Dept: HEALTH INFORMATION MANAGEMENT | Facility: CLINIC | Age: 57
End: 2022-03-10
Payer: COMMERCIAL

## 2022-04-11 ENCOUNTER — TELEPHONE (OUTPATIENT)
Dept: PULMONOLOGY | Facility: CLINIC | Age: 57
End: 2022-04-11
Payer: COMMERCIAL

## 2022-04-11 NOTE — TELEPHONE ENCOUNTER
[facility] Call Center    Phone Message    May a detailed message be left on voicemail: no     Reason for Call: Other: Pt asking for a call back asap from [facility] today.  She knows Dr Bean is not in clinic.  Scheduled an appt for next week 4.18.22 with Dr Bean.  Pt requesting a call back.      Action Taken: Message routed to:  Adult Clinics: Pulmonology p [id number]    Travel Screening: Not Applicable

## 2022-04-12 NOTE — TELEPHONE ENCOUNTER
VM left for patient requesting CB to pulmonary.    Donna Gaspar LPN  Pulmonary Medicine:  [facility]  Phone: [phone] Fax: [phone]

## 2022-04-14 NOTE — TELEPHONE ENCOUNTER
Pt returned call back to pulmonary clinic stating that she went to [place] last week and was hospitalized for 4 days d/t breathing issues. Patient was given 60 mg of Prednisone and Combivent Respimat in the hospital. Since completing  the Prednisone burst, patient states that her cough has significantly worsened. Patient is only using Comibivent and an Albuterol rescue inhaler for pulmonary medicines. Patient refuses to use albuterol nebs d/t side effects, increased HR, shaky, increased anxiety. pt also not using Levalbuterol nebs or Budesonide d/t the same side effects - pt tried these meds for 2 weeks and discontinued them. Patient does not want to be put on Prednsione d/t side effects - weight gain, insomnia, acne, anxiety. Patient states that she took a COVID test upon arrival back to [place] and the result was negative. Patient requesting to trial Dupixent, as at last pulmonary office visit with Dr. Bean Dupvivi was mentioned. Patient has been scheduled for pulmonary follow up with Dr. Bean on Monday 04/18/2022 to discuss possible Dupixent initiation. Requested that patient have medical records faxed to Dr. Bean from [place] hospitalization. Patient expressed understanding.    Donna Gaspar LPN  Pulmonary Medicine:  [facility]  Phone: [phone] Fax: [phone]       Patient was on 60 mg of prednisone. Cough was better but pt cannot handle the side effects of Prednisone

## 2022-04-18 ENCOUNTER — OFFICE VISIT (OUTPATIENT)
Dept: PULMONOLOGY | Facility: CLINIC | Age: 57
End: 2022-04-18

## 2022-04-18 VITALS
BODY MASS INDEX: 20.8 KG/M2 | RESPIRATION RATE: 18 BRPM | DIASTOLIC BLOOD PRESSURE: 83 MMHG | SYSTOLIC BLOOD PRESSURE: 138 MMHG | WEIGHT: 125 LBS | OXYGEN SATURATION: 98 % | HEART RATE: 78 BPM

## 2022-04-18 DIAGNOSIS — M79.89 RIGHT LEG SWELLING: ICD-10-CM

## 2022-04-18 DIAGNOSIS — J45.50 SEVERE PERSISTENT ASTHMA, UNSPECIFIED WHETHER COMPLICATED (H): Primary | ICD-10-CM

## 2022-04-18 DIAGNOSIS — J33.9 NASAL POLYPOSIS: ICD-10-CM

## 2022-04-18 DIAGNOSIS — J32.9 CHRONIC RHINOSINUSITIS: ICD-10-CM

## 2022-04-18 PROCEDURE — 99215 OFFICE O/P EST HI 40 MIN: CPT | Performed by: INTERNAL MEDICINE

## 2022-04-18 ASSESSMENT — PAIN SCALES - GENERAL: PAINLEVEL: NO PAIN (0)

## 2022-04-18 NOTE — PROGRESS NOTES
Gracy Bautista's goals for this visit include: Return  She requests these members of her care team be copied on today's visit information: PCP    PCP: No primary care provider on file.    Referring Provider:  Marco Bean MD  [address] [place] [id number]    /83   Pulse 78   Resp 18   Wt 56.7 kg (125 lb)   SpO2 98%   BMI 20.80 kg/m      Do you need any medication refills at today's visit? N    Donna Gaspar LPN  Pulmonary Medicine:  [facility]  Phone: [phone] Fax: [phone]

## 2022-04-18 NOTE — PATIENT INSTRUCTIONS
Establish care with a primary care provider. Dr. Donna Hwang is recommended. Address: [address] [address], [place] [address]. Phone: [phone]    Please contact infusion center once you receive your Dupixent. Schedule your 1st injection with them. # [phone]

## 2022-04-18 NOTE — PROGRESS NOTES
Pulmonary Clinic Return Patient Visit  Reason for Visit: Severe Asthma and COPD  History of Present Illness  Ms. Gracy Bautista is a 56 yr old female with a complex history of asthma/copd with severe nasal polyposis, chronic rhinosinusitis with multiple surgeries, IgG deficiency. She presents to clinic for follow up for recent ER visit for asthma exacerbation. I last saw her in clinic in 02/2022.  Please refer to my prior clinic office notes for an overview of her complicated history.  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 Continues to have repeated asthma/copd flares despite maximal inhaler regimen. Has been tried on Xolair and Nucala at different times with no improvement. Work up for ABPA, Churg-Misti disease and other vasculitic/autoimmune work up have returned negative. Chest imaging shows mostly subtle emphysema and low suspicion for conditions such as DIPNECH etc. I suspected that some of her persistent symptoms may be due to ongoing smoking although she states that she has quit.  Recent sinus surgery with eosinophilic predominance as well as growth of pseudomonas and was treated with Levaquin and continued with gentamicin nasal rinses with no significant improvement. Work up continues to show elevated peripheral eosinophilia, most recently at 1200. IgE levels usually range from the 100's to 200's.  She continues to have repeated flares with several ER visits and hospital admissions, most recently last month during a trip to [place] for where she was hospitalized for 4 days and treated with steroids and nebulized bronchodilators.  During the last clinic visit, she was frustrated and stopped using all her inhalers and was adamant that she wanted no part of it. I was able to convince to continue to use an inhaled corticosteroids and so I prescribed high dose Qvar as she complained of side effects from  bronchodilators.  Currently has taken her self off all medications including inhalers, nasal rinses and sprays etc.  She still continues to struggle with increased sinus discharges that are usually thick and purulent.  She still has frequent wheezing, shortness of breath and chest tightness. She is adamant that they cause her to to have increased tachycardia and she doesn't want to use them any longer.    Review of Systems:  10 of 14 systems reviewed and are negative unless otherwise stated in HPI.    Past Medical History:   Diagnosis Date     Allergic rhinitis      Arthritis      Chronic sinusitis      Depressive disorder      Hoarseness      Reduced vision      Uncomplicated asthma        Past Surgical History:   Procedure Laterality Date     NASAL/SINUS POLYPECTOMY       OPTICAL TRACKING SYSTEM ENDOSCOPIC SINUS SURGERY Bilateral 1/21/2022    Procedure: stealth guided, bilateral ethmoidectomy, bilateral frontal sinusotomy, bilateral maxillary antrostomies;  Surgeon: Digna Jaimes MD;  Location: [facility] OR       Family History   Problem Relation Age of Onset     Cancer Brother      Bleeding Disorder Brother      Diabetes Father      Heart Disease Father      Cerebrovascular Disease Father      Bleeding Disorder Mother        Social History     Socioeconomic History     Marital status:      Spouse name: Not on file     Number of children: Not on file     Years of education: Not on file     Highest education level: Not on file   Occupational History     Occupation: HOMEMAKER   Tobacco Use     Smoking status: Light Tobacco Smoker     Packs/day: 0.00     Years: 15.00     Pack years: 0.00     Types: Cigarettes     Start date: 9/17/2021     Smokeless tobacco: Never Used     Tobacco comment: 6 cigarettes per week   Substance and Sexual Activity     Alcohol use: Not Currently     Drug use: Never     Sexual activity: Yes     Partners: Male     Birth control/protection: None   Other Topics Concern     Not on file    Social History Narrative    October 7, 2021    ENVIRONMENTAL HISTORY: The family [place]. The home is heated with a electric furnace. They do have central air conditioning. The patient's bedroom is furnished with carpeting in bedroom, allergen mattress cover, and allergen pillowcase cover.  Pets inside the house include 1 dog. There is no history of cockroach or mice infestation. There is/are 0 smokers in the house.  The house does have a damp basement.      Social Determinants of Health     Financial Resource Strain: Not on file   Food Insecurity: Not on file   Transportation Needs: Not on file   Physical Activity: Not on file   Stress: Not on file   Social Connections: Not on file   Intimate Partner Violence: Not on file   Housing Stability: Not on file         Allergies   Allergen Reactions     Bee Venom Angioedema, Swelling and Hives     Other reaction(s): Angioedema       Cefdinir Diarrhea and GI Disturbance              Levofloxacin Muscle Pain (Myalgia)     Prednisone Anxiety and Other (See Comments)     Hyperactivity and moodiness. Doesn't like how it makes her feel.           Current Outpatient Medications:      acetaminophen (TYLENOL) 500 MG tablet, Take 500-1,000 mg by mouth every 6 hours as needed for mild pain, Disp: , Rfl:      ADDERALL XR 20 MG 24 hr capsule, PRN, Disp: , Rfl:      albuterol (PROAIR HFA/PROVENTIL HFA/VENTOLIN HFA) 108 (90 Base) MCG/ACT inhaler, Inhale 1-2 puffs into the lungs every 6 hours as needed for shortness of breath / dyspnea or wheezing, Disp: 8 g, Rfl: 11     azithromycin (ZITHROMAX) 250 MG tablet, TAKE 1 TABLET BY MOUTH EVERY MONDAY, WEDNESDAY, AND FRIDAY MORNING, Disp: 36 tablet, Rfl: 3     beclomethasone HFA (QVAR REDIHALER) 80 MCG/ACT inhaler, Inhale 1 puff into the lungs 2 times daily, Disp: 10.6 g, Rfl: 3     CYMBALTA 60 MG capsule, Take 60 mg by mouth every morning , Disp: , Rfl:      fluticasone (FLONASE) 50 MCG/ACT nasal spray, Spray  1 spray into both nostrils daily, Disp: 16 g, Rfl: 11     LAMICTAL 200 MG tablet, Take 200 mg by mouth every morning , Disp: , Rfl:      levalbuterol (XOPENEX) 0.31 MG/3ML neb solution, Take 1 ampule by nebulization every 4 hours as needed for wheezing or shortness of breath / dyspnea, Disp: , Rfl:      montelukast (SINGULAIR) 10 MG tablet, TAKE 1 TABLET(10 MG) BY MOUTH AT BEDTIME, Disp: 90 tablet, Rfl: 3     nicotine polacrilex (NICORETTE) 4 MG gum, TAKE 1 BY MOUTH FOUR TIMES DAILY, Disp: , Rfl:      spacer (OPTICHAMBER ELBA) holding chamber, Use with albuterol (PROAIR HFA/PROVENTIL HFA/VENTOLIN HFA) 108 (90 Base) MCG/ACT inhaler, Disp: 1 each, Rfl: 0     cetirizine (ZYRTEC) 10 MG tablet, Take 10 mg by mouth  (Patient not taking: Reported on 4/18/2022), Disp: , Rfl:       Physical Exam:  /83   Pulse 78   Resp 18   Wt 56.7 kg (125 lb)   SpO2 98%   BMI 20.80 kg/m    GENERAL: Well developed, well nourished, alert, and in no apparent distress.  HEENT: Normocephalic, atraumatic. PERRL, EOMI. Oral mucosa is moist. No perioral cyanosis.  NECK: supple, no masses, no thyromegaly.  RESP:  Diffuse expiratory and inspiratory wheezing  CV: Normal S1, S2, regular rhythm, normal rate. No murmur.  Right leg swelling  ABDOMEN:  Soft, non-tender, non-distended.   SKIN: warm and dry. No rash.  NEURO: AAOx3.  Normal gait.  Fluent speech.  PSYCH: mentation appears normal.     Assessment and Plan:   Severe persistent Asthma (eosinophilic) with poor control/COPD   Complicated history and continued persistent symptoms despite maximum therapy with different triple inhaler regimen  as well as Singulair, Xolair infusions, chronic azithromycin therapy, Flonase and albuterol.  Of note, she had been previously placed on Nucala which was not helpful. No longer using any inhalers as she is unable to tolerate them due to tachycardia. I strongly recommended her to continue to use a high dose inhaled corticosteroids but she stopped  taking them. I reiterated the importance and I will re-prescribe QVAR 80 mcg 1 puff bid and prescriptions were given. I encouraged her to continue to use it as there are no bronchodilators in it and she is unlikely to be intolerant to it.  Severe asthma with combined COPD (emphysema on imaging and hx of smoking) complicated with nasal polyps and eosinophilic sinus disease s/p multiple sinus surgeries. Continues to have repeated asthma flares despite maximal inhaler regimen. Has been tried on Xolair and Nucala at different times with no improvement. Work up for   With regards Biologics, she may benefit from Dupixent as she has concomitant chronic eosinophilic rhinosinusitis  with polyps and I will go ahead and try to set her up for one. Of note, she did not have any benefit with Nucala in the past but she may benefit from this one. Her most recent absolute eosinophil levels are 1200.  I gave her educational materials on Dupixent and we discussed the side effects and the first administration will be supervised in the infusion center to monitor for hypersensitivity.  I emphasized the importance of staying abstinent from smoking as she does have a COPD component and smoking may lead to flare ups. I reviewed her most recent H/P at [facility] [place] from 3/2022 and she did reveal that she is a 1/2 ppd smoker and was tagged a current smoker.   Chronic rhinosinusitis with nasal polyposis  Recent sinus biopsy shows significant eosinophils indicative of eosinophilic chronic sinusitis.  Interestingly, her prior sinus biopsy done at [place] showed similar picture.  She has been on Nucala in the past with no improvement in her symptoms.  It may be reasonable to try a different agent such as Dupixent to see if she will derive any benefit. I will try another agent- Dupixent to see if she derives any benefit from using it.      Questions and concerns were answered to the patient's satisfaction.  she was provided with  my contact information should new questions or concerns arise in the interim.  I spent a total of 40 minutes face to face with Gracy Bautista during today's office visit. Over 50% of this time was spent counseling the patient and/or coordinating care regarding their pulmonary disease.    She should return to clinic after 3 months   Up to date on vaccinations  Marco Bean MD  Pulmonary, Critical Care and Sleep Medicine  [email or web address]  Pager: [phone]              The above note was dictated using voice recognition software and may include typographical errors. Please contact the author for any clarifications.

## 2022-04-19 ENCOUNTER — MYC MEDICAL ADVICE (OUTPATIENT)
Dept: PULMONOLOGY | Facility: CLINIC | Age: 57
End: 2022-04-19

## 2022-04-19 ENCOUNTER — ANCILLARY PROCEDURE (OUTPATIENT)
Dept: ULTRASOUND IMAGING | Facility: CLINIC | Age: 57
End: 2022-04-19
Attending: INTERNAL MEDICINE
Payer: COMMERCIAL

## 2022-04-19 ENCOUNTER — TELEPHONE (OUTPATIENT)
Facility: CLINIC | Age: 57
End: 2022-04-19

## 2022-04-19 DIAGNOSIS — J45.50 SEVERE PERSISTENT ASTHMA, UNSPECIFIED WHETHER COMPLICATED (H): ICD-10-CM

## 2022-04-19 DIAGNOSIS — J01.40 ACUTE PANSINUSITIS, RECURRENCE NOT SPECIFIED: ICD-10-CM

## 2022-04-19 DIAGNOSIS — M79.89 RIGHT LEG SWELLING: ICD-10-CM

## 2022-04-19 PROCEDURE — 93971 EXTREMITY STUDY: CPT | Mod: RT | Performed by: RADIOLOGY

## 2022-04-19 NOTE — TELEPHONE ENCOUNTER
Prior Authorization Approval    Authorization Effective Date: 3/20/2022  Authorization Expiration Date: 10/16/2022  Medication: Dupixent--PA Approved  Approved Dose/Quantity: 4ml/28days    Reference #:     Insurance Company: [employer] - Phone [phone] Fax [phone]  Expected CoPay:       CoPay Card Available:      Foundation Assistance Needed:    Which Pharmacy is filling the prescription (Not needed for infusion/clinic administered):    Pharmacy Notified:    Patient Notified:

## 2022-04-20 ENCOUNTER — TELEPHONE (OUTPATIENT)
Dept: OTOLARYNGOLOGY | Facility: CLINIC | Age: 57
End: 2022-04-20
Payer: COMMERCIAL

## 2022-04-20 NOTE — TELEPHONE ENCOUNTER
Order for amoxicillin-clavulanate (AUGMENTIN) 875-125 MG tablet Sig - Route: Take 1 tablet by mouth 2 times daily for 7 days - Oral has been sent in for the patient per Dr. Bean's instruction. Patient has been instructed to not take azithromycin while taking Augmentin.    Donna Gaspar LPN  Pulmonary Medicine:  [facility]  Phone: [phone] Fax: [phone]

## 2022-04-25 ENCOUNTER — TELEPHONE (OUTPATIENT)
Dept: PULMONOLOGY | Facility: CLINIC | Age: 57
End: 2022-04-25
Payer: COMMERCIAL

## 2022-04-25 DIAGNOSIS — J45.50 SEVERE PERSISTENT ASTHMA, UNSPECIFIED WHETHER COMPLICATED (H): ICD-10-CM

## 2022-04-25 DIAGNOSIS — J32.9 CHRONIC RHINOSINUSITIS: ICD-10-CM

## 2022-04-25 DIAGNOSIS — J33.9 NASAL POLYPOSIS: ICD-10-CM

## 2022-04-25 NOTE — TELEPHONE ENCOUNTER
[facility] Call Center    Phone Message    May a detailed message be left on voicemail: yes     Reason for Call: Medication Refill Request    Has the patient contacted the pharmacy for the refill? Yes   Name of medication being requested: Dupilumab 300 MG/2ML SOPN  Provider who prescribed the medication: Geneva  Pharmacy: [facility]   Date medication is needed: ASAP    Please send via escribe, or fax to [phone]      Action Taken: Message routed to:  Adult Clinics: Pulmonology p [id number]    Travel Screening: Not Applicable

## 2022-04-25 NOTE — TELEPHONE ENCOUNTER
Contacted [facility] @[phone]. Spoke with pharmacy staff regarding Dupilumab 300 MG/2ML SOPN orders. Per pharmacy staff, there is an order for patient ready to be shipped out. Verbal orders given so that patient's Dupilumab 300 MG/2ML SOPN may be shipped to her home address. Patient's medication to be shipped to her Thursday 04/28/2022 per pharmacy staff.    Donna Gaspar LPN  Pulmonary Medicine:  [facility]  Phone: [phone] Fax: [phone]

## 2022-04-26 NOTE — TELEPHONE ENCOUNTER
Contacted [facility] regarding patients Dupixent Rx. Spoke with pharmacist and was informed that patient will need a loading dose (initial dose) of Dupixent. Orders for maintenance dose have been received and set to deliver to the patient tomorrow. Verbal orders given for Dupixent loading dose: 2 pens on day 1 or 600 mg SIG: inject 2 pens or 600 mg under the skin on day 1. 2 pens with 0 refills. Dr. Bean updated to the medication adjustment.    Donna Gaspar LPN  Pulmonary Medicine:  [facility]  Phone: [phone] Fax: [phone]

## 2022-04-26 NOTE — TELEPHONE ENCOUNTER
[facility] requesting a prescription for a loading dose.  The pharmacy is requesting clarification regarding a loading dose for Dupixent.  They are unsure if the patient already received a loading dose.  Please advise.  Thank you.

## 2022-04-29 ENCOUNTER — OFFICE VISIT (OUTPATIENT)
Dept: OTOLARYNGOLOGY | Facility: CLINIC | Age: 57
End: 2022-04-29
Payer: COMMERCIAL

## 2022-04-29 VITALS
SYSTOLIC BLOOD PRESSURE: 129 MMHG | WEIGHT: 119 LBS | TEMPERATURE: 98.9 F | HEART RATE: 95 BPM | OXYGEN SATURATION: 99 % | BODY MASS INDEX: 19.83 KG/M2 | HEIGHT: 65 IN | DIASTOLIC BLOOD PRESSURE: 92 MMHG

## 2022-04-29 DIAGNOSIS — J45.41 MODERATE PERSISTENT ASTHMA WITH EXACERBATION: ICD-10-CM

## 2022-04-29 DIAGNOSIS — J45.50 SEVERE PERSISTENT ASTHMA WITH ALLERGIC RHINITIS, UNSPECIFIED WHETHER COMPLICATED (H): ICD-10-CM

## 2022-04-29 DIAGNOSIS — J01.40 ACUTE PANSINUSITIS, RECURRENCE NOT SPECIFIED: ICD-10-CM

## 2022-04-29 DIAGNOSIS — J32.4 CHRONIC PANSINUSITIS: Primary | ICD-10-CM

## 2022-04-29 PROCEDURE — 99212 OFFICE O/P EST SF 10 MIN: CPT | Mod: 25 | Performed by: OTOLARYNGOLOGY

## 2022-04-29 PROCEDURE — 31237 NSL/SINS NDSC SURG BX POLYPC: CPT | Mod: 50 | Performed by: OTOLARYNGOLOGY

## 2022-04-29 RX ORDER — LAMOTRIGINE 200 MG/1
200 TABLET ORAL
COMMUNITY
End: 2022-05-02

## 2022-04-29 RX ORDER — INHALER, ASSIST DEVICES
SPACER (EA) MISCELLANEOUS
Qty: 1 EACH | Refills: 0 | Status: SHIPPED | OUTPATIENT
Start: 2022-04-29

## 2022-04-29 RX ORDER — BUDESONIDE AND FORMOTEROL FUMARATE DIHYDRATE 160; 4.5 UG/1; UG/1
2 AEROSOL RESPIRATORY (INHALATION)
COMMUNITY
Start: 2021-04-08 | End: 2022-05-02 | Stop reason: ALTCHOICE

## 2022-04-29 ASSESSMENT — PAIN SCALES - GENERAL: PAINLEVEL: MODERATE PAIN (4)

## 2022-04-29 NOTE — LETTER
4/29/2022       RE: Gracy Bautista  [address] [facility] [address]     Dear Colleague,    Thank you for referring your patient, Gracy Bautista, to the [facility]IC [place] [facility]. Please see a copy of my visit note below.    Gracy is s/p FESS. This is her second post op visit. Did not use gentamicin irrigations as recommended, has been using flonase. Has had several respiratory exacerbations, with hospitalization. Has now been approved for dupixent, will start next week. Is using prednisone. She describes worse cough at night associated with PND. Has this secretions and crusting from nose.     1/21/2022:Optical tracking system endoscopic sinus surgery (Bilateral) Procedure: stealth guided, bilateral ethmoidectomy, bilateral frontal sinusotomy, bilateral maxillary antrostomies;  Surgeon: Digna Jaimes MD;  Location: [facility] OR    Exam:    Procedure:  Endoscopy indicated for exam, cleaning   Topical anesthetic/decongestant spray applied.  Rigid scope used for visualization.  Findings:Bilateral frontal recess patent with thick secretions removed with suction. Thick secretions throughout nose, but no obstruction of sinuses.    A/P: 3 month post op visit.  Recommended starting gentamicin rinses and continuing nasal steroid sprays. Explained the potential benefits. She will continue to follow with pulmonary, will assess response to dupixent.    15 minutes spent on the date of the encounter doing chart review, history and exam, documentation and further activities per the note. This time is in addition to separately billable procedure.        Again, thank you for allowing me to participate in the care of your patient.      Sincerely,    Digna Jaimes MD

## 2022-04-29 NOTE — LETTER
Date:May 2, 2022      Patient was self referred, no letter generated. Do not send.        [facility] Health Information

## 2022-04-29 NOTE — PATIENT INSTRUCTIONS
1.  You were seen in the ENT Clinic today by Dr. Jaimes. The following has been recommended:   - Start Gentamicin   - Continue Flonase    - Dupixent administration - discuss with Dr. Bean office    2.  Plan is to return to clinic in 2-3 months.    If you have any questions or concerns after your appointment, please call the clinic.   - Clinic phone: [phone]. Press option #1 for scheduling related needs. Press option #3 for Nurse advice.      Anh Trujillo LPN  [phone]  [facility]  Department of Otolaryngology

## 2022-04-29 NOTE — NURSING NOTE
"Chief Complaint   Patient presents with     RECHECK     3 month follow up, surgery 1/21.    Blood pressure (!) 129/92, pulse 95, temperature 98.9  F (37.2  C), temperature source Temporal, height 1.651 m (5' 5\"), weight 54 kg (119 lb), SpO2 99 %, not currently breastfeeding. [place] Harvey, EMT  "

## 2022-04-29 NOTE — PROGRESS NOTES
Gracy is s/p FESS. This is her second post op visit. Did not use gentamicin irrigations as recommended, has been using flonase. Has had several respiratory exacerbations, with hospitalization. Has now been approved for dupixent, will start next week. Is using prednisone. She describes worse cough at night associated with PND. Has this secretions and crusting from nose.     1/21/2022:Optical tracking system endoscopic sinus surgery (Bilateral) Procedure: stealth guided, bilateral ethmoidectomy, bilateral frontal sinusotomy, bilateral maxillary antrostomies;  Surgeon: Digna Jaimes MD;  [place] [facility] [place]    Exam:    Procedure:  Endoscopy indicated for exam, cleaning   Topical anesthetic/decongestant spray applied.  Rigid scope used for visualization.  Findings:Bilateral frontal recess patent with thick secretions removed with suction. Thick secretions throughout nose, but no obstruction of sinuses.    A/P: 3 month post op visit.  Recommended starting gentamicin rinses and continuing nasal steroid sprays. Explained the potential benefits. She will continue to follow with pulmonary, will assess response to dupixent.    15 minutes spent on the date of the encounter doing chart review, history and exam, documentation and further activities per the note. This time is in addition to separately billable procedure.

## 2022-05-02 ENCOUNTER — OFFICE VISIT (OUTPATIENT)
Dept: PHARMACY | Facility: CLINIC | Age: 57
End: 2022-05-02
Payer: COMMERCIAL

## 2022-05-02 VITALS — OXYGEN SATURATION: 95 % | HEART RATE: 79 BPM | DIASTOLIC BLOOD PRESSURE: 86 MMHG | SYSTOLIC BLOOD PRESSURE: 123 MMHG

## 2022-05-02 DIAGNOSIS — J45.50 SEVERE PERSISTENT ASTHMA WITH ALLERGIC RHINITIS, UNSPECIFIED WHETHER COMPLICATED (H): Primary | ICD-10-CM

## 2022-05-02 PROCEDURE — 99207 PR NO CHARGE LOS: CPT | Performed by: PHARMACIST

## 2022-05-02 RX ORDER — PREDNISONE 20 MG/1
10 TABLET ORAL DAILY PRN
COMMUNITY
End: 2022-07-21

## 2022-05-02 RX ORDER — BISACODYL 5 MG/1
5 TABLET, DELAYED RELEASE ORAL PRN
COMMUNITY

## 2022-05-02 NOTE — PROGRESS NOTES
Clinical Pharmacy Consult:                                                    Gracy Bautista is a 56 year old female coming in for a clinical pharmacist consult.  She was referred to me from pulmonology.     Reason for Consult: Dupixent teaching    Discussion: Reviewed proper storage, time to store at room temp prior to injection, administration technique, proper disposal of syringes, side effects and place in therapy. Patient brought in 2-300mg for a total initial dose of 600mg Dupixent and demonstrated proper administration. A very small amount of liquid was noted on the leg of the patient's first injection. With the first injection, patient pulled up on the pen slightly in the beginning of the injection. Patient had no issues with the second injection. Patient has a calendar to document doses on.   Patient was observed by paramedic for 15 minutes with no incident.    Plan:  1. Call pharmacy for refill of Dupixent.       Tatyana Pickens, Pharm.D, BCACP  Medication Therapy Management Pharmacist

## 2022-05-09 DIAGNOSIS — J45.41 MODERATE PERSISTENT ASTHMA WITH EXACERBATION: ICD-10-CM

## 2022-05-09 RX ORDER — ALBUTEROL SULFATE 90 UG/1
1-2 AEROSOL, METERED RESPIRATORY (INHALATION) EVERY 6 HOURS PRN
Qty: 24 G | Refills: 3 | Status: SHIPPED | OUTPATIENT
Start: 2022-05-09 | End: 2022-12-27

## 2022-05-09 NOTE — TELEPHONE ENCOUNTER
[facility] Call Center    Phone Message    May a detailed message be left on voicemail: yes     Reason for Call: Medication Question or concern regarding medication   Prescription Clarification  Name of Medication: Inhaler  Prescribing Provider: [place]    Pt has questions about her inhaler and is requesting a call back from [facility] to discuss.  Thanks.      Action Taken: Message routed to:  Adult Clinics: Pulmonology p [id number]    Travel Screening: Not Applicable

## 2022-05-09 NOTE — TELEPHONE ENCOUNTER
Contacted patient regarding inhaler questions. Patient states that she went to a wedding on Saturday 05/07/2022. Patient reached for her albuterol (PROAIR HFA/PROVENTIL HFA/VENTOLIN HFA) 108 (90 Base) MCG/ACT inhaler during the reception and discovered that it had fallen out of her purse. Patient was unable to find the inhaler and is requesting a 90 day supply be sent to the [facility]. Rx refill pended.    Donna Gaspar LPN  Pulmonary Medicine:  [facility]  Phone: [phone] Fax: [phone]

## 2022-05-13 ENCOUNTER — MYC MEDICAL ADVICE (OUTPATIENT)
Dept: PULMONOLOGY | Facility: CLINIC | Age: 57
End: 2022-05-13
Payer: COMMERCIAL

## 2022-05-13 DIAGNOSIS — J45.50 SEVERE PERSISTENT ASTHMA, UNSPECIFIED WHETHER COMPLICATED (H): Primary | ICD-10-CM

## 2022-05-13 DIAGNOSIS — R05.9 COUGH: ICD-10-CM

## 2022-05-13 RX ORDER — LEVALBUTEROL INHALATION SOLUTION 0.31 MG/3ML
1 SOLUTION RESPIRATORY (INHALATION) EVERY 4 HOURS PRN
Qty: 125 ML | Refills: 11 | Status: SHIPPED | OUTPATIENT
Start: 2022-05-13 | End: 2023-07-28

## 2022-05-13 RX ORDER — BENZONATATE 200 MG/1
200 CAPSULE ORAL 3 TIMES DAILY PRN
Qty: 90 CAPSULE | Refills: 3 | Status: SHIPPED | OUTPATIENT
Start: 2022-05-13 | End: 2022-07-08

## 2022-05-13 RX ORDER — PREDNISONE 20 MG/1
40 TABLET ORAL DAILY
Qty: 10 TABLET | Refills: 0 | Status: SHIPPED | OUTPATIENT
Start: 2022-05-13 | End: 2022-05-18

## 2022-05-13 NOTE — TELEPHONE ENCOUNTER
Contacted patient regarding my chart message. Patient states that she is travelling to the [facility] this morning, as her son is graduating from college. Patient is requesting Rx for Prednisone, Xopenex neb solution, and Benzonatate to have on hand incase of an emergency.Verbal orders received from Dr. Bean to fill. Medicines have been sent to the [facility] in [place] per patient request.      Donna Gaspar LPN  Pulmonary Medicine:  [facility]  Phone: [phone] Fax: [phone]

## 2022-05-16 ENCOUNTER — TELEPHONE (OUTPATIENT)
Dept: PULMONOLOGY | Facility: CLINIC | Age: 57
End: 2022-05-16

## 2022-05-16 NOTE — TELEPHONE ENCOUNTER
Prior Authorization Retail Medication Request    Medication/Dose: benzonatate (TESSALON) 200 MG capsule    ICD code (if different than what is on RX):    Previously Tried and Failed:    Rationale:      Insurance Name:    Insurance ID:        Pharmacy Information (if different than what is on RX)  Name:    Phone:     no vomiting/no loss of consciousness/no nausea/no numbness/no syncope/no weakness/no fever/no bleeding gums/no chills

## 2022-05-16 NOTE — TELEPHONE ENCOUNTER
PA Initiation    Medication: benzonatate (TESSALON) 200 MG capsule   Insurance Company: [employer] - Phone [phone] Fax [phone]  Pharmacy Filling the Rx: [id number] - [place], [place] - [address] [facility] [address]  Filling Pharmacy Phone: [phone]  Filling Pharmacy Fax: [phone]  Start Date: 5/16/2022

## 2022-05-17 ENCOUNTER — TELEPHONE (OUTPATIENT)
Dept: PULMONOLOGY | Facility: CLINIC | Age: 57
End: 2022-05-17
Payer: COMMERCIAL

## 2022-05-17 NOTE — TELEPHONE ENCOUNTER
Prior Authorization Approval    Authorization Effective Date: 4/16/2022  Authorization Expiration Date: 5/16/2023  Medication: benzonatate (TESSALON) 200 MG capsule--APPROVED  Approved Dose/Quantity:   Reference #:     Insurance Company: [employer] - Phone [phone] Fax [phone]  Expected CoPay:       CoPay Card Available:      Foundation Assistance Needed:    Which Pharmacy is filling the prescription (Not needed for infusion/clinic administered): [id number] - [place], [place] - [address] [facility] [address] [place] [address]  Pharmacy Notified: Yes  Patient Notified: Yes **Instructed pharmacy to notify patient when script is ready to /ship.**

## 2022-05-17 NOTE — TELEPHONE ENCOUNTER
[facility] Call Center    Phone Message    May a detailed message be left on voicemail: yes     Reason for Call: Symptoms or Concerns     If patient has red-flag symptoms, warm transfer to triage line    Current symptom or concern: Gracy called stating that her cough has gotten worse and she is now coughing up yellow phlyem.  She said it made the most significant change over the weekend.  She requested a call back to discuss her symptoms.    Symptoms have been present for:  3-4 day(s)    Has patient previously been seen for this? Yes    By : Dr. Bean    Date: 4/18/22    Are there any new or worsening symptoms? Yes: see above      Action Taken: Message routed to:  Adult Clinics: Pulmonology p [id number]    Travel Screening: Not Applicable

## 2022-05-17 NOTE — TELEPHONE ENCOUNTER
Contacted patient regarding cough sxs. Patient states that she has been coughing for over a month. Patient reports that cough is getting stronger to the point where her lungs hurt. Patient states that Mucinex, Prednisone, Albuterol inhaler, as well as nebs are not helpful. Patient denies fever. Patient reports that this morning she expectorated yellow and grey phlegm. Patient has been added to Dr. Bean's schedule for 05/17/2022 to discuss alternative treatment. Patient agreeable to the plan.    Donna Gaspar LPN  Pulmonary Medicine:  [facility]  Phone: [phone] Fax: [phone]

## 2022-05-18 ENCOUNTER — VIRTUAL VISIT (OUTPATIENT)
Dept: PULMONOLOGY | Facility: CLINIC | Age: 57
End: 2022-05-18
Payer: COMMERCIAL

## 2022-05-18 DIAGNOSIS — J01.40 ACUTE PANSINUSITIS, RECURRENCE NOT SPECIFIED: Primary | ICD-10-CM

## 2022-05-18 PROCEDURE — 99215 OFFICE O/P EST HI 40 MIN: CPT | Mod: 95 | Performed by: INTERNAL MEDICINE

## 2022-05-18 RX ORDER — LEVOFLOXACIN 750 MG/1
750 TABLET, FILM COATED ORAL DAILY
Qty: 7 TABLET | Refills: 0 | Status: SHIPPED | OUTPATIENT
Start: 2022-05-18 | End: 2022-05-25

## 2022-05-18 NOTE — PROGRESS NOTES
Gracy is a 56 year old who is being evaluated via a billable video visit.      How would you like to obtain your AVS? MyChart  If the video visit is dropped, the invitation should be resent by: Text to cell phone: Mobile  Will anyone else be joining your video visit? No      Video Start Time: 8:18 AM        Parish Dubose is a 56 year old who presents for persistent cough, severe asthma and COPD    HPI   History of Present Illness  Ms. Gracy Bautista is a 56 yr old female with a complex history of severe asthma/copd with severe nasal polyposis, chronic rhinosinusitis with multiple surgeries, IgG deficiency who presents to clinic for follow up for uncontrolled severe asthma/copd and persistent cough. I last saw her in clinic in 04/2022.  Please refer to my prior clinic office notes for an overview of her complicated history.  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 Continues to have repeated asthma/copd flares despite maximal inhaler regimen. Has been tried on Xolair and Nucala at different times with no improvement. Work up for ABPA, Churg-Misti disease and other vasculitic/autoimmune work up have returned negative. Chest imaging shows mostly subtle emphysema and low suspicion for conditions such as DIPNECH etc.   Recent sinus surgery with biopsy showing eosinophilic infiltration,  work up continues to show elevated peripheral eosinophilia, most recently at 1200. IgE levels usually range from the 100's to 200's. I decided to try her on a different biologic- Dupixent for which she received her first/loading dose on 05/02/2022.  Her care has been complicated by intolerance to virtually all inhalers and singulair and she is only on rescue Xopenex nebs. Very long conversations have been held about the importance of inhaled corticosteroids and we decided to start QVAR.  Today,  complains of persistent cough, now productive of green yellowish sputum which is a change in color. Also has significant sinus discharges of the same color with congestion. Continues to have frequent wheezing and SOB. She believes that her cough is the most bothersome symptoms. Phlegm is usually very thick and may contain casts.  Recently returned from a trip to [place], and persistent coughing was very bothersome. Has had 2 shots of Dupixent so far with so side effects. Continues to use Flonase, saline rinses and Xopenex Nebs at least twice daily including at night. Had to take prednisone recently and has not gone an entire 2 months without steroids.  Still smoking < 1/ppd per day.       Review of Systems   Constitutional, HEENT, cardiovascular, pulmonary, GI, , musculoskeletal, neuro, skin, endocrine and psych systems are negative, except as otherwise noted.      Objective           Vitals:  No vitals were obtained today due to virtual visit.    Physical Exam   GENERAL: Healthy, alert and no distress  EYES: Eyes grossly normal to inspection.  No discharge or erythema, or obvious scleral/conjunctival abnormalities.  RESP: No audible wheeze, cough, or visible cyanosis.  No visible retractions or increased work of breathing.    SKIN: Visible skin clear. No significant rash, abnormal pigmentation or lesions.  NEURO: Cranial nerves grossly intact.  Mentation and speech appropriate for age.  PSYCH: Mentation appears normal, affect normal/bright, judgement and insight intact, normal speech and appearance well-groomed.        Assessment and Plan:   Severe persistent Asthma with poor control/COPD   Complicated history and continued persistent symptoms despite maximum therapy with different inhaler regimen as well as Singulair, Xolair infusions, Nucala, chronic azithromycin therapy, Flonase and albuterol.    After a long conversation about the importance of inhaled corticosteroids, she was agreeable to QVAR and she  swears to compliance and uses Xopenex as needed. She is adamant that she doesn't want to try any other inhalers due to intolerance and concerns of being on multiple medications. She also refuses Singulair.  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 Work up for ABPA, Churg-Misti disease and other vasculitic/autoimmune work up have returned negative. Chest imaging shows mostly subtle emphysema and low suspicion for conditions such as DIPNECH etc. I suspected that some of her persistent symptoms may be due to ongoing smoking.  Encouraged her to quit smoking, we will continue high dose Qvar, Xopenex Nebs as well as Dupixent shots.       Eosinophilic Chronic Sinusitis   Recent sinus biopsy shows significant eosinophils indicative of eosinophilic chronic sinusitis.  Interestingly, her prior sinus biopsy done at [place] showed similar picture.  She has been on Nucala in the past with no improvement in her symptoms. She was recently started on Dupixent, hopefully she will have a very positive response.  Due to the change in color of the sinus discharge, I will treat her with 7 days of levaquin based on prior sinus biopsy cultures showing strong pseudomonas growth.    Chronic Cough  Likely multifactorial- due to asthma/copd and chronic sinusitis. She is on Flonase and gentamicin rinses. I will start her on Mucinex 1200 mg BID for improved airway clearance and encouraged her to continue to use her nasal sprays.    Active Smoker  Amount of smoking does not meet threshold for lung cancer screening       Questions and concerns were answered to the patient's satisfaction.  she was provided with my contact information should new questions or concerns arise in the interim.    I spent a total of 30 minutes of video timewith rGacy Bautista during today's office visit in addition to 10 minutes reviewing her  chart. Over 50% of this time was spent counseling the patient and/or coordinating care regarding their pulmonary disease.     She should return to clinic after 3 months     Up to date on vaccinations    Marco Bean MD  Pulmonary, Critical Care and Sleep Medicine  [facility]  Pager: [phone]         Video-Visit Details    Type of service:  Video Visit    Video End Time:8:47 AM    Originating Location (pt. Location): Home    Distant Location (provider location):  [facility]     Platform used for Video Visit: Tolerx

## 2022-05-18 NOTE — PROGRESS NOTES
Gracy is a 56 year old who is being evaluated via a billable video visit.      How would you like to obtain your AVS? MyChart  If the video visit is dropped, the invitation should be resent by: Text to cell phone: [phone]  Will anyone else be joining your video visit? Sharon Pat VF

## 2022-06-01 ENCOUNTER — TELEPHONE (OUTPATIENT)
Dept: ALLERGY | Facility: CLINIC | Age: 57
End: 2022-06-01
Payer: COMMERCIAL

## 2022-06-01 NOTE — TELEPHONE ENCOUNTER
Patient would like to speak to a nurse about labs.  Patient has an appointment 7/19.  She can be reached at [phone].  Ok to leave messdage

## 2022-06-01 NOTE — TELEPHONE ENCOUNTER
Spoke with patient.  She never had labs done that provider ordered last  because she said there was an issue with the order and the labs have .  Per chart review, these will not  until 11/3/22.   She's scheduled to see provider in July and is wondering if she should still do these labs prior.  I recommended she just wait for her appointment as provider may want to add or change labs as she has had a lot going on over the past few months.  She is agreeable to this.    Also added her to wait list in case anything sooner becomes available.    Rosalinda Brooke, RN

## 2022-06-08 ENCOUNTER — TELEPHONE (OUTPATIENT)
Facility: CLINIC | Age: 57
End: 2022-06-08
Payer: COMMERCIAL

## 2022-06-08 ENCOUNTER — TELEPHONE (OUTPATIENT)
Dept: OTOLARYNGOLOGY | Facility: CLINIC | Age: 57
End: 2022-06-08
Payer: COMMERCIAL

## 2022-06-08 ENCOUNTER — TELEPHONE (OUTPATIENT)
Dept: PULMONOLOGY | Facility: CLINIC | Age: 57
End: 2022-06-08
Payer: COMMERCIAL

## 2022-06-08 DIAGNOSIS — J32.9 CHRONIC RHINOSINUSITIS: Primary | ICD-10-CM

## 2022-06-08 NOTE — TELEPHONE ENCOUNTER
"Pt would like a call back to discuss her \"traveling\" and \"updating\" [place]. Pt states she knows what this means. If she could get a call back today she would really appreciate it.   "

## 2022-06-08 NOTE — TELEPHONE ENCOUNTER
[facility] Call Center    Phone Message    May a detailed message be left on voicemail: yes     Reason for Call: Other: Pt called back regarding a missed call from Dr. Theodore Kamara's nurse. Pt is requesting if Anh can give her a call back in the morning regarding missed call.     Thank You!    Action Taken: Message routed to:  [facility] Ent    Travel Screening: Not Applicable

## 2022-06-09 NOTE — TELEPHONE ENCOUNTER
Left vm message for patient in regards to symptoms patient is experiencing. Advised pt on voicemail to sent a my chart message to provider with her symptoms and concerns, and writer will forward to provider for recommendations, since patient and writer keep missing each other. Writers call back number was provided on vm in case of further questions.

## 2022-06-09 NOTE — TELEPHONE ENCOUNTER
Voice mail left for patient requesting a call back to pulmonary clinic to discuss.    Donna Gaspar LPN  Pulmonary Medicine:  [facility]  Phone: [phone] Fax: [phone]

## 2022-06-10 RX ORDER — AZELASTINE 1 MG/ML
1 SPRAY, METERED NASAL 2 TIMES DAILY
Qty: 30 ML | Refills: 3 | Status: SHIPPED | OUTPATIENT
Start: 2022-06-10 | End: 2022-07-10

## 2022-06-10 NOTE — TELEPHONE ENCOUNTER
Patient returned call back to pulmonary clinic. Patient c/o constant cough day and night. Patient is tearful stating that she contacted ENT thinking that her issues are sinus related, however, is not able to see and ENT MD until 07/18/2022. Patient was to travel this afternoon, however, she is uncomfortable going out.    Patient states that she is weak, coughing, and is experiencing yellow sinus drainage. No fever reported. Patient is wondering if her sinuses are draining into her lungs. Patient states that she is taking Mucinex, Dupixent, QVAR. Patient does not want to go to the ED for evaluation. Patient does not want to take Prednisone. Patient states that she cannot tolerate Levaquin, however, she does tolerate Amoxicillin. Patient's pulmonary follow up with Dr. Bean has been rescheduled from 07/08/2022 to 06/20/2022. Message sent to Dr. Bean for review/advisement.    Donna Gaspar LPN  Pulmonary Medicine:  [facility]  Phone: [phone] Fax: [phone]

## 2022-06-19 DIAGNOSIS — J45.50 SEVERE PERSISTENT ASTHMA, UNSPECIFIED WHETHER COMPLICATED (H): ICD-10-CM

## 2022-06-19 DIAGNOSIS — J32.9 CHRONIC RHINOSINUSITIS: ICD-10-CM

## 2022-06-19 DIAGNOSIS — J33.9 NASAL POLYPOSIS: ICD-10-CM

## 2022-06-20 RX ORDER — DUPILUMAB 300 MG/2ML
INJECTION, SOLUTION SUBCUTANEOUS
Qty: 8 ML | Refills: 1 | Status: SHIPPED | OUTPATIENT
Start: 2022-06-20 | End: 2022-10-19

## 2022-06-20 NOTE — TELEPHONE ENCOUNTER
Last Written Prescription Date:  4/18/2022  Last Fill Quantity: 4ml,  # refills: 2   Last office visit: 4/18/2022 with prescribing provider:  Dr. Bean   Future Office Visit:   Next 5 appointments (look out 90 days)    Jun 20, 2022  2:00 PM  Return Visit with Marco Bean MD  [facility] ([facility]) [address] [place] [id number]  [phone]   Jul 19, 2022  4:30 PM  Return Visit with Doni Lee MD  [facility] ([facility] ) [address]  [facility] [id number] [phone]

## 2022-07-08 ENCOUNTER — OFFICE VISIT (OUTPATIENT)
Dept: FAMILY MEDICINE | Facility: CLINIC | Age: 57
End: 2022-07-08
Payer: COMMERCIAL

## 2022-07-08 VITALS
TEMPERATURE: 97.9 F | DIASTOLIC BLOOD PRESSURE: 75 MMHG | RESPIRATION RATE: 15 BRPM | OXYGEN SATURATION: 99 % | HEIGHT: 64 IN | HEART RATE: 72 BPM | WEIGHT: 117.4 LBS | BODY MASS INDEX: 20.04 KG/M2 | SYSTOLIC BLOOD PRESSURE: 113 MMHG

## 2022-07-08 DIAGNOSIS — Z12.11 SCREEN FOR COLON CANCER: ICD-10-CM

## 2022-07-08 DIAGNOSIS — Z13.220 SCREENING FOR HYPERLIPIDEMIA: ICD-10-CM

## 2022-07-08 DIAGNOSIS — J32.8 OTHER CHRONIC SINUSITIS: ICD-10-CM

## 2022-07-08 DIAGNOSIS — R13.10 DYSPHAGIA, UNSPECIFIED TYPE: Primary | ICD-10-CM

## 2022-07-08 DIAGNOSIS — K42.0 UMBILICAL HERNIA WITH OBSTRUCTION: ICD-10-CM

## 2022-07-08 DIAGNOSIS — Z12.31 VISIT FOR SCREENING MAMMOGRAM: ICD-10-CM

## 2022-07-08 PROBLEM — M79.18 MYOFASCIAL PAIN: Status: ACTIVE | Noted: 2017-06-06

## 2022-07-08 PROBLEM — J45.40 MODERATE PERSISTENT ASTHMA, UNCOMPLICATED: Status: ACTIVE | Noted: 2018-12-04

## 2022-07-08 PROBLEM — Z72.0 TOBACCO ABUSE: Status: ACTIVE | Noted: 2021-04-22

## 2022-07-08 PROBLEM — G56.00 CARPAL TUNNEL SYNDROME: Status: ACTIVE | Noted: 2019-08-07

## 2022-07-08 PROBLEM — R76.0 ANTICARDIOLIPIN ANTIBODY POSITIVE: Status: ACTIVE | Noted: 2022-07-08

## 2022-07-08 PROBLEM — H54.40 BLIND RIGHT EYE: Status: ACTIVE | Noted: 2017-09-01

## 2022-07-08 PROBLEM — J45.909: Status: RESOLVED | Noted: 2021-06-09 | Resolved: 2022-07-08

## 2022-07-08 LAB — TOTAL PROTEIN SERUM FOR ELP: 6.7 G/DL (ref 6.4–8.3)

## 2022-07-08 PROCEDURE — 86774 TETANUS ANTIBODY: CPT | Mod: 90 | Performed by: FAMILY MEDICINE

## 2022-07-08 PROCEDURE — 99204 OFFICE O/P NEW MOD 45 MIN: CPT | Mod: 25 | Performed by: FAMILY MEDICINE

## 2022-07-08 PROCEDURE — 36415 COLL VENOUS BLD VENIPUNCTURE: CPT | Performed by: FAMILY MEDICINE

## 2022-07-08 PROCEDURE — 90715 TDAP VACCINE 7 YRS/> IM: CPT | Performed by: FAMILY MEDICINE

## 2022-07-08 PROCEDURE — 99000 SPECIMEN HANDLING OFFICE-LAB: CPT | Performed by: FAMILY MEDICINE

## 2022-07-08 PROCEDURE — 90471 IMMUNIZATION ADMIN: CPT | Performed by: FAMILY MEDICINE

## 2022-07-08 PROCEDURE — 86038 ANTINUCLEAR ANTIBODIES: CPT | Performed by: FAMILY MEDICINE

## 2022-07-08 PROCEDURE — 86381 MITOCHONDRIAL ANTIBODY EACH: CPT | Performed by: FAMILY MEDICINE

## 2022-07-08 PROCEDURE — 84165 PROTEIN E-PHORESIS SERUM: CPT | Performed by: PATHOLOGY

## 2022-07-08 PROCEDURE — 86162 COMPLEMENT TOTAL (CH50): CPT | Mod: 90 | Performed by: FAMILY MEDICINE

## 2022-07-08 PROCEDURE — 84155 ASSAY OF PROTEIN SERUM: CPT | Performed by: FAMILY MEDICINE

## 2022-07-08 PROCEDURE — 82784 ASSAY IGA/IGD/IGG/IGM EACH: CPT | Performed by: FAMILY MEDICINE

## 2022-07-08 ASSESSMENT — PAIN SCALES - GENERAL: PAINLEVEL: NO PAIN (0)

## 2022-07-08 NOTE — PROGRESS NOTES
Assessment & Plan     Dysphagia, unspecified type  - Speech Therapy Referral; Future    Umbilical hernia with obstruction  - Adult General Surg Referral; Future    Screen for colon cancer  - Colonscopy Screening  Referral; Future    Screening for hyperlipidemia  - REVIEW OF HEALTH MAINTENANCE PROTOCOL ORDERS    Visit for screening mammogram  - MA SCREENING DIGITAL BILAT - Future  (s+30); Future    Other chronic sinusitis  - Tetanus antibody  - Complement Activity Total (CH50)  - IgM  - Protein electrophoresis  - Anti Nuclear Kaycee IgG by IFA with Reflex  - Mitochondrial M2 Antibody IgG             Tobacco Cessation:   reports that she has been smoking cigarettes. She started smoking about 9 months ago. She has been smoking about 0.00 packs per day for the past 15.00 years. She has never used smokeless tobacco.  Tobacco Cessation Action Plan: Information offered: Patient not interested at this time      Return in about 6 weeks (around 8/19/2022) for routine physical with lipid panel.    Donna Hwang MD  [facility] JOSUE Dubose is a 56 year old accompanied by her Self, presenting for the following health issues:    Patient with a medical history of here to establish care, New to provider.  She has a medical history of Asthma/COPD established with pulmonology, Chronic Eosinophilic rhinosinusitis and severe nasal polyposis, Mood disorder (Sees [place] care Dr Enriquez and [facility] Weekly), IgG deficiency, pulmonary nodules and Tobacco Dependence.      History of Present Illness       Reason for visit:  Referred from Donna at Dr Rasheed nurse for Primary Care Macrina consumes 1 sweetened beverage(s) daily.She exercises with enough effort to increase her heart rate 9 or less minutes per day.  She exercises with enough effort to increase her heart rate 3 or less days per week.   She is taking medications regularly.     Current concerns;   Dysphagia with solids and liquids  Abdomen  "bloating Denies alarming symptoms of melena, Hematochezia, N/V/D, constipation  Lesion umbilical region.      Review of Systems   Constitutional, HEENT, cardiovascular, pulmonary, GI, , musculoskeletal, neuro, skin, endocrine and psych systems are negative, except as otherwise noted.      Objective    /75   Pulse 72   Temp 97.9  F (36.6  C) (Temporal)   Resp 15   Ht 1.63 m (5' 4.17\")   Wt 53.3 kg (117 lb 6.4 oz)   SpO2 99%   BMI 20.04 kg/m    Body mass index is 20.04 kg/m .  Physical Exam   GENERAL: healthy, alert and no distress  EYES: Eyes grossly normal to inspection, PERRL and conjunctivae and sclerae normal  HENT: ear canals and TM's normal, nose and mouth without ulcers or lesions  NECK: no adenopathy, no asymmetry, masses, or scars and thyroid normal to palpation  RESP: lungs clear to auscultation - no rales, rhonchi or wheezes  CV: regular rate and rhythm, normal S1 S2, no S3 or S4, no murmur, click or rub, no peripheral edema and peripheral pulses strong  ABDOMEN: soft, nontender, no hepatosplenomegaly, no masses and bowel sounds normal. Devon sized reducible inguina hernia.  MS: no gross musculoskeletal defects noted, no edema                .  ..  "

## 2022-07-08 NOTE — NURSING NOTE
Prior to immunization administration, verified patients identity using patient s name and date of birth. Please see Immunization Activity for additional information.     Screening Questionnaire for Adult Immunization    Are you sick today?   No   Do you have allergies to medications, food, a vaccine component or latex?   No   Have you ever had a serious reaction after receiving a vaccination?   No   Do you have a long-term health problem with heart, lung, kidney, or metabolic disease (e.g., diabetes), asthma, a blood disorder, no spleen, complement component deficiency, a cochlear implant, or a spinal fluid leak?  Are you on long-term aspirin therapy?   No   Do you have cancer, leukemia, HIV/AIDS, or any other immune system problem?   No   Do you have a parent, brother, or sister with an immune system problem?   No   In the past 3 months, have you taken medications that affect  your immune system, such as prednisone, other steroids, or anticancer drugs; drugs for the treatment of rheumatoid arthritis, Crohn s disease, or psoriasis; or have you had radiation treatments?   No   Have you had a seizure, or a brain or other nervous system problem?   No   During the past year, have you received a transfusion of blood or blood    products, or been given immune (gamma) globulin or antiviral drug?   No   For women: Are you pregnant or is there a chance you could become       pregnant during the next month?   No   Have you received any vaccinations in the past 4 weeks?   No     Immunization questionnaire answers were all negative.        Per orders of Dr. Hwang, injection of Tdap given by Cindy Cannon. Patient instructed to remain in clinic for 15 minutes afterwards, and to report any adverse reaction to me immediately.       Screening performed by Cindy Cannon on 7/8/2022 at 3:20 PM.

## 2022-07-11 ENCOUNTER — TELEPHONE (OUTPATIENT)
Dept: GASTROENTEROLOGY | Facility: CLINIC | Age: 57
End: 2022-07-11

## 2022-07-11 LAB
ALBUMIN SERPL ELPH-MCNC: 4.2 G/DL (ref 3.7–5.1)
ALPHA1 GLOB SERPL ELPH-MCNC: 0.3 G/DL (ref 0.2–0.4)
ALPHA2 GLOB SERPL ELPH-MCNC: 0.6 G/DL (ref 0.5–0.9)
ANA SER QL IF: NEGATIVE
B-GLOBULIN SERPL ELPH-MCNC: 0.7 G/DL (ref 0.6–1)
GAMMA GLOB SERPL ELPH-MCNC: 0.8 G/DL (ref 0.7–1.6)
IGM SERPL-MCNC: 275 MG/DL (ref 35–242)
M PROTEIN SERPL ELPH-MCNC: 0 G/DL
PROT PATTERN SERPL ELPH-IMP: NORMAL

## 2022-07-11 NOTE — TELEPHONE ENCOUNTER
Screening Questions  BlueKIND OF PREP RedLOCATION [review exclusion criteria] GreenSEDATION TYPE      1.  Y Are you active on mychart?       2.  Donna Hwang MD Ordering/Referring Provider:      3. [employer]  What insurance is in the chart?    4.  Y             Are you able to give consent for your medical care?                (Sedation review/consideration needed)      5.   20.04  BMI  [BMI OVER 40-EXTENDED PREP]  If greater than 40 review exclusion criteria [PAC APPT IF @ [facility]]       6.   N Have you had a positive covid test in the last 90 days?      7.   Respiratory Screening :  [If yes to any of the following HOSPITAL setting only]                     N Do you use daily home oxygen?   N Do you have mod to severe Obstructive Sleep Apnea?  [OKAY @ [facility] SH PH RI]                  N Do you have Pulmonary Hypertension?                   N Do you have UNCONTROLLED asthma?         8.   N Have you had a heart or lung transplant?       9.   N Are you currently on dialysis?   [ If yes, G-PREP & HOSPITAL setting only]      10.   N  Do you have chronic kidney disease?  [ If yes, G-PREP ]     11.  N Have you had a stroke or Transient ischemic attack (TIA - aka  mini stroke ) within 6 months?   (If yes, please review exclusion criteria)     12.   N In the past 6 months, have you had any heart related issues including cardiomyopathy or heart attack?           N If yes, did it require cardiac stenting or other implantable device?       13.   N Do you have any implantable devices in your body (pacemaker, defib, LVAD)?  (If yes, please review exclusion criteria)     14.   N Do you take nitroglycerin?            N If yes, how often? n  (if yes, HOSPITAL setting ONLY)     15.   N Are you currently taking any blood thinners?           [IF YES, INFORM PATIENT TO FOLLOW UP W/ ORDERING PROVIDER FOR BRIDGING INSTRUCTIONS]      16.    N  Do you have a diagnosis of diabetes?  [ If yes, G-PREP ]     17.   [FEMALES] N Are  you currently pregnant?    N If yes, how many weeks?      18.    N  Are you taking any prescription pain medications on a routine schedule?    [ If yes, EXTENDED PREP.] [If yes, MAC]     19.   N Do you have any chemical dependencies such as alcohol, street drugs, or methadone?   [If yes, MAC]     20.   Y Do you have any history of post-traumatic stress syndrome, severe anxiety or history of psychosis?   [If yes, MAC]     21.   Y Do you transfer independently?       22.   N  On a regular basis do you go 3-5 days between bowel movements?  [ If yes, EXTENDED PREP.]     23.    Preferred LOCAL Pharmacy for Pre Prescription     [id number] - [place], [place] - [address] [facility] [address]      Scheduling Details         Gracy : Caller   (Please ask for phone number if not scheduled by patient)    Lower Endoscopy [Colonoscopy] : Type of Procedure Scheduled   mprep Which Colonoscopy Prep was Sent?      PAULA  Surgeon    08/22 12:45pmDate of Procedure   [facility] Location    MAC Sedation Type     Conscious Sedation- Needs  for 6 hours after the procedure  MAC/General-Needs  for 24 hours after procedure     Y Pre-op Required at [facility] [place] [place] and OR for MAC sedation   (advise patient they will need a pre-op prior to procedure -)       Y Informed patient they will need an adult    Cannot take any type of public or medical transportation alone     HOME Pre-Procedure Covid test to be completed at [facility] or Externally     Y  Confirmed Nurse will call to complete assessment     Additional comments:

## 2022-07-12 ENCOUNTER — TELEPHONE (OUTPATIENT)
Dept: OTOLARYNGOLOGY | Facility: CLINIC | Age: 57
End: 2022-07-12

## 2022-07-12 DIAGNOSIS — J32.9 SINUS INFECTION: Primary | ICD-10-CM

## 2022-07-12 LAB — MITOCHONDRIA M2 IGG SER-ACNC: 1.2 U/ML

## 2022-07-12 RX ORDER — DOXYCYCLINE HYCLATE 100 MG
100 TABLET ORAL 2 TIMES DAILY
Qty: 20 TABLET | Refills: 0 | Status: SHIPPED | OUTPATIENT
Start: 2022-07-12 | End: 2022-07-22

## 2022-07-12 NOTE — TELEPHONE ENCOUNTER
Left vm message for patient regarding vm message and information relayed. Provider was contacted and prescription for an antibiotic has been sent to patients pharmacy. Writers call back number provided on vm.

## 2022-07-12 NOTE — TELEPHONE ENCOUNTER
[facility] Call Center    Phone Message    May a detailed message be left on voicemail: yes     Reason for Call: Other: Patient left a message on Anh's voicemail regarding a sinus infection, patient states she left a little wrong information on the message, and states urgent care prescribed the medication she stated. Patient wants to make sure Nieshay recevived the voicemail as well as reach out to patient to discuss. Thank you      Action Taken: Message routed to:  [facility] ENT     Travel Screening: Not Applicable

## 2022-07-13 LAB
C TETANI TOXOID IGG SERPL IA-ACNC: 6.3 IU/ML
CH50 SERPL-ACNC: 70.5 U/ML

## 2022-07-18 ENCOUNTER — OFFICE VISIT (OUTPATIENT)
Dept: OTOLARYNGOLOGY | Facility: CLINIC | Age: 57
End: 2022-07-18
Payer: COMMERCIAL

## 2022-07-18 VITALS
SYSTOLIC BLOOD PRESSURE: 115 MMHG | HEIGHT: 64 IN | HEART RATE: 65 BPM | OXYGEN SATURATION: 97 % | DIASTOLIC BLOOD PRESSURE: 72 MMHG | BODY MASS INDEX: 20.49 KG/M2 | TEMPERATURE: 99 F | WEIGHT: 120 LBS

## 2022-07-18 DIAGNOSIS — J32.4 CHRONIC PANSINUSITIS: ICD-10-CM

## 2022-07-18 DIAGNOSIS — F41.9 ANXIETY: Primary | ICD-10-CM

## 2022-07-18 PROCEDURE — 87205 SMEAR GRAM STAIN: CPT | Mod: 90 | Performed by: PATHOLOGY

## 2022-07-18 PROCEDURE — 87077 CULTURE AEROBIC IDENTIFY: CPT | Mod: 90 | Performed by: PATHOLOGY

## 2022-07-18 PROCEDURE — 87186 SC STD MICRODIL/AGAR DIL: CPT | Mod: 90 | Performed by: PATHOLOGY

## 2022-07-18 PROCEDURE — 87070 CULTURE OTHR SPECIMN AEROBIC: CPT | Mod: 90 | Performed by: PATHOLOGY

## 2022-07-18 PROCEDURE — 99213 OFFICE O/P EST LOW 20 MIN: CPT | Mod: 25 | Performed by: OTOLARYNGOLOGY

## 2022-07-18 PROCEDURE — 87102 FUNGUS ISOLATION CULTURE: CPT | Mod: 90 | Performed by: PATHOLOGY

## 2022-07-18 PROCEDURE — 31231 NASAL ENDOSCOPY DX: CPT | Performed by: OTOLARYNGOLOGY

## 2022-07-18 PROCEDURE — 99000 SPECIMEN HANDLING OFFICE-LAB: CPT | Performed by: PATHOLOGY

## 2022-07-18 ASSESSMENT — PAIN SCALES - GENERAL: PAINLEVEL: MODERATE PAIN (5)

## 2022-07-18 NOTE — PROGRESS NOTES
Gracy is not doing well. Continues to have profuse secretions, coughing, chest congestion and post nasal drainage. Using flonase and gent irrigations (1 daily). On Dupixent, will be 6 weeks soon. She admits to moderate depression and frustration. We discussed having her see integrated behavioral health. .    Brother had lung disease, diabetes, kidney failure  Father lung disease, diabetes, EtoH.     Exam: alert mildly distraught    Procedure:  Endoscopy indicated for exam of sinuses  Topical anesthetic/decongestant spray applied.  Rigid scope used for visualization.  Findings: thick copious secretions in bilateral middle meatus, sinus cavities and nasal cavities. Suction performed cultures taken.      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 Has eosiinophilia, has cultured staph, pseudomonas, fungus. Allergy and rheumatologic work up not remarkable, has had low IgG in past. Discussed family history, could consider weat chloride test. Will message allergist.. Appreciate ongoing care from rest of team.       25 minutes spent on the date of the encounter doing chart review, history and exam, documentation and further activities per the note. Messaged care team.

## 2022-07-18 NOTE — LETTER
Date:July 19, 2022      Patient was self referred, no letter generated. Do not send.        [facility] Health Information

## 2022-07-18 NOTE — PATIENT INSTRUCTIONS
1.  You were seen in the ENT Clinic today by . If you have any questions or concerns after your appointment, please call [phone]. Press option #1 for scheduling related needs. Press option #3 for Nurse advice.    2.    has recommended the following:   - behavioral health referral with Jarad Perez   - sinus culture done today. We will contact you with results    3.  Plan is to return to clinic in 3 months      Anh Trujillo LPN  [phone]  [facility] - Otolaryngology

## 2022-07-18 NOTE — NURSING NOTE
"Chief Complaint   Patient presents with     RECHECK     2-3 month follow up    Blood pressure 115/72, pulse 65, temperature 99  F (37.2  C), temperature source Temporal, height 1.626 m (5' 4\"), weight 54.4 kg (120 lb), SpO2 97 %, not currently breastfeeding. Yue Harvey, EMT  "

## 2022-07-18 NOTE — RESULT ENCOUNTER NOTE
Protective tetanus antibody level.  Total complement is within normal limits.  Negative BRICE.  Negative mitochondrial antibodies.  SPEP is within normal limits.  Mildly elevated IgM.

## 2022-07-18 NOTE — LETTER
7/18/2022       RE: Gracy Bautista  [address] [facility] [address]     Dear Colleague,    Thank you for referring your patient, Gracy Bautista, to the [facility] [place] at [facility]. Please see a copy of my visit note below.    Gracy is not doing well. Continues to have profuse secretions, coughing, chest congestion and post nasal drainage. Using flonase and gent irrigations (1 daily). On Dupixent, will be 6 weeks soon. She admits to moderate depression and frustration. We discussed having her see integrated behavioral health. .    Brother had lung disease, diabetes, kidney failure  Father lung disease, diabetes, EtoH.     Exam: alert mildly distraught    Procedure:  Endoscopy indicated for exam of sinuses  Topical anesthetic/decongestant spray applied.  Rigid scope used for visualization.  Findings: thick copious secretions in bilateral middle meatus, sinus cavities and nasal cavities. Suction performed cultures taken.      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 Has eosiinophilia, has cultured staph, pseudomonas, fungus. Allergy and rheumatologic work up not remarkable, has had low IgG in past. Discussed family history, could consider weat chloride test. Will message allergist.. Appreciate ongoing care from rest of team.       25 minutes spent on the date of the encounter doing chart review, history and exam, documentation and further activities per the note. Messaged care team.           Again, thank you for allowing me to participate in the care of your patient.      Sincerely,    Digna Jaimes MD

## 2022-07-19 ENCOUNTER — TELEPHONE (OUTPATIENT)
Dept: ALLERGY | Facility: CLINIC | Age: 57
End: 2022-07-19

## 2022-07-19 ENCOUNTER — OFFICE VISIT (OUTPATIENT)
Dept: ALLERGY | Facility: OTHER | Age: 57
End: 2022-07-19
Payer: COMMERCIAL

## 2022-07-19 VITALS
WEIGHT: 120 LBS | OXYGEN SATURATION: 98 % | BODY MASS INDEX: 20.49 KG/M2 | HEART RATE: 76 BPM | SYSTOLIC BLOOD PRESSURE: 94 MMHG | DIASTOLIC BLOOD PRESSURE: 72 MMHG | HEIGHT: 64 IN

## 2022-07-19 DIAGNOSIS — J45.50 NOT WELL CONTROLLED SEVERE PERSISTENT ASTHMA (H): Primary | ICD-10-CM

## 2022-07-19 DIAGNOSIS — Z23 NEED FOR PNEUMOCOCCAL VACCINE: ICD-10-CM

## 2022-07-19 DIAGNOSIS — J32.8 OTHER CHRONIC SINUSITIS: ICD-10-CM

## 2022-07-19 DIAGNOSIS — J32.8 OTHER CHRONIC SINUSITIS: Primary | ICD-10-CM

## 2022-07-19 PROCEDURE — 90732 PPSV23 VACC 2 YRS+ SUBQ/IM: CPT | Performed by: ALLERGY & IMMUNOLOGY

## 2022-07-19 PROCEDURE — 99215 OFFICE O/P EST HI 40 MIN: CPT | Mod: 25 | Performed by: ALLERGY & IMMUNOLOGY

## 2022-07-19 PROCEDURE — 90471 IMMUNIZATION ADMIN: CPT | Performed by: ALLERGY & IMMUNOLOGY

## 2022-07-19 ASSESSMENT — ENCOUNTER SYMPTOMS
MYALGIAS: 0
EYE DISCHARGE: 0
ADENOPATHY: 0
EYE ITCHING: 0
RHINORRHEA: 0
WHEEZING: 1
SHORTNESS OF BREATH: 1
ARTHRALGIAS: 0
DIARRHEA: 0
FEVER: 0
EYE REDNESS: 0
SINUS PRESSURE: 0
CHEST TIGHTNESS: 0
VOMITING: 0
NAUSEA: 0
COUGH: 1
FACIAL SWELLING: 0
FATIGUE: 1
ACTIVITY CHANGE: 0
HEADACHES: 0
SINUS PAIN: 1
CHILLS: 0

## 2022-07-19 NOTE — TELEPHONE ENCOUNTER
----- Message from Digna Jaimes MD sent at 7/18/2022  6:58 PM CDT -----  Doni,  I saw Gracy today and I am still puzzled by her ongoing symptoms despite aggressive therapy, as well as her fairly unremarkable work up. One additional thought I had today was to send her for sweat chloride testing. She has a family history of respiratory disease, but no diagnoses of CF. Do you think this is still reasonable? If so, let me know if you could order it, or else I will - I have not ordered it often. I have also referred her to integrated behavioral health for symptoms of depression.   Digna

## 2022-07-19 NOTE — TELEPHONE ENCOUNTER
I rarely order sweat chloride test for adults, but I think it is worthwhile trying.  I do not know if her pulmonologist would be able to order it on the adult side.  Usually, it is done at [facility].  I can try ordering it.  If there is CF, likely an atypical one.  Her pulmonary function test in 2020 showed a normal study.    By the way, her immune workup was not completed.  In October, pneumococcal antibody levels were suboptimally protective.  I asked the patient to get Pneumovax and then call us, so I could order postvaccination antibody levels.  She never did.  I saw her once.  She did not follow-up after October appointment.    Allergy RN please ask the patient to schedule sweat chloride test, get Pneumovax, and call us after it is done.  I will need to order pneumococcal antibody levels to be done 4 weeks later.        Doni Lee MD

## 2022-07-19 NOTE — PROGRESS NOTES
SUBJECTIVE:                                                                   Gracy Bautista presents today to our Allergy Clinic at [facility] for a follow up visit. She is a 56 year old female with a historical diagnosis of severe asthma and chronic sinusitis.    Asthma  History of smoking 3 to 4 cigarettes/day since the age of 15 years. Developed chest symptoms in her 50s, manifested by wheezing, chest tightness, shortness of breath, and mainly productive cough. Serum IgE for regional aeroallergen panel in December 2020 was negative.  In March 2021, slight sensitivity to Aspergillus was noted. She tried and failed omalizumab and mepolizumab.  Negative ABPA panel in the past.  Alpha-1 antitrypsin in the past was within normal limits.    Pulmonary function test in 2022 did not suggest an obstruction    Pansinusitis  History of sinus symptoms since late 40s.  Previous sinus CT consistent with pansinusitis.  Sinus biopsy with chronic inflammation and markedly increased eosinophils.  No mention about possible vasculitis. Has a history of nasal polyposis.  Negative ANCA's in the past. Refractory to multiple treatments, requiring multiple antibiotics   In October, T and B cell panel was within normal limits.  Immunoglobulin G was within normal limits. IgA was within normal limits.    Immunoglobulin M was elevated. SPEP was within normal limits. Negative BRICE. Negative antimitochondrial antibodies.  Pneumococcal antibody levels were 13/22 protective.   suggested the patient to get pneumococcal vaccine and then repeat pneumococcal antibody levels later. Total complement was within normal limits.     Since the last visit, she did not get Pneumovax and did not repeat pneumococcal antibody levels.  She continues smoking about 1 cigarette/day.  However, when she was seen in the ED 1 month ago, she reported 0.25 packs/day.    For some reason, she stopped Symbicort and Spiriva.  Currently, she uses  Qvar 2 puffs twice daily.  She also started dupilumab in May 2022.  Despite that, she feels that her asthma symptoms are not well controlled.  Asthma tends to get worse with sinus symptoms.    She was recently seen in the ED, complaining of productive discolored cough, chest tightness, wheezing, coughing, and shortness of breath.  Because her sinuses were acting up as well, she was treated with prednisone and doxycycline.    Besides days when she was on doxycycline, she would take azithromycin 3 times a week.  Uses intranasal fluticasone and gentamicin experiences.  She still has some chest symptoms and sinus symptoms but not as bad as before she was started on doxycycline and prednisone.  Uses albuterol daily.    Previously, I prescribed Trelegy Ellipta, but she stopped it for unknown reasons after 1 week of use      Patient Active Problem List   Diagnosis     Chronic pansinusitis     ADD (attention deficit disorder)     Alcohol use disorder, moderate, in sustained remission (H)     Blind right eye     Cardiac arrhythmia     Carpal tunnel syndrome     PTSD (post-traumatic stress disorder)     Hyperlipidemia     Hypogammaglobulinemia (H)     Immunoglobulin deficiency (H)     Insomnia     Moderate persistent asthma, uncomplicated     Myofascial pain     Peripheral vascular disease (H)     Tobacco abuse     Anticardiolipin antibody positive       Past Medical History:   Diagnosis Date     Allergic rhinitis      Arthritis      Chronic sinusitis      Depressive disorder      Hoarseness      Reduced vision      Uncomplicated asthma       Problem (# of Occurrences) Relation (Name,Age of Onset)    Bleeding Disorder (2) Brother (Bill), Mother (Darlyn)    Cancer (1) Brother (Bill)    Cerebrovascular Disease (1) Father (Jer)    Diabetes (1) Father (Jer)    Heart Disease (1) Father (Jer)        Past Surgical History:   Procedure Laterality Date     NASAL/SINUS POLYPECTOMY       OPTICAL TRACKING SYSTEM ENDOSCOPIC SINUS  SURGERY Bilateral 1/21/2022    Procedure: stealth guided, bilateral ethmoidectomy, bilateral frontal sinusotomy, bilateral maxillary antrostomies;  Surgeon: Digna Jaimes MD;  Location: [facility] OR     Social History     Socioeconomic History     Marital status:      Spouse name: None     Number of children: None     Years of education: None     Highest education level: None   Occupational History     Occupation: HOMEMAKER   Tobacco Use     Smoking status: Light Tobacco Smoker     Packs/day: 0.00     Years: 15.00     Pack years: 0.00     Types: Cigarettes     Start date: 9/17/2021     Smokeless tobacco: Never Used     Tobacco comment: 6 cigarettes per week   Vaping Use     Vaping Use: Never used   Substance and Sexual Activity     Alcohol use: Not Currently     Drug use: Never     Sexual activity: Yes     Partners: Male     Birth control/protection: None   Social History Narrative    October 7, 2021    ENVIRONMENTAL HISTORY: [place] The home is heated with a electric furnace. They do have central air conditioning. The patient's bedroom is furnished with carpeting in bedroom, allergen mattress cover, and allergen pillowcase cover.  Pets inside the house include 1 dog. There is no history of cockroach or mice infestation. There is/are 0 smokers in the house.  The house does have a damp basement.            Review of Systems   Constitutional: Positive for fatigue. Negative for activity change, chills and fever.   HENT: Positive for sinus pain. Negative for congestion, ear discharge, facial swelling, nosebleeds, postnasal drip, rhinorrhea, sinus pressure and sneezing.    Eyes: Negative for discharge, redness and itching.   Respiratory: Positive for cough, shortness of breath and wheezing. Negative for chest tightness.    Cardiovascular: Negative for chest pain.   Gastrointestinal: Negative for diarrhea, nausea and vomiting.   Musculoskeletal: Negative for arthralgias and  myalgias.   Skin: Negative for rash.   Allergic/Immunologic: Positive for environmental allergies.   Neurological: Negative for headaches.   Hematological: Negative for adenopathy.   Psychiatric/Behavioral: Negative for self-injury.           Current Outpatient Medications:      albuterol (PROAIR HFA/PROVENTIL HFA/VENTOLIN HFA) 108 (90 Base) MCG/ACT inhaler, Inhale 1-2 puffs into the lungs every 6 hours as needed for shortness of breath / dyspnea or wheezing, Disp: 24 g, Rfl: 3     beclomethasone HFA (QVAR REDIHALER) 80 MCG/ACT inhaler, Inhale 1 puff into the lungs 2 times daily, Disp: 10.6 g, Rfl: 3     bisacodyl (DULCOLAX) 5 MG EC tablet, Take 5 mg by mouth as needed for constipation, Disp: , Rfl:      CYMBALTA 60 MG capsule, Take 60 mg by mouth every morning , Disp: , Rfl:      doxycycline hyclate (VIBRA-TABS) 100 MG tablet, Take 1 tablet (100 mg) by mouth 2 times daily for 10 days, Disp: 20 tablet, Rfl: 0     DUPIXENT 300 MG/2ML prefilled pen, INJECT 1 PEN (300 MG) UNDER THE SKIN EVERY 2 WEEKS, Disp: 8 mL, Rfl: 1     Fluticasone-Umeclidin-Vilanterol (TRELEGY ELLIPTA) 200-62.5-25 MCG/INH oral inhaler, Inhale 1 puff into the lungs daily, Disp: 60 each, Rfl: 3     LAMICTAL 200 MG tablet, Take 200 mg by mouth every morning , Disp: , Rfl:      levalbuterol (XOPENEX) 0.31 MG/3ML neb solution, Take 3 mLs (0.31 mg) by nebulization every 4 hours as needed for wheezing or shortness of breath / dyspnea, Disp: 125 mL, Rfl: 11     nicotine polacrilex (NICORETTE) 4 MG gum, TAKE 1 BY MOUTH FOUR TIMES DAILY, Disp: , Rfl:      spacer (OPTICHAMBER ELBA) holding chamber, Use with albuterol (PROAIR HFA/PROVENTIL HFA/VENTOLIN HFA) 108 (90 Base) MCG/ACT inhaler, Disp: 1 each, Rfl: 0     ADDERALL XR 20 MG 24 hr capsule, Take 20 mg by mouth daily as needed PRN (Patient not taking: Reported on 7/19/2022), Disp: , Rfl:      azithromycin (ZITHROMAX) 250 MG tablet, TAKE 1 TABLET BY MOUTH EVERY MONDAY, WEDNESDAY, AND FRIDAY MORNING  "(Patient not taking: Reported on 7/19/2022), Disp: 36 tablet, Rfl: 3     predniSONE (DELTASONE) 20 MG tablet, Take 10 mg by mouth daily as needed (Patient not taking: Reported on 7/19/2022), Disp: , Rfl:   Immunization History   Administered Date(s) Administered     COVID-19,PF,Pfizer (12+ Yrs) 03/20/2021, 04/10/2021, 08/19/2021     FLU 6-35 months 11/21/2011     Influenza (IIV3) PF 11/04/2005, 10/23/2006, 10/20/2009     Influenza Quad, Recombinant, pf(RIV4) (Flublok) 10/07/2021     Influenza Vaccine IM > 6 months Valent IIV4 (Alfuria,Fluzone) 10/13/2016, 09/07/2017, 10/25/2019     Pneumo Conj 13-V (2010&after) 09/07/2017     Pneumococcal 23 valent 11/04/2005, 02/18/2019, 07/19/2022     TDAP Vaccine (Boostrix) 08/03/2011     Td (Adult), Adsorbed 01/01/2001, 10/25/2008     Tdap (Adacel,Boostrix) 07/08/2022     Allergies   Allergen Reactions     Bee Venom Angioedema, Swelling and Hives     Other reaction(s): Angioedema       Cefdinir Diarrhea and GI Disturbance              Levofloxacin Muscle Pain (Myalgia)     Prednisone Anxiety and Other (See Comments)     Hyperactivity and moodiness. Doesn't like how it makes her feel.       OBJECTIVE:                                                                 BP 94/72   Pulse 76   Ht 1.626 m (5' 4\")   Wt 54.4 kg (120 lb)   SpO2 98%   BMI 20.60 kg/m          Physical Exam  Vitals and nursing note reviewed.   Constitutional:       General: She is not in acute distress.     Appearance: She is not ill-appearing, toxic-appearing or diaphoretic.   HENT:      Head: Normocephalic and atraumatic.      Right Ear: Tympanic membrane, ear canal and external ear normal.      Left Ear: Tympanic membrane, ear canal and external ear normal.      Nose:      Right Turbinates: Enlarged (Erythematous).      Left Turbinates: Enlarged (Erythematous).      Comments: Scant amount of whitish/sticky secretions in both nostrils.     Mouth/Throat:      Lips: Pink.      Mouth: Mucous membranes are " moist.      Pharynx: Oropharynx is clear. No pharyngeal swelling, oropharyngeal exudate, posterior oropharyngeal erythema or uvula swelling.   Eyes:      General:         Right eye: No discharge.         Left eye: No discharge.      Conjunctiva/sclera: Conjunctivae normal.   Cardiovascular:      Rate and Rhythm: Normal rate and regular rhythm.      Heart sounds: Normal heart sounds.   Pulmonary:      Effort: Pulmonary effort is normal. No respiratory distress.      Breath sounds: No stridor. No decreased breath sounds, wheezing or rales.   Skin:     General: Skin is warm.      Capillary Refill: Capillary refill takes less than 2 seconds.   Neurological:      Mental Status: She is alert and oriented to person, place, and time.   Psychiatric:         Mood and Affect: Mood normal.         Behavior: Behavior normal.         ASSESSMENT/PLAN:    Not well controlled severe persistent asthma  Symptoms not well controlled.  - Continue dupilumab 300 mg every 14 days.  I would recommend to be on it for at least 3 months before stopping it.    Most likely, treating sinuses is the key.  She feels that symptoms are exacerbated by sinus symptoms.  -Stop Qvar.  - Start Trelegy Ellipta 200/62.5/25 mcg 1 puff once daily.  -Continue using levalbuterol nebs or albuterol inhaler as needed.  - Continue azithromycin 3 times a week.    In the past, her pulmonary function test was within normal limits.  I wonder about a certain degree of chronic bronchitis considering consistent smoking.  Discussed with Dr. Jaimes.  There is some concern about possibility of atypical cystic fibrosis.  I placed a referral for sweat chloride testing.    Considering increased eosinophils on the biopsy, any signs of possible vasculitis?  I will ask ENT and pathologist.     - Ordered interval PFT.      - Fluticasone-Umeclidin-Vilanterol (TRELEGY ELLIPTA) 200-62.5-25 MCG/INH oral inhaler  Dispense: 60 each; Refill: 3  - General PFT Lab (Please always keep  checked)  - Pulmonary Function Test    Need for pneumococcal vaccine  Other chronic sinusitis    -Administered Pneumovax.  We will repeat pneumococcal antibody levels in 4 to 6 weeks to assess for specific antibody deficiency.  - We will also repeat IgG, along with IgG subclasses, IgA, and IgE.  - We will repeat total complement and T and B cell panel.    - PPSV23, IM/SUBQ (2+ YRS) - [id number]  - Alpha 1 Antitrypsin  - IgA  - IgG  - Strep pneumo IgG Abys 23 Serotypes  - T cell subset extended profile  - Complement Activity Total (CH50)  - IgG subclasses  - IgE  - CBC with Platelets & Differential       50 minutes spent on the date of the encounter during chart review, history and exam, documentation, and further activities as noted above.    Return in about 3 months (around 10/19/2022), or if symptoms worsen or fail to improve.    Thank you for allowing us to participate in the care of this patient. Please feel free to contact us if there are any questions or concerns about the patient.    Disclaimer: This note consists of symbols derived from keyboarding, dictation and/or voice recognition software. As a result, there may be errors in the script that have gone undetected. Please consider this when interpreting information found in this chart.    Doni Lee MD, FAAAAI, FACAAI  Allergy, Asthma and Immunology     [facility]

## 2022-07-19 NOTE — NURSING NOTE
Prior to immunization administration, verified patients identity using patient s name and date of birth. Please see Immunization Activity for additional information.     Screening Questionnaire for Adult Immunization    Are you sick today?   Yes approved via Dr Lee   Do you have allergies to medications, food, a vaccine component or latex?   Yes   Have you ever had a serious reaction after receiving a vaccination?   No   Do you have a long-term health problem with heart, lung, kidney, or metabolic disease (e.g., diabetes), asthma, a blood disorder, no spleen, complement component deficiency, a cochlear implant, or a spinal fluid leak?  Are you on long-term aspirin therapy?   Yes   Do you have cancer, leukemia, HIV/AIDS, or any other immune system problem?   No   Do you have a parent, brother, or sister with an immune system problem?   No   In the past 3 months, have you taken medications that affect  your immune system, such as prednisone, other steroids, or anticancer drugs; drugs for the treatment of rheumatoid arthritis, Crohn s disease, or psoriasis; or have you had radiation treatments?   Yes   Have you had a seizure, or a brain or other nervous system problem?   No   During the past year, have you received a transfusion of blood or blood    products, or been given immune (gamma) globulin or antiviral drug?   No   For women: Are you pregnant or is there a chance you could become       pregnant during the next month?   No   Have you received any vaccinations in the past 4 weeks?   Yes     Immunization questionnaire was positive for at least one answer.  Notified Dr Lee.        Per orders of Dr. Lee, injection of Pneumovax 23 given by Rosalinda Brooke RN. Patient instructed to remain in clinic for 15 minutes afterwards, and to report any adverse reaction to me immediately.       Screening performed by Rosalinda Brooke RN on 7/19/2022 at 5:12 PM.

## 2022-07-19 NOTE — LETTER
7/19/2022         RE: Gracy Bautista  [address] [place] [id number]        Dear Colleague,    Thank you for referring your patient, Gracy Bautista, to the [facility]. Please see a copy of my visit note below.    SUBJECTIVE:                                                                   Gracy Bautista presents today to our Allergy Clinic at [facility] for a follow up visit. She is a 56 year old female with a historical diagnosis of severe asthma and chronic sinusitis.    Asthma  History of smoking 3 to 4 cigarettes/day since the age of 15 years. Developed chest symptoms in her 50s, manifested by wheezing, chest tightness, shortness of breath, and mainly productive cough. Serum IgE for regional aeroallergen panel in December 2020 was negative.  In March 2021, slight sensitivity to Aspergillus was noted. She tried and failed omalizumab and mepolizumab.  Negative ABPA panel in the past.  Alpha-1 antitrypsin in the past was within normal limits.    Pulmonary function test in 2022 did not suggest an obstruction    Pansinusitis  History of sinus symptoms since late 40s.  Previous sinus CT consistent with pansinusitis.  Sinus biopsy with chronic inflammation and markedly increased eosinophils.  No mention about possible vasculitis. Has a history of nasal polyposis.  Negative ANCA's in the past. Refractory to multiple treatments, requiring multiple antibiotics   In October, T and B cell panel was within normal limits.  Immunoglobulin G was within normal limits. IgA was within normal limits.    Immunoglobulin M was elevated. SPEP was within normal limits. Negative BRICE. Negative antimitochondrial antibodies.  Pneumococcal antibody levels were 13/22 protective.   suggested the patient to get pneumococcal vaccine and then repeat pneumococcal antibody levels later. Total complement was within normal limits.     Since the last visit, she did not get Pneumovax and  did not repeat pneumococcal antibody levels.  She continues smoking about 1 cigarette/day.  However, when she was seen in the ED 1 month ago, she reported 0.25 packs/day.    For some reason, she stopped Symbicort and Spiriva.  Currently, she uses Qvar 2 puffs twice daily.  She also started dupilumab in May 2022.  Despite that, she feels that her asthma symptoms are not well controlled.  Asthma tends to get worse with sinus symptoms.    She was recently seen in the ED, complaining of productive discolored cough, chest tightness, wheezing, coughing, and shortness of breath.  Because her sinuses were acting up as well, she was treated with prednisone and doxycycline.    Besides days when she was on doxycycline, she would take azithromycin 3 times a week.  Uses intranasal fluticasone and gentamicin experiences.  She still has some chest symptoms and sinus symptoms but not as bad as before she was started on doxycycline and prednisone.  Uses albuterol daily.    Previously, I prescribed Trelegy Ellipta, but she stopped it for unknown reasons after 1 week of use      Patient Active Problem List   Diagnosis     Chronic pansinusitis     ADD (attention deficit disorder)     Alcohol use disorder, moderate, in sustained remission (H)     Blind right eye     Cardiac arrhythmia     Carpal tunnel syndrome     PTSD (post-traumatic stress disorder)     Hyperlipidemia     Hypogammaglobulinemia (H)     Immunoglobulin deficiency (H)     Insomnia     Moderate persistent asthma, uncomplicated     Myofascial pain     Peripheral vascular disease (H)     Tobacco abuse     Anticardiolipin antibody positive       Past Medical History:   Diagnosis Date     Allergic rhinitis      Arthritis      Chronic sinusitis      Depressive disorder      Hoarseness      Reduced vision      Uncomplicated asthma       Problem (# of Occurrences) Relation (Name,Age of Onset)    Bleeding Disorder (2) Brother (Bill), Mother (Darlyn)    Cancer (1) Brother (Bill)     Cerebrovascular Disease (1) Father (Jer)    Diabetes (1) Father (Jer)    Heart Disease (1) Father (Jer)        Past Surgical History:   Procedure Laterality Date     NASAL/SINUS POLYPECTOMY       OPTICAL TRACKING SYSTEM ENDOSCOPIC SINUS SURGERY Bilateral 1/21/2022    Procedure: stealth guided, bilateral ethmoidectomy, bilateral frontal sinusotomy, bilateral maxillary antrostomies;  Surgeon: Digna Jaimes MD;  [place] [facility] [place]     Social History     Socioeconomic History     Marital status:      Spouse name: None     Number of children: None     Years of education: None     Highest education level: None   Occupational History     Occupation: HOMEMAKER   Tobacco Use     Smoking status: Light Tobacco Smoker     Packs/day: 0.00     Years: 15.00     Pack years: 0.00     Types: Cigarettes     Start date: 9/17/2021     Smokeless tobacco: Never Used     Tobacco comment: 6 cigarettes per week   Vaping Use     Vaping Use: Never used   Substance and Sexual Activity     Alcohol use: Not Currently     Drug use: Never     Sexual activity: Yes     Partners: Male     Birth control/protection: None   Social History Narrative    October 7, 2021    ENVIRONMENTAL HISTORY: [place] The home is heated with a electric furnace. They do have central air conditioning. The patient's bedroom is furnished with carpeting in bedroom, allergen mattress cover, and allergen pillowcase cover.  Pets inside the house include 1 dog. There is no history of cockroach or mice infestation. There is/are 0 smokers in the house.  The house does have a damp basement.            Review of Systems   Constitutional: Positive for fatigue. Negative for activity change, chills and fever.   HENT: Positive for sinus pain. Negative for congestion, ear discharge, facial swelling, nosebleeds, postnasal drip, rhinorrhea, sinus pressure and sneezing.    Eyes: Negative for discharge, redness and itching.    Respiratory: Positive for cough, shortness of breath and wheezing. Negative for chest tightness.    Cardiovascular: Negative for chest pain.   Gastrointestinal: Negative for diarrhea, nausea and vomiting.   Musculoskeletal: Negative for arthralgias and myalgias.   Skin: Negative for rash.   Allergic/Immunologic: Positive for environmental allergies.   Neurological: Negative for headaches.   Hematological: Negative for adenopathy.   Psychiatric/Behavioral: Negative for self-injury.           Current Outpatient Medications:      albuterol (PROAIR HFA/PROVENTIL HFA/VENTOLIN HFA) 108 (90 Base) MCG/ACT inhaler, Inhale 1-2 puffs into the lungs every 6 hours as needed for shortness of breath / dyspnea or wheezing, Disp: 24 g, Rfl: 3     beclomethasone HFA (QVAR REDIHALER) 80 MCG/ACT inhaler, Inhale 1 puff into the lungs 2 times daily, Disp: 10.6 g, Rfl: 3     bisacodyl (DULCOLAX) 5 MG EC tablet, Take 5 mg by mouth as needed for constipation, Disp: , Rfl:      CYMBALTA 60 MG capsule, Take 60 mg by mouth every morning , Disp: , Rfl:      doxycycline hyclate (VIBRA-TABS) 100 MG tablet, Take 1 tablet (100 mg) by mouth 2 times daily for 10 days, Disp: 20 tablet, Rfl: 0     DUPIXENT 300 MG/2ML prefilled pen, INJECT 1 PEN (300 MG) UNDER THE SKIN EVERY 2 WEEKS, Disp: 8 mL, Rfl: 1     Fluticasone-Umeclidin-Vilanterol (TRELEGY ELLIPTA) 200-62.5-25 MCG/INH oral inhaler, Inhale 1 puff into the lungs daily, Disp: 60 each, Rfl: 3     LAMICTAL 200 MG tablet, Take 200 mg by mouth every morning , Disp: , Rfl:      levalbuterol (XOPENEX) 0.31 MG/3ML neb solution, Take 3 mLs (0.31 mg) by nebulization every 4 hours as needed for wheezing or shortness of breath / dyspnea, Disp: 125 mL, Rfl: 11     nicotine polacrilex (NICORETTE) 4 MG gum, TAKE 1 BY MOUTH FOUR TIMES DAILY, Disp: , Rfl:      spacer (OPTICHAMBER ELBA) holding chamber, Use with albuterol (PROAIR HFA/PROVENTIL HFA/VENTOLIN HFA) 108 (90 Base) MCG/ACT inhaler, Disp: 1 each, Rfl:  "0     ADDERALL XR 20 MG 24 hr capsule, Take 20 mg by mouth daily as needed PRN (Patient not taking: Reported on 7/19/2022), Disp: , Rfl:      azithromycin (ZITHROMAX) 250 MG tablet, TAKE 1 TABLET BY MOUTH EVERY MONDAY, WEDNESDAY, AND FRIDAY MORNING (Patient not taking: Reported on 7/19/2022), Disp: 36 tablet, Rfl: 3     predniSONE (DELTASONE) 20 MG tablet, Take 10 mg by mouth daily as needed (Patient not taking: Reported on 7/19/2022), Disp: , Rfl:   Immunization History   Administered Date(s) Administered     COVID-19,PF,Pfizer (12+ Yrs) 03/20/2021, 04/10/2021, 08/19/2021     FLU 6-35 months 11/21/2011     Influenza (IIV3) PF 11/04/2005, 10/23/2006, 10/20/2009     Influenza Quad, Recombinant, pf(RIV4) (Flublok) 10/07/2021     Influenza Vaccine IM > 6 months Valent IIV4 (Alfuria,Fluzone) 10/13/2016, 09/07/2017, 10/25/2019     Pneumo Conj 13-V (2010&after) 09/07/2017     Pneumococcal 23 valent 11/04/2005, 02/18/2019, 07/19/2022     TDAP Vaccine (Boostrix) 08/03/2011     Td (Adult), Adsorbed 01/01/2001, 10/25/2008     Tdap (Adacel,Boostrix) 07/08/2022     Allergies   Allergen Reactions     Bee Venom Angioedema, Swelling and Hives     Other reaction(s): Angioedema       Cefdinir Diarrhea and GI Disturbance              Levofloxacin Muscle Pain (Myalgia)     Prednisone Anxiety and Other (See Comments)     Hyperactivity and moodiness. Doesn't like how it makes her feel.       OBJECTIVE:                                                                 BP 94/72   Pulse 76   Ht 1.626 m (5' 4\")   Wt 54.4 kg (120 lb)   SpO2 98%   BMI 20.60 kg/m          Physical Exam  Vitals and nursing note reviewed.   Constitutional:       General: She is not in acute distress.     Appearance: She is not ill-appearing, toxic-appearing or diaphoretic.   HENT:      Head: Normocephalic and atraumatic.      Right Ear: Tympanic membrane, ear canal and external ear normal.      Left Ear: Tympanic membrane, ear canal and external ear normal. "      Nose:      Right Turbinates: Enlarged (Erythematous).      Left Turbinates: Enlarged (Erythematous).      Comments: Scant amount of whitish/sticky secretions in both nostrils.     Mouth/Throat:      Lips: Pink.      Mouth: Mucous membranes are moist.      Pharynx: Oropharynx is clear. No pharyngeal swelling, oropharyngeal exudate, posterior oropharyngeal erythema or uvula swelling.   Eyes:      General:         Right eye: No discharge.         Left eye: No discharge.      Conjunctiva/sclera: Conjunctivae normal.   Cardiovascular:      Rate and Rhythm: Normal rate and regular rhythm.      Heart sounds: Normal heart sounds.   Pulmonary:      Effort: Pulmonary effort is normal. No respiratory distress.      Breath sounds: No stridor. No decreased breath sounds, wheezing or rales.   Skin:     General: Skin is warm.      Capillary Refill: Capillary refill takes less than 2 seconds.   Neurological:      Mental Status: She is alert and oriented to person, place, and time.   Psychiatric:         Mood and Affect: Mood normal.         Behavior: Behavior normal.         ASSESSMENT/PLAN:    Not well controlled severe persistent asthma  Symptoms not well controlled.  - Continue dupilumab 300 mg every 14 days.  I would recommend to be on it for at least 3 months before stopping it.    Most likely, treating sinuses is the key.  She feels that symptoms are exacerbated by sinus symptoms.  -Stop Qvar.  - Start Trelegy Ellipta 200/62.5/25 mcg 1 puff once daily.  -Continue using levalbuterol nebs or albuterol inhaler as needed.  - Continue azithromycin 3 times a week.    In the past, her pulmonary function test was within normal limits.  I wonder about a certain degree of chronic bronchitis considering consistent smoking.  Discussed with Dr. Jaimes.  There is some concern about possibility of atypical cystic fibrosis.  I placed a referral for sweat chloride testing.    Considering increased eosinophils on the biopsy, any signs of  possible vasculitis?  I will ask ENT and pathologist.     - Ordered interval PFT.      - Fluticasone-Umeclidin-Vilanterol (TRELEGY ELLIPTA) 200-62.5-25 MCG/INH oral inhaler  Dispense: 60 each; Refill: 3  - General PFT Lab (Please always keep checked)  - Pulmonary Function Test    Need for pneumococcal vaccine  Other chronic sinusitis    -Administered Pneumovax.  We will repeat pneumococcal antibody levels in 4 to 6 weeks to assess for specific antibody deficiency.  - We will also repeat IgG, along with IgG subclasses, IgA, and IgE.  - We will repeat total complement and T and B cell panel.    - PPSV23, IM/SUBQ (2+ YRS) - [id number]  - Alpha 1 Antitrypsin  - IgA  - IgG  - Strep pneumo IgG Abys 23 Serotypes  - T cell subset extended profile  - Complement Activity Total (CH50)  - IgG subclasses  - IgE  - CBC with Platelets & Differential       50 minutes spent on the date of the encounter during chart review, history and exam, documentation, and further activities as noted above.    Return in about 3 months (around 10/19/2022), or if symptoms worsen or fail to improve.    Thank you for allowing us to participate in the care of this patient. Please feel free to contact us if there are any questions or concerns about the patient.    Disclaimer: This note consists of symbols derived from keyboarding, dictation and/or voice recognition software. As a result, there may be errors in the script that have gone undetected. Please consider this when interpreting information found in this chart.    Doni Lee MD, FAAAAI, FACAAI  Allergy, Asthma and Immunology     [facility]       Again, thank you for allowing me to participate in the care of your patient.        Sincerely,        Doni Lee MD

## 2022-07-20 ENCOUNTER — OFFICE VISIT (OUTPATIENT)
Dept: SURGERY | Facility: CLINIC | Age: 57
End: 2022-07-20
Attending: FAMILY MEDICINE
Payer: COMMERCIAL

## 2022-07-20 ENCOUNTER — HOSPITAL ENCOUNTER (OUTPATIENT)
Facility: AMBULATORY SURGERY CENTER | Age: 57
End: 2022-07-20
Attending: SURGERY | Admitting: SURGERY
Payer: COMMERCIAL

## 2022-07-20 ENCOUNTER — TELEPHONE (OUTPATIENT)
Dept: UROLOGY | Facility: CLINIC | Age: 57
End: 2022-07-20

## 2022-07-20 VITALS
SYSTOLIC BLOOD PRESSURE: 94 MMHG | WEIGHT: 117 LBS | DIASTOLIC BLOOD PRESSURE: 72 MMHG | HEART RATE: 76 BPM | BODY MASS INDEX: 20.08 KG/M2

## 2022-07-20 DIAGNOSIS — K42.0 UMBILICAL HERNIA WITH OBSTRUCTION: ICD-10-CM

## 2022-07-20 PROCEDURE — 99203 OFFICE O/P NEW LOW 30 MIN: CPT | Performed by: SURGERY

## 2022-07-20 NOTE — TELEPHONE ENCOUNTER
Type of surgery: Herniorrhaphy, umbilical ,open possible mesh   CPT 40035  Umbilical hernia with obstruction K42.0    Location of surgery: [facility]  Date and time of surgery: 09/21/2022  Surgeon: Dick  Pre-Op Appt Date: 09/06/2022  Post-Op Appt Date: 10/05/2022   Packet sent out: Yes  Pre-cert/Authorization completed:  No prior auth required per [employer] online site.    Date: 7-20-22    Sherlyn Holland  Financial Securing/Prior Auth Dept  [phone]

## 2022-07-20 NOTE — LETTER
7/20/2022         RE: Gracy Bautista  [address] [place] [id number]        Dear Colleague,    Thank you for referring your patient, Gracy Bautista, to the [facility]. Please see a copy of my visit note below.    Patient seen in consultation for umbilical hernia by Donna Hwang    HPI:  Patient is a 56 year old female  with complaints of umbilical hernia  Can have pain there/sensitive and some bulge which is enlarging  The patient noticed the symptoms about maybe some years ago.    Worsening recently, seems to be getting larger  nothing makes the episode better.  Gets a lot of abdominal bloating seems like she looks pregnant  Patient has not family history of hernia problems    Review Of Systems    Skin: negative  Ears/Nose/Throat: allergy/sinus issues  Respiratory: asthma  Cardiovascular: negative  Gastrointestinal: dysphagia has swallow study coming up also colonoscopy  Genitourinary: negative  Musculoskeletal: negative  Neurologic: negative  Hematologic/Lymphatic/Immunologic: negative  Endocrine: negative      Past Medical History:   Diagnosis Date     Allergic rhinitis      Arthritis      Chronic sinusitis      Depressive disorder      Hoarseness      Reduced vision      Uncomplicated asthma        Past Surgical History:   Procedure Laterality Date     NASAL/SINUS POLYPECTOMY       OPTICAL TRACKING SYSTEM ENDOSCOPIC SINUS SURGERY Bilateral 1/21/2022    Procedure: stealth guided, bilateral ethmoidectomy, bilateral frontal sinusotomy, bilateral maxillary antrostomies;  Surgeon: Digna Jaimes MD;  Location: [facility] OR   Hysterectomy  Csections x2    Social History     Socioeconomic History     Marital status:      Spouse name: Not on file     Number of children: Not on file     Years of education: Not on file     Highest education level: Not on file   Occupational History     Occupation: HOMEMAKER   Tobacco Use     Smoking status: Light Tobacco Smoker     Packs/day:  0.00     Years: 15.00     Pack years: 0.00     Types: Cigarettes     Start date: 9/17/2021     Smokeless tobacco: Never Used     Tobacco comment: 6 cigarettes per week   Vaping Use     Vaping Use: Never used   Substance and Sexual Activity     Alcohol use: Not Currently     Drug use: Never     Sexual activity: Yes     Partners: Male     Birth control/protection: None   Other Topics Concern     Not on file   Social History Narrative    October 7, 2021    ENVIRONMENTAL HISTORY: The family [place] The home is heated with a electric furnace. They do have central air conditioning. The patient's bedroom is furnished with carpeting in bedroom, allergen mattress cover, and allergen pillowcase cover.  Pets inside the house include 1 dog. There is no history of cockroach or mice infestation. There is/are 0 smokers in the house.  The house does have a damp basement.      Social Determinants of Health     Financial Resource Strain: Not on file   Food Insecurity: Not on file   Transportation Needs: Not on file   Physical Activity: Not on file   Stress: Not on file   Social Connections: Not on file   Intimate Partner Violence: Not on file   Housing Stability: Not on file       Current Outpatient Medications   Medication Sig Dispense Refill     ADDERALL XR 20 MG 24 hr capsule Take 20 mg by mouth daily as needed PRN (Patient not taking: Reported on 7/19/2022)       albuterol (PROAIR HFA/PROVENTIL HFA/VENTOLIN HFA) 108 (90 Base) MCG/ACT inhaler Inhale 1-2 puffs into the lungs every 6 hours as needed for shortness of breath / dyspnea or wheezing 24 g 3     azithromycin (ZITHROMAX) 250 MG tablet TAKE 1 TABLET BY MOUTH EVERY MONDAY, WEDNESDAY, AND FRIDAY MORNING (Patient not taking: Reported on 7/19/2022) 36 tablet 3     beclomethasone HFA (QVAR REDIHALER) 80 MCG/ACT inhaler Inhale 1 puff into the lungs 2 times daily 10.6 g 3     bisacodyl (DULCOLAX) 5 MG EC tablet Take 5 mg by mouth as needed for  constipation       CYMBALTA 60 MG capsule Take 60 mg by mouth every morning        doxycycline hyclate (VIBRA-TABS) 100 MG tablet Take 1 tablet (100 mg) by mouth 2 times daily for 10 days 20 tablet 0     DUPIXENT 300 MG/2ML prefilled pen INJECT 1 PEN (300 MG) UNDER THE SKIN EVERY 2 WEEKS 8 mL 1     Fluticasone-Umeclidin-Vilanterol (TRELEGY ELLIPTA) 200-62.5-25 MCG/INH oral inhaler Inhale 1 puff into the lungs daily 60 each 3     LAMICTAL 200 MG tablet Take 200 mg by mouth every morning        levalbuterol (XOPENEX) 0.31 MG/3ML neb solution Take 3 mLs (0.31 mg) by nebulization every 4 hours as needed for wheezing or shortness of breath / dyspnea 125 mL 11     nicotine polacrilex (NICORETTE) 4 MG gum TAKE 1 BY MOUTH FOUR TIMES DAILY       predniSONE (DELTASONE) 20 MG tablet Take 10 mg by mouth daily as needed (Patient not taking: Reported on 7/19/2022)       spacer (OPTICHAMBER ELBA) holding chamber Use with albuterol (PROAIR HFA/PROVENTIL HFA/VENTOLIN HFA) 108 (90 Base) MCG/ACT inhaler 1 each 0       Medications and history reviewed    Physical exam:  Vitals: BP 94/72   Pulse 76   Wt 53.1 kg (117 lb)   BMI 20.08 kg/m    BMI= Body mass index is 20.08 kg/m .    Constitutional: healthy, alert and no distress  Head: Normocephalic. No masses, lesions, tenderness or abnormalities  Cardiovascular: negative, PMI normal. No lifts, heaves, or thrills. RRR. No murmurs, clicks gallops or rub  Respiratory: negative, Percussion normal. Good diaphragmatic excursion. Lungs clear  Gastrointestinal: Abdomen soft, tender at umbilicus. BS normal. No masses, organomegaly, positive findings: umbilical hernia, small with incarcerated tissue  : Deferred  Musculoskeletal: extremities normal- no gross deformities noted, gait normal and normal muscle tone  Skin: no suspicious lesions or rashes  Psychiatric: mentation appears normal and affect normal/bright  Patient able to get up on table without difficulty.      Assessment:      ICD-10-CM    1. Umbilical hernia with obstruction  K42.0 Adult General Surg Referral     Plan: Small symptomatic umbilical hernia with pain.  There is incarcerated tissue.  I discussed that with such a small hernia it is unlikely that this is incarcerated bowel but she does have these bloating symptoms so it is possible.  More common would be incarcerated fatty tissue which can still be tender.  There is no overlying skin change or discoloration.  Offered open repair of the hernia.  If defect is quite small as I suspect would likely be a primary repair but did mention that mesh reinforcement would be used if hernia defect greater than 1.5 cm  Risks of surgery discussed including, but not limited to bleeding, infection, recurrence, damage to intra-abdominal contents such as nerves, blood vessels, bowel or other organs.  Risks of anesthesia also discussed.  If mesh used, if it gets infected it must be removed. Mesh problems such as fracture, erosion or adhesions are possible.  If there is evidence of an infection at time of surgery it will be cancelled and rescheduled to when well.  If hernia seems to be getting much worse then present to the emergency room for evaluation.    Yonny Jennings MD        Again, thank you for allowing me to participate in the care of your patient.        Sincerely,        Yonny Jennings MD

## 2022-07-20 NOTE — PROGRESS NOTES
Patient seen in consultation for umbilical hernia by Donna Hwang    HPI:  Patient is a 56 year old female  with complaints of umbilical hernia  Can have pain there/sensitive and some bulge which is enlarging  The patient noticed the symptoms about maybe some years ago.    Worsening recently, seems to be getting larger  nothing makes the episode better.  Gets a lot of abdominal bloating seems like she looks pregnant  Patient has not family history of hernia problems    Review Of Systems    Skin: negative  Ears/Nose/Throat: allergy/sinus issues  Respiratory: asthma  Cardiovascular: negative  Gastrointestinal: dysphagia has swallow study coming up also colonoscopy  Genitourinary: negative  Musculoskeletal: negative  Neurologic: negative  Hematologic/Lymphatic/Immunologic: negative  Endocrine: negative      Past Medical History:   Diagnosis Date     Allergic rhinitis      Arthritis      Chronic sinusitis      Depressive disorder      Hoarseness      Reduced vision      Uncomplicated asthma        Past Surgical History:   Procedure Laterality Date     NASAL/SINUS POLYPECTOMY       OPTICAL TRACKING SYSTEM ENDOSCOPIC SINUS SURGERY Bilateral 1/21/2022    Procedure: stealth guided, bilateral ethmoidectomy, bilateral frontal sinusotomy, bilateral maxillary antrostomies;  Surgeon: Digna Jaimes MD;  Location: [facility] OR   Hysterectomy  Csections x2    Social History     Socioeconomic History     Marital status:      Spouse name: Not on file     Number of children: Not on file     Years of education: Not on file     Highest education level: Not on file   Occupational History     Occupation: HOMEMAKER   Tobacco Use     Smoking status: Light Tobacco Smoker     Packs/day: 0.00     Years: 15.00     Pack years: 0.00     Types: Cigarettes     Start date: 9/17/2021     Smokeless tobacco: Never Used     Tobacco comment: 6 cigarettes per week   Vaping Use     Vaping Use: Never used   Substance and Sexual Activity     Alcohol  use: Not Currently     Drug use: Never     Sexual activity: Yes     Partners: Male     Birth control/protection: None   Other Topics Concern     Not on file   Social History Narrative    October 7, 2021    ENVIRONMENTAL HISTORY: The [place] The home is heated with a electric furnace. They do have central air conditioning. The patient's bedroom is furnished with carpeting in bedroom, allergen mattress cover, and allergen pillowcase cover.  Pets inside the house include 1 dog. There is no history of cockroach or mice infestation. There is/are 0 smokers in the house.  The house does have a damp basement.      Social Determinants of Health     Financial Resource Strain: Not on file   Food Insecurity: Not on file   Transportation Needs: Not on file   Physical Activity: Not on file   Stress: Not on file   Social Connections: Not on file   Intimate Partner Violence: Not on file   Housing Stability: Not on file       Current Outpatient Medications   Medication Sig Dispense Refill     ADDERALL XR 20 MG 24 hr capsule Take 20 mg by mouth daily as needed PRN (Patient not taking: Reported on 7/19/2022)       albuterol (PROAIR HFA/PROVENTIL HFA/VENTOLIN HFA) 108 (90 Base) MCG/ACT inhaler Inhale 1-2 puffs into the lungs every 6 hours as needed for shortness of breath / dyspnea or wheezing 24 g 3     azithromycin (ZITHROMAX) 250 MG tablet TAKE 1 TABLET BY MOUTH EVERY MONDAY, WEDNESDAY, AND FRIDAY MORNING (Patient not taking: Reported on 7/19/2022) 36 tablet 3     beclomethasone HFA (QVAR REDIHALER) 80 MCG/ACT inhaler Inhale 1 puff into the lungs 2 times daily 10.6 g 3     bisacodyl (DULCOLAX) 5 MG EC tablet Take 5 mg by mouth as needed for constipation       CYMBALTA 60 MG capsule Take 60 mg by mouth every morning        doxycycline hyclate (VIBRA-TABS) 100 MG tablet Take 1 tablet (100 mg) by mouth 2 times daily for 10 days 20 tablet 0     DUPIXENT 300 MG/2ML prefilled pen INJECT 1 PEN (300  MG) UNDER THE SKIN EVERY 2 WEEKS 8 mL 1     Fluticasone-Umeclidin-Vilanterol (TRELEGY ELLIPTA) 200-62.5-25 MCG/INH oral inhaler Inhale 1 puff into the lungs daily 60 each 3     LAMICTAL 200 MG tablet Take 200 mg by mouth every morning        levalbuterol (XOPENEX) 0.31 MG/3ML neb solution Take 3 mLs (0.31 mg) by nebulization every 4 hours as needed for wheezing or shortness of breath / dyspnea 125 mL 11     nicotine polacrilex (NICORETTE) 4 MG gum TAKE 1 BY MOUTH FOUR TIMES DAILY       predniSONE (DELTASONE) 20 MG tablet Take 10 mg by mouth daily as needed (Patient not taking: Reported on 7/19/2022)       spacer (OPTICHAMBER ELBA) holding chamber Use with albuterol (PROAIR HFA/PROVENTIL HFA/VENTOLIN HFA) 108 (90 Base) MCG/ACT inhaler 1 each 0       Medications and history reviewed    Physical exam:  Vitals: BP 94/72   Pulse 76   Wt 53.1 kg (117 lb)   BMI 20.08 kg/m    BMI= Body mass index is 20.08 kg/m .    Constitutional: healthy, alert and no distress  Head: Normocephalic. No masses, lesions, tenderness or abnormalities  Cardiovascular: negative, PMI normal. No lifts, heaves, or thrills. RRR. No murmurs, clicks gallops or rub  Respiratory: negative, Percussion normal. Good diaphragmatic excursion. Lungs clear  Gastrointestinal: Abdomen soft, tender at umbilicus. BS normal. No masses, organomegaly, positive findings: umbilical hernia, small with incarcerated tissue  : Deferred  Musculoskeletal: extremities normal- no gross deformities noted, gait normal and normal muscle tone  Skin: no suspicious lesions or rashes  Psychiatric: mentation appears normal and affect normal/bright  Patient able to get up on table without difficulty.      Assessment:     ICD-10-CM    1. Umbilical hernia with obstruction  K42.0 Adult General Surg Referral     Plan: Small symptomatic umbilical hernia with pain.  There is incarcerated tissue.  I discussed that with such a small hernia it is unlikely that this is incarcerated bowel  but she does have these bloating symptoms so it is possible.  More common would be incarcerated fatty tissue which can still be tender.  There is no overlying skin change or discoloration.  Offered open repair of the hernia.  If defect is quite small as I suspect would likely be a primary repair but did mention that mesh reinforcement would be used if hernia defect greater than 1.5 cm  Risks of surgery discussed including, but not limited to bleeding, infection, recurrence, damage to intra-abdominal contents such as nerves, blood vessels, bowel or other organs.  Risks of anesthesia also discussed.  If mesh used, if it gets infected it must be removed. Mesh problems such as fracture, erosion or adhesions are possible.  If there is evidence of an infection at time of surgery it will be cancelled and rescheduled to when well.  If hernia seems to be getting much worse then present to the emergency room for evaluation.    Yonny Jennings MD

## 2022-07-21 ENCOUNTER — OFFICE VISIT (OUTPATIENT)
Dept: PULMONOLOGY | Facility: CLINIC | Age: 57
End: 2022-07-21
Payer: COMMERCIAL

## 2022-07-21 ENCOUNTER — ANCILLARY PROCEDURE (OUTPATIENT)
Dept: MAMMOGRAPHY | Facility: CLINIC | Age: 57
End: 2022-07-21
Attending: FAMILY MEDICINE
Payer: COMMERCIAL

## 2022-07-21 VITALS
WEIGHT: 117 LBS | OXYGEN SATURATION: 100 % | SYSTOLIC BLOOD PRESSURE: 119 MMHG | HEART RATE: 94 BPM | BODY MASS INDEX: 20.08 KG/M2 | DIASTOLIC BLOOD PRESSURE: 78 MMHG

## 2022-07-21 DIAGNOSIS — Z12.31 VISIT FOR SCREENING MAMMOGRAM: ICD-10-CM

## 2022-07-21 DIAGNOSIS — J45.50 SEVERE PERSISTENT ASTHMA WITH ALLERGIC RHINITIS, UNSPECIFIED WHETHER COMPLICATED (H): ICD-10-CM

## 2022-07-21 DIAGNOSIS — J43.2 CENTRILOBULAR EMPHYSEMA (H): Primary | ICD-10-CM

## 2022-07-21 PROCEDURE — 77063 BREAST TOMOSYNTHESIS BI: CPT | Mod: GC | Performed by: STUDENT IN AN ORGANIZED HEALTH CARE EDUCATION/TRAINING PROGRAM

## 2022-07-21 PROCEDURE — 77067 SCR MAMMO BI INCL CAD: CPT | Mod: GC | Performed by: STUDENT IN AN ORGANIZED HEALTH CARE EDUCATION/TRAINING PROGRAM

## 2022-07-21 PROCEDURE — 99215 OFFICE O/P EST HI 40 MIN: CPT | Performed by: INTERNAL MEDICINE

## 2022-07-21 RX ORDER — LORATADINE 10 MG/1
5 TABLET ORAL DAILY
COMMUNITY
End: 2022-09-15

## 2022-07-21 RX ORDER — GUAIFENESIN AND DEXTROMETHORPHAN HYDROBROMIDE 600; 30 MG/1; MG/1
1 TABLET, EXTENDED RELEASE ORAL EVERY 12 HOURS
COMMUNITY
End: 2022-09-15

## 2022-07-21 ASSESSMENT — ASTHMA QUESTIONNAIRES
ACUTE_EXACERBATION_TODAY: NO
QUESTION_3 LAST FOUR WEEKS HOW OFTEN DID YOUR ASTHMA SYMPTOMS (WHEEZING, COUGHING, SHORTNESS OF BREATH, CHEST TIGHTNESS OR PAIN) WAKE YOU UP AT NIGHT OR EARLIER THAN USUAL IN THE MORNING: FOUR OR MORE NIGHTS A WEEK
ACT_TOTALSCORE: 6
QUESTION_5 LAST FOUR WEEKS HOW WOULD YOU RATE YOUR ASTHMA CONTROL: NOT CONTROLLED AT ALL
ACT_TOTALSCORE: 6
QUESTION_2 LAST FOUR WEEKS HOW OFTEN HAVE YOU HAD SHORTNESS OF BREATH: MORE THAN ONCE A DAY
QUESTION_1 LAST FOUR WEEKS HOW MUCH OF THE TIME DID YOUR ASTHMA KEEP YOU FROM GETTING AS MUCH DONE AT WORK, SCHOOL OR AT HOME: MOST OF THE TIME
QUESTION_4 LAST FOUR WEEKS HOW OFTEN HAVE YOU USED YOUR RESCUE INHALER OR NEBULIZER MEDICATION (SUCH AS ALBUTEROL): THREE OR MORE TIMES PER DAY

## 2022-07-21 ASSESSMENT — PAIN SCALES - GENERAL: PAINLEVEL: SEVERE PAIN (6)

## 2022-07-21 NOTE — PROGRESS NOTES
Pulmonary Clinic Return Patient Visit  Reason for Visit: Asthma and COPD  History of Present Illness  Ms. Gracy Bautista is a 56 yr old female with a complex history of severe asthma/copd with severe nasal polyposis, chronic rhinosinusitis with multiple surgeries, IgG deficiency who presents to clinic for follow up for uncontrolled severe asthma/copd and persistent cough. I last saw her in clinic in 04/2022.  Please refer to my prior clinic office notes for an overview of her complicated history.  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 Continues to have repeated asthma/copd flares despite maximal inhaler regimen. Has been tried on Xolair and Nucala at different times with no improvement. Work up for ABPA, Churg-Misti disease and other vasculitic/autoimmune work up have returned negative. Chest imaging shows mostly subtle emphysema and low suspicion for conditions such as DIPNECH etc.   Recent sinus surgery with biopsy showing eosinophilic infiltration,  work up continues to show elevated peripheral eosinophilia, most recently at 1200. IgE levels usually range from the 100's to 200's. I decided to try her on a different biologic- Dupixent for which she received her first/loading dose on 05/02/2022.  Her care has been complicated by intolerance to virtually all inhalers and singulair and she is only on rescue Xopenex nebs. Very long conversations have been held about the importance of inhaled corticosteroids and we decided to start QVAR.  Today, complains of persistent cough, now productive of green yellowish sputum which is a change in color. Also has significant sinus discharges of the same color with congestion. Continues to have frequent wheezing and SOB. She believes that her cough is the most bothersome symptoms. Phlegm is usually very thick and may contain casts.  Recently  returned from a trip to [place], and persistent coughing was very bothersome. Has had 2 shots of Dupixent so far with so side effects. Continues to use Flonase, saline rinses and Xopenex Nebs at least twice daily including at night. Had to take prednisone recently and has not gone an entire 2 months without steroids.  Still smoking < 1/ppd per day.        Review of Systems:  10 of 14 systems reviewed and are negative unless otherwise stated in HPI.    Past Medical History:   Diagnosis Date     Allergic rhinitis      Arthritis      Chronic sinusitis      Depressive disorder      Hoarseness      Reduced vision      Uncomplicated asthma        Past Surgical History:   Procedure Laterality Date     NASAL/SINUS POLYPECTOMY       OPTICAL TRACKING SYSTEM ENDOSCOPIC SINUS SURGERY Bilateral 1/21/2022    Procedure: stealth guided, bilateral ethmoidectomy, bilateral frontal sinusotomy, bilateral maxillary antrostomies;  Surgeon: Digna Jaimes MD;  [place] [facility] [place]       Family History   Problem Relation Age of Onset     Cancer Brother      Bleeding Disorder Brother      Diabetes Father      Heart Disease Father      Cerebrovascular Disease Father      Bleeding Disorder Mother        Social History     Socioeconomic History     Marital status:      Spouse name: None     Number of children: None     Years of education: None     Highest education level: None   Occupational History     Occupation: HOMEMAKER   Tobacco Use     Smoking status: Light Tobacco Smoker     Packs/day: 0.00     Years: 15.00     Pack years: 0.00     Types: Cigarettes     Start date: 9/17/2021     Smokeless tobacco: Never Used     Tobacco comment: 6 cigarettes per week   Vaping Use     Vaping Use: Never used   Substance and Sexual Activity     Alcohol use: Not Currently     Drug use: Never     Sexual activity: Yes     Partners: Male     Birth control/protection: None   Social History Narrative    October 7, 2021    ENVIRONMENTAL HISTORY: The family  l[place]. The home is heated with a electric furnace. They do have central air conditioning. The patient's bedroom is furnished with carpeting in bedroom, allergen mattress cover, and allergen pillowcase cover.  Pets inside the house include 1 dog. There is no history of cockroach or mice infestation. There is/are 0 smokers in the house.  The house does have a damp basement.          Allergies   Allergen Reactions     Bee Venom Angioedema, Swelling and Hives     Other reaction(s): Angioedema       Cefdinir Diarrhea and GI Disturbance              Levofloxacin Muscle Pain (Myalgia)     Prednisone Anxiety and Other (See Comments)     Hyperactivity and moodiness. Doesn't like how it makes her feel.           Current Outpatient Medications:      dextromethorphan-guaiFENesin (MUCINEX DM)  MG 12 hr tablet, Take 1 tablet by mouth every 12 hours, Disp: , Rfl:      loratadine (CLARITIN) 10 MG tablet, Take 5 mg by mouth daily, Disp: , Rfl:      albuterol (PROAIR HFA/PROVENTIL HFA/VENTOLIN HFA) 108 (90 Base) MCG/ACT inhaler, Inhale 1-2 puffs into the lungs every 6 hours as needed for shortness of breath / dyspnea or wheezing, Disp: 24 g, Rfl: 3     bisacodyl (DULCOLAX) 5 MG EC tablet, Take 5 mg by mouth as needed for constipation, Disp: , Rfl:      CYMBALTA 60 MG capsule, Take 60 mg by mouth every morning , Disp: , Rfl:      doxycycline hyclate (VIBRA-TABS) 100 MG tablet, Take 1 tablet (100 mg) by mouth 2 times daily for 10 days, Disp: 20 tablet, Rfl: 0     DUPIXENT 300 MG/2ML prefilled pen, INJECT 1 PEN (300 MG) UNDER THE SKIN EVERY 2 WEEKS, Disp: 8 mL, Rfl: 1     Fluticasone-Umeclidin-Vilanterol (TRELEGY ELLIPTA) 200-62.5-25 MCG/INH oral inhaler, Inhale 1 puff into the lungs daily, Disp: 60 each, Rfl: 3     LAMICTAL 200 MG tablet, Take 200 mg by mouth every morning , Disp: , Rfl:      levalbuterol (XOPENEX) 0.31 MG/3ML neb solution, Take 3 mLs (0.31 mg) by nebulization every 4 hours as  needed for wheezing or shortness of breath / dyspnea, Disp: 125 mL, Rfl: 11     nicotine polacrilex (NICORETTE) 4 MG gum, TAKE 1 BY MOUTH FOUR TIMES DAILY, Disp: , Rfl:      spacer (OPTICHAMBER ELBA) holding chamber, Use with albuterol (PROAIR HFA/PROVENTIL HFA/VENTOLIN HFA) 108 (90 Base) MCG/ACT inhaler, Disp: 1 each, Rfl: 0      Physical Exam:  /78 (BP Location: Left arm, Patient Position: Sitting, Cuff Size: Adult Regular)   Pulse 94   Wt 53.1 kg (117 lb)   SpO2 100%   BMI 20.08 kg/m    GENERAL: Well developed, well nourished, alert, and in no apparent distress.  HEENT: Normocephalic, atraumatic. PERRL, EOMI. Oral mucosa is moist. No perioral cyanosis.  NECK: supple, no masses, no thyromegaly.  RESP:  Normal respiratory effort.  CTAB.  No rales, wheezes, rhonchi.  No cyanosis or clubbing.  CV: Normal S1, S2, regular rhythm, normal rate. No murmur.  No LE edema.   ABDOMEN:  Soft, non-tender, non-distended.   SKIN: warm and dry. No rash.  NEURO: AAOx3.  Normal gait.  Fluent speech.  PSYCH: mentation appears normal.       Results  Imaging (personally reviewed in clinic today):      Assessment and Plan:   Severe persistent Asthma with poor control/COPD   Complicated history and continued persistent symptoms despite maximum therapy with different inhaler regimen as well as Singulair, Xolair infusions, Nucala, chronic azithromycin therapy, Flonase and albuterol.    After a long conversation about the importance of inhaled corticosteroids, she was agreeable to QVAR and she swears to compliance and uses Xopenex as needed. She is adamant that she doesn't want to try any other inhalers due to intolerance and concerns of being on multiple medications. She also refuses Singulair.  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 Work up for ABPA, Churg-Misti disease and other  vasculitic/autoimmune work up have returned negative. Chest imaging shows mostly subtle emphysema and low suspicion for conditions such as DIPNECH etc. I suspected that some of her persistent symptoms may be due to ongoing smoking.  Encouraged her to quit smoking, we will continue high dose Qvar, Xopenex Nebs as well as Dupixent shots.      Eosinophilic Chronic Sinusitis   Recent sinus biopsy shows significant eosinophils indicative of eosinophilic chronic sinusitis.  Interestingly, her prior sinus biopsy done at [facility] showed similar picture.  She has been on Nucala in the past with no improvement in her symptoms. She was recently started on Dupixent, hopefully she will have a very positive response.  Due to the change in color of the sinus discharge, I will treat her with 7 days of levaquin based on prior sinus biopsy cultures showing strong pseudomonas growth.     Chronic Cough  Likely multifactorial- due to asthma/copd and chronic sinusitis. She is on Flonase and gentamicin rinses. I will start her on Mucinex 1200 mg BID for improved airway clearance and encouraged her to continue to use her nasal sprays.     Active Smoker  Amount of smoking does not meet threshold for lung cancer screening    Questions and concerns were answered to the patient's satisfaction.  she was provided with my contact information should new questions or concerns arise in the interim.  I spent a total of 40 minutes of video timewith Gracy Bautista during today's office visit. Over 50% of this time was spent counseling the patient and/or coordinating care regarding their pulmonary disease.  She should return to clinic after 3 months   Up to date on vaccinations  Marco Bean MD  Pulmonary, Critical Care and Sleep Medicine  [facility]  Pager: [phone]    The above note was dictated using voice recognition software and may include typographical errors. Please contact the author for any  clarifications.

## 2022-07-21 NOTE — NURSING NOTE
Gracy Bautista's goals for this visit include:  COUGH  She requests these members of her care team be copied on today's visit information: NO    PCP: No Ref-Primary, Physician    Referring Provider:  Referred Self, MD  No address on file    /78 (BP Location: Left arm, Patient Position: Sitting, Cuff Size: Adult Regular)   Pulse 94   Wt 53.1 kg (117 lb)   SpO2 100%   BMI 20.08 kg/m      Do you need any medication refills at today's visit? NO

## 2022-07-23 LAB
BACTERIA SPEC CULT: ABNORMAL
BACTERIA SPEC CULT: ABNORMAL
GRAM STAIN RESULT: ABNORMAL
GRAM STAIN RESULT: ABNORMAL

## 2022-07-24 DIAGNOSIS — J45.41 MODERATE PERSISTENT ASTHMA WITH EXACERBATION: ICD-10-CM

## 2022-07-25 RX ORDER — ALBUTEROL SULFATE 90 UG/1
AEROSOL, METERED RESPIRATORY (INHALATION)
Qty: 6.7 G | OUTPATIENT
Start: 2022-07-25

## 2022-07-27 ENCOUNTER — TELEPHONE (OUTPATIENT)
Dept: ALLERGY | Facility: CLINIC | Age: 57
End: 2022-07-27

## 2022-07-27 NOTE — TELEPHONE ENCOUNTER
MA called patient, no answer, not able to leave message. MA called to the [facility] to see if they can call her but was relayed that they  do not call and schedule patients. Pt will have to call them. Will try to contact patient and let her know that she is calling the correct phone number and she should be able to call and schedule.      Carmen Samuels MA

## 2022-07-27 NOTE — TELEPHONE ENCOUNTER
I just called the same number.  They confirmed that they do schedule adults at [facility] pediatric clinic.  That was the right number.  I talked to Manda.  If they give the patient a hard time, I would suggest the patient to be transferred to [facility].    Doni Lee MD

## 2022-07-27 NOTE — TELEPHONE ENCOUNTER
Pt calling about scheduling Swe[facility] Chloride [facility] as directed at her last appointment. She called this number [phone], and they didn't know what she was talking about and couldn't get her scheduled. She wanted to make sure she has the correct info and maybe have help scheduling this appointment. Please call patient with update at [phone]-Can leave detailed message.

## 2022-07-28 ENCOUNTER — HOSPITAL ENCOUNTER (OUTPATIENT)
Dept: MAMMOGRAPHY | Facility: CLINIC | Age: 57
Discharge: HOME | End: 2022-07-28
Attending: FAMILY MEDICINE
Payer: COMMERCIAL

## 2022-07-28 DIAGNOSIS — R92.8 ABNORMAL MAMMOGRAM: ICD-10-CM

## 2022-07-28 PROCEDURE — 77065 DX MAMMO INCL CAD UNI: CPT | Mod: RT

## 2022-07-29 ENCOUNTER — TELEPHONE (OUTPATIENT)
Dept: ALLERGY | Facility: CLINIC | Age: 57
End: 2022-07-29

## 2022-07-29 DIAGNOSIS — J32.4 CHRONIC PANSINUSITIS: Primary | ICD-10-CM

## 2022-07-29 DIAGNOSIS — J45.50 NOT WELL CONTROLLED SEVERE PERSISTENT ASTHMA (H): Primary | ICD-10-CM

## 2022-07-29 RX ORDER — DOXYCYCLINE HYCLATE 100 MG
100 TABLET ORAL 2 TIMES DAILY
Qty: 28 TABLET | Refills: 0 | Status: SHIPPED | OUTPATIENT
Start: 2022-07-29 | End: 2022-08-12

## 2022-07-29 NOTE — TELEPHONE ENCOUNTER
Pt having a hard  Time getting a appointment for the cf test . Pt had another question regarding symptoms .   Pt looking for a call back at [phone] from [facility] allergy nurse

## 2022-08-01 RX ORDER — PREDNISONE 20 MG/1
40 TABLET ORAL DAILY
Qty: 10 TABLET | Refills: 0 | Status: SHIPPED | OUTPATIENT
Start: 2022-08-01 | End: 2022-08-06

## 2022-08-01 NOTE — TELEPHONE ENCOUNTER
Sent the prescription for prednisone.  The thing is if she takes prednisone by next Monday and gets pulmonary function test done, were not going to find out if normal results are naturally normal or improved because of prednisone.  She may need to postpone pulmonary function test for a different day.      I do not have a specific pulmonology provider to recommend. I think every pulmonologist at the  is great. She may ask Dr. Jaimes since they work close there.  Suggest smoking cessation.    Doni Barreraerea

## 2022-08-01 NOTE — TELEPHONE ENCOUNTER
RN left message for patient to return call to clinic.  Call back number provided.    Rosalinda Brooke RN

## 2022-08-01 NOTE — TELEPHONE ENCOUNTER
Spoke with patient.  She has had continued cough, wheeze and SOB.  This is worse at night.  She takes her levalbuterol neb before bed and then says she gets about 2 hours of sleep before she wakes up with asthma symptoms.  She then uses her albuterol inhaler (1-2 puffs) every 3-4 hours.  Recommended she increase this up to 4 puffs and see how she does.  Her cough is productive with clear sputum.    She did switch over to the Trelegy and has been taking azithromycin as prescribed.  States she does not feel she has had improvement.  She has PFT scheduled for Monday and is still working on getting the CF test done.    She was seen by ENT and prescribed doxycycline for sinusitis, but has not started yet as it was out of stock at her pharmacy.  She will start today.    She is traveling this week and wondering if Dr Lee would prescribe prednisone for her.  If so, please send to [facility] in [place].    She also would like to request from him a recommendation for a new pulmonologist.  Says she has discussed this with him in the past.    Advised patient if her symptoms worsen or she is having significant shortness of breath, wheezing, chest tightness that is not relieved by her rescue inhaler that she should be seen in emergency department.     Rosalinda Brooke RN

## 2022-08-02 ENCOUNTER — TELEPHONE (OUTPATIENT)
Dept: ALLERGY | Facility: CLINIC | Age: 57
End: 2022-08-02

## 2022-08-02 ENCOUNTER — HOSPITAL ENCOUNTER (OUTPATIENT)
Dept: MAMMOGRAPHY | Facility: CLINIC | Age: 57
Discharge: HOME | End: 2022-08-02
Attending: FAMILY MEDICINE
Payer: COMMERCIAL

## 2022-08-02 DIAGNOSIS — R92.8 ABNORMAL MAMMOGRAM: ICD-10-CM

## 2022-08-02 PROCEDURE — 88305 TISSUE EXAM BY PATHOLOGIST: CPT | Mod: 26 | Performed by: PATHOLOGY

## 2022-08-02 PROCEDURE — 999N000065 MA POST PROCEDURE RIGHT

## 2022-08-02 PROCEDURE — 272N000715 MA STEREOTACTIC BREAST BIOPSY VACUUM RT

## 2022-08-02 PROCEDURE — 88305 TISSUE EXAM BY PATHOLOGIST: CPT | Mod: TC | Performed by: FAMILY MEDICINE

## 2022-08-02 PROCEDURE — 250N000009 HC RX 250: Performed by: FAMILY MEDICINE

## 2022-08-02 RX ORDER — LIDOCAINE HYDROCHLORIDE AND EPINEPHRINE 10; 10 MG/ML; UG/ML
10 INJECTION, SOLUTION INFILTRATION; PERINEURAL ONCE
Status: COMPLETED | OUTPATIENT
Start: 2022-08-02 | End: 2022-08-02

## 2022-08-02 RX ADMIN — LIDOCAINE HYDROCHLORIDE 5 ML: 10 INJECTION, SOLUTION EPIDURAL; INFILTRATION; INTRACAUDAL; PERINEURAL at 13:18

## 2022-08-02 RX ADMIN — LIDOCAINE HYDROCHLORIDE AND EPINEPHRINE 10 ML: 10; 10 INJECTION, SOLUTION INFILTRATION; PERINEURAL at 13:18

## 2022-08-02 NOTE — TELEPHONE ENCOUNTER
Called patient to notify her of which pharmacy this went to, and she said to disregard as she already picked up.    Rosalinda Brooke RN

## 2022-08-02 NOTE — DISCHARGE INSTRUCTIONS

## 2022-08-02 NOTE — TELEPHONE ENCOUNTER
1500 to 1520    Pt seen for follow-up due to anxiety (F41.1) and depression (F32.1). Pt's wife present today with pt's permission. Discussed the plan for d/c tomorrow and their concerns with pt's ability to use the stairs. Pt's wife said she had spoken with someone at their apartment building regarding the steps and a very loose railing. Her concerns appear legitimate. Pt feels that he will be ok once in the home. They both acknowledged that pt will have ongoing home therapy. Pt with ongoing anxiety. Difficult to stay focused today. He would bring up other thoughts outside of the current topic. He also appears to have some mild cognitive impairments. At one point he became upset about something that had been said regarding going home. Pt has participated well with rehab but continues to say things like \"I should have done more\". He is hard on himself and lacks confidence. Pt's wife indicated that this is his personality. She indicated that she is ready for him to come home as long as the stairs get addressed. She mentioned that they will be renting a hospital bed as well which should be delivered tomorrow. Pt is set to d/c home tomorrow. He does not have any SI/HI. He is not appropriate for outpatient therapy due to cognitive impairments and insight. He will no longer be followed by rehab psychologist.     Mayte Sanz  Rehabilitation Psychologist    Mom states that she brings in the patient for many reasons- not sleeping- not eating well- red ear- fuzziness. Patient had a soaked diaper in triage and in no acute distress.    Reason for Call:  Medication or medication refill:    Do you use a [facility]?  Name of the pharmacy and phone number for the current request:  [facility] [place]    Name of the medication requested: PUT IN REFILL REQUEST AND HER PRESCRIPTION IS NOT AT HER PHARMACY LOOKING TO SEE WHAT PHARMACY IT WAS SENT TOO  [facility]      Other request: NONE    Can we leave a detailed message on this number? YES    Phone number patient can be reached at: Home number on file [phone] (home)    Best Time: ANYTIME    Call taken on 8/2/2022 at 1:57 PM by Vidhi Soliman

## 2022-08-03 ENCOUNTER — LAB (OUTPATIENT)
Dept: PULMONOLOGY | Facility: CLINIC | Age: 57
End: 2022-08-03
Attending: ALLERGY & IMMUNOLOGY
Payer: COMMERCIAL

## 2022-08-03 DIAGNOSIS — J32.8 OTHER CHRONIC SINUSITIS: ICD-10-CM

## 2022-08-03 LAB — SWEAT CHLORIDE: NORMAL

## 2022-08-03 PROCEDURE — 89230 COLLECT SWEAT FOR TEST: CPT

## 2022-08-03 NOTE — TELEPHONE ENCOUNTER
Patient already informed of this via phone call on 8/1/22.  She understands she needs to call to schedule and to ask for Manda if she has issues.    Rosalinda Brooke RN

## 2022-08-04 LAB
PATH REPORT.COMMENTS IMP SPEC: NORMAL
PATH REPORT.FINAL DX SPEC: NORMAL
PATH REPORT.GROSS SPEC: NORMAL
PATH REPORT.MICROSCOPIC SPEC OTHER STN: NORMAL
PHOTO IMAGE: NORMAL

## 2022-08-04 NOTE — PROGRESS NOTES
BENIGN PATH:  Pathology report reviewed with our breast radiologist Dr. Asa Charles, who confirmed the recent breast imaging is concordant with the final pathology results below.    I phoned Ms. Gracy Bautista, confirmed her full name, date of birth, and informed patient of her Stereotactic Guided Right Breast Needle Biopsy (08/02/2022) results showing benign Fibrocystic changes.     Recommended follow up is Routine annual Screening mammogram.  Patient will be due for her next screening mammogram after 7/22/2022.  Patient states no problems or concerns with her biopsy site.   Questions were answered and my phone number given if she has further questions or concerns.  I informed patient I will notify the ordering provider of the results and recommendations for follow up.  I also advised patient to notify her primary provider-Dr. Donna Hwang if any breast symptoms or changes arise.  Patient verbalized understanding and agrees with the plan of care.     Marce Rizvi RN BSN  Breast Care Nurse Coordinator  [facility]  [phone]      Gracy Bautista [id number]  F, 1965  Surgical Pathology Report (Final result) [id number]  Authorizing Provider: Donna Hwang MD Ordering Provider: Donna Hwang MD  Ordering Location: [facility]  Collected: 08/02/2022 01:25 PM  Pathologist: Patience Liu Received: 08/02/2022 03:06 PM  .  Specimens  A Breast, Right  .  .  Final Diagnosis  A. Breast, right, 3 cm from nipple (0.5 cm area of microcalcifications), stereotactic-guided needle core biopsy:  -Fibrocystic change with prominent apocrine metaplasia with associated calcifications.  -See comment.  Electronically signed by Patience Liu on 8/4/2022 at 11:31 AM

## 2022-08-09 DIAGNOSIS — J45.50 NOT WELL CONTROLLED SEVERE PERSISTENT ASTHMA (H): Primary | ICD-10-CM

## 2022-08-09 NOTE — RESULT ENCOUNTER NOTE
[employer]ion message sent:     Gracy's sweat test results returned as borderline in the range.  I suggest repeating the study in 2 to 3 weeks with a genetic counselor appointment to go over the results.  I will place the order in.

## 2022-08-10 DIAGNOSIS — J45.50 NOT WELL CONTROLLED SEVERE PERSISTENT ASTHMA (H): ICD-10-CM

## 2022-08-10 PROCEDURE — 94375 RESPIRATORY FLOW VOLUME LOOP: CPT | Performed by: INTERNAL MEDICINE

## 2022-08-11 ENCOUNTER — TELEPHONE (OUTPATIENT)
Dept: PULMONOLOGY | Facility: CLINIC | Age: 57
End: 2022-08-11

## 2022-08-11 NOTE — TELEPHONE ENCOUNTER
I contacted Gracy to discuss her referral for a repeat sweat chloride test and genetic counseling.  Gracy had an initial sweat test that returned in the borderline range, prompting the recommendations for a repeat as well as genetic counseling.  All of Gracy's questions were answered and these appointments were scheduled at Gracy's convenience.        Leighann Estrella MS, [facility]  Genetic Counselor  [facility]  [facility]

## 2022-08-15 ENCOUNTER — HOSPITAL ENCOUNTER (OUTPATIENT)
Dept: SPEECH THERAPY | Facility: CLINIC | Age: 57
Setting detail: THERAPIES SERIES
Discharge: HOME | End: 2022-08-15
Attending: FAMILY MEDICINE
Payer: COMMERCIAL

## 2022-08-15 ENCOUNTER — TELEPHONE (OUTPATIENT)
Dept: FAMILY MEDICINE | Facility: CLINIC | Age: 57
End: 2022-08-15

## 2022-08-15 ENCOUNTER — HOSPITAL ENCOUNTER (OUTPATIENT)
Dept: GENERAL RADIOLOGY | Facility: CLINIC | Age: 57
Discharge: HOME | End: 2022-08-15
Attending: FAMILY MEDICINE | Admitting: FAMILY MEDICINE
Payer: COMMERCIAL

## 2022-08-15 DIAGNOSIS — R13.10 ODYNOPHAGIA: ICD-10-CM

## 2022-08-15 DIAGNOSIS — R13.10 DYSPHAGIA, UNSPECIFIED TYPE: Primary | ICD-10-CM

## 2022-08-15 DIAGNOSIS — R13.10 DYSPHAGIA, UNSPECIFIED TYPE: ICD-10-CM

## 2022-08-15 LAB
BACTERIA SPEC CULT: NO GROWTH
ERV-%PRED-PRE: 70 %
ERV-PRE: 0.83 L
ERV-PRED: 1.17 L
EXPTIME-PRE: 6.83 SEC
FEF2575-%PRED-PRE: 69 %
FEF2575-PRE: 1.72 L/SEC
FEF2575-PRED: 2.46 L/SEC
FEFMAX-%PRED-PRE: 71 %
FEFMAX-PRE: 4.73 L/SEC
FEFMAX-PRED: 6.6 L/SEC
FEV1-%PRED-PRE: 95 %
FEV1-PRE: 2.54 L
FEV1FEV6-PRE: 67 %
FEV1FEV6-PRED: 81 %
FEV1FVC-PRE: 67 %
FEV1FVC-PRED: 80 %
FEV1SVC-PRE: 92 %
FEV1SVC-PRED: 77 %
FIFMAX-PRE: 4.07 L/SEC
FRCPLETH-%PRED-PRE: 146 %
FRCPLETH-PRE: 4.04 L
FRCPLETH-PRED: 2.76 L
FVC-%PRED-PRE: 112 %
FVC-PRE: 3.8 L
FVC-PRED: 3.38 L
IC-%PRED-PRE: 84 %
IC-PRE: 1.93 L
IC-PRED: 2.29 L
RVPLETH-%PRED-PRE: 168 %
RVPLETH-PRE: 3.21 L
RVPLETH-PRED: 1.9 L
TLCPLETH-%PRED-PRE: 116 %
TLCPLETH-PRE: 5.97 L
TLCPLETH-PRED: 5.11 L
VC-%PRED-PRE: 79 %
VC-PRE: 2.76 L
VC-PRED: 3.46 L

## 2022-08-15 PROCEDURE — 74230 X-RAY XM SWLNG FUNCJ C+: CPT

## 2022-08-15 PROCEDURE — 92610 EVALUATE SWALLOWING FUNCTION: CPT | Mod: GN,59 | Performed by: SPEECH-LANGUAGE PATHOLOGIST

## 2022-08-15 PROCEDURE — 92611 MOTION FLUOROSCOPY/SWALLOW: CPT | Mod: GN | Performed by: SPEECH-LANGUAGE PATHOLOGIST

## 2022-08-15 RX ORDER — BARIUM SULFATE 400 MG/ML
SUSPENSION ORAL ONCE
Status: COMPLETED | OUTPATIENT
Start: 2022-08-15 | End: 2022-08-15

## 2022-08-15 RX ORDER — BARIUM SULFATE 400 MG/ML
SUSPENSION ORAL ONCE
Status: DISCONTINUED | OUTPATIENT
Start: 2022-08-15 | End: 2022-08-16 | Stop reason: HOSPADM

## 2022-08-15 RX ORDER — BISACODYL 5 MG
TABLET, DELAYED RELEASE (ENTERIC COATED) ORAL
Qty: 4 TABLET | Refills: 0 | Status: SHIPPED | OUTPATIENT
Start: 2022-08-15 | End: 2022-09-15

## 2022-08-15 RX ADMIN — BARIUM SULFATE 10 ML: 400 SUSPENSION ORAL at 11:40

## 2022-08-15 NOTE — PROGRESS NOTES
OP Clinical Swallow Evaluation and VFSS 08/15/22 1157   General Information   Type Of Visit Initial   Start Of Care Date 08/15/22   Referring Physician Dr. Donna Hwang   Orders Evaluate And Treat   Medical Diagnosis Dysphagia   Patient/family Goals To decrease difficulty passing food and coughing/choking episodes   General Information Comments Pt reports an ongoing hx of difficulty swallowing including globus sensation, pain with swallows, abdominal pain with only a small amount of po, nausea, hx of hiatal hernia, coughing/choking at every meal and during the day, waking up at night with extensive coughing/choking, difficulty breathing during episodes.  Pt reports a hx of multiple pneumonias, asthma, chronic sinusitis, weight loss, fear of eating.    The number of puree to solid trials were limited due to esophageal residue that did not pass and pt very uncomfortable/pain in abdomen reported during the study.     Fall Risk Screen   Fall screen comments See radiology documentation.   Clinical Swallow Evaluation   Oral Musculature generally intact   Dentition   (Missing some teeth)   Mucosal Quality sticky  (Patch of white?)   Laryngeal Function Voicing initiated;Dry swallow palpated;Swallow;Throat clear;Cough  (Mild hoarse voice, report of sore throat)   Additional evaluation(s) completed today Yes  (Clinical: interview, oral motor exam, thin water trial)   Rationale for completing additional evaluation VFSS: determine pharyngeal phase deficits and aspiration risk   Clinical Swallow Eval: Thin Liquid Texture Trial   Mode of Presentation, Thin Liquids cup   Volume of Liquid or Food Presented 1 small sips, 1 consecutive sips by cup   Oral Phase of Swallow WFL   Pharyngeal Phase of Swallow repeated swallows  (? elevation)   Diagnostic Statement no signs of aspiration   VFSS Eval: Radiology   Radiologist Dr. Blair   Views Taken left lateral;A/P   VFSS Eval: Thin Liquid Texture Trial   Mode of Presentation, Thin  Liquid cup;straw   Order of Presentation 1, 2, 3, 4, 8   Preparatory Phase Poor bolus control   Oral Phase, Thin Liquid Premature pharyngeal entry   Pharyngeal Phase, Thin Liquid Delayed swallow reflex   Rosenbek's Penetration Aspiration Scale: Thin Liquid Trial Results 2 - contrast enters airway, remains above the vocal cords, no residue remains (penetration)   Diagnostic Statement Curved epiglottis with decreased inversion/closure, mild decreased hyoid movement at times, flash penetration by cup x 1 and straw x 1, min pharyngeal residue   VFSS Eval: Mildly Thick Liquids    Mode of Presentation cup   Order of Presentation 5   Preparatory Phase Poor bolus control   Oral Phase Premature pharyngeal entry   Pharyngeal Phase Delayed swallow reflex   Rosenbek's Penetration Aspiration Scale 1 - no aspiration, contrast does not enter airway   Diagnostic Statement Curved epiglottis with decreased inversion/closure, min pharyngeal residue   VFSS Eval: Puree Solid Texture Trial   Mode of Presentation, Puree spoon   Order of Presentation 6, 9 - AP view   Preparatory Phase Poor bolus control   Oral Phase, Puree Premature pharyngeal entry   Pharyngeal Phase, Puree Delayed swallow reflex   Rosenbek's Penetration Aspiration Scale: Puree Food Trial Results 1 - no aspiration, contrast does not enter airway   Diagnostic Statement Curved epiglottis with decreased inversion/closure, bolus under the epiglottis, min-no pharyngeal residue; Barium observed in lower half of esophagus, ? air bubble and reflux as well   VFSS Eval: Regular Texture Trial (Solid)   Mode of Presentation spoon   Order of Presentation 7   Preparatory Phase WFL   Oral Phase Residue in oral cavity   Pharyngeal Phase   (no delay)   Rosenbek's Penetration Aspiration Scale 2 - contrast enters airway, remains above the vocal cords, no residue remains (penetration)   Diagnostic Statement Curved epiglottis with decreased inversion/closure, mild decreased hyoid movement,  flash penetration, mild-min pharyngeal residue; barium under the UES prior to the swallow, increased barium residual under the UES with solid trial, tight UES, ? air bubble in residual barium at level of /below UES, 2nd swallow did not pass residue, alternating to thin liquids helped clear residue from view   Esophageal Phase of Swallow   Patient reports or presents with symptoms of esophageal dysphagia Yes   Esophageal comments Significant residual barium observed at/under the UES after pudding and solid trials; Residual barium noted in lower half of esophagus during AP view with question of air bubble/reflux - pt very uncomfortable/pain in abdomen - GI consult recommended ASAP given pt report of coughing/gagging with every meal and esophageal observations   Swallow Eval: Clinical Impressions   Skilled Criteria for Therapy Intervention Skilled criteria met.  Treatment indicated.   Functional Assessment Scale (FAS) 5   Dysphagia Outcome Severity Scale (DAYANNA) Level 5 - DAYANNA   Risks and Benefits of Treatment have been explained. Yes   Patient, family and/or staff in agreement with Plan of Care Yes   Clinical Impression Comments Pt presents with mild oral-pharyngeal dysphagia and significant esophageal residue and abdominal pain after limited number of trials during today's study.  Oral-pharyngeal dysphagia findings include delayed swallows, curved epiglottis with decreased inversion/closure, and decreased hyoid elevation at times.  Deficits resulted in min pharyngeal residue, flash mild penetration of solids, and flash mild penetration of thin by cup/straw at times.    See esophageal section above for esophageal observations during the study.   GI consult recommended ASAP given amount of esophageal residue at/below UES and in the lower half of the esophagus, pt report of coughing/gagging with every meal, and concerns for continued poor po intake.  No futher SLP swallow eval/Tx is indicated at this time.  Consider  repeat OP VFSS with SLP if esophageal deficits resolve, but increased coughing with liquids and new pneumonia develops.  The following recommendations were provided to patient given today's observations - sit at 90 degrees, stay up for 60+ minutes, small quantities at a sitting, pick soft smooth food with added moisture, use small multiple sips of thin liquids after bites of food, multiple dry swallows, crush medications and use puree if able, slow rate of intake, do not eat dry hard food until further direction from GI.  Pt was encouraged to contact referring MD for GI consult ASAP.  SLP to place call/send a message to referring MD regarding above.   Total Session Time   SLP Eval: oral/pharyngeal swallow function, clinical minutes (56896) 15   SLP Eval: VideoFluoroscopic Swallow function Minutes (44771) 15   Total Evaluation Time 30

## 2022-08-15 NOTE — TELEPHONE ENCOUNTER
I discussed with the patient. Urgent Gastroenterology appointment placed.  Donna Hwang MD on 8/15/2022 at 5:06 PM

## 2022-08-15 NOTE — TELEPHONE ENCOUNTER
[facility], calling regarding swallow study. Lots of esophageal residue noted and advised for pt to get a GI consult sooner than later as pt is barely eating because she chokes on everything.     Would like a provider to look at note/images in epic today if possible.

## 2022-08-15 NOTE — PROGRESS NOTES
Pt reports an ongoing hx of difficulty swallowing including globus sensation, pain with swallows, abdominal pain with only a small amount of po, nausea, hx of hiatal hernia, coughing/choking at every meal and during the day, waking up at night with extensive coughing/choking, difficulty breathing during episodes.  Pt reports a hx of multiple pneumonias, asthma, chronic sinusitis, weight loss, fear of eating.     The number of puree to solid trials were limited due to esophageal residue that did not pass and pt very uncomfortable/pain in abdomen reported during the study.      Abnormal

## 2022-08-16 ENCOUNTER — TELEPHONE (OUTPATIENT)
Dept: GASTROENTEROLOGY | Facility: CLINIC | Age: 57
End: 2022-08-16

## 2022-08-16 NOTE — TELEPHONE ENCOUNTER
[facility] Call Center    Phone Message    May a detailed message be left on voicemail: Yes    Reason for Call: Other: Patient is currently scheduled on 2/21/23, as a patient New GI Urgent. This is outside the expected timeline for this schedule. Paitent has been added to the waitlist.      Action Taken: Message routed to:  Other: GI REFERRAL TRIAGE POOL     Travel Screening: Not Applicable

## 2022-08-16 NOTE — RESULT ENCOUNTER NOTE
[employer] message sent:     This test should be interpreted with caution. There is a hint of mild obstruction, but the study wasn't finished due to coughing.  At this point, I would not change the management.  I believe that trying a different biologic if symptoms not well controlled as a way to go.

## 2022-08-17 NOTE — TELEPHONE ENCOUNTER
Pt calling back very upset with her February appt.     The referral was made for Emergency 1-2 days.     Please reconsider this scheduling and reach out to pt.

## 2022-08-18 ENCOUNTER — LAB (OUTPATIENT)
Dept: LAB | Facility: CLINIC | Age: 57
End: 2022-08-18
Payer: COMMERCIAL

## 2022-08-18 DIAGNOSIS — Z11.59 NEED FOR HEPATITIS C SCREENING TEST: ICD-10-CM

## 2022-08-18 DIAGNOSIS — E78.5 HYPERLIPIDEMIA, UNSPECIFIED HYPERLIPIDEMIA TYPE: ICD-10-CM

## 2022-08-18 DIAGNOSIS — Z13.220 SCREENING FOR HYPERLIPIDEMIA: ICD-10-CM

## 2022-08-18 DIAGNOSIS — Z11.4 SCREENING FOR HIV (HUMAN IMMUNODEFICIENCY VIRUS): ICD-10-CM

## 2022-08-18 DIAGNOSIS — J32.8 OTHER CHRONIC SINUSITIS: ICD-10-CM

## 2022-08-18 LAB
BASOPHILS # BLD AUTO: 0.1 10E3/UL (ref 0–0.2)
BASOPHILS NFR BLD AUTO: 1 %
CHOLEST SERPL-MCNC: 245 MG/DL
EOSINOPHIL # BLD AUTO: 1.1 10E3/UL (ref 0–0.7)
EOSINOPHIL NFR BLD AUTO: 9 %
ERYTHROCYTE [DISTWIDTH] IN BLOOD BY AUTOMATED COUNT: 12.8 % (ref 10–15)
FASTING STATUS PATIENT QL REPORTED: NO
HCT VFR BLD AUTO: 42.3 % (ref 35–47)
HDLC SERPL-MCNC: 103 MG/DL
HGB BLD-MCNC: 14.9 G/DL (ref 11.7–15.7)
IMM GRANULOCYTES # BLD: 0 10E3/UL
IMM GRANULOCYTES NFR BLD: 0 %
LDLC SERPL CALC-MCNC: 120 MG/DL
LYMPHOCYTES # BLD AUTO: 4.6 10E3/UL (ref 0.8–5.3)
LYMPHOCYTES NFR BLD AUTO: 36 %
MCH RBC QN AUTO: 34.3 PG (ref 26.5–33)
MCHC RBC AUTO-ENTMCNC: 35.2 G/DL (ref 31.5–36.5)
MCV RBC AUTO: 97 FL (ref 78–100)
MONOCYTES # BLD AUTO: 1 10E3/UL (ref 0–1.3)
MONOCYTES NFR BLD AUTO: 8 %
NEUTROPHILS # BLD AUTO: 6 10E3/UL (ref 1.6–8.3)
NEUTROPHILS NFR BLD AUTO: 46 %
NONHDLC SERPL-MCNC: 142 MG/DL
NRBC # BLD AUTO: 0 10E3/UL
NRBC BLD AUTO-RTO: 0 /100
PLATELET # BLD AUTO: 385 10E3/UL (ref 150–450)
RBC # BLD AUTO: 4.35 10E6/UL (ref 3.8–5.2)
TRIGL SERPL-MCNC: 108 MG/DL
WBC # BLD AUTO: 12.7 10E3/UL (ref 4–11)

## 2022-08-18 PROCEDURE — 85025 COMPLETE CBC W/AUTO DIFF WBC: CPT

## 2022-08-18 PROCEDURE — 86359 T CELLS TOTAL COUNT: CPT

## 2022-08-18 PROCEDURE — 87389 HIV-1 AG W/HIV-1&-2 AB AG IA: CPT

## 2022-08-18 PROCEDURE — 80061 LIPID PANEL: CPT

## 2022-08-18 PROCEDURE — 86317 IMMUNOASSAY INFECTIOUS AGENT: CPT | Mod: 90

## 2022-08-18 PROCEDURE — 86162 COMPLEMENT TOTAL (CH50): CPT | Mod: 90

## 2022-08-18 PROCEDURE — 36415 COLL VENOUS BLD VENIPUNCTURE: CPT

## 2022-08-18 PROCEDURE — 82103 ALPHA-1-ANTITRYPSIN TOTAL: CPT | Mod: 90

## 2022-08-18 PROCEDURE — 82787 IGG 1 2 3 OR 4 EACH: CPT

## 2022-08-18 PROCEDURE — 86803 HEPATITIS C AB TEST: CPT

## 2022-08-18 PROCEDURE — 82785 ASSAY OF IGE: CPT

## 2022-08-18 PROCEDURE — 86355 B CELLS TOTAL COUNT: CPT

## 2022-08-18 PROCEDURE — 82104 ALPHA-1-ANTITRYPSIN PHENO: CPT | Mod: 90

## 2022-08-18 PROCEDURE — 86360 T CELL ABSOLUTE COUNT/RATIO: CPT

## 2022-08-18 PROCEDURE — 86357 NK CELLS TOTAL COUNT: CPT

## 2022-08-18 PROCEDURE — 82784 ASSAY IGA/IGD/IGG/IGM EACH: CPT

## 2022-08-18 NOTE — RESULT ENCOUNTER NOTE
Gracy,  Your provider is currently out of the office, I am covering for her.  Your recent studies showed a mildly elevated cholesterol, please schedule a follow-up appointment with your primary care provider to discuss potential treatment options.  Please let me know if you have any questions or concerns and follow up as discussed in clinic.          Sincerely.  Dr. KRAIG Sesay MD, [facility]  Family Medicine Physician  [facility]  [address] [place] [address]

## 2022-08-19 ENCOUNTER — ANESTHESIA EVENT (OUTPATIENT)
Dept: SURGERY | Facility: AMBULATORY SURGERY CENTER | Age: 57
End: 2022-08-19
Payer: COMMERCIAL

## 2022-08-19 LAB
CD19 CELLS # BLD: 531 CELLS/UL (ref 107–698)
CD19 CELLS NFR BLD: 11 % (ref 6–27)
CD3 CELLS # BLD: 3920 CELLS/UL (ref 603–2990)
CD3 CELLS NFR BLD: 83 % (ref 49–84)
CD3+CD4+ CELLS # BLD: 2824 CELLS/UL (ref 441–2156)
CD3+CD4+ CELLS NFR BLD: 60 % (ref 28–63)
CD3+CD4+ CELLS/CD3+CD8+ CLL BLD: 2.47 % (ref 1.4–2.6)
CD3+CD8+ CELLS # BLD: 1143 CELLS/UL (ref 125–1312)
CD3+CD8+ CELLS NFR BLD: 24 % (ref 10–40)
CD3-CD16+CD56+ CELLS # BLD: 259 CELLS/UL (ref 95–640)
CD3-CD16+CD56+ CELLS NFR BLD: 5 % (ref 4–25)
HCV AB SERPL QL IA: NONREACTIVE
HIV 1+2 AB+HIV1 P24 AG SERPL QL IA: NONREACTIVE
IGA SERPL-MCNC: 126 MG/DL (ref 84–499)
IGE SERPL-ACNC: 32 KU/L (ref 0–114)
IGG SERPL-MCNC: 741 MG/DL (ref 610–1616)
IGG SERPL-MCNC: 741 MG/DL (ref 610–1616)
IGG1 SER-MCNC: 459 MG/DL (ref 382–929)
IGG2 SER-MCNC: 130 MG/DL (ref 242–700)
IGG3 SER-MCNC: 23 MG/DL (ref 22–176)
IGG4 SER-MCNC: 23 MG/DL (ref 4–86)
SUBCLASSES, PERCENT: 86 %
T CELL EXTENDED COMMENT: ABNORMAL

## 2022-08-19 RX ORDER — PROCHLORPERAZINE MALEATE 10 MG
10 TABLET ORAL EVERY 6 HOURS PRN
Status: CANCELLED | OUTPATIENT
Start: 2022-08-19

## 2022-08-19 RX ORDER — NALOXONE HYDROCHLORIDE 0.4 MG/ML
0.4 INJECTION, SOLUTION INTRAMUSCULAR; INTRAVENOUS; SUBCUTANEOUS
Status: CANCELLED | OUTPATIENT
Start: 2022-08-19

## 2022-08-19 RX ORDER — FLUMAZENIL 0.1 MG/ML
0.2 INJECTION, SOLUTION INTRAVENOUS
Status: CANCELLED | OUTPATIENT
Start: 2022-08-19 | End: 2022-08-20

## 2022-08-19 RX ORDER — ONDANSETRON 2 MG/ML
4 INJECTION INTRAMUSCULAR; INTRAVENOUS EVERY 6 HOURS PRN
Status: CANCELLED | OUTPATIENT
Start: 2022-08-19

## 2022-08-19 RX ORDER — NALOXONE HYDROCHLORIDE 0.4 MG/ML
0.2 INJECTION, SOLUTION INTRAMUSCULAR; INTRAVENOUS; SUBCUTANEOUS
Status: CANCELLED | OUTPATIENT
Start: 2022-08-19

## 2022-08-19 RX ORDER — ONDANSETRON 4 MG/1
4 TABLET, ORALLY DISINTEGRATING ORAL EVERY 6 HOURS PRN
Status: CANCELLED | OUTPATIENT
Start: 2022-08-19

## 2022-08-20 LAB — CH50 SERPL-ACNC: 70.9 U/ML

## 2022-08-22 ENCOUNTER — HOSPITAL ENCOUNTER (OUTPATIENT)
Facility: AMBULATORY SURGERY CENTER | Age: 57
Discharge: HOME | End: 2022-08-22
Attending: INTERNAL MEDICINE
Payer: COMMERCIAL

## 2022-08-22 ENCOUNTER — ANESTHESIA (OUTPATIENT)
Dept: SURGERY | Facility: AMBULATORY SURGERY CENTER | Age: 57
End: 2022-08-22
Payer: COMMERCIAL

## 2022-08-22 VITALS
HEART RATE: 65 BPM | DIASTOLIC BLOOD PRESSURE: 50 MMHG | SYSTOLIC BLOOD PRESSURE: 99 MMHG | OXYGEN SATURATION: 100 % | RESPIRATION RATE: 16 BRPM | TEMPERATURE: 97.8 F

## 2022-08-22 VITALS — HEART RATE: 84 BPM

## 2022-08-22 DIAGNOSIS — Z12.11 SPECIAL SCREENING FOR MALIGNANT NEOPLASMS, COLON: Primary | ICD-10-CM

## 2022-08-22 LAB — COLONOSCOPY: NORMAL

## 2022-08-22 PROCEDURE — G8907 PT DOC NO EVENTS ON DISCHARG: HCPCS

## 2022-08-22 PROCEDURE — 88305 TISSUE EXAM BY PATHOLOGIST: CPT | Mod: 26 | Performed by: PATHOLOGY

## 2022-08-22 PROCEDURE — G8918 PT W/O PREOP ORDER IV AB PRO: HCPCS

## 2022-08-22 PROCEDURE — 45380 COLONOSCOPY AND BIOPSY: CPT

## 2022-08-22 RX ORDER — LIDOCAINE 40 MG/G
CREAM TOPICAL
Status: DISCONTINUED | OUTPATIENT
Start: 2022-08-22 | End: 2022-08-23 | Stop reason: HOSPADM

## 2022-08-22 RX ORDER — ONDANSETRON 2 MG/ML
4 INJECTION INTRAMUSCULAR; INTRAVENOUS
Status: COMPLETED | OUTPATIENT
Start: 2022-08-22 | End: 2022-08-22

## 2022-08-22 RX ORDER — PROPOFOL 10 MG/ML
INJECTION, EMULSION INTRAVENOUS CONTINUOUS PRN
Status: DISCONTINUED | OUTPATIENT
Start: 2022-08-22 | End: 2022-08-22

## 2022-08-22 RX ORDER — SODIUM CHLORIDE, SODIUM LACTATE, POTASSIUM CHLORIDE, CALCIUM CHLORIDE 600; 310; 30; 20 MG/100ML; MG/100ML; MG/100ML; MG/100ML
INJECTION, SOLUTION INTRAVENOUS CONTINUOUS PRN
Status: DISCONTINUED | OUTPATIENT
Start: 2022-08-22 | End: 2022-08-22

## 2022-08-22 RX ORDER — LIDOCAINE HYDROCHLORIDE 20 MG/ML
INJECTION, SOLUTION INFILTRATION; PERINEURAL PRN
Status: DISCONTINUED | OUTPATIENT
Start: 2022-08-22 | End: 2022-08-22

## 2022-08-22 RX ORDER — PROPOFOL 10 MG/ML
INJECTION, EMULSION INTRAVENOUS PRN
Status: DISCONTINUED | OUTPATIENT
Start: 2022-08-22 | End: 2022-08-22

## 2022-08-22 RX ADMIN — LIDOCAINE HYDROCHLORIDE 80 MG: 20 INJECTION, SOLUTION INFILTRATION; PERINEURAL at 13:57

## 2022-08-22 RX ADMIN — PROPOFOL 70 MG: 10 INJECTION, EMULSION INTRAVENOUS at 13:57

## 2022-08-22 RX ADMIN — PROPOFOL 150 MCG/KG/MIN: 10 INJECTION, EMULSION INTRAVENOUS at 13:57

## 2022-08-22 RX ADMIN — ONDANSETRON 4 MG: 2 INJECTION INTRAMUSCULAR; INTRAVENOUS at 14:32

## 2022-08-22 RX ADMIN — SODIUM CHLORIDE, SODIUM LACTATE, POTASSIUM CHLORIDE, CALCIUM CHLORIDE: 600; 310; 30; 20 INJECTION, SOLUTION INTRAVENOUS at 13:57

## 2022-08-22 ASSESSMENT — LIFESTYLE VARIABLES: TOBACCO_USE: 1

## 2022-08-22 ASSESSMENT — ENCOUNTER SYMPTOMS: DYSRHYTHMIAS: 1

## 2022-08-22 NOTE — H&P
ENDOSCOPY PRE-SEDATION H&P FOR OUTPATIENT PROCEDURES    Gracy Bautista  [id number]  1965    Procedure: colonoscopy    Pre-procedure diagnosis: CRCS    Past medical history:   Past Medical History:   Diagnosis Date     Allergic rhinitis      Arthritis      Chronic sinusitis      Depressive disorder      Hoarseness      Reduced vision      Uncomplicated asthma        Past surgical history:   Past Surgical History:   Procedure Laterality Date     NASAL/SINUS POLYPECTOMY       OPTICAL TRACKING SYSTEM ENDOSCOPIC SINUS SURGERY Bilateral 1/21/2022    Procedure: stealth guided, bilateral ethmoidectomy, bilateral frontal sinusotomy, bilateral maxillary antrostomies;  Surgeon: Digna Jaimes MD;  Location: [facility]       Current Outpatient Medications   Medication     albuterol (PROAIR HFA/PROVENTIL HFA/VENTOLIN HFA) 108 (90 Base) MCG/ACT inhaler     bisacodyl (DULCOLAX) 5 MG EC tablet     bisacodyl (DULCOLAX) 5 MG EC tablet     CYMBALTA 60 MG capsule     DUPIXENT 300 MG/2ML prefilled pen     Fluticasone-Umeclidin-Vilanterol (TRELEGY ELLIPTA) 200-62.5-25 MCG/INH oral inhaler     LAMICTAL 200 MG tablet     levalbuterol (XOPENEX) 0.31 MG/3ML neb solution     nicotine polacrilex (NICORETTE) 4 MG gum     polyethylene glycol (GOLYTELY) 236 g suspension     dextromethorphan-guaiFENesin (MUCINEX DM)  MG 12 hr tablet     loratadine (CLARITIN) 10 MG tablet     spacer (OPTICHAMBER ELBA) holding chamber     Current Facility-Administered Medications   Medication     lidocaine (LMX4) kit     lidocaine 1 % 0.1-1 mL     ondansetron (ZOFRAN) injection 4 mg     sodium chloride (PF) 0.9% PF flush 3 mL     sodium chloride (PF) 0.9% PF flush 3 mL       Allergies   Allergen Reactions     Bee Venom Angioedema, Swelling and Hives     Other reaction(s): Angioedema       Cefdinir Diarrhea and GI Disturbance              Levofloxacin Muscle Pain (Myalgia)     Prednisone Anxiety and Other (See Comments)     Hyperactivity and moodiness.  Doesn't like how it makes her feel.         History of Anesthesia/Sedation Problems: no    Physical Exam:    Mental status: alert  Heart: Normal  Lung: mild bilateral wheezes  Assessment of patient's airway: Normal  Other as pertinent for procedure: None     ASA Score: See Provation note    Mallampati score:  II - Faucial pillars and soft palate may be seen, but uvula is masked by the base of the tongue    Assessment/Plan:     The patient is an appropriate candidate to receive sedation.    Informed consent was discussed with the patient/family, including the risks, benefits, potential complications and any alternative options associated with sedation.    Patient assessment completed just prior to sedation and while under constant observation by the provider. Condition determined to be adequate for proceeding with sedation.    The specific risks for the procedure were discussed with the patient at the time of informed consent and include but are not limited to perforation which could require surgery, missing significant neoplasm or lesion, hemorrhage and adverse sedative complication.      Glen Dyer MD

## 2022-08-22 NOTE — ANESTHESIA CARE TRANSFER NOTE
Patient: Gracy Bautista    Procedure: Procedure(s):  COLONOSCOPY, WITH CO2 INSUFFLATION  COLONOSCOPY, WITH POLYPECTOMY AND BIOPSY       Diagnosis: Screen for colon cancer [Z12.11]  Diagnosis Additional Information: No value filed.    Anesthesia Type:   MAC     Note:    Oropharynx: oropharynx clear of all foreign objects  Level of Consciousness: drowsy  Oxygen Supplementation: room air    Independent Airway: airway patency satisfactory and stable  Dentition: dentition unchanged  Vital Signs Stable: post-procedure vital signs reviewed and stable  Report to RN Given: handoff report given  Patient transferred to: Phase II  Comments: To phase 2 awake VSS Report to RN  Handoff Report: Identifed the Patient, Identified the Reponsible Provider, Reviewed the pertinent medical history, Discussed the surgical course, Reviewed Intra-OP anesthesia mangement and issues during anesthesia, Set expectations for post-procedure period and Allowed opportunity for questions and acknowledgement of understanding      Vitals:  Vitals Value Taken Time   BP     Temp     Pulse     Resp     SpO2         Electronically Signed By: PAT Douglas CRNA  August 22, 2022  2:25 PM

## 2022-08-22 NOTE — ANESTHESIA PREPROCEDURE EVALUATION
Anesthesia Pre-Procedure Evaluation    Patient: Gracy Bautista   MRN: [id number] : 1965        Procedure : Procedure(s):  COLONOSCOPY, WITH CO2 INSUFFLATION          Past Medical History:   Diagnosis Date     Allergic rhinitis      Arthritis      Chronic sinusitis      Depressive disorder      Hoarseness      Reduced vision      Uncomplicated asthma       Past Surgical History:   Procedure Laterality Date     NASAL/SINUS POLYPECTOMY       OPTICAL TRACKING SYSTEM ENDOSCOPIC SINUS SURGERY Bilateral 2022    Procedure: stealth guided, bilateral ethmoidectomy, bilateral frontal sinusotomy, bilateral maxillary antrostomies;  Surgeon: Digna Jaimes MD;  Location: [facility] OR      Allergies   Allergen Reactions     Bee Venom Angioedema, Swelling and Hives     Other reaction(s): Angioedema       Cefdinir Diarrhea and GI Disturbance              Levofloxacin Muscle Pain (Myalgia)     Prednisone Anxiety and Other (See Comments)     Hyperactivity and moodiness. Doesn't like how it makes her feel.        Social History     Tobacco Use     Smoking status: Light Tobacco Smoker     Packs/day: 0.00     Years: 15.00     Pack years: 0.00     Types: Cigarettes     Start date: 2021     Smokeless tobacco: Never Used     Tobacco comment: 6 cigarettes per week   Substance Use Topics     Alcohol use: Not Currently      Wt Readings from Last 1 Encounters:   22 53.1 kg (117 lb)        Anesthesia Evaluation   Pt has had prior anesthetic. Type: General.    History of anesthetic complications  - PONV.      ROS/MED HX  ENT/Pulmonary: Comment: Hoarseness, chronic pansinusitis    (+) allergic rhinitis, tobacco use, Current use, Moderate Persistent, asthma     Neurologic:       Cardiovascular:     (+) Dyslipidemia -----dysrhythmias,     METS/Exercise Tolerance:     Hematologic:       Musculoskeletal: Comment: CTS, myofascial pain  (+) arthritis,     GI/Hepatic: Comment: Globus sensation with swallowing       Renal/Genitourinary:  - neg Renal ROS     Endo:  - neg endo ROS     Psychiatric/Substance Use:     (+) psychiatric history anxiety, depression and other (comment) (PTSD, ADD) alcohol abuse     Infectious Disease:  - neg infectious disease ROS     Malignancy:  - neg malignancy ROS     Other: Comment: Blind right eye     (+) , H/O Chronic Pain,        Physical Exam    Airway  airway exam normal      Mallampati: I   TM distance: > 3 FB   Neck ROM: full   Mouth opening: > 3 cm    Respiratory Devices and Support         Dental  no notable dental history         Cardiovascular   cardiovascular exam normal       Rhythm and rate: regular and normal     Pulmonary   pulmonary exam normal                OUTSIDE LABS:  CBC:   Lab Results   Component Value Date    WBC 12.7 (H) 08/18/2022    WBC 9.6 10/07/2021    HGB 14.9 08/18/2022    HGB 16.3 (H) 10/07/2021    HCT 42.3 08/18/2022    HCT 45.9 10/07/2021     08/18/2022     10/07/2021     BMP:   Lab Results   Component Value Date     10/07/2021     06/27/2021    POTASSIUM 3.7 10/07/2021    POTASSIUM 4.7 06/27/2021    CHLORIDE 103 10/07/2021    CHLORIDE 106 06/27/2021    CO2 26 10/07/2021    CO2 26 06/27/2021    BUN 21 10/07/2021    BUN 15 06/27/2021    CR 0.75 10/07/2021    CR 0.89 06/27/2021    GLC 87 10/07/2021     (H) 06/27/2021     COAGS:   Lab Results   Component Value Date    PTT 30 10/21/2020    INR 0.91 10/21/2020    FIBR 328 10/21/2020     POC: No results found for: BGM, HCG, HCGS  HEPATIC:   Lab Results   Component Value Date    ALBUMIN 3.9 10/07/2021    PROTTOTAL 7.5 10/07/2021    ALT 26 10/07/2021    AST 20 10/07/2021    ALKPHOS 80 10/07/2021    BILITOTAL 0.3 10/07/2021     OTHER:   Lab Results   Component Value Date    LACT 1.4 06/27/2021    YOAN 9.0 10/07/2021    TSH 0.65 10/21/2020    CRP <2.9 09/21/2020       Anesthesia Plan    ASA Status:  3   NPO Status:  NPO Appropriate    Anesthesia Type: MAC.     - Reason for MAC: immobility  needed, straight local not clinically adequate              Consents    Anesthesia Plan(s) and associated risks, benefits, and realistic alternatives discussed. Questions answered and patient/representative(s) expressed understanding.    - Discussed:     - Discussed with:  Patient      - Extended Intubation/Ventilatory Support Discussed: No.      - Patient is DNR/DNI Status: No    Use of blood products discussed: No .     Postoperative Care    Pain management: IV analgesics, Oral pain medications.   PONV prophylaxis: Ondansetron (or other 5HT-3), Background Propofol Infusion     Comments:    Other Comments: MAC, sedation, GA as back up, standard ASA monitors  All pertinent results and records reviewed, risks, included but not limited to hypoventilation, hypoxemia, laryngo/bronchospasm, N/V, intraoperative awareness due to sedation only d/w patient, all questions, concerns addressed       H&P reviewed: Unable to attach H&P to encounter due to EHR limitations. H&P Update: appropriate H&P reviewed, patient examined. No interval changes since H&P (within 30 days).         Linda Tate MD

## 2022-08-22 NOTE — ANESTHESIA POSTPROCEDURE EVALUATION
Patient: Gracy Bautista    Procedure: Procedure(s):  COLONOSCOPY, WITH CO2 INSUFFLATION  COLONOSCOPY, WITH POLYPECTOMY AND BIOPSY       Anesthesia Type:  MAC    Note:  Disposition: Outpatient   Postop Pain Control: Uneventful            Sign Out: Well controlled pain   PONV: No   Neuro/Psych: Uneventful            Sign Out: Acceptable/Baseline neuro status   Airway/Respiratory: Uneventful            Sign Out: Acceptable/Baseline resp. status   CV/Hemodynamics: Uneventful            Sign Out: Acceptable CV status; No obvious hypovolemia; No obvious fluid overload   Other NRE: NONE   DID A NON-ROUTINE EVENT OCCUR?            Last vitals:  Vitals Value Taken Time   BP 99/50 08/22/22 1449   Temp 36.6  C (97.8  F) 08/22/22 1426   Pulse 65 08/22/22 1449   Resp 16 08/22/22 1449   SpO2 100 % 08/22/22 1449       Electronically Signed By: Linda Tate MD  August 22, 2022  3:03 PM

## 2022-08-23 ENCOUNTER — TELEPHONE (OUTPATIENT)
Dept: FAMILY MEDICINE | Facility: CLINIC | Age: 57
End: 2022-08-23

## 2022-08-23 DIAGNOSIS — Z13.220 SCREENING FOR HYPERLIPIDEMIA: ICD-10-CM

## 2022-08-23 DIAGNOSIS — E78.5 HYPERLIPIDEMIA: Primary | ICD-10-CM

## 2022-08-23 LAB
A1AT PHENOTYP SERPL-IMP: NORMAL
A1AT SERPL-MCNC: 155 MG/DL
S PN DA SERO 19F IGG SER-MCNC: 5.6 MCG/ML
S PNEUM DA 1 IGG SER-MCNC: 63.9 MCG/ML
S PNEUM DA 10A IGG SER-MCNC: 1.8 MCG/ML
S PNEUM DA 11A IGG SER-MCNC: 1.5 MCG/ML
S PNEUM DA 12F IGG SER-MCNC: <0.4 MCG/ML
S PNEUM DA 14 IGG SER-MCNC: 1.1 MCG/ML
S PNEUM DA 15B IGG SER-MCNC: 3.3 MCG/ML
S PNEUM DA 17F IGG SER-MCNC: 2.8 MCG/ML
S PNEUM DA 18C IGG SER-MCNC: 1.9 MCG/ML
S PNEUM DA 19A IGG SER-MCNC: 84.6 MCG/ML
S PNEUM DA 2 IGG SER-MCNC: 2.1 MCG/ML
S PNEUM DA 20A IGG SER-MCNC: 1.9 MCG/ML
S PNEUM DA 22F IGG SER-MCNC: 7.3 MCG/ML
S PNEUM DA 23F IGG SER-MCNC: 12.1 MCG/ML
S PNEUM DA 3 IGG SER-MCNC: 4.6 MCG/ML
S PNEUM DA 33F IGG SER-MCNC: 1.8 MCG/ML
S PNEUM DA 4 IGG SER-MCNC: 1.1 MCG/ML
S PNEUM DA 5 IGG SER-MCNC: 9.5 MCG/ML
S PNEUM DA 6B IGG SER-MCNC: 2.8 MCG/ML
S PNEUM DA 7F IGG SER-MCNC: 23.6 MCG/ML
S PNEUM DA 8 IGG SER-MCNC: 4.4 MCG/ML
S PNEUM DA 9N IGG SER-MCNC: NORMAL MCG/ML
S PNEUM DA 9V IGG SER-MCNC: 7.4 MCG/ML

## 2022-08-23 NOTE — TELEPHONE ENCOUNTER
SITUATION:   Patient calling stating that the patient had a cough. Patient calling stating that she doesn't know if she should go ahead with a hernia surgery. Patient had a swallow test completed and GI can't see the patient until February 2023. Wondering if she can be seen sooner or if she needs to cancel the surgery?     Patient had lab work completed on 08/18/22, but the patient wasn't fasting. Wondering if a new order can be placed?       PONCE GutierrezN, RN, PHN  [facility]  August 23, 2022

## 2022-08-24 LAB
PATH REPORT.COMMENTS IMP SPEC: NORMAL
PATH REPORT.FINAL DX SPEC: NORMAL
PATH REPORT.GROSS SPEC: NORMAL
PATH REPORT.MICROSCOPIC SPEC OTHER STN: NORMAL
PATH REPORT.RELEVANT HX SPEC: NORMAL
PHOTO IMAGE: NORMAL

## 2022-08-24 NOTE — TELEPHONE ENCOUNTER
This is an internal scheduling issue with [facility] and should be directed to them as they are the ones that schedule.  It appears they scheduled her for next February.  Possibly consider [facility] if [facility] is unable to get her in urgenly?  Please forward to [facility]/manager.

## 2022-08-24 NOTE — TELEPHONE ENCOUNTER
Checking in on this referral and reschedule again.   I    s the order valid for urgent/emergent scheduling? If not, what do we need to do to change it     Copying provider on as FYI of progress

## 2022-08-26 NOTE — TELEPHONE ENCOUNTER
The patient has been scheduled 08/29, I appreciate the team's assistance in getting the patient evaluated sooner.    Donna Hwang MD on 8/26/2022 at 4:25 PM

## 2022-08-29 ENCOUNTER — VIRTUAL VISIT (OUTPATIENT)
Dept: GASTROENTEROLOGY | Facility: CLINIC | Age: 57
End: 2022-08-29
Attending: FAMILY MEDICINE
Payer: COMMERCIAL

## 2022-08-29 VITALS — WEIGHT: 112 LBS | BODY MASS INDEX: 19.22 KG/M2

## 2022-08-29 DIAGNOSIS — R13.10 ODYNOPHAGIA: ICD-10-CM

## 2022-08-29 DIAGNOSIS — R13.10 DYSPHAGIA, UNSPECIFIED TYPE: ICD-10-CM

## 2022-08-29 DIAGNOSIS — Z72.0 TOBACCO ABUSE: ICD-10-CM

## 2022-08-29 DIAGNOSIS — J45.40 MODERATE PERSISTENT ASTHMA, UNCOMPLICATED: ICD-10-CM

## 2022-08-29 DIAGNOSIS — R05.3 CHRONIC COUGH: Primary | ICD-10-CM

## 2022-08-29 DIAGNOSIS — K42.0 UMBILICAL HERNIA WITH OBSTRUCTION: ICD-10-CM

## 2022-08-29 PROCEDURE — 99215 OFFICE O/P EST HI 40 MIN: CPT | Mod: 95 | Performed by: INTERNAL MEDICINE

## 2022-08-29 ASSESSMENT — PAIN SCALES - GENERAL: PAINLEVEL: SEVERE PAIN (7)

## 2022-08-29 NOTE — PATIENT INSTRUCTIONS
It was a pleasure taking care of you today.  I've included a brief summary of our discussion and care plan from today's visit below.  Please review this information with your primary care provider.  _______________________________________________________________________    My recommendations are summarized as follows:    Please stop smoking. This is contributing to your coughing.   Please obtain a CT of your Chest to evaluate your lungs given you cough and smoking history  Please schedule an endoscopy with the first available provider to assess your difficulty swallowing.   In the future you may need a repeat endoscopy with a test called endoflip and Bravo to assess the motility as well as the amount of reflux you may be having.     To schedule endoscopic procedures you may call: [phone]  To schedule radiology tests you may call: [phone]  To schedule an ENT appointment you may call: [phone]    Please call my nurse Kathleen ([phone]), Tahira ([phone]) with any questions or concerns.  If you were seen through the [facility] please feel free to reach out to Laura at [phone]   --    Return to GI Clinic in 4 months to review your progress.    _______________________________________________________________________    Who do I call with any questions after my visit?  Please be in touch if there are any further questions that arise following today's visit.  There are multiple ways to contact your gastroenterology care team.      During business hours, you may reach a Gastroenterology nurse at [phone] and choose option 3.       To schedule or reschedule an appointment, please call [phone].     You can always send a secure message through W[employer].  [employer]addle messages are answered by your nurse or doctor typically within 24 hours.  Please allow extra time on weekends and holidays.      For urgent/emergent questions after business hours, you may reach the on-call GI Fellow by contacting  the [facility]  at [phone].     How will I get the results of any tests ordered?    You will receive all of your results.  If you have signed up for [employer], any tests ordered at your visit will be available to you after your physician reviews them.  Typically this takes 1-2 weeks.  If there are urgent results that require a change in your care plan, your physician or nurse will call you to discuss the next steps.      What is [employer]?  [employer] is a secure way for you to access all of your healthcare records from the [facility].  It is a web based computer program, so you can sign on to it from any location.  It also allows you to send secure messages to your care team.  I recommend signing up for [employer] access if you have not already done so and are comfortable with using a computer.      How to I schedule a follow-up visit?  If you did not schedule a follow-up visit today, please call [phone] to schedule a follow-up office visit.      If you feel you received exceptional care and are interested in supporting the clinical and research goals of Dr. Narvaez or the Division of Gastroenterology, Hepatology, and Nutrition please contact [email or web address] from the [facility] to discuss opportunities to donate.    Sincerely,    Jayy Narvaez DO   of Medicine  Director, Esophageal Disorders Program  Division of Gastroenterology, Hepatology, and Nutrition  [facility]

## 2022-08-29 NOTE — PROGRESS NOTES
Gracy is a 56 year old who is being evaluated via a billable video visit.      How would you like to obtain your AVS? MyChart  If the video visit is dropped, the invitation should be resent by: Send to e-mail at: [email or web address]  Will anyone else be joining your video visit? No       Vanesa Dunn      Video-Visit Details    Video Start Time: 2:24 PM    Type of service:  Video Visit    Video End Time:3:04 PM    Originating Location (pt. Location): Home    Distant Location (provider location):  [facility]     Platform used for Video Visit: [facility]     Gastroenterology Visit for: Gracy Bautista 1965   MRN: [id number]     Reason for Visit:  chief complaint    Referred by: Jaiden  / [id number] [place] [id number]  Patient Care Team:  No Ref-Primary, Physician as PCP - General  Digna Jaimes MD as Assigned Surgical Provider  Marco Bean MD as Assigned Pulmonology Provider  Doni Lee MD as Assigned Allergy Provider  Donna Hwang MD as Assigned PCP  Tatyana Pickens [facility] as Assigned MTM Pharmacist  Donna Hwang MD as MD (Family Medicine)  Jayy Narvaez DO as MD (Gastroenterology)    History of Present Illness:   Gracy Bautista is a 56 year old female with asthma, chronic cough, tobacco abuse, umbilical hernia, nasal polyposis, chronic rhinosinusitis, COPD, who is presenting as a new patient in consultation at the request of Dr. Hwang with a chief complaint of chronic cough and dysphagia..  ---------------------------------------------------------------  Gracy Bautista states for the past 8 month she states she has been sick. She has been having a chronic cough and has been in the hospital for 4 times because she couldn't breath. She states she was in [place] in January and was kept in the hospital for 1 week. She reports that she has had a hysterectomy. She reports that she has a hernia that has been evaluated by a  surgeon. She has pain described as incredible cramping. It is located in two specific areas. This is not a new thing. She states that this dates back at least 6 years however it has been more frequent. It is not like a gas pain. She has tried omeprazole OTC for a week with no relief of symptoms. She drinks a lot of Fresca (2 cans a day) with a sip of this she feels as if it doesn't go down. She feels that when she swallows it she has belching and possibly some regurgitation. As of late, she states she doesn't sleep more than two hours because of coughing. She will have a popsicle at night if she wakes and begins coughing after that. She reports a significant amount of mucus. She states that whatever is happening has made her asthma aggravated. States that she used to be a pack every 2 weeks smoker. Currently 4 puffs a day. The patient saw Dr. Jennings regarding her umbilical hernia and is not interested in surgery at this time. Dysphagia has been progressive over the past 8-9months. Coincides with cough. No recent chest CT.     Egg consistency, yogurts feel like they go down slowly. Needs to drink water to help it pass. More solid foods she has to regurgitate it back up. Coffee makes her nauseated.   ---------------------------------------------------------------     Wt Readings from Last 5 Encounters:   08/29/22 50.8 kg (112 lb)   07/21/22 53.1 kg (117 lb)   07/20/22 53.1 kg (117 lb)   07/19/22 54.4 kg (120 lb)   07/18/22 54.4 kg (120 lb)        Esophageal Questionnaire(s)    BEDQ Questionnaire  BEDQ Questionnaire: How Often Have You Had the Following? 8/29/2022   Trouble eating solid food (meat, bread, vegetables) 5.0   Trouble eating soft foods (yogurt, jello, pudding) 4   Trouble swallowing liquids 4   Pain while swallowing 5   Coughing or choking while swallowing foods or liquids 5   Total Score: 23     BEDQ Questionnaire: Discomfort/Pain Ratings 8/29/2022   Eating solid food (meat, bread, vegetables) 3    Eating soft foods (yogurt, jello, pudding) 2   Drinking liquid 2   Total Score: 7       Eckardt Questionnaire  No flowsheet data found.    Promis 10 Questionnaire  No flowsheet data found.        STUDIES & PROCEDURES:    EGD:   Date:  Impression:  Pathology Report:    Colonoscopy:  Date: 8/22/22  Impression:     The perianal and digital rectal examinations were normal.        A 2 mm polyp was found in the descending colon. The polyp was sessile.        The polyp was removed with a cold biopsy forceps. Resection and        retrieval were complete. Verification of patient identification for the        specimen was done. Estimated blood loss was minimal.        The retroflexed view of the distal rectum and anal verge was normal and        showed no anal or rectal abnormalities.                                                                                     Moderate Sedation:        Moderate Sedation was not administered   Impression:               - One 2 mm polyp in the descending colon, removed                             with a cold biopsy forceps. Resected and retrieved.                             - The distal rectum and anal verge are normal on                             retroflexion view.   Recommendation:           - Discharge patient to home (ambulatory).                             - Patient has a contact number available for                             emergencies. The signs and symptoms of potential                             delayed complications were discussed with the                             patient. Return to normal activities tomorrow.                             Written discharge instructions were provided to the                             patient.                             - Await pathology results.                             - Repeat colonoscopy in 5-10 years for surveillance                             based on pathology results.                             - Return to primary care  physician.   Pathology Report:     EndoFLIP directed at the UES or LES (8cm (EF-325) balloon length or 16cm (EF-322) balloon length):   Date:  8cm balloon  Balloon inflation Balloon pressure CSA (mm^2) DI (mm^2/mmHg) Dmin (mm) Compliance   20 (ladmark ID)        30        40        50           16cm balloon  Balloon inflation Balloon pressure CSA (mm^2) DI (mm^2/mmHg) Dmin (mm) Compliance   30 (ladmark ID)        40        50        60        70           High Resolution Manometry:  Date:  Impression:    PH/Impedance:  Date:  Impression:     Bravo:  48 or 96hr  Date:  Impression:    CT:  Date: 10/6/21                                                                   IMPRESSION: Unchanged 4 mm right upper lobe nodule. Unchanged right  lower lobe mucous plug with clearing of other right lower lobe mucous  plug. Hyperinflated lungs. Nonenlarged mediastinal nodes.  Cervicothoracic spondylosis. Osteopenia.    Esophagram:  Date: 8/15/22  Impression:                                                                   IMPRESSION: The patient was able to swallow various consistencies of  barium without difficulty. Intermittent flash penetration noted with  thin barium. Otherwise no penetration or aspiration. With solid  consistencies, there is relative stasis of the bolus within the upper  esophagus suggesting some degree of dysmotility. Recommend GI  consultation. Please see the speech pathologist note for further  details and recommendations.     Prior medical records were reviewed including, but not limited to, notes from referring providers, lab work, radiographic tests, and other diagnostic tests. Pertinent results were summarized above.     History     Past Medical History:   Diagnosis Date     Allergic rhinitis      Arthritis      Chronic sinusitis      Depressive disorder      Hoarseness      Reduced vision      Uncomplicated asthma        Past Surgical History:   Procedure Laterality Date     COLONOSCOPY N/A 8/22/2022     Procedure: COLONOSCOPY, WITH POLYPECTOMY AND BIOPSY;  Surgeon: Glen Dyer MD;  Location: [facility] OR     COLONOSCOPY WITH CO2 INSUFFLATION N/A 8/22/2022    Procedure: COLONOSCOPY, WITH CO2 INSUFFLATION;  Surgeon: Glen Dyer MD;  Location: [facility] OR     NASAL/SINUS POLYPECTOMY       OPTICAL TRACKING SYSTEM ENDOSCOPIC SINUS SURGERY Bilateral 1/21/2022    Procedure: stealth guided, bilateral ethmoidectomy, bilateral frontal sinusotomy, bilateral maxillary antrostomies;  Surgeon: Digna Jaimes MD;  Location: [facility] OR       Social History     Socioeconomic History     Marital status:      Spouse name: Not on file     Number of children: Not on file     Years of education: Not on file     Highest education level: Not on file   Occupational History     Occupation: HOMEMAKER   Tobacco Use     Smoking status: Light Tobacco Smoker     Packs/day: 0.00     Years: 15.00     Pack years: 0.00     Types: Cigarettes     Start date: 9/17/2021     Smokeless tobacco: Never Used     Tobacco comment: 3 cigarettes per week   Vaping Use     Vaping Use: Never used   Substance and Sexual Activity     Alcohol use: Not Currently     Drug use: Never     Sexual activity: Yes     Partners: Male     Birth control/protection: None   Other Topics Concern     Not on file   Social History Narrative    October 7, 2021    ENVIRONMENTAL HISTORY: [place] The home is heated with a electric furnace. They do have central air conditioning. The patient's bedroom is furnished with carpeting in bedroom, allergen mattress cover, and allergen pillowcase cover.  Pets inside the house include 1 dog. There is no history of cockroach or mice infestation. There is/are 0 smokers in the house.  The house does have a damp basement.      Social Determinants of Health     Financial Resource Strain: Not on file   Food Insecurity: Not on file   Transportation Needs: Not on file   Physical Activity:  Not on file   Stress: Not on file   Social Connections: Not on file   Intimate Partner Violence: Not on file   Housing Stability: Not on file       Family History   Problem Relation Age of Onset     Cancer Brother      Bleeding Disorder Brother      Diabetes Father      Heart Disease Father      Cerebrovascular Disease Father      Bleeding Disorder Mother      Family history reviewed and edited as appropriate    Medications and Allergies:     Outpatient Encounter Medications as of 8/29/2022   Medication Sig Dispense Refill     albuterol (PROAIR HFA/PROVENTIL HFA/VENTOLIN HFA) 108 (90 Base) MCG/ACT inhaler Inhale 1-2 puffs into the lungs every 6 hours as needed for shortness of breath / dyspnea or wheezing 24 g 3     bisacodyl (DULCOLAX) 5 MG EC tablet Take 5 mg by mouth as needed for constipation       CYMBALTA 60 MG capsule Take 60 mg by mouth every morning        Fluticasone-Umeclidin-Vilanterol (TRELEGY ELLIPTA) 200-62.5-25 MCG/INH oral inhaler Inhale 1 puff into the lungs daily 60 each 3     LAMICTAL 200 MG tablet Take 200 mg by mouth every morning        levalbuterol (XOPENEX) 0.31 MG/3ML neb solution Take 3 mLs (0.31 mg) by nebulization every 4 hours as needed for wheezing or shortness of breath / dyspnea 125 mL 11     nicotine polacrilex (NICORETTE) 4 MG gum TAKE 1 BY MOUTH FOUR TIMES DAILY       spacer (OPTICHAMBER ELBA) holding chamber Use with albuterol (PROAIR HFA/PROVENTIL HFA/VENTOLIN HFA) 108 (90 Base) MCG/ACT inhaler 1 each 0     bisacodyl (DULCOLAX) 5 MG EC tablet Take two (2) tablet at 4 pm the day before your procedure.  If your procedure is before 11 am, take two (2) additional tablets at 8 pm.  If your procedure is after 11 am, take two (2) additional tablets at 6 am. For additional instructions refer to your colonoscopy prep instructions. (Patient not taking: Reported on 8/29/2022) 4 tablet 0     dextromethorphan-guaiFENesin (MUCINEX DM)  MG 12 hr tablet Take 1 tablet by mouth every 12  hours (Patient not taking: Reported on 8/29/2022)       DUPIXENT 300 MG/2ML prefilled pen INJECT 1 PEN (300 MG) UNDER THE SKIN EVERY 2 WEEKS (Patient not taking: Reported on 8/29/2022) 8 mL 1     loratadine (CLARITIN) 10 MG tablet Take 5 mg by mouth daily (Patient not taking: Reported on 8/29/2022)       polyethylene glycol (GOLYTELY) 236 g suspension The night before the exam: at 6 pm start drinking an 8-ounce glass every 15 minutes until the jug is half empty (about 8 glasses). Day of exam: 6 hours before your exam check-in Drink the other half of the jug. You should finish the prep 4 hours before the exam. For additional instructions refer to your colonoscopy prep instructions. (Patient not taking: Reported on 8/29/2022) 4000 mL 0     No facility-administered encounter medications on file as of 8/29/2022.        Allergies   Allergen Reactions     Bee Venom Angioedema, Swelling and Hives     Other reaction(s): Angioedema       Cefdinir Diarrhea and GI Disturbance              Levofloxacin Muscle Pain (Myalgia)     Prednisone Anxiety and Other (See Comments)     Hyperactivity and moodiness. Doesn't like how it makes her feel.          Review of systems:  A full 10 point review of systems was obtained and was negative except for the pertinent positives and negatives stated within the HPI.    Objective Findings:   Physical Exam:    Constitutional: Wt 50.8 kg (112 lb)   BMI 19.22 kg/m    General: Alert, cooperative, no distress, well-appearing  Head: Atraumatic, normocephalic, no obvious abnormalities   Eyes: EOMI, Sclera anicteric, no obvious conjunctival hemorrhage   Nose: Nares normal, no obvious malformation, no obvious rhinorrhea   Respiratory: normal appearing respirations without cough during the duration of the encounter.  Musculoskeletal: Range of motion intact, no obvious strength deficit  Skin: No jaundice, no obvious rash  Neurologic: AAOx3, no obvious neurologic abnormality  Psychiatric: Normal Affect,  appropriate mood  Extremities: No obvious edema, no obvious malformation     Labs, Radiology, Pathology     Lab Results   Component Value Date    WBC 12.7 (H) 08/18/2022    WBC 9.6 10/07/2021    WBC 13.4 (H) 06/27/2021    HGB 14.9 08/18/2022    HGB 16.3 (H) 10/07/2021    HGB 15.7 06/27/2021     08/18/2022     10/07/2021     06/27/2021    CHOL 245 (H) 08/18/2022    TRIG 108 08/18/2022     08/18/2022    ALT 26 10/07/2021    ALT 27 06/27/2021    AST 20 10/07/2021    AST 18 06/27/2021     10/07/2021     06/27/2021     09/21/2020    BUN 21 10/07/2021    BUN 15 06/27/2021    BUN 18 09/21/2020    CO2 26 10/07/2021    CO2 26 06/27/2021    CO2 26 09/21/2020    TSH 0.65 10/21/2020    TSH 0.652 02/18/2019    TSH 0.66 08/27/2013    INR 0.91 10/21/2020        Liver Function Studies -   Recent Labs   Lab Test 10/07/21  1124   PROTTOTAL 7.5   ALBUMIN 3.9   BILITOTAL 0.3   ALKPHOS 80   AST 20   ALT 26          Patient Active Problem List    Diagnosis Date Noted     Dysphagia, unspecified type 08/29/2022     Priority: Medium     Chronic cough 08/29/2022     Priority: Medium     Umbilical hernia with obstruction 07/20/2022     Priority: Medium     Added automatically from request for surgery [id number]       Anticardiolipin antibody positive 07/08/2022     Priority: Medium     Chronic pansinusitis 10/23/2021     Priority: Medium     Added automatically from request for surgery [id number]       Tobacco abuse 04/22/2021     Priority: Medium     Carpal tunnel syndrome 08/07/2019     Priority: Medium     Moderate persistent asthma, uncomplicated 12/04/2018     Priority: Medium     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  Overview:   Dx'd at [place] late 2013  Formatting of this note might be different from the  original.  Dx'd at [place] late 2013       Hypogammaglobulinemia (H) 11/22/2013     Priority: Medium     Insomnia 10/25/2013     Priority: Medium     Formatting of this note might be different from the original.  Overview:   Insomnia, unspecified  Formatting of this note might be different from the original.  Insomnia, unspecified       ADD (attention deficit disorder) 01/20/2012     Priority: Medium     Alcohol use disorder, moderate, in sustained remission (H) 01/20/2012     Priority: Medium     PTSD (post-traumatic stress disorder) 01/20/2012     Priority: Medium     Cardiac arrhythmia 10/28/2008     Priority: Medium     Hyperlipidemia 09/12/2006     Priority: Medium     Formatting of this note might be different from the original.  Overview:   LW Onset:  [id number]  Formatting of this note might be different from the original.  LW Onset:  79Mqq50       Peripheral vascular disease (H) 05/19/2005     Priority: Medium     Formatting of this note might be different from the original.  Overview:   LW Onset:  80Kap52  ; Acrocyanosis        Assessment and Plan   Assessment:    Gracy Bautista is a 56 year old female with asthma, chronic cough, tobacco abuse, umbilical hernia, nasal polyposis, chronic rhinosinusitis, COPD, who is presenting as a new patient in consultation at the request of Dr. Hwang with a chief complaint of chronic cough and dysphagia..    The patient was seen in video telemedicine consultation regarding her symptoms of cough and progressive dysphagia. Because she has never undergone endoscopy and in light of the alarm symptom of dysphagia I have recommended an endoscopy with the first available provider. If normal we will plan a non urgent endoscopy with endoflip and bravo placement off therapy to determine if she has any motility abnormality or reflux contributing to her cough.     Her cough is likely related to her active tobacco use and lung disease and less likely related to reflux however a  complete work up will be performed following a more urgent indication of dysphagia.     Because of the reported weight loss and worsening cough I have ordered a CT of her chest to ensure there is not a more nefarious etiology to her cough such as malignancy. Her last CT was almost a year ago however she states her symptoms have been worse over the last 8 months.     The patient was counseled to discontinue her smoking.     Umbilical hernia seen by surgery and patient not interested in surgery at this time.     1. Chronic cough    2. Dysphagia, unspecified type    3. Odynophagia    4. Tobacco abuse    5. Umbilical hernia with obstruction    6. Moderate persistent asthma, uncomplicated       Orders Placed This Encounter   Procedures     CT Chest w Contrast     Adult GI  Referral - Procedure Only          Plan:  1. Please stop smoking. This is contributing to your coughing.   2. Please obtain a CT of your Chest to evaluate your lungs given you cough and smoking history  3. Please schedule an endoscopy with the first available provider to assess your difficulty swallowing.   4. In the future you may need a repeat endoscopy with a test called endoflip and Bravo to assess the motility as well as the amount of reflux you may be having.     Follow up plan:   Return to clinic 4 months and as needed.    The risks and benefits of my recommendations, as well as other treatment options were discussed with the patient and any available family today. All questions were answered.     o Follow up: As planned above. Today, I personally spent 40 minutes in direct face to face time with the patient, of which greater than 50% of the time was spent in patient education and counseling as described above. Approximately 20 minutes were spent on indirect care associated with the patient's consultation including but not limited to review of: patient medical records to date, clinic visits, hospital records, lab results, imaging  studies, procedural documentation, and coordinating care with other providers. The findings from this review are summarized in the above note. All of the above accounted for a cumulative time of 60 minutes and was performed on the date of service.     The patient verbalized understanding of the plan and was appreciative for the time spent and information provided during the office visit.     Author:   Jayy Narvaez DO   of Medicine  Director, Esophageal Disorders Program  Division of Gastroenterology, Hepatology, and Nutrition  [facility]     Documentation assisted by voice recognition and documentation system.

## 2022-08-29 NOTE — RESULT ENCOUNTER NOTE
Splore message sent:     Alpha-1 antitrypsin is within normal limits.  CBC with differential with mild eosinophilia noted.  Eosinophilia is likely due to dupilumab that was started in May 2022.  Unremarkable T and B cell panel.  Total complement is within normal limits.  Postvaccination all pneumococcal antibody levels are 18/22 protective, which is optimal level of protection.    While totals IgG is within normal limits, selective IgG2 subclass deficiency noted.  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  I will contact [place] [facility] to see what other thoughts about it and if they feel that patient would benefit from immunoglobulin replacement.

## 2022-08-30 ENCOUNTER — LAB (OUTPATIENT)
Dept: PULMONOLOGY | Facility: CLINIC | Age: 57
End: 2022-08-30
Attending: GENETIC COUNSELOR, MS
Payer: COMMERCIAL

## 2022-08-30 ENCOUNTER — TELEPHONE (OUTPATIENT)
Dept: GASTROENTEROLOGY | Facility: CLINIC | Age: 57
End: 2022-08-30

## 2022-08-30 ENCOUNTER — ANCILLARY PROCEDURE (OUTPATIENT)
Dept: CT IMAGING | Facility: CLINIC | Age: 57
End: 2022-08-30
Attending: INTERNAL MEDICINE
Payer: COMMERCIAL

## 2022-08-30 DIAGNOSIS — R13.10 DYSPHAGIA, UNSPECIFIED TYPE: ICD-10-CM

## 2022-08-30 DIAGNOSIS — E87.8 HIGH SWEAT CHLORIDE CONCENTRATION WITHOUT CYSTIC FIBROSIS: Primary | ICD-10-CM

## 2022-08-30 DIAGNOSIS — R06.2 WHEEZING: ICD-10-CM

## 2022-08-30 DIAGNOSIS — J32.8 OTHER CHRONIC SINUSITIS: ICD-10-CM

## 2022-08-30 DIAGNOSIS — R05.3 CHRONIC COUGH: ICD-10-CM

## 2022-08-30 DIAGNOSIS — J34.9 SINUS DISEASE: ICD-10-CM

## 2022-08-30 DIAGNOSIS — Z13.220 SCREENING FOR HYPERLIPIDEMIA: ICD-10-CM

## 2022-08-30 DIAGNOSIS — D80.1 HYPOGAMMAGLOBULINEMIA (H): ICD-10-CM

## 2022-08-30 DIAGNOSIS — E87.8 HIGH SWEAT CHLORIDE CONCENTRATION WITHOUT CYSTIC FIBROSIS: ICD-10-CM

## 2022-08-30 DIAGNOSIS — R05.9 COUGH: ICD-10-CM

## 2022-08-30 DIAGNOSIS — Z71.83 ENCOUNTER FOR NONPROCREATIVE GENETIC COUNSELING: ICD-10-CM

## 2022-08-30 DIAGNOSIS — J45.50 NOT WELL CONTROLLED SEVERE PERSISTENT ASTHMA (H): ICD-10-CM

## 2022-08-30 DIAGNOSIS — J33.9 NASAL POLYPOSIS: ICD-10-CM

## 2022-08-30 DIAGNOSIS — Z72.0 TOBACCO ABUSE: ICD-10-CM

## 2022-08-30 DIAGNOSIS — J01.40 ACUTE PANSINUSITIS, RECURRENCE NOT SPECIFIED: ICD-10-CM

## 2022-08-30 DIAGNOSIS — J45.909 SEVERE ASTHMA, UNSPECIFIED WHETHER COMPLICATED, UNSPECIFIED WHETHER PERSISTENT: ICD-10-CM

## 2022-08-30 LAB
CHOLEST SERPL-MCNC: 250 MG/DL
FASTING STATUS PATIENT QL REPORTED: ABNORMAL
HDLC SERPL-MCNC: 103 MG/DL
INTERPRETATION: NORMAL
LAB PDF RESULT: NORMAL
LDLC SERPL CALC-MCNC: 137 MG/DL
NONHDLC SERPL-MCNC: 147 MG/DL
SIGNIFICANT RESULTS: NORMAL
SPECIMEN DESCRIPTION: NORMAL
SWEAT CHLORIDE: NORMAL
TEST DETAILS, MDL: NORMAL
TRIGL SERPL-MCNC: 50 MG/DL

## 2022-08-30 PROCEDURE — 71260 CT THORAX DX C+: CPT | Performed by: RADIOLOGY

## 2022-08-30 PROCEDURE — 99000 SPECIMEN HANDLING OFFICE-LAB: CPT | Performed by: PATHOLOGY

## 2022-08-30 PROCEDURE — 80061 LIPID PANEL: CPT | Mod: 90 | Performed by: PATHOLOGY

## 2022-08-30 PROCEDURE — 86774 TETANUS ANTIBODY: CPT | Mod: 90 | Performed by: PATHOLOGY

## 2022-08-30 PROCEDURE — 36415 COLL VENOUS BLD VENIPUNCTURE: CPT | Performed by: PATHOLOGY

## 2022-08-30 PROCEDURE — 89230 COLLECT SWEAT FOR TEST: CPT

## 2022-08-30 PROCEDURE — 96040 HC GENETIC COUNSELING, EACH 30 MINUTES: CPT | Performed by: GENETIC COUNSELOR, MS

## 2022-08-30 RX ORDER — IOPAMIDOL 755 MG/ML
55 INJECTION, SOLUTION INTRAVASCULAR ONCE
Status: COMPLETED | OUTPATIENT
Start: 2022-08-30 | End: 2022-08-30

## 2022-08-30 RX ADMIN — IOPAMIDOL 55 ML: 755 INJECTION, SOLUTION INTRAVASCULAR at 11:24

## 2022-08-30 NOTE — LETTER
Date:September 1, 2022      Patient was self referred, no letter generated. Do not send.        [facility] Health Information

## 2022-08-30 NOTE — LETTER
8/30/2022      RE: Gracy Bautista  [address] [place] [id number]     Dear Colleague,    Thank you for the opportunity to participate in the care of your patient, Gracy Bautista, at the [facility] at [facility]. Please see a copy of my visit note below.    Presenting Information:  Gracy Bautista is a 56-year-old woman with a history of pulmonary and sinus disease as well as a recent borderline sweat chloride test.  She was seen today at the request of Dr. Doni Lee  for a repeat sweat test and genetic counseling.  During today's history a medical and family history were collected, the genetics and inheritance of CFTR-related disorders was reviewed, and the results of Gracy's sweat test were disclosed and discussed.  These again returned in the borderline range, prompting the recommendation for genetic testing of the CFTR gene.  Informed consent for genetic testing was obtained and testing was pursued today.      Personal History:  Gracy reports a history of asthma that has not been well controlled and has required hospitalizations during exacerbations.  She has a chronic cough and notes mucus in her chest and airway.  She experiences wheezing and shortness of breath.  Gracy also has a history of nasal polyps and sinus disease and has undergone four sinus surgeries.  She also experiences dysphagia.  Prior testing for alpha-1-antitrypsin deficiency has returned normal.  Gracy also has a history of fatigue and recent weight loss.  Gracy has a history of abdominal bloating and cramping.  A recent colonoscopy was normal.  An endoscopy is planned.      Family History:  A detailed pedigree was obtained and scanned into the electronic medical record.  It is significant for the following:     Gracy has two sons, ages 23 and 21.  Both boys had a history of seizures in early childhood.  Her oldest grew out of this and is  currently not on any medication and has been seizure-free since around age 7 or 9.  Gracy's younger son was diagnosed with a non-cancerous brain tumor in middle-school that was removed.  Gracy was unsure what type of tumor he had.  This son has continued to experience seizures.     Gracy's sons paternal uncle also has a history of seizures and a brain tumor.  The type of tumor he had is unknown.  Given this family history I am suspicious for an underlying genetic cause for the seizures, and possible brain tumors, in the family.  I encouraged her to obtain more information about the types of tumors in the family.  Further genetic evaluation would be available to Gracy's sons and brother-in-law.  They are encouraged to reach out of they are interested in pursuing this.     Gracy has one full brother and one half-brother, neither of whom have similar symptoms to what she has experienced.  Both have children.      Gracy's father  at 45 due to a stroke and a history of diabetes.      Gracy's mother is still living.      Gracy is of [place] and Congregational ancestry.  Consanguinity was denied.     Discussion:  We first reviewed the genetics of CFTR-related disorders including cystic fibrosis.  Genes are long stretches of DNA that are responsible for how our bodies look and how our bodies work.  We all have two copies of every gene; one inherited from the mother and one inherited from the father.  When there is a change, called a mutation, in a gene it can cause it to not do its job correctly which can cause the signs and symptoms of a genetic condition.  Cystic fibosis is caused by mutations in a gene called CFTR.        CFTR-related disorders including cystic fibrosis are inherited in an autosomal recessive pattern.  This means that to be affected an individual must inherit a mutation in both copies of the CFTR gene (one from each parent).  Individuals with just one mutation in the CFTR gene are said to be carriers.   Carriers do not have cystic fibrosis but can have an affected child if their partner is also a carrier or has a CFTR-related disorder themselves.     While most people found to have two CFTR mutations have classic cystic fibrosis, CFTR mutations can also cause milder symptoms of a CFTR-related disorder including milder pulmonary disease, sinus disease, pancreatitis and male infertility.  The diagnosis of cystic fibrosis is made by a sweat chloride test of >60 mmol/L (normal range <30 mmol/), or the presence of two CF-causing mutations in the CFTR gene.      Because of Gracy's history of respiratory and sinus symptoms, her doctors recommended a sweat chloride test to rule out a CFTR-related disorder.  Gracy's initial sweat test returned in the borderline range at 31 and 37 mmol/L.  A repeat sweat test was recommended and again returned in the borderline range today at 29 and 31 mmol/L.  Because of Gracy's symptoms and now two borderline sweat tests, genetic testing of the CFTR gene was recommended today.      Genetic testing for Gracy is medically necessary as it will have direct implications for her health and healthcare.  If she is found to have two CFTR mutations this would confirm a diagnosis of a CFTR-related disorder.  She would then benefit for follow-up in our adult cystic fibrosis clinic for management of her symptoms as well as further evaluations including pulmonary function testing, chest imaging, fecal elastase, throat cultures, etc. This information would also have implications for her family members including her sons.      We discussed the potential costs, benefits, and limitations of genetic testing including the possibility of a positive, negative, or uncertain result.  Testing will include sequencing and deletion/duplication of the CFTR gene and will be performed at the [facility].  On Gracy's behalf we will submit a prior authorization for genetic testing.  A  sample for testing will be drawn today but remain on hold until approval is obtained.  Once approved we will contact Gracy again to confirm she wishes to proceed.  Once testing is started results are expected in 3-4 weeks and I will contact her by telephone at that time.  Additional recommendations will be made at that time.     It was a pleasure meeting with Gracy today and she was encouraged to contact me with any additional questions or concerns.    Plan:  1.  A sample for genetic testing of the CFTR gene was collected today.  2.  A prior authorization for genetic testing will be submitted   3.  Once approved, the lab will contact Gracy and testing will begin  4.  Results are expected in 3-4 weeks after testing is started and I will contact Gracy by telephone at that time  5.  Additional recommendations as determined by results of the above testing       This visit was co-counseled by Brigitte Mata, Genetic Counseling Intern      Leighann Estrella MS, [facility]  Genetic Counselor  [facility]  [facility]       Total time spent in consultation with the family was approximately 40 minutes            Please do not hesitate to contact me if you have any questions/concerns.     Sincerely,       Leighann Estrella,

## 2022-08-31 ENCOUNTER — TELEPHONE (OUTPATIENT)
Dept: GASTROENTEROLOGY | Facility: CLINIC | Age: 57
End: 2022-08-31

## 2022-08-31 NOTE — PROGRESS NOTES
Presenting Information:  Gracy Bautista is a 56-year-old woman with a history of pulmonary and sinus disease as well as a recent borderline sweat chloride test.  She was seen today at the request of Dr. Doni Lee  for a repeat sweat test and genetic counseling.  During today's history a medical and family history were collected, the genetics and inheritance of CFTR-related disorders was reviewed, and the results of Gracy's sweat test were disclosed and discussed.  These again returned in the borderline range, prompting the recommendation for genetic testing of the CFTR gene.  Informed consent for genetic testing was obtained and testing was pursued today.      Personal History:  Gracy reports a history of asthma that has not been well controlled and has required hospitalizations during exacerbations.  She has a chronic cough and notes mucus in her chest and airway.  She experiences wheezing and shortness of breath.  Gracy also has a history of nasal polyps and sinus disease and has undergone four sinus surgeries.  She also experiences dysphagia.  Prior testing for alpha-1-antitrypsin deficiency has returned normal.  Gracy also has a history of fatigue and recent weight loss.  Gracy has a history of abdominal bloating and cramping.  A recent colonoscopy was normal.  An endoscopy is planned.      Family History:  A detailed pedigree was obtained and scanned into the electronic medical record.  It is significant for the following:     Gracy has two sons, ages 23 and 21.  Both boys had a history of seizures in early childhood.  Her oldest grew out of this and is currently not on any medication and has been seizure-free since around age 7 or 9.  Gracy's younger son was diagnosed with a non-cancerous brain tumor in middle-school that was removed.  Gracy was unsure what type of tumor he had.  This son has continued to experience seizures.     Gracy's sons paternal uncle also has a history of seizures and a brain  tumor.  The type of tumor he had is unknown.  Given this family history I am suspicious for an underlying genetic cause for the seizures, and possible brain tumors, in the family.  I encouraged her to obtain more information about the types of tumors in the family.  Further genetic evaluation would be available to Gracy's sons and brother-in-law.  They are encouraged to reach out of they are interested in pursuing this.     Gracy has one full brother and one half-brother, neither of whom have similar symptoms to what she has experienced.  Both have children.      Gracy's father  at 45 due to a stroke and a history of diabetes.      Gracy's mother is still living.      Gracy is of [place] and Anglican ancestry.  Consanguinity was denied.     Discussion:  We first reviewed the genetics of CFTR-related disorders including cystic fibrosis.  Genes are long stretches of DNA that are responsible for how our bodies look and how our bodies work.  We all have two copies of every gene; one inherited from the mother and one inherited from the father.  When there is a change, called a mutation, in a gene it can cause it to not do its job correctly which can cause the signs and symptoms of a genetic condition.  Cystic fibosis is caused by mutations in a gene called CFTR.        CFTR-related disorders including cystic fibrosis are inherited in an autosomal recessive pattern.  This means that to be affected an individual must inherit a mutation in both copies of the CFTR gene (one from each parent).  Individuals with just one mutation in the CFTR gene are said to be carriers.  Carriers do not have cystic fibrosis but can have an affected child if their partner is also a carrier or has a CFTR-related disorder themselves.     While most people found to have two CFTR mutations have classic cystic fibrosis, CFTR mutations can also cause milder symptoms of a CFTR-related disorder including milder pulmonary disease, sinus disease,  pancreatitis and male infertility.  The diagnosis of cystic fibrosis is made by a sweat chloride test of >60 mmol/L (normal range <30 mmol/), or the presence of two CF-causing mutations in the CFTR gene.      Because of Gracy's history of respiratory and sinus symptoms, her doctors recommended a sweat chloride test to rule out a CFTR-related disorder.  Gracy's initial sweat test returned in the borderline range at 31 and 37 mmol/L.  A repeat sweat test was recommended and again returned in the borderline range today at 29 and 31 mmol/L.  Because of Gracy's symptoms and now two borderline sweat tests, genetic testing of the CFTR gene was recommended today.      Genetic testing for Gracy is medically necessary as it will have direct implications for her health and healthcare.  If she is found to have two CFTR mutations this would confirm a diagnosis of a CFTR-related disorder.  She would then benefit for follow-up in our adult cystic fibrosis clinic for management of her symptoms as well as further evaluations including pulmonary function testing, chest imaging, fecal elastase, throat cultures, etc. This information would also have implications for her family members including her sons.      We discussed the potential costs, benefits, and limitations of genetic testing including the possibility of a positive, negative, or uncertain result.  Testing will include sequencing and deletion/duplication of the CFTR gene and will be performed at the [facility].  On Gracy's behalf we will submit a prior authorization for genetic testing.  A sample for testing will be drawn today but remain on hold until approval is obtained.  Once approved we will contact Gracy again to confirm she wishes to proceed.  Once testing is started results are expected in 3-4 weeks and I will contact her by telephone at that time.  Additional recommendations will be made at that time.     It was a pleasure meeting  with Gracy today and she was encouraged to contact me with any additional questions or concerns.    Plan:  1.  A sample for genetic testing of the CFTR gene was collected today.  2.  A prior authorization for genetic testing will be submitted   3.  Once approved, the lab will contact Gracy and testing will begin  4.  Results are expected in 3-4 weeks after testing is started and I will contact Gracy by telephone at that time  5.  Additional recommendations as determined by results of the above testing       This visit was co-counseled by Brigitte Mata, Genetic Counseling Intern      Leighann Estrella MS, [place]  Genetic Counselor  [facility]  [facility]       Total time spent in consultation with the family was approximately 40 minutes

## 2022-08-31 NOTE — TELEPHONE ENCOUNTER
Attempted to contact patient regarding upcoming EGD procedure on 9/2/22 for pre assessment questions. No answer.     Left message to return call to [phone] #4    Covid test scheduled? Discuss at home rapid antigen COVID test 1-2 days prior to procedure.    Arrival time: 0915    Facility location: [facility]    Sedation type: MAC     Indication for procedure: dysphagia     Anticoagulants: no.     Tatyana Cohen RN

## 2022-08-31 NOTE — TELEPHONE ENCOUNTER
Screening Questions    BlueKIND OF PREP RedLOCATION [review exclusion criteria] GreenSEDATION TYPE      1. Are you active on mychart? Y    2. What insurance is in the chart? [employer]     3.   Ordering/Referring Provider: DENISA    4. BMI   (If greater than 40 review exclusion criteria [PAC APPT IF [MAC] @ [facility])  19.2  [If yes, BMI OVER 40-EXTENDED PREP]      **(Sedation review/consideration needed)**  Do you have a legal guardian or Medical Power of    and/or are you able to give consent for your medical care?     SELF    5. Have you had a positive covid test in the last 90 days?   N -     6.  Are you currently on dialysis?   N [ If yes, G-PREP & HOSPITAL setting ONLY]     7.  Do you have chronic kidney disease?  N [ If yes, G-PREP ]    8.   Do you have a diagnosis of diabetes?   N   [ If yes, G-PREP ]    9.  On a regular basis do you go 3-5 days between bowel movements?   N   [ If yes, EXTENDED PREP]    10.  Are you taking any prescription pain medications on a routine schedule?    N -  [ If yes, EXTENDED PREP] [If yes, MAC]      11.   Do you have any chemical dependencies such as alcohol, street drugs, or methadone?    N [If yes, MAC]    12.   Do you have any history of post-traumatic stress syndrome, severe anxiety or history of psychosis?    Y  [If yes, MAC]    13.  [FEMALES] Are you currently pregnant? N    If yes, how many weeks?       Respiratory/Heart Screening:  [If yes to any of the following HOSPITAL setting only]     14. Do you have Pulmonary Hypertension [Lungs]?   N       15. Do you have UNCONTROLLED asthma?   N     16.  Do you use daily home oxygen?  N      17. Do you have mod to severe Obstructive Sleep Apnea?         (OKAY @  [facility]  SH  PH  RI  MG - if pt is not on OXYGEN)  N      18.   Have you had a heart or lung transplant?   N      19.   Have you had a stroke or Transient ischemic attack (TIA - aka  mini stroke ) within 6 months?  (If yes, please review exclusion criteria)  N     20.    In the past 6 months, have you had any heart related issues including cardiomyopathy or heart attack?   N           If yes, did it require cardiac stenting or other implantable device?         21.   Do you have any implantable devices in your body (pacemaker, defib, LVAD)? (If yes, please review exclusion criteria)  N   22.  Do you take the medication Phentermine?  NO        23. Do you take nitroglycerin?   N           If yes, how often?   (if yes, HOSPITAL setting ONLY)    24.  Are you currently taking any blood thinners?    [If yes, INFORM patient to follw up w/ ORDERING PROVIDER FOR BRIDGING INSTRUCTIONS]     N    25.   Do you transfer independently?                (If NO, please HOSPITAL setting ONLY)  Y    26.   Preferred LOCAL Pharmacy for Pre Prescription:         [id number] - [place], [place] - [address] AT [facility] OF [address]      Scheduling Details  (Please ask for phone number if not scheduled by patient)      Caller : Gracy Bautista  Date of Procedure: 9/2/22  Surgeon: Mello  Location: [facility]        Sedation Type: MAX l Hx of PTSD  Conscious Sedation- Needs  for 6 hours after the procedure  MAC/General-Needs  for 24 hours after procedure    N :[Pre-op Required] at U  SH  MG and OR for MAC sedation   (advise patient they will need a pre-op WITH IN 30 DAYS of procedure date)     Type of Procedure Scheduled:   Upper Endoscopy [EGD]    Which Colonoscopy Prep was Sent?:    -     KHORUTS CF PATIENTS & GROEN'S PATIENTS NEEDS EXTENDED PREP       Informed patient they will need an adult  Y  Cannot take any type of public or medical transportation alone    Pre-Procedure Covid test to be completed at [facility] or Externally: home  **INFORMED OF HOME TESTING & LAB OPTION**        Confirmed Nurse will call to complete assessment Y    Additional comments:     Not applicable

## 2022-09-01 ENCOUNTER — MYC MEDICAL ADVICE (OUTPATIENT)
Dept: ALLERGY | Facility: OTHER | Age: 57
End: 2022-09-01

## 2022-09-01 LAB
C TETANI TOXOID IGG SERPL IA-ACNC: 13.7 IU/ML
INTERPRETATION: NORMAL

## 2022-09-01 NOTE — TELEPHONE ENCOUNTER
Pre assessment questions completed for upcoming EGD procedure scheduled on 9/2/22    COVID policy reviewed. Patient to complete rapid antigen test one to two days before their scheduled procedure. Patient to bring photo of the results when they come in for their procedure.    Reviewed procedural arrival time 0915 and [facility].    Designated  policy reviewed. Instructed to have someone stay 24 hours post procedure.     Reviewed EGD prep instructions with patient.     Anticoagulation/blood thinners? None    Electronic implanted devices? None    Patient verbalized understanding and had no questions or concerns at this time.    Rebecca Hodge RN

## 2022-09-01 NOTE — TELEPHONE ENCOUNTER
Second attempt for pre-assessment prior to upcoming EGD.    No answer.  Left message to return call [phone] #4    Shanna Guzman RN

## 2022-09-02 ENCOUNTER — HOSPITAL ENCOUNTER (OUTPATIENT)
Facility: AMBULATORY SURGERY CENTER | Age: 57
Discharge: HOME | End: 2022-09-02
Attending: INTERNAL MEDICINE
Payer: COMMERCIAL

## 2022-09-02 ENCOUNTER — TELEPHONE (OUTPATIENT)
Dept: LAB | Facility: CLINIC | Age: 57
End: 2022-09-02

## 2022-09-02 ENCOUNTER — ANESTHESIA (OUTPATIENT)
Dept: SURGERY | Facility: AMBULATORY SURGERY CENTER | Age: 57
End: 2022-09-02
Payer: COMMERCIAL

## 2022-09-02 ENCOUNTER — ANESTHESIA EVENT (OUTPATIENT)
Dept: SURGERY | Facility: AMBULATORY SURGERY CENTER | Age: 57
End: 2022-09-02
Payer: COMMERCIAL

## 2022-09-02 VITALS
RESPIRATION RATE: 24 BRPM | WEIGHT: 112 LBS | BODY MASS INDEX: 19.12 KG/M2 | DIASTOLIC BLOOD PRESSURE: 54 MMHG | OXYGEN SATURATION: 98 % | SYSTOLIC BLOOD PRESSURE: 131 MMHG | HEIGHT: 64 IN | HEART RATE: 87 BPM | TEMPERATURE: 97.2 F

## 2022-09-02 VITALS — HEART RATE: 73 BPM

## 2022-09-02 LAB — UPPER GI ENDOSCOPY: NORMAL

## 2022-09-02 PROCEDURE — 43239 EGD BIOPSY SINGLE/MULTIPLE: CPT | Mod: 59

## 2022-09-02 PROCEDURE — 43251 EGD REMOVE LESION SNARE: CPT

## 2022-09-02 PROCEDURE — 88305 TISSUE EXAM BY PATHOLOGIST: CPT | Mod: 26 | Performed by: PATHOLOGY

## 2022-09-02 PROCEDURE — 88305 TISSUE EXAM BY PATHOLOGIST: CPT | Mod: TC | Performed by: INTERNAL MEDICINE

## 2022-09-02 RX ORDER — KETAMINE HYDROCHLORIDE 10 MG/ML
INJECTION INTRAMUSCULAR; INTRAVENOUS PRN
Status: DISCONTINUED | OUTPATIENT
Start: 2022-09-02 | End: 2022-09-02

## 2022-09-02 RX ORDER — GLYCOPYRROLATE 0.2 MG/ML
INJECTION, SOLUTION INTRAMUSCULAR; INTRAVENOUS PRN
Status: DISCONTINUED | OUTPATIENT
Start: 2022-09-02 | End: 2022-09-02

## 2022-09-02 RX ORDER — LIDOCAINE 40 MG/G
CREAM TOPICAL
Status: DISCONTINUED | OUTPATIENT
Start: 2022-09-02 | End: 2022-09-03 | Stop reason: HOSPADM

## 2022-09-02 RX ORDER — SODIUM CHLORIDE, SODIUM LACTATE, POTASSIUM CHLORIDE, CALCIUM CHLORIDE 600; 310; 30; 20 MG/100ML; MG/100ML; MG/100ML; MG/100ML
INJECTION, SOLUTION INTRAVENOUS CONTINUOUS PRN
Status: DISCONTINUED | OUTPATIENT
Start: 2022-09-02 | End: 2022-09-02

## 2022-09-02 RX ORDER — PROPOFOL 10 MG/ML
INJECTION, EMULSION INTRAVENOUS PRN
Status: DISCONTINUED | OUTPATIENT
Start: 2022-09-02 | End: 2022-09-02

## 2022-09-02 RX ORDER — LIDOCAINE HYDROCHLORIDE 20 MG/ML
INJECTION, SOLUTION INFILTRATION; PERINEURAL PRN
Status: DISCONTINUED | OUTPATIENT
Start: 2022-09-02 | End: 2022-09-02

## 2022-09-02 RX ORDER — ONDANSETRON 2 MG/ML
INJECTION INTRAMUSCULAR; INTRAVENOUS PRN
Status: DISCONTINUED | OUTPATIENT
Start: 2022-09-02 | End: 2022-09-02

## 2022-09-02 RX ORDER — SODIUM CHLORIDE, SODIUM LACTATE, POTASSIUM CHLORIDE, CALCIUM CHLORIDE 600; 310; 30; 20 MG/100ML; MG/100ML; MG/100ML; MG/100ML
INJECTION, SOLUTION INTRAVENOUS CONTINUOUS
Status: DISCONTINUED | OUTPATIENT
Start: 2022-09-02 | End: 2022-09-03 | Stop reason: HOSPADM

## 2022-09-02 RX ORDER — PROPOFOL 10 MG/ML
INJECTION, EMULSION INTRAVENOUS CONTINUOUS PRN
Status: DISCONTINUED | OUTPATIENT
Start: 2022-09-02 | End: 2022-09-02

## 2022-09-02 RX ORDER — ONDANSETRON 2 MG/ML
4 INJECTION INTRAMUSCULAR; INTRAVENOUS
Status: DISCONTINUED | OUTPATIENT
Start: 2022-09-02 | End: 2022-09-03 | Stop reason: HOSPADM

## 2022-09-02 RX ADMIN — ONDANSETRON 4 MG: 2 INJECTION INTRAMUSCULAR; INTRAVENOUS at 10:10

## 2022-09-02 RX ADMIN — PROPOFOL 100 MG: 10 INJECTION, EMULSION INTRAVENOUS at 10:11

## 2022-09-02 RX ADMIN — KETAMINE HYDROCHLORIDE 10 MG: 10 INJECTION INTRAMUSCULAR; INTRAVENOUS at 10:11

## 2022-09-02 RX ADMIN — SODIUM CHLORIDE, SODIUM LACTATE, POTASSIUM CHLORIDE, CALCIUM CHLORIDE: 600; 310; 30; 20 INJECTION, SOLUTION INTRAVENOUS at 10:07

## 2022-09-02 RX ADMIN — LIDOCAINE HYDROCHLORIDE 100 MG: 20 INJECTION, SOLUTION INFILTRATION; PERINEURAL at 10:10

## 2022-09-02 RX ADMIN — GLYCOPYRROLATE 0.2 MG: 0.2 INJECTION, SOLUTION INTRAMUSCULAR; INTRAVENOUS at 10:10

## 2022-09-02 RX ADMIN — PROPOFOL 200 MCG/KG/MIN: 10 INJECTION, EMULSION INTRAVENOUS at 10:11

## 2022-09-02 NOTE — PROGRESS NOTES
I called Gracy to update her on insurance coverage for her genetic testing (no auth required). However, I was unable to reach her.  I left a voicemail with my name and phone number.    PONCE Kirkpatrick  Genomics Billing    [facility]   Molecular Diagnostics Laboratory  [address] [place] [id number]  [phone]

## 2022-09-02 NOTE — ANESTHESIA CARE TRANSFER NOTE
Patient: Gracy Bautista    Procedure: Procedure(s):  ESOPHAGOGASTRODUODENOSCOPY, WITH BIOPSY AND POLYPECTOMY       Diagnosis: Dysphagia, unspecified type [R13.10]  Diagnosis Additional Information: No value filed.    Anesthesia Type:   MAC     Note:    Oropharynx: oropharynx clear of all foreign objects and spontaneously breathing  Level of Consciousness: awake  Oxygen Supplementation: room air    Independent Airway: airway patency satisfactory and stable  Dentition: dentition unchanged  Vital Signs Stable: post-procedure vital signs reviewed and stable  Report to RN Given: handoff report given  Patient transferred to: Phase II    Handoff Report: Identifed the Patient, Identified the Reponsible Provider, Reviewed the pertinent medical history, Discussed the surgical course, Reviewed Intra-OP anesthesia mangement and issues during anesthesia, Set expectations for post-procedure period and Allowed opportunity for questions and acknowledgement of understanding      Vitals:  Vitals Value Taken Time   BP     Temp     Pulse 73 09/02/22 1020   Resp     SpO2         Electronically Signed By: PAT Nichole CRNA  September 2, 2022  10:24 AM

## 2022-09-02 NOTE — TELEPHONE ENCOUNTER
Apparently, there were some changes on chest CT.  It could represent a previous infection.  But it would be up to Pulmonology to decide what to do with those findings.    Doni Lee MD

## 2022-09-02 NOTE — DISCHARGE INSTRUCTIONS
Discharge Instructions after  Upper Endoscopy (EGD)    Activity and Diet  You were given medicine for pain. You may be dizzy or sleepy.  For 24 hours:   Do not drive or use heavy equipment.   Do not make important decisions.   Do not drink any alcohol.  ___ You may return to your regular diet.    Discomfort  You may have a sore throat for 2 to 3 days. It may help to:   Avoid hot liquids for 24 hours.   Use sore throat lozenges.   Gargle as needed with salt water up to 4 times a day. Mix 1 cup of warm water  with 1 teaspoon of salt. Do not swallow.  ___ Your esophagus was dilated (opened) or banded during the exam:   Drink only cool liquids for the rest of the day. Eat a soft diet for the next few days.   You may have a sore chest for 2 to 3 days.    You may take Tylenol (acetaminophen) for pain unless your doctor has told you not to.    Do not take aspirin or ibuprofen (Advil, Motrin) or other NSAIDS  (anti-inflammatory drugs) for ___ days.    Follow-up  ___ We took small tissue samples for study. If you do not have a follow-up visit scheduled,  call your provider s office in 2 weeks for the results.    Other instructions________________________________________________________    When to call us:  Problems are rare. Call right away if you have:   Unusual throat pain or trouble swallowing   Unusual pain in belly or chest that is not relieved by belching or passing air   Black stools (tar-like looking bowel movement)   Temperature above 100.6  F. (37.5  C).    If you vomit blood or have severe pain, go to an emergency room.    If you have questions, call:  Monday to Friday, 8 a.m. to 4:30 p.m.: Central Scheduling Department:[phone]    After hours: Hospital: [phone] (Ask for the GI fellow on call)

## 2022-09-02 NOTE — H&P
Gracy Bautista  [id number]  female  56 year old      Reason for procedure/surgery: Dysphagia     Patient Active Problem List   Diagnosis     Chronic pansinusitis     ADD (attention deficit disorder)     Alcohol use disorder, moderate, in sustained remission (H)     Blind right eye     Cardiac arrhythmia     Carpal tunnel syndrome     PTSD (post-traumatic stress disorder)     Hyperlipidemia     Hypogammaglobulinemia (H)     Immunoglobulin deficiency (H)     Insomnia     Moderate persistent asthma, uncomplicated     Myofascial pain     Peripheral vascular disease (H)     Tobacco abuse     Anticardiolipin antibody positive     Umbilical hernia with obstruction     Dysphagia, unspecified type     Chronic cough       Past Surgical History:    Past Surgical History:   Procedure Laterality Date     COLONOSCOPY N/A 8/22/2022    Procedure: COLONOSCOPY, WITH POLYPECTOMY AND BIOPSY;  Surgeon: Glen Dyer MD;  Location: [facility] OR     COLONOSCOPY WITH CO2 INSUFFLATION N/A 8/22/2022    Procedure: COLONOSCOPY, WITH CO2 INSUFFLATION;  Surgeon: Glen Dyer MD;  Location: [facility] OR     NASAL/SINUS POLYPECTOMY       OPTICAL TRACKING SYSTEM ENDOSCOPIC SINUS SURGERY Bilateral 1/21/2022    Procedure: stealth guided, bilateral ethmoidectomy, bilateral frontal sinusotomy, bilateral maxillary antrostomies;  Surgeon: Digna Jaimes MD;  Location: [facility] OR       Past Medical History:   Past Medical History:   Diagnosis Date     Allergic rhinitis      Arthritis      Chronic sinusitis      Depressive disorder      Hoarseness      Reduced vision      Uncomplicated asthma        Social History:   Social History     Tobacco Use     Smoking status: Light Tobacco Smoker     Packs/day: 0.00     Years: 15.00     Pack years: 0.00     Types: Cigarettes     Start date: 9/17/2021     Smokeless tobacco: Never Used     Tobacco comment: 3 cigarettes per day   Substance Use Topics     Alcohol use: Not Currently       Family History:    Family History   Problem Relation Age of Onset     Cancer Brother      Bleeding Disorder Brother      Diabetes Father      Heart Disease Father      Cerebrovascular Disease Father      Bleeding Disorder Mother        Allergies:   Allergies   Allergen Reactions     Bee Venom Angioedema, Swelling and Hives     Other reaction(s): Angioedema       Cefdinir Diarrhea and GI Disturbance              Levofloxacin Muscle Pain (Myalgia)     Prednisone Anxiety and Other (See Comments)     Hyperactivity and moodiness. Doesn't like how it makes her feel.         Active Medications:   Current Outpatient Medications   Medication Sig Dispense Refill     albuterol (PROAIR HFA/PROVENTIL HFA/VENTOLIN HFA) 108 (90 Base) MCG/ACT inhaler Inhale 1-2 puffs into the lungs every 6 hours as needed for shortness of breath / dyspnea or wheezing 24 g 3     CYMBALTA 60 MG capsule Take 60 mg by mouth every morning        DUPIXENT 300 MG/2ML prefilled pen INJECT 1 PEN (300 MG) UNDER THE SKIN EVERY 2 WEEKS 8 mL 1     Fluticasone-Umeclidin-Vilanterol (TRELEGY ELLIPTA) 200-62.5-25 MCG/INH oral inhaler Inhale 1 puff into the lungs daily 60 each 3     LAMICTAL 200 MG tablet Take 200 mg by mouth every morning        levalbuterol (XOPENEX) 0.31 MG/3ML neb solution Take 3 mLs (0.31 mg) by nebulization every 4 hours as needed for wheezing or shortness of breath / dyspnea 125 mL 11     nicotine polacrilex (NICORETTE) 4 MG gum TAKE 1 BY MOUTH FOUR TIMES DAILY       bisacodyl (DULCOLAX) 5 MG EC tablet Take two (2) tablet at 4 pm the day before your procedure.  If your procedure is before 11 am, take two (2) additional tablets at 8 pm.  If your procedure is after 11 am, take two (2) additional tablets at 6 am. For additional instructions refer to your colonoscopy prep instructions. (Patient not taking: Reported on 8/29/2022) 4 tablet 0     bisacodyl (DULCOLAX) 5 MG EC tablet Take 5 mg by mouth as needed for constipation       dextromethorphan-guaiFENesin  "(MUCINEX DM)  MG 12 hr tablet Take 1 tablet by mouth every 12 hours (Patient not taking: Reported on 8/29/2022)       loratadine (CLARITIN) 10 MG tablet Take 5 mg by mouth daily (Patient not taking: Reported on 8/29/2022)       polyethylene glycol (GOLYTELY) 236 g suspension The night before the exam: at 6 pm start drinking an 8-ounce glass every 15 minutes until the jug is half empty (about 8 glasses). Day of exam: 6 hours before your exam check-in Drink the other half of the jug. You should finish the prep 4 hours before the exam. For additional instructions refer to your colonoscopy prep instructions. (Patient not taking: Reported on 8/29/2022) 4000 mL 0     spacer (OPTICHAMBER ELBA) holding chamber Use with albuterol (PROAIR HFA/PROVENTIL HFA/VENTOLIN HFA) 108 (90 Base) MCG/ACT inhaler 1 each 0       Systemic Review:   CONSTITUTIONAL: NEGATIVE for fever, chills, change in weight  ENT/MOUTH: NEGATIVE for ear, mouth and throat problems  RESP: NEGATIVE for significant cough or SOB  CV: NEGATIVE for chest pain, palpitations or peripheral edema    Physical Examination:   Vital Signs: BP (!) 87/59   Pulse 53   Temp 97.2  F (36.2  C) (Temporal)   Ht 1.626 m (5' 4\")   Wt 50.8 kg (112 lb)   SpO2 100%   BMI 19.22 kg/m    GENERAL: healthy, alert and no distress  NECK: no adenopathy, no asymmetry, masses, or scars  RESP: lungs clear to auscultation - no rales, rhonchi or wheezes  CV: regular rate and rhythm, normal S1 S2, no S3 or S4, no murmur, click or rub, no peripheral edema and peripheral pulses strong  ABDOMEN: soft, nontender, no hepatosplenomegaly, no masses and bowel sounds normal  MS: no gross musculoskeletal defects noted, no edema    ASA: 2    Mallampati Score: 2    Plan: Appropriate to proceed as scheduled.      Colton Sarkar MD  9/2/2022    PCP:  No Ref-Primary, Physician    "

## 2022-09-02 NOTE — ANESTHESIA POSTPROCEDURE EVALUATION
Patient: Gracy Bautista    Procedure: Procedure(s):  ESOPHAGOGASTRODUODENOSCOPY, WITH BIOPSY AND POLYPECTOMY       Anesthesia Type:  MAC    Note:  Disposition: Outpatient   Postop Pain Control: Uneventful            Sign Out: Well controlled pain   PONV: No   Neuro/Psych: Uneventful            Sign Out: Acceptable/Baseline neuro status   Airway/Respiratory: Uneventful            Sign Out: Acceptable/Baseline resp. status   CV/Hemodynamics: Uneventful            Sign Out: Acceptable CV status; No obvious hypovolemia; No obvious fluid overload   Other NRE: NONE   DID A NON-ROUTINE EVENT OCCUR? No           Last vitals:  Vitals Value Taken Time   /54 09/02/22 1041   Temp     Pulse 87 09/02/22 1041   Resp 24 09/02/22 1041   SpO2 98 % 09/02/22 1041       Electronically Signed By: Russell Klein MD, MD  September 2, 2022  12:58 PM

## 2022-09-02 NOTE — ANESTHESIA PREPROCEDURE EVALUATION
Anesthesia Pre-Procedure Evaluation    Patient: Gracy Bautista   MRN: [id number] : 1965        Procedure : Procedure(s):  ESOPHAGOGASTRODUODENOSCOPY, WITH BIOPSY AND POLYPECTOMY          Past Medical History:   Diagnosis Date     Allergic rhinitis      Arthritis      Chronic sinusitis      Depressive disorder      Hoarseness      Reduced vision      Uncomplicated asthma       Past Surgical History:   Procedure Laterality Date     COLONOSCOPY N/A 2022    Procedure: COLONOSCOPY, WITH POLYPECTOMY AND BIOPSY;  Surgeon: Glen Dyer MD;  Location: [facility]     COLONOSCOPY WITH CO2 INSUFFLATION N/A 2022    Procedure: COLONOSCOPY, WITH CO2 INSUFFLATION;  Surgeon: Glen Dyer MD;  Location: [facility] OR     NASAL/SINUS POLYPECTOMY       OPTICAL TRACKING SYSTEM ENDOSCOPIC SINUS SURGERY Bilateral 2022    Procedure: stealth guided, bilateral ethmoidectomy, bilateral frontal sinusotomy, bilateral maxillary antrostomies;  Surgeon: Digna Jaimes MD;  Location: [facility] OR      Allergies   Allergen Reactions     Bee Venom Angioedema, Swelling and Hives     Other reaction(s): Angioedema       Cefdinir Diarrhea and GI Disturbance              Levofloxacin Muscle Pain (Myalgia)     Prednisone Anxiety and Other (See Comments)     Hyperactivity and moodiness. Doesn't like how it makes her feel.        Social History     Tobacco Use     Smoking status: Light Tobacco Smoker     Packs/day: 0.00     Years: 15.00     Pack years: 0.00     Types: Cigarettes     Start date: 2021     Smokeless tobacco: Never Used     Tobacco comment: 3 cigarettes per day   Substance Use Topics     Alcohol use: Not Currently      Wt Readings from Last 1 Encounters:   22 50.8 kg (112 lb)        Anesthesia Evaluation            ROS/MED HX  ENT/Pulmonary:     (+) Intermittent, asthma     Neurologic:  - neg neurologic ROS     Cardiovascular:  - neg cardiovascular ROS     METS/Exercise Tolerance:      Hematologic:  - neg hematologic  ROS     Musculoskeletal:  - neg musculoskeletal ROS     GI/Hepatic:  - neg GI/hepatic ROS     Renal/Genitourinary:  - neg Renal ROS     Endo:  - neg endo ROS     Psychiatric/Substance Use:  - neg psychiatric ROS     Infectious Disease:  - neg infectious disease ROS     Malignancy:  - neg malignancy ROS     Other:  - neg other ROS          Physical Exam    Airway  airway exam normal           Respiratory Devices and Support         Dental  no notable dental history         Cardiovascular   cardiovascular exam normal          Pulmonary   pulmonary exam normal                OUTSIDE LABS:  CBC:   Lab Results   Component Value Date    WBC 12.7 (H) 08/18/2022    WBC 9.6 10/07/2021    HGB 14.9 08/18/2022    HGB 16.3 (H) 10/07/2021    HCT 42.3 08/18/2022    HCT 45.9 10/07/2021     08/18/2022     10/07/2021     BMP:   Lab Results   Component Value Date     10/07/2021     06/27/2021    POTASSIUM 3.7 10/07/2021    POTASSIUM 4.7 06/27/2021    CHLORIDE 103 10/07/2021    CHLORIDE 106 06/27/2021    CO2 26 10/07/2021    CO2 26 06/27/2021    BUN 21 10/07/2021    BUN 15 06/27/2021    CR 0.75 10/07/2021    CR 0.89 06/27/2021    GLC 87 10/07/2021     (H) 06/27/2021     COAGS:   Lab Results   Component Value Date    PTT 30 10/21/2020    INR 0.91 10/21/2020    FIBR 328 10/21/2020     POC: No results found for: BGM, HCG, HCGS  HEPATIC:   Lab Results   Component Value Date    ALBUMIN 3.9 10/07/2021    PROTTOTAL 7.5 10/07/2021    ALT 26 10/07/2021    AST 20 10/07/2021    ALKPHOS 80 10/07/2021    BILITOTAL 0.3 10/07/2021     OTHER:   Lab Results   Component Value Date    LACT 1.4 06/27/2021    YOAN 9.0 10/07/2021    TSH 0.65 10/21/2020    CRP <2.9 09/21/2020       Anesthesia Plan    ASA Status:  2   NPO Status:  NPO Appropriate    Anesthesia Type: MAC.     - Reason for MAC: straight local not clinically adequate   Induction: Intravenous.   Maintenance: TIVA.         Consents    Anesthesia Plan(s) and associated risks, benefits, and realistic alternatives discussed. Questions answered and patient/representative(s) expressed understanding.     - Discussed: Risks, Benefits and Alternatives for BOTH SEDATION and the PROCEDURE were discussed     - Discussed with:  Patient         Postoperative Care    Pain management: Oral pain medications.   PONV prophylaxis: Ondansetron (or other 5HT-3), Dexamethasone or Solumedrol     Comments:                Russell Klein MD, MD

## 2022-09-06 ENCOUNTER — TELEPHONE (OUTPATIENT)
Dept: FAMILY MEDICINE | Facility: CLINIC | Age: 57
End: 2022-09-06

## 2022-09-06 LAB
PATH REPORT.COMMENTS IMP SPEC: NORMAL
PATH REPORT.COMMENTS IMP SPEC: NORMAL
PATH REPORT.FINAL DX SPEC: NORMAL
PATH REPORT.GROSS SPEC: NORMAL
PATH REPORT.RELEVANT HX SPEC: NORMAL
PHOTO IMAGE: NORMAL

## 2022-09-09 ENCOUNTER — NURSE TRIAGE (OUTPATIENT)
Dept: FAMILY MEDICINE | Facility: CLINIC | Age: 57
End: 2022-09-09

## 2022-09-09 NOTE — ADDENDUM NOTE
Addendum  created 09/09/22 1021 by Linda Tate MD    Attestation recorded in Intraprocedure, Intraprocedure Attestations filed

## 2022-09-09 NOTE — TELEPHONE ENCOUNTER
The patient can be scheduled 09/16, ok to use same day spot.  Also RN may triage if ADS is appropriate.     Thank you.  Donna Hwang MD on 9/9/2022 at 8:50 AM

## 2022-09-09 NOTE — TELEPHONE ENCOUNTER
Reason for Call:  Other --requesting call back    Detailed comments: Patient had appointment scheduled with provider this AM 9/9, stating this is the 3rd appointment in the last week that has been cancelled.     Patient growing concerned with her symptoms and frustrated her appointments have been cancelled. Requesting call back from provider to quickly discuss concerns and POC. Please contact patient if you are able.     Phone Number Patient can be reached at: Cell number on file:    Telephone Information:   Mobile [phone]       Best Time: Anytime    Can we leave a detailed message on this number? YES    Call taken on 9/9/2022 at 8:31 AM by Chantelle Aguilar

## 2022-09-09 NOTE — TELEPHONE ENCOUNTER
"  Nurse Triage SBAR  Is this a 2nd Level Triage? NO    SITUATION:                                                    (Clearly and briefly define the situation)  Gracy Bautista is a 56 year old female     Coughing, burning in throat and chest. Unable to swallow properly.     BACKGROUND:                                                    (Provide clear, relevant background information that relates to the situation)  Current Medication: Trelegy, Albuterol, Zithromax   Hx: Ongoing for 9 months    Patient is very upset. Very tearful, feeling like no one cares about what is going on.     \"very hard for me to go through the whole thing, it's been 9 months of coughing. Something is wrong and no one can figure out what.\"    GI provider did an endoscopy, found stuff and wants provider to review. Just wants to be seen by someone who understands her and knows what's going on.     Hard for patient to breathe, mainly at night. Extremely weak, can't eat- coughs whenever she eats anything \"even a banana or oatmeal\", nausea, losing weight- 7 pounds. Chest is very painful from coughing.     Advised patient to try liquid protein shakes to see if she can tolerate these to get the nutrition she needs, may be why she is so weak. Patient states she just got some and is trying them.    Everything that goes in her mouth \"burns\" her throat and chest, including her inhaler.     States that stuff is not fully going down her throat and it comes back up.    \"I do not have the strength.\"     Was scheduled for hernia, patient cancelled due to coughing.     Wanted to talk with PCP about it. States \"no one believes me.\"    Has asthma. Taking Zithromax, Albuterol inhaler, and Trelegy inhaler. These are not helping. Wheezing coming \"from throat.\"    Worse time for patient is in the evening while sleeping. Up all night only sleeping an hour a night.     A lot of \"abnormal closure.\" Patient does not know if this is the right wording.     Per " "report:  \"Abnormal esophageal motility\"  \"Submucosal nodule found in the duodenum. Biopsied.\"    Pathologic results are in and patient would like these results ASAP by Dr. Narvaez or Dr. Hwang. If there is something in the report that could be the cause of what's going on, she wants to know right away.    States Dr. Narvaez does not have anything to get in for earlier than scheduled visit on 11/21/22.  States [facility] did an urgent referral to GI to get her in for the endoscopy but now to follow-up it's booked out.         NURSE ASSESSMENT:                                                    (A statement of your professional conclusion)    Advised to go into ED or ADS. Patient is declining ED or ADS due to \"nothing more they can do for me.\"    Advised patient to put pulse oximeter on finger at night while she is having her coughing spells to see what oxygen drops to, since this is the time she states she has trouble breathing. This is not new for her.    Patient is in agreement and states she never thought about this and will try tonight.     Advised if O2 drops below 90% she needs to go into emergency room. Patient states understanding.      Patient is looking for her pathology results and is hoping to get these today.     Patient was more calm at the end of call and thanked this nurse for all the help.     Scheduled patient for Friday 9/16 with [facility] to follow-up with patient.     Routing to HI and Dr. Narvaez.     (See information below for more triage details.)  RECOMMENDATION(S) and PLAN:                                                    (What do you need from this individual?)  Protocol Recommended Disposition: Go To ED/[facility] Now (Or To Office With PCP Approval)  Will comply with recommendation: no - see note      Routing to provider for recommendation on YES.    Does the patient meet one of the following criteria for ADS visit consideration? 16+ years old, with an [facility] PCP     TIP  Providers, please consider if this " condition is appropriate for management at one of our Acute and Diagnostic Services sites.     If patient is a good candidate, please use dotphrase <dot>triageresponse and select Refer to ADS to document.    Encourage to return call clinic triage nurse if further questions/concerns that may come up or if symptoms do not improve, worsen, or new symptoms develop.    NOTES: Disposition was determined by the first positive assessment question, therefore all previous assessment questions were negative.  Guideline used:Cough-chronic  Electronic Clinical References    FAUSTINO Renee, RN  [facility]  September 9, 2022      Reason for Disposition    Increasing difficulty breathing and always has some difficulty breathing    Additional Information    Negative: SEVERE difficulty breathing (e.g., struggling for each breath, speaks in single words)    Negative: Lips or face are bluish now and persists when not coughing    Negative: Sounds like a life-threatening emergency to the triager    Negative: Chest pain is main symptom    Negative: Cough and < 3 weeks duration    Negative: Previous asthma attacks and this feels like an asthma attack    Negative: MODERATE difficulty breathing (e.g., speaks in phrases, SOB even at rest, pulse 100-120) and still present when not coughing    Negative: Chest pain  (Exception: MILD central chest pain, present only when coughing.)    Protocols used: COUGH - CHRONIC-A-OH

## 2022-09-12 ENCOUNTER — TELEPHONE (OUTPATIENT)
Dept: GASTROENTEROLOGY | Facility: CLINIC | Age: 57
End: 2022-09-12

## 2022-09-12 ENCOUNTER — PATIENT OUTREACH (OUTPATIENT)
Dept: GASTROENTEROLOGY | Facility: CLINIC | Age: 57
End: 2022-09-12

## 2022-09-12 DIAGNOSIS — R05.3 CHRONIC COUGH: Primary | ICD-10-CM

## 2022-09-12 DIAGNOSIS — R13.10 DYSPHAGIA, UNSPECIFIED TYPE: ICD-10-CM

## 2022-09-12 NOTE — PROGRESS NOTES
Per provider pt communicated with regarding testing to be done prior to follow up esophageal clinic visit.   - manometry and 24hr pH impedance off PPI therapy while awaiting the appointment.   Vibes message sent to relay plan.

## 2022-09-12 NOTE — TELEPHONE ENCOUNTER
Non- Invasive Endoscopy  Scheduling Details      Caller : Gracy Bautista    Procedure scheduled: pH Impedence (Manometry / Motility)  Ordering Provider: Jayy Narvaez DO   Provider/Surgeon: RN  Date of Scheduled Procedure: 9/20  [place] [facility] [place] @ [facility] ARRIVAL 60MINS BEFORE]    Sedation Type: NO SEDATION  Informed patient they will need an adult  No  NEEDED  Cannot take any type of public or medical transportation alone    Where is COVID appointment Scheduled at?  Home rapid test      Additional comments/Staff message:

## 2022-09-13 ENCOUNTER — LAB (OUTPATIENT)
Dept: LAB | Facility: CLINIC | Age: 57
End: 2022-09-13
Payer: COMMERCIAL

## 2022-09-13 DIAGNOSIS — R05.3 CHRONIC COUGH: ICD-10-CM

## 2022-09-13 DIAGNOSIS — R06.2 WHEEZING: ICD-10-CM

## 2022-09-13 DIAGNOSIS — E87.8 HIGH SWEAT CHLORIDE CONCENTRATION WITHOUT CYSTIC FIBROSIS: ICD-10-CM

## 2022-09-13 DIAGNOSIS — J34.9 SINUS DISEASE: ICD-10-CM

## 2022-09-13 DIAGNOSIS — J45.50 NOT WELL CONTROLLED SEVERE PERSISTENT ASTHMA (H): Primary | ICD-10-CM

## 2022-09-13 PROCEDURE — G0452 MOLECULAR PATHOLOGY INTERPR: HCPCS | Mod: 26 | Performed by: PATHOLOGY

## 2022-09-13 PROCEDURE — 81479 UNLISTED MOLECULAR PATHOLOGY: CPT | Performed by: GENETIC COUNSELOR, MS

## 2022-09-13 PROCEDURE — 81223 CFTR GENE FULL SEQUENCE: CPT | Performed by: GENETIC COUNSELOR, MS

## 2022-09-15 ENCOUNTER — PATIENT OUTREACH (OUTPATIENT)
Dept: GASTROENTEROLOGY | Facility: CLINIC | Age: 57
End: 2022-09-15

## 2022-09-15 ENCOUNTER — VIRTUAL VISIT (OUTPATIENT)
Dept: FAMILY MEDICINE | Facility: CLINIC | Age: 57
End: 2022-09-15
Payer: COMMERCIAL

## 2022-09-15 DIAGNOSIS — E78.5 HYPERLIPIDEMIA, UNSPECIFIED HYPERLIPIDEMIA TYPE: Primary | ICD-10-CM

## 2022-09-15 DIAGNOSIS — K21.00 GASTROESOPHAGEAL REFLUX DISEASE WITH ESOPHAGITIS, UNSPECIFIED WHETHER HEMORRHAGE: ICD-10-CM

## 2022-09-15 PROCEDURE — 99214 OFFICE O/P EST MOD 30 MIN: CPT | Mod: 95 | Performed by: FAMILY MEDICINE

## 2022-09-15 RX ORDER — PANTOPRAZOLE SODIUM 40 MG/1
40 TABLET, DELAYED RELEASE ORAL DAILY
Qty: 90 TABLET | Refills: 1 | Status: SHIPPED | OUTPATIENT
Start: 2022-09-15 | End: 2022-09-30

## 2022-09-15 ASSESSMENT — ASTHMA QUESTIONNAIRES
ACT_TOTALSCORE: 5
ACT_TOTALSCORE: 5
EMERGENCY_ROOM_LAST_YEAR_TOTAL: TWO
HOSPITALIZATION_OVERNIGHT_LAST_YEAR_TOTAL: ONE
QUESTION_5 LAST FOUR WEEKS HOW WOULD YOU RATE YOUR ASTHMA CONTROL: NOT CONTROLLED AT ALL
QUESTION_4 LAST FOUR WEEKS HOW OFTEN HAVE YOU USED YOUR RESCUE INHALER OR NEBULIZER MEDICATION (SUCH AS ALBUTEROL): THREE OR MORE TIMES PER DAY
QUESTION_3 LAST FOUR WEEKS HOW OFTEN DID YOUR ASTHMA SYMPTOMS (WHEEZING, COUGHING, SHORTNESS OF BREATH, CHEST TIGHTNESS OR PAIN) WAKE YOU UP AT NIGHT OR EARLIER THAN USUAL IN THE MORNING: FOUR OR MORE NIGHTS A WEEK
QUESTION_1 LAST FOUR WEEKS HOW MUCH OF THE TIME DID YOUR ASTHMA KEEP YOU FROM GETTING AS MUCH DONE AT WORK, SCHOOL OR AT HOME: ALL OF THE TIME
QUESTION_2 LAST FOUR WEEKS HOW OFTEN HAVE YOU HAD SHORTNESS OF BREATH: MORE THAN ONCE A DAY

## 2022-09-15 ASSESSMENT — ANXIETY QUESTIONNAIRES
5. BEING SO RESTLESS THAT IT IS HARD TO SIT STILL: NOT AT ALL
3. WORRYING TOO MUCH ABOUT DIFFERENT THINGS: SEVERAL DAYS
8. IF YOU CHECKED OFF ANY PROBLEMS, HOW DIFFICULT HAVE THESE MADE IT FOR YOU TO DO YOUR WORK, TAKE CARE OF THINGS AT HOME, OR GET ALONG WITH OTHER PEOPLE?: SOMEWHAT DIFFICULT
7. FEELING AFRAID AS IF SOMETHING AWFUL MIGHT HAPPEN: NOT AT ALL
6. BECOMING EASILY ANNOYED OR IRRITABLE: NOT AT ALL
GAD7 TOTAL SCORE: 4
2. NOT BEING ABLE TO STOP OR CONTROL WORRYING: SEVERAL DAYS
GAD7 TOTAL SCORE: 4
7. FEELING AFRAID AS IF SOMETHING AWFUL MIGHT HAPPEN: NOT AT ALL
IF YOU CHECKED OFF ANY PROBLEMS ON THIS QUESTIONNAIRE, HOW DIFFICULT HAVE THESE PROBLEMS MADE IT FOR YOU TO DO YOUR WORK, TAKE CARE OF THINGS AT HOME, OR GET ALONG WITH OTHER PEOPLE: SOMEWHAT DIFFICULT
1. FEELING NERVOUS, ANXIOUS, OR ON EDGE: NOT AT ALL
4. TROUBLE RELAXING: MORE THAN HALF THE DAYS
GAD7 TOTAL SCORE: 4

## 2022-09-15 ASSESSMENT — PATIENT HEALTH QUESTIONNAIRE - PHQ9
SUM OF ALL RESPONSES TO PHQ QUESTIONS 1-9: 14
10. IF YOU CHECKED OFF ANY PROBLEMS, HOW DIFFICULT HAVE THESE PROBLEMS MADE IT FOR YOU TO DO YOUR WORK, TAKE CARE OF THINGS AT HOME, OR GET ALONG WITH OTHER PEOPLE: SOMEWHAT DIFFICULT
SUM OF ALL RESPONSES TO PHQ QUESTIONS 1-9: 14

## 2022-09-15 NOTE — PROGRESS NOTES
Gracy is a 56 year old who is being evaluated via a billable video visit.      How would you like to obtain your AVS? MyChart  If the video visit is dropped, the invitation should be resent by: Text to cell phone: [phone]  Will anyone else be joining your video visit? No        Assessment & Plan     Hyperlipidemia, unspecified hyperlipidemia type  orders and follow up as documented in [facility], very strongly urged to quit smoking to reduce cardiovascular risk, copy of written low fat low cholesterol diet provided and reviewed, cardiovascular risk and specific lipid/LDL goals reviewed.    - Lipid panel reflex to direct LDL Fasting; Future    Gastroesophageal reflux disease with esophagitis, unspecified whether hemorrhage  Established with GI  Esophageal Manometry Study with pH 09/20  Scheduled for due to abnormal Video swallow, Dysphagia and chronic cough  - pantoprazole (PROTONIX) 40 MG EC tablet; Take 1 tablet (40 mg) by mouth daily for 90 days    No follow-ups on file.   Follow-up Visit   Expected date:  Oct 15, 2022 (Approximate)      Follow Up Appointment Details:     Follow-up with whom?: PCP    Follow-Up for what?: Adult Preventive    How?: In Person                    Donna Hwang MD  [facility] JOSUE Dubose is a 56 year old, presenting for the following health issues:  Recheck Medication and Cough    Patient did E-Check in. Questionnaires blown and patient checked in without being called.      History of Present Illness     Asthma:  She presents for follow up of asthma.  She has some cough, some wheezing, and some shortness of breath. She is using a relief medication every 4 hours. She does not miss any doses of her controller medication throughout the week.Patient is aware of the following triggers: unaware of any triggers, gastric reflux and upper respiratory infections. The patient has not had a visit to the Emergency Room, Urgent Care or Hospital due to asthma  since the last clinic visit.     COPD:  She presents for follow up of COPD.  Overall, COPD symptoms are much worse since last visit. She has a lot more than usual fatigue or shortness of breath with exertion and a lot more than usual shortness of breath at rest.  She constantly coughs and does have change in sputum. No recent fever. She can walk less than 1 block without stopping to rest. She can walk 1 flights of stairs without resting.The patient has had no ED, urgent care, or hospital admissions because of COPD since the last visit.     Mental Health Follow-up:  Patient presents to follow-up on Depression & Anxiety.Patient's depression since last visit has been:  Medium  The patient is having other symptoms associated with depression.  Patient's anxiety since last visit has been:  Medium  The patient is having other symptoms associated with anxiety.  Any significant life events: health concerns  Patient is feeling anxious or having panic attacks.  Patient has no concerns about alcohol or drug use.    She eats 2-3 servings of fruits and vegetables daily.She consumes 0 sweetened beverage(s) daily.She exercises with enough effort to increase her heart rate 9 or less minutes per day.  She exercises with enough effort to increase her heart rate 3 or less days per week.   She is taking medications regularly.    Today's PHQ-9         PHQ-9 Total Score: 14    PHQ-9 Q9 Thoughts of better off dead/self-harm past 2 weeks :   Not at all    How difficult have these problems made it for you to do your work, take care of things at home, or get along with other people: Somewhat difficult  Today's WESTON-7 Score: 4     Video swallow 08/15/2022  IMPRESSION: The patient was able to swallow various consistencies of  barium without difficulty. Intermittent flash penetration noted with  thin barium. Otherwise no penetration or aspiration. With solid  consistencies, there is relative stasis of the bolus within the upper  esophagus  suggesting some degree of dysmotility. Recommend GI  consultation. Please see the speech pathologist note for further  details and recommendations.     Upper GI endoscopy 09/02/2022  Impression:            - Abnormal esophageal motility.                          - Z-line irregular, 40 cm from the incisors.                          - Esophagogastric landmarks identified.                          - 2 cm hiatal hernia.                          - A single gastric polyp. Resected and retrieved.                          - Erosive gastropathy with stigmata of recent                          bleeding. Biopsied.                          - Submucosal nodule found in the duodenum. Biopsied.                          - Biopsies were taken with a cold forceps for                          evaluation of celiac disease.   Recommendation:        - Await pathology results.                          - Return to referring physician as previously                          scheduled.  Review of Systems   Constitutional, HEENT, cardiovascular, pulmonary, GI, , musculoskeletal, neuro, skin, endocrine and psych systems are negative, except as otherwise noted.      Objective           Vitals:  No vitals were obtained today due to virtual visit.    Physical Exam   GENERAL: Healthy, alert and no distress  EYES: Eyes grossly normal to inspection.  No discharge or erythema, or obvious scleral/conjunctival abnormalities.  RESP: No audible wheeze, cough, or visible cyanosis.  No visible retractions or increased work of breathing.    SKIN: Visible skin clear. No significant rash, abnormal pigmentation or lesions.  NEURO: Cranial nerves grossly intact.  Mentation and speech appropriate for age.  PSYCH: Mentation appears Anxious, affect normal/bright, judgement and insight intact, normal speech and appearance well-groomed.            Video-Visit Details    Video Start Time: 09:00AM    Type of service:  Video Visit    Video End  Time:09:40AM    Originating Location (pt. Location): Home    Distant Location (provider location):  [facility]     Platform used for Video Visit: Suly

## 2022-09-15 NOTE — PROGRESS NOTES
Reached out to pt to answer any further questions about next week's manometry and 24hr pH testing.   (already communicated via [facility])  Left detailed vm with call back number to use as needed.

## 2022-09-16 ENCOUNTER — TELEPHONE (OUTPATIENT)
Dept: PULMONOLOGY | Facility: CLINIC | Age: 57
End: 2022-09-16

## 2022-09-16 NOTE — TELEPHONE ENCOUNTER
I attempted to reach Gracy to disclose and discuss the results of her recent genetic testing of the CFTR gene.  This has returned negative (normal) with no mutations identified.  This essentially rules out a diagnosis of cystic fibrosis, despite her history of low borderline sweat chloride tests.  This is good news.  Grcay expressed understanding of these results and noted that since her sweat tests, there has been some progress in understanding her symptoms which may be related to her esophagus.  I encouraged her to continue working with her providers and to contact me with any additional questions or concerns.  A results letter and copy of Gracy's genetic test report will be sent by mail.        Leighann Estrella MS, [facility]  Genetic Counselor  [facility]  [facility]

## 2022-09-19 ENCOUNTER — PATIENT OUTREACH (OUTPATIENT)
Dept: GASTROENTEROLOGY | Facility: CLINIC | Age: 57
End: 2022-09-19

## 2022-09-19 NOTE — PROGRESS NOTES
Returned pt call regarding timing of 24 pH monitor return.  Pt will coordinate timing with motility RN.

## 2022-09-20 ENCOUNTER — OFFICE VISIT (OUTPATIENT)
Dept: GASTROENTEROLOGY | Facility: CLINIC | Age: 57
End: 2022-09-20
Payer: COMMERCIAL

## 2022-09-20 VITALS
SYSTOLIC BLOOD PRESSURE: 94 MMHG | DIASTOLIC BLOOD PRESSURE: 63 MMHG | HEIGHT: 65 IN | WEIGHT: 112 LBS | OXYGEN SATURATION: 98 % | HEART RATE: 61 BPM | TEMPERATURE: 98.6 F | BODY MASS INDEX: 18.66 KG/M2 | RESPIRATION RATE: 16 BRPM

## 2022-09-20 DIAGNOSIS — R05.3 CHRONIC COUGH: ICD-10-CM

## 2022-09-20 DIAGNOSIS — R13.10 DYSPHAGIA, UNSPECIFIED TYPE: ICD-10-CM

## 2022-09-20 PROCEDURE — 91037 ESOPH IMPED FUNCTION TEST: CPT

## 2022-09-20 PROCEDURE — 91010 ESOPHAGUS MOTILITY STUDY: CPT

## 2022-09-20 ASSESSMENT — PAIN SCALES - GENERAL: PAINLEVEL: MILD PAIN (2)

## 2022-09-20 NOTE — PATIENT INSTRUCTIONS
Esophogeal Manometry with pH  1. Resume regular diet.  2. You may have a bloody nose or sore throat after the procedure.  3. You had a 24-hour probe placed, return the next day to have the probe removed.  Removal will take 5 minutes.  4. If you have questions call [phone] from 7:00am-5:00pm.  For afterhours questions call GI doctor on call at [phone].

## 2022-09-20 NOTE — PROGRESS NOTES
Non-Invasive GI Procedure Visit    Gracy Bautista is a 56 year old female with history of    Chronic cough  Dysphagia, unspecified type.   Patient stated reason for procedure: Dysphagia and chronic cough  COVID-19 Test Performed within 48-72hrs of the procedure:  Yes. COVID-19 result was NEGATIVE.    COVID-19 PCR Results    COVID-19 PCR Results 2/23/21 1/17/22   COVID-19 Virus by PCR (External Result) Not Detected    SARS CoV2 PCR  Negative      Comments are available for some flowsheets but are not being displayed.         COVID-19 Antibody Results, Testing for Immunity    COVID-19 Antibody Results, Testing for Immunity   No data to display.             Pre-Procedure Assessment  Patient presents to clinic today for Esophageal Manometry Study with pH    Referring Provider: Dr Narvaez  Patient has undergone previous endoscopy.    Does patient report taking a PPI (omeprazole, pantoprazole, rabeprazole, lansoprazole, esomeprazole, dexlansoprazole)? No  Does patient report taking a H2 blocker (ranitidine, or famotidine)? No  Does patient report taking opioids? No  Patient reported that last food and/or drink was last consumed 10 hours ago.  Esophageal Questionnaire(s) Completed:   Yes - Esophageal Questionnaire(s)    BEDQ Questionnaire  BEDQ Questionnaire: How Often Have You Had the Following? 8/29/2022 9/18/2022 9/20/2022   Trouble eating solid food (meat, bread, vegetables) 5.0 5 5   Trouble eating soft foods (yogurt, jello, pudding) 4 4 5   Trouble swallowing liquids 4 3 4   Pain while swallowing 5 5 4   Coughing or choking while swallowing foods or liquids 5 5 5   Total Score: 23 22 23     BEDQ Questionnaire: Discomfort/Pain Ratings 8/29/2022 9/18/2022 9/20/2022   Eating solid food (meat, bread, vegetables) 3 4 5   Eating soft foods (yogurt, jello, pudding) 2 3 5   Drinking liquid 2 3 3   Total Score: 7 10 13       Eckardt Questionnaire  Eckardt Questionnaire 9/18/2022   Dysphagia 3   Regurgitation 3   Retrosternal  Pain 3   Weight Loss (kg) 2   Total Score:  11       Promis 10 Questionnaire  PROMIS 10 FLOWSHEET DATA 9/18/2022   In general, would you say your health is: 2   In general, would you say your quality of life is: 2   In general, how would you rate your physical health? 1   In general, how would you rate your mental health, including your mood and your ability to think? 2   In general, how would you rate your satisfaction with your social activities and relationships? 2   In general, please rate how well you carry out your usual social activities and roles. (This includes activities at home, at work and in your community, and responsibilities as a parent, child, spouse, employee, friend, etc.) 2   To what extent are you able to carry out your everyday physical activities such as walking, climbing stairs, carrying groceries, or moving a chair? 4   In the past 7 days, how often have you been bothered by emotional problems such as feeling anxious, depressed, or irritable? 3   In the past 7 days, how would you rate your fatigue on average? 3   In the past 7 days, how would you rate your pain on average, where 0 means no pain, and 10 means worst imaginable pain? 7   Mental health question re-calculation - no clinical value 3   Physical health question re-calculation - no clinical value 3   Pain question re-calculation - no clinical value 2   Global Mental Health Score 9   Global Physical Health Score 10   PROMIS TOTAL - SUBSCORES 19     .    Patient Hx  Patient's history, medications and allergies were reviewed.     Height: Data Unavailable   Weight: 0 lbs 0 oz    Patient Active Problem List    Diagnosis Date Noted     Dysphagia, unspecified type 08/29/2022     Priority: Medium     Chronic cough 08/29/2022     Priority: Medium     Umbilical hernia with obstruction 07/20/2022     Priority: Medium     Added automatically from request for surgery [id number]       Anticardiolipin antibody positive 07/08/2022     Priority: Medium      Chronic pansinusitis 10/23/2021     Priority: Medium     Added automatically from request for surgery [id number]       Tobacco abuse 04/22/2021     Priority: Medium     Carpal tunnel syndrome 08/07/2019     Priority: Medium     Moderate persistent asthma, uncomplicated 12/04/2018     Priority: Medium     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  Overview:   Dx'd at [facility] late 2013  Formatting of this note might be different from the original.  Dx'd at [facility] late 2013       Hypogammaglobulinemia (H) 11/22/2013     Priority: Medium     Insomnia 10/25/2013     Priority: Medium     Formatting of this note might be different from the original.  Overview:   Insomnia, unspecified  Formatting of this note might be different from the original.  Insomnia, unspecified       ADD (attention deficit disorder) 01/20/2012     Priority: Medium     Alcohol use disorder, moderate, in sustained remission (H) 01/20/2012     Priority: Medium     PTSD (post-traumatic stress disorder) 01/20/2012     Priority: Medium     Cardiac arrhythmia 10/28/2008     Priority: Medium     Hyperlipidemia 09/12/2006     Priority: Medium     Formatting of this note might be different from the original.  Overview:   LW Onset:  [id number]  Formatting of this note might be different from the original.  LW Onset:  [id number]       Peripheral vascular disease (H) 05/19/2005     Priority: Medium     Formatting of this note might be different from the original.  Overview:   LW Onset:  [id number]  ; Acrocyanosis        Prior to Admission medications    Medication Sig Start Date End Date Taking? Authorizing Provider   albuterol (PROAIR HFA/PROVENTIL HFA/VENTOLIN HFA) 108 (90 Base) MCG/ACT inhaler Inhale 1-2 puffs into the lungs every 6 hours as needed for shortness of breath / dyspnea or  wheezing 5/9/22   Marco Bean MD   bisacodyl (DULCOLAX) 5 MG EC tablet Take 5 mg by mouth as needed for constipation    Reported, Patient   CYMBALTA 60 MG capsule Take 60 mg by mouth every morning  9/4/20   Reported, Patient   DUPIXENT 300 MG/2ML prefilled pen INJECT 1 PEN (300 MG) UNDER THE SKIN EVERY 2 WEEKS 6/20/22   Marco Bean MD   Fluticasone-Umeclidin-Vilanterol (TRELEGY ELLIPTA) 200-62.5-25 MCG/INH oral inhaler Inhale 1 puff into the lungs daily 7/19/22   Doni Lee MD   LAMICTAL 200 MG tablet Take 200 mg by mouth every morning  9/12/20   Reported, Patient   levalbuterol (XOPENEX) 0.31 MG/3ML neb solution Take 3 mLs (0.31 mg) by nebulization every 4 hours as needed for wheezing or shortness of breath / dyspnea 5/13/22   Marco Bean MD   nicotine polacrilex (NICORETTE) 4 MG gum TAKE 1 BY MOUTH FOUR TIMES DAILY 9/24/21   Reported, Patient   pantoprazole (PROTONIX) 40 MG EC tablet Take 1 tablet (40 mg) by mouth daily for 90 days 9/15/22 12/14/22  Donna Hwang MD   spacer (OPTICHAMBER ELBA) holding chamber Use with albuterol (PROAIR HFA/PROVENTIL HFA/VENTOLIN HFA) 108 (90 Base) MCG/ACT inhaler 4/29/22   Digna Jaimes MD     Allergies   Allergen Reactions     Bee Venom Angioedema, Swelling and Hives     Other reaction(s): Angioedema       Cefdinir Diarrhea and GI Disturbance              Levofloxacin Muscle Pain (Myalgia)     Prednisone Anxiety and Other (See Comments)     Hyperactivity and moodiness. Doesn't like how it makes her feel.       Past Medical History:   Diagnosis Date     Allergic rhinitis      Arthritis      Chronic sinusitis      Depressive disorder      Hoarseness      Reduced vision      Uncomplicated asthma      Past Surgical History:   Procedure Laterality Date     COLONOSCOPY N/A 8/22/2022    Procedure: COLONOSCOPY, WITH POLYPECTOMY AND BIOPSY;  Surgeon: Glen Dyer MD;  Location: [facility] OR     COLONOSCOPY WITH CO2 INSUFFLATION N/A 8/22/2022    Procedure:  COLONOSCOPY, WITH CO2 INSUFFLATION;  Surgeon: Glen Dyer MD;  Location:  OR     ESOPHAGOSCOPY, GASTROSCOPY, DUODENOSCOPY (EGD), COMBINED N/A 9/2/2022    Procedure: ESOPHAGOGASTRODUODENOSCOPY, WITH BIOPSY AND POLYPECTOMY;  Surgeon: Colton Sarkar MD;  Location: [facility] OR     NASAL/SINUS POLYPECTOMY       OPTICAL TRACKING SYSTEM ENDOSCOPIC SINUS SURGERY Bilateral 1/21/2022    Procedure: stealth guided, bilateral ethmoidectomy, bilateral frontal sinusotomy, bilateral maxillary antrostomies;  Surgeon: Digna Jaimes MD;  Location: [facility] OR     Family History   Problem Relation Age of Onset     Cancer Brother      Bleeding Disorder Brother      Diabetes Father      Heart Disease Father      Cerebrovascular Disease Father      Bleeding Disorder Mother      Social History     Tobacco Use     Smoking status: Light Tobacco Smoker     Packs/day: 0.00     Years: 15.00     Pack years: 0.00     Types: Cigarettes     Start date: 9/17/2021     Smokeless tobacco: Never Used     Tobacco comment: 3 cigarettes per day   Substance Use Topics     Alcohol use: Not Currently        Pre-Procedure Education & Consent  Procedure education was provided to: Patient  Teaching method: Explanation  Barriers to learning: No Barrier    Patient indicated understanding of pre-procedure instruction and appropriate consent was obtained and documented.    ____________________________________________________________________    Post-Procedure Documentation: GI Esophageal Manometry with 24 Hour Ph    Manometry catheter was placed via right nare to 52 cm and normal saline swallows given per protocol. Manometry catheter was removed at the end of test and the pH cathter placement attempted several times, pt coughing badly, concerned that breathing will compromised if attempts were continued.  Pt feels that having it in place would not be tolerated. Provider notified.     Diary and discharge instructions given to patient.    Notification of  pending test results sent to provider for interpretation. Please reference scanned document for final interpretation of results. Patient will follow up with referring provider for test results.    Kathleen Marin RN on 9/20/2022 at 9:14 AM

## 2022-09-26 ENCOUNTER — TELEPHONE (OUTPATIENT)
Dept: PULMONOLOGY | Facility: CLINIC | Age: 57
End: 2022-09-26

## 2022-09-26 NOTE — TELEPHONE ENCOUNTER
PA Initiation    Medication: Dupixent PA renewal pending  Insurance Company: [employer] - Phone [phone] Fax [phone]  Pharmacy Filling the Rx: [facility], [address]  Filling Pharmacy Phone:    Filling Pharmacy Fax:    Start Date: 9/26/2022    Key: [id number]

## 2022-09-27 ENCOUNTER — TELEPHONE (OUTPATIENT)
Dept: GASTROENTEROLOGY | Facility: CLINIC | Age: 57
End: 2022-09-27

## 2022-09-27 NOTE — TELEPHONE ENCOUNTER
" Health Call Center    Phone Message    May a detailed message be left on voicemail: yes     Reason for Call: Other:      Pt is requesting a call back ASAP to discuss what the next steps are in their treatment after their menometry test. Pt also wanted it noted that they are \"really suffering\". Pt is wanting to talk to the clinic today.    Action Taken: Message routed to:  [facility] Gastro    Travel Screening: Not Applicable                                                                      "

## 2022-09-28 NOTE — TELEPHONE ENCOUNTER
Prior Authorization Approval    Authorization Effective Date:  8/27/2022  Authorization Expiration Date:  9/26/2023  Medication: Dupixent PA renewal approved  Approved Dose/Quantity: 300mg / 4 for 28 ds  Reference #: Key: [id number]   Insurance Company: [employer] - Phone [phone] Fax [phone]  Expected CoPay:       CoPay Card Available:      Foundation Assistance Needed:    Which Pharmacy is filling the prescription (Not needed for infusion/clinic administered): [address]  Pharmacy Notified:    Patient Notified:

## 2022-09-30 ENCOUNTER — OFFICE VISIT (OUTPATIENT)
Dept: FAMILY MEDICINE | Facility: CLINIC | Age: 57
End: 2022-09-30
Payer: COMMERCIAL

## 2022-09-30 VITALS
TEMPERATURE: 97.2 F | BODY MASS INDEX: 19.16 KG/M2 | HEIGHT: 65 IN | OXYGEN SATURATION: 98 % | SYSTOLIC BLOOD PRESSURE: 94 MMHG | RESPIRATION RATE: 20 BRPM | WEIGHT: 115 LBS | DIASTOLIC BLOOD PRESSURE: 62 MMHG | HEART RATE: 77 BPM

## 2022-09-30 DIAGNOSIS — E78.5 HYPERLIPIDEMIA, UNSPECIFIED HYPERLIPIDEMIA TYPE: ICD-10-CM

## 2022-09-30 DIAGNOSIS — K21.00 GASTROESOPHAGEAL REFLUX DISEASE WITH ESOPHAGITIS, UNSPECIFIED WHETHER HEMORRHAGE: ICD-10-CM

## 2022-09-30 DIAGNOSIS — R13.10 DYSPHAGIA, UNSPECIFIED TYPE: Primary | ICD-10-CM

## 2022-09-30 DIAGNOSIS — R14.0 ABDOMINAL BLOATING: ICD-10-CM

## 2022-09-30 DIAGNOSIS — R09.A2 GLOBUS SENSATION: ICD-10-CM

## 2022-09-30 LAB
ALBUMIN SERPL-MCNC: 3.6 G/DL (ref 3.4–5)
ALP SERPL-CCNC: 126 U/L (ref 40–150)
ALT SERPL W P-5'-P-CCNC: 27 U/L (ref 0–50)
ANION GAP SERPL CALCULATED.3IONS-SCNC: 4 MMOL/L (ref 3–14)
AST SERPL W P-5'-P-CCNC: 22 U/L (ref 0–45)
BASOPHILS # BLD MANUAL: 0.1 10E3/UL (ref 0–0.2)
BASOPHILS NFR BLD MANUAL: 1 %
BILIRUB SERPL-MCNC: 0.3 MG/DL (ref 0.2–1.3)
BUN SERPL-MCNC: 13 MG/DL (ref 7–30)
CALCIUM SERPL-MCNC: 9.3 MG/DL (ref 8.5–10.1)
CHLORIDE BLD-SCNC: 108 MMOL/L (ref 94–109)
CHOLEST SERPL-MCNC: 219 MG/DL
CO2 SERPL-SCNC: 30 MMOL/L (ref 20–32)
CREAT SERPL-MCNC: 0.7 MG/DL (ref 0.52–1.04)
EOSINOPHIL # BLD MANUAL: 2.2 10E3/UL (ref 0–0.7)
EOSINOPHIL NFR BLD MANUAL: 22 %
ERYTHROCYTE [DISTWIDTH] IN BLOOD BY AUTOMATED COUNT: 12.7 % (ref 10–15)
FASTING STATUS PATIENT QL REPORTED: NO
GFR SERPL CREATININE-BSD FRML MDRD: >90 ML/MIN/1.73M2
GLUCOSE BLD-MCNC: 111 MG/DL (ref 70–99)
HCT VFR BLD AUTO: 42.7 % (ref 35–47)
HDLC SERPL-MCNC: 82 MG/DL
HGB BLD-MCNC: 14.8 G/DL (ref 11.7–15.7)
LDLC SERPL CALC-MCNC: 74 MG/DL
LYMPHOCYTES # BLD MANUAL: 3.9 10E3/UL (ref 0.8–5.3)
LYMPHOCYTES NFR BLD MANUAL: 39 %
MCH RBC QN AUTO: 34.2 PG (ref 26.5–33)
MCHC RBC AUTO-ENTMCNC: 34.7 G/DL (ref 31.5–36.5)
MCV RBC AUTO: 99 FL (ref 78–100)
MONOCYTES # BLD MANUAL: 1.1 10E3/UL (ref 0–1.3)
MONOCYTES NFR BLD MANUAL: 11 %
NEUTROPHILS # BLD MANUAL: 2.7 10E3/UL (ref 1.6–8.3)
NEUTROPHILS NFR BLD MANUAL: 27 %
NONHDLC SERPL-MCNC: 137 MG/DL
PLAT MORPH BLD: ABNORMAL
PLATELET # BLD AUTO: 380 10E3/UL (ref 150–450)
POTASSIUM BLD-SCNC: 4.2 MMOL/L (ref 3.4–5.3)
PROT SERPL-MCNC: 6.8 G/DL (ref 6.8–8.8)
RBC # BLD AUTO: 4.33 10E6/UL (ref 3.8–5.2)
RBC MORPH BLD: ABNORMAL
SODIUM SERPL-SCNC: 142 MMOL/L (ref 133–144)
TRIGL SERPL-MCNC: 313 MG/DL
WBC # BLD AUTO: 10.1 10E3/UL (ref 4–11)

## 2022-09-30 PROCEDURE — 85007 BL SMEAR W/DIFF WBC COUNT: CPT | Performed by: FAMILY MEDICINE

## 2022-09-30 PROCEDURE — 99214 OFFICE O/P EST MOD 30 MIN: CPT | Performed by: FAMILY MEDICINE

## 2022-09-30 PROCEDURE — 80061 LIPID PANEL: CPT | Performed by: FAMILY MEDICINE

## 2022-09-30 PROCEDURE — 83036 HEMOGLOBIN GLYCOSYLATED A1C: CPT | Performed by: FAMILY MEDICINE

## 2022-09-30 PROCEDURE — 36415 COLL VENOUS BLD VENIPUNCTURE: CPT | Performed by: FAMILY MEDICINE

## 2022-09-30 PROCEDURE — 80053 COMPREHEN METABOLIC PANEL: CPT | Performed by: FAMILY MEDICINE

## 2022-09-30 PROCEDURE — 85027 COMPLETE CBC AUTOMATED: CPT | Performed by: FAMILY MEDICINE

## 2022-09-30 RX ORDER — ONDANSETRON 4 MG/1
4 TABLET, ORALLY DISINTEGRATING ORAL EVERY 8 HOURS PRN
Qty: 20 TABLET | Refills: 0 | Status: SHIPPED | OUTPATIENT
Start: 2022-09-30 | End: 2022-12-05

## 2022-09-30 ASSESSMENT — ASTHMA QUESTIONNAIRES: QUESTION_2 LAST FOUR WEEKS HOW OFTEN HAVE YOU HAD SHORTNESS OF BREATH: MORE THAN ONCE A DAY

## 2022-09-30 ASSESSMENT — PAIN SCALES - GENERAL: PAINLEVEL: SEVERE PAIN (7)

## 2022-09-30 ASSESSMENT — ENCOUNTER SYMPTOMS: ABDOMINAL PAIN: 1

## 2022-09-30 NOTE — PROGRESS NOTES
Assessment & Plan     Dysphagia, unspecified type  Awaiting results of Manometry, protonix switched to liquids, she will continue with soft foods as tolerated  - pantoprazole (PROTONIX) 2 mg/mL SUSP suspension; Take 10 mLs (20 mg) by mouth every morning (before breakfast) for 90 days  - US Head Neck Soft Tissue; Future  - ondansetron (ZOFRAN ODT) 4 MG ODT tab; Take 1 tablet (4 mg) by mouth every 8 hours as needed for nausea  - PRIMARY CARE FOLLOW-UP SCHEDULING; Future  - CBC with platelets and differential; Future  - Comprehensive metabolic panel  - CBC with platelets and differential    Gastroesophageal reflux disease with esophagitis, unspecified whether hemorrhage  - pantoprazole (PROTONIX) 2 mg/mL SUSP suspension; Take 10 mLs (20 mg) by mouth every morning (before breakfast) for 90 days  - US Head Neck Soft Tissue; Future  - CT Abdomen Pelvis w Contrast; Future  - ondansetron (ZOFRAN ODT) 4 MG ODT tab; Take 1 tablet (4 mg) by mouth every 8 hours as needed for nausea  - PRIMARY CARE FOLLOW-UP SCHEDULING; Future    Abdominal bloating  - CT Abdomen Pelvis w Contrast; Future  - PRIMARY CARE FOLLOW-UP SCHEDULING; Future    Globus sensation  - PRIMARY CARE FOLLOW-UP SCHEDULING; Future    Hyperlipidemia, unspecified hyperlipidemia type  - Lipid panel reflex to direct LDL Fasting        No follow-ups on file.   Follow-up Visit   Expected date: Oct 06, 2022      Follow Up Appointment Details:     Follow-up with whom?: Me    Follow-Up for what?: Chronic Disease f/u    Chronic Disease f/u: General (Other)    How?: Telephone    Is this an as-needed follow-up?: Yes              Follow-up Visit   Expected date:  Oct 07, 2022 (Approximate)      Follow Up Appointment Details:     Follow-up with whom?: Me    Follow-Up for what?: Other (Office Visit)    Additional Details: follow up    How?: Telephone                    Donna Hwang MD  [facility] JOSUE Dubose is a 56 year old, presenting  "for the following health issues:  Abdominal Pain  08/30/2022  IMPRESSION: No CT evidence of chest malignancy. Granulomatous disease.  Old trauma lateral right ribs.     Abdominal Pain     History of Present Illness       Reason for visit:  Follow up    She eats 2-3 servings of fruits and vegetables daily.She consumes 0 sweetened beverage(s) daily.She exercises with enough effort to increase her heart rate 9 or less minutes per day.  She exercises with enough effort to increase her heart rate 3 or less days per week.   She is taking medications regularly.       Pain History:  When did you first notice your pain? - Chronic Pain   Have you seen this provider for your pain in the past?   Yes   Where in your body do you have pain? Abdomen, throat , neck  Are you seeing anyone else for your pain? Yes - GI    PHQ-9 SCORE 9/15/2022   PHQ-9 Total Score MyChart 14 (Moderate depression)   PHQ-9 Total Score 14       WESTON-7 SCORE 9/15/2022   Total Score 4 (minimal anxiety)   Total Score 4           PDMP Review     None        Last CSA Agreement:   CSA -- Patient Level:    CSA: None found at the patient level.           Review of Systems   Gastrointestinal: Positive for abdominal pain.        Objective    BP 94/62 (BP Location: Left arm, Patient Position: Chair, Cuff Size: Adult Regular)   Pulse 77   Temp 97.2  F (36.2  C) (Temporal)   Resp 20   Ht 1.645 m (5' 4.75\")   Wt 52.2 kg (115 lb)   LMP  (Exact Date)   SpO2 98%   Breastfeeding No   BMI 19.29 kg/m    Body mass index is 19.29 kg/m .  Physical Exam   GENERAL: healthy, alert and no distress  EYES: Eyes grossly normal to inspection, PERRL and conjunctivae and sclerae normal  HENT: ear canals and TM's normal, nose and mouth without ulcers or lesions  NECK: no adenopathy, no asymmetry, masses, or scars and thyroid normal to palpation  RESP: lungs clear to auscultation - no rales, rhonchi or wheezes  CV: regular rate and rhythm, normal S1 S2, no S3 or S4, no murmur, click " or rub, no peripheral edema and peripheral pulses strong  ABDOMEN: soft, nontender, no hepatosplenomegaly, no masses and bowel sounds normal  MS: no gross musculoskeletal defects noted, no edema

## 2022-10-03 DIAGNOSIS — R73.01 IMPAIRED FASTING GLUCOSE: Primary | ICD-10-CM

## 2022-10-03 LAB — HBA1C MFR BLD: 6.1 % (ref 0–5.6)

## 2022-10-06 ENCOUNTER — ANCILLARY PROCEDURE (OUTPATIENT)
Dept: ULTRASOUND IMAGING | Facility: CLINIC | Age: 57
End: 2022-10-06
Attending: FAMILY MEDICINE
Payer: COMMERCIAL

## 2022-10-06 DIAGNOSIS — R13.10 DYSPHAGIA, UNSPECIFIED TYPE: ICD-10-CM

## 2022-10-06 DIAGNOSIS — K21.00 GASTROESOPHAGEAL REFLUX DISEASE WITH ESOPHAGITIS, UNSPECIFIED WHETHER HEMORRHAGE: ICD-10-CM

## 2022-10-06 PROCEDURE — 76536 US EXAM OF HEAD AND NECK: CPT

## 2022-10-07 NOTE — RESULT ENCOUNTER NOTE
Please inform of results if patient has not viewed in [facility] within 3 business days.    Dr. Hwang is out of the office today. Your thyroid ultrasound showed a tiny benign nodule on the right side. This is not concerning and everything else was normal.     She will follow-up with you next week when she returns to the office if she has not already.    Please call the clinic with any questions you may have.     Have a great day,    Dr. Higuera

## 2022-10-11 ENCOUNTER — TELEPHONE (OUTPATIENT)
Dept: FAMILY MEDICINE | Facility: CLINIC | Age: 57
End: 2022-10-11

## 2022-10-11 RX ORDER — PANTOPRAZOLE SODIUM 40 MG/1
40 TABLET, DELAYED RELEASE ORAL DAILY
COMMUNITY
Start: 2022-10-11 | End: 2023-02-15

## 2022-10-11 NOTE — TELEPHONE ENCOUNTER
Patient calling in serious pain with abdomen.    Located in all area of abdomen.  Above belly button.    Bloating.  No constipation.  Diarrhea last night X1.    Hasn't eaten today.    No fevers.  No bloody diarrhea or vomiting.      Patient was seen 9/30/22 and CT scan was ordered.    Patient needs a plan going forward because she can't be in this much pain and unable to function.    She is wondering if you need to speak with Dr Narvaez but she is saying she can't wait till November and needs a plan now.    Any recommendations on what to do?    Genevieve Xie RN  [facility] ~ Registered Nurse  Clinic Triage ~ [place] [facility] [place]  October 11, 2022

## 2022-10-11 NOTE — TELEPHONE ENCOUNTER
I called the patient to review her CT abdomen/pelvis which didn't show an acute intrathoracic or pelvic abnormality identified. Normal appendix and gallbladder. It is interesting that the patient stated she hadn't had a BM in 3 days. I advised increasing fluids, fiber and miralax and to call if her symptoms worsen or persist. She is currently on Protonix 40 mg daily.  Donna Hwang MD on 10/11/2022 at 1:01 PM

## 2022-10-16 NOTE — PROGRESS NOTES
Gracy is not doing well. Continues to have profuse secretions, coughing, chest congestion and post nasal drainage. Using flonase and gent irrigations (1 daily). No longer on dupixent, caused visual issues, did not subjectively help. She admits to moderate depression and frustration. She is seeing a health psychologist. CF genetic test came back negative. Working hypothesis now is that respiratory symptoms are at least partially GERD related. She is undergoing further evaluation. Having trouble eating, some nausea, induces cough. GI MD ordered CT chest in September, reading indicates granulomatous disease, sees pulmonary MD, Dr. Bean, soon. Will get PFTs.     Brother had lung disease, diabetes, kidney failure  Father lung disease, diabetes, EtoH.     Exam: alert mildly distraught    Procedure:  Endoscopy indicated for exam of sinuses  Topical anesthetic/decongestant spray applied.  Rigid scope used for visualization.  Findings: thick copious secretions in bilateral middle meatus, sinus cavities and nasal cavities. Suction performed.      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 Has eosiinophilia, has cultured staph, pseudomonas, fungus. Allergy and rheumatologic work up not remarkable, has had low IgG in past.  Will discuss granulomatous disease seen on CT with Pulmonologist. Consider repeat work up for granulomatous diseases? I have seen patients seroconvert over time. Agree with pursuit of GI causes.       25 minutes spent on the date of the encounter doing chart review, history and exam, documentation and further activities per the note. [facility] care team.

## 2022-10-17 ENCOUNTER — OFFICE VISIT (OUTPATIENT)
Dept: OTOLARYNGOLOGY | Facility: CLINIC | Age: 57
End: 2022-10-17
Payer: COMMERCIAL

## 2022-10-17 ENCOUNTER — TELEPHONE (OUTPATIENT)
Dept: FAMILY MEDICINE | Facility: CLINIC | Age: 57
End: 2022-10-17

## 2022-10-17 VITALS
WEIGHT: 113.7 LBS | HEART RATE: 84 BPM | BODY MASS INDEX: 19.07 KG/M2 | SYSTOLIC BLOOD PRESSURE: 137 MMHG | DIASTOLIC BLOOD PRESSURE: 86 MMHG

## 2022-10-17 DIAGNOSIS — J45.50 SEVERE PERSISTENT ASTHMA WITH ALLERGIC RHINITIS, UNSPECIFIED WHETHER COMPLICATED (H): ICD-10-CM

## 2022-10-17 DIAGNOSIS — J44.1 COPD EXACERBATION (H): ICD-10-CM

## 2022-10-17 DIAGNOSIS — J32.4 CHRONIC PANSINUSITIS: Primary | ICD-10-CM

## 2022-10-17 PROCEDURE — 31231 NASAL ENDOSCOPY DX: CPT | Performed by: OTOLARYNGOLOGY

## 2022-10-17 PROCEDURE — 99213 OFFICE O/P EST LOW 20 MIN: CPT | Mod: 25 | Performed by: OTOLARYNGOLOGY

## 2022-10-17 ASSESSMENT — PAIN SCALES - GENERAL: PAINLEVEL: MODERATE PAIN (4)

## 2022-10-17 NOTE — TELEPHONE ENCOUNTER
Pt called, MA left message stating that this is an asthma follow up/check up. Call back number left in message if needed.      Carmen Samuels MA

## 2022-10-17 NOTE — TELEPHONE ENCOUNTER
[facility] Call Center    Phone Message    May a detailed message be left on voicemail: yes     Reason for Call: Gracy said Pharmacy said that Dr. Bean stopped the RX for levalbuterol, please give her a call back.     Action Taken: Message routed to:  Adult Clinics: Pulmonology p [phone]    Travel Screening: Not Applicable

## 2022-10-17 NOTE — TELEPHONE ENCOUNTER
Contacted patient regarding mediation issues. Patient states that Dr. Bean cancelled her chronic azithromycin Rx. Informed patient that her Rx was not discontinued, the Rx for azithromycin has . Informed patient that she should continue taking the azithromycin every M,W,Fri. Patient requesting Rx refill be sent to the [facility] in [place]. Rx for azithromycin has been pended for MD review/signature.    Donna Gaspar LPN  Pulmonary Medicine:  [facility]  Phone: [phone] Fax: [phone]

## 2022-10-17 NOTE — PATIENT INSTRUCTIONS
1.  You were seen in the ENT Clinic today by . If you have any questions or concerns after your appointment, please call [phone]. Press option #1 for scheduling related needs. Press option #3 for Nurse advice.    2.  Plan is to return to clinic in 2-3 months      Anh Trujillo LPN  [phone]  [facility] - Otolaryngology

## 2022-10-17 NOTE — NURSING NOTE
Blood pressure 137/86, pulse 84, weight 51.6 kg (113 lb 11.2 oz), not currently breastfeeding.    Faith Reyes LPN

## 2022-10-17 NOTE — LETTER
10/17/2022     RE: Gracy Bautista  [address] [facility] [address]     Dear Colleague,    Thank you for referring your patient, Gracy Bautista, to the [facility] [place] [facility]. Please see a copy of my visit note below.    Gracy is not doing well. Continues to have profuse secretions, coughing, chest congestion and post nasal drainage. Using flonase and gent irrigations (1 daily). No longer on dupixent, caused visual issues, did not subjectively help. She admits to moderate depression and frustration. She is seeing a health psychologist. CF genetic test came back negative. Working hypothesis now is that respiratory symptoms are at least partially GERD related. She is undergoing further evaluation. Having trouble eating, some nausea, induces cough. GI MD ordered CT chest in September, reading indicates granulomatous disease, sees pulmonary MD, Dr. Bean, soon. Will get PFTs.     Brother had lung disease, diabetes, kidney failure  Father lung disease, diabetes, EtoH.     Exam: alert mildly distraught    Procedure:  Endoscopy indicated for exam of sinuses  Topical anesthetic/decongestant spray applied.  Rigid scope used for visualization.  Findings: thick copious secretions in bilateral middle meatus, sinus cavities and nasal cavities. Suction performed.    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 Has eosiinophilia, has cultured staph, pseudomonas, fungus. Allergy and rheumatologic work up not remarkable, has had low IgG in past.  Will discuss granulomatous disease seen on CT with Pulmonologist. Consider repeat work up for granulomatous diseases? I have seen patients seroconvert over time. Agree with pursuit of GI causes.     25 minutes spent on the date of the encounter doing chart review, history and exam, documentation and further activities per the note. Messaged care team.     Digna Jaimes MD

## 2022-10-17 NOTE — TELEPHONE ENCOUNTER
Pt wondering if she needs her f/u appt 10/19 with Dr. BRAN. She was referred to [facility] by Dr BRAN and has that appt on   10/24 130.pm. she just wants clarification  if Dr. BRAN is following her or Immunology clinic      Call pt asap.

## 2022-10-18 RX ORDER — AZITHROMYCIN 250 MG/1
250 TABLET, FILM COATED ORAL
Qty: 36 TABLET | Refills: 3 | Status: SHIPPED | OUTPATIENT
Start: 2022-10-19

## 2022-10-19 ENCOUNTER — OFFICE VISIT (OUTPATIENT)
Dept: ALLERGY | Facility: OTHER | Age: 57
End: 2022-10-19
Payer: COMMERCIAL

## 2022-10-19 ENCOUNTER — TELEPHONE (OUTPATIENT)
Dept: ALLERGY | Facility: OTHER | Age: 57
End: 2022-10-19

## 2022-10-19 VITALS
BODY MASS INDEX: 18.83 KG/M2 | HEIGHT: 65 IN | WEIGHT: 113 LBS | HEART RATE: 72 BPM | OXYGEN SATURATION: 99 % | SYSTOLIC BLOOD PRESSURE: 105 MMHG | DIASTOLIC BLOOD PRESSURE: 69 MMHG

## 2022-10-19 DIAGNOSIS — J32.8 OTHER CHRONIC SINUSITIS: ICD-10-CM

## 2022-10-19 DIAGNOSIS — R06.09 OTHER FORM OF DYSPNEA: ICD-10-CM

## 2022-10-19 DIAGNOSIS — J45.50 NOT WELL CONTROLLED SEVERE PERSISTENT ASTHMA (H): Primary | ICD-10-CM

## 2022-10-19 DIAGNOSIS — D80.3 IGG SUBCLASS DEFICIENCY (H): ICD-10-CM

## 2022-10-19 DIAGNOSIS — R05.8 OTHER COUGH: ICD-10-CM

## 2022-10-19 LAB
BASOPHILS # BLD MANUAL: 0.1 10E3/UL (ref 0–0.2)
BASOPHILS NFR BLD MANUAL: 1 %
EOSINOPHIL # BLD MANUAL: 2.7 10E3/UL (ref 0–0.7)
EOSINOPHIL NFR BLD MANUAL: 23 %
ERYTHROCYTE [DISTWIDTH] IN BLOOD BY AUTOMATED COUNT: 12.9 % (ref 10–15)
HCT VFR BLD AUTO: 43.7 % (ref 35–47)
HGB BLD-MCNC: 15.1 G/DL (ref 11.7–15.7)
LYMPHOCYTES # BLD MANUAL: 4.5 10E3/UL (ref 0.8–5.3)
LYMPHOCYTES NFR BLD MANUAL: 38 %
MCH RBC QN AUTO: 34.2 PG (ref 26.5–33)
MCHC RBC AUTO-ENTMCNC: 34.6 G/DL (ref 31.5–36.5)
MCV RBC AUTO: 99 FL (ref 78–100)
MONOCYTES # BLD MANUAL: 0.4 10E3/UL (ref 0–1.3)
MONOCYTES NFR BLD MANUAL: 3 %
NEUTROPHILS # BLD MANUAL: 4.2 10E3/UL (ref 1.6–8.3)
NEUTROPHILS NFR BLD MANUAL: 35 %
PLAT MORPH BLD: ABNORMAL
PLATELET # BLD AUTO: 386 10E3/UL (ref 150–450)
RBC # BLD AUTO: 4.42 10E6/UL (ref 3.8–5.2)
RBC MORPH BLD: ABNORMAL
WBC # BLD AUTO: 11.9 10E3/UL (ref 4–11)

## 2022-10-19 PROCEDURE — 36415 COLL VENOUS BLD VENIPUNCTURE: CPT | Performed by: ALLERGY & IMMUNOLOGY

## 2022-10-19 PROCEDURE — 99214 OFFICE O/P EST MOD 30 MIN: CPT | Performed by: ALLERGY & IMMUNOLOGY

## 2022-10-19 PROCEDURE — 85007 BL SMEAR W/DIFF WBC COUNT: CPT | Performed by: ALLERGY & IMMUNOLOGY

## 2022-10-19 PROCEDURE — 99000 SPECIMEN HANDLING OFFICE-LAB: CPT | Performed by: ALLERGY & IMMUNOLOGY

## 2022-10-19 PROCEDURE — 86003 ALLG SPEC IGE CRUDE XTRC EA: CPT | Mod: 90 | Performed by: ALLERGY & IMMUNOLOGY

## 2022-10-19 PROCEDURE — 85027 COMPLETE CBC AUTOMATED: CPT | Performed by: ALLERGY & IMMUNOLOGY

## 2022-10-19 PROCEDURE — 86606 ASPERGILLUS ANTIBODY: CPT | Mod: 90 | Performed by: ALLERGY & IMMUNOLOGY

## 2022-10-19 PROCEDURE — 82785 ASSAY OF IGE: CPT | Mod: 90 | Performed by: ALLERGY & IMMUNOLOGY

## 2022-10-19 RX ORDER — PREDNISONE 20 MG/1
TABLET ORAL
Qty: 20 TABLET | Refills: 0 | Status: SHIPPED | OUTPATIENT
Start: 2022-10-19 | End: 2022-12-05

## 2022-10-19 ASSESSMENT — ENCOUNTER SYMPTOMS
FEVER: 0
WHEEZING: 1
MYALGIAS: 0
ADENOPATHY: 0
ACTIVITY CHANGE: 0
CHILLS: 0
EYE REDNESS: 0
DIARRHEA: 1
COUGH: 1
ABDOMINAL PAIN: 1
HEADACHES: 1
SHORTNESS OF BREATH: 1
EYE ITCHING: 1
ARTHRALGIAS: 0
CHEST TIGHTNESS: 1
RHINORRHEA: 1
EYE DISCHARGE: 1
NAUSEA: 1
SINUS PRESSURE: 0
VOMITING: 0
FACIAL SWELLING: 0

## 2022-10-19 ASSESSMENT — ASTHMA QUESTIONNAIRES: ACT_TOTALSCORE: 7

## 2022-10-19 NOTE — PROGRESS NOTES
SUBJECTIVE:                                                                   Gracy Bautista presents today to our Allergy Clinic at [facility] for a follow up visit. She is a 56 year old female with a historical diagnosis of severe asthma and chronic sinusitis.    Asthma  History of smoking 3 to 4 cigarettes/day since the age of 15 years. Developed chest symptoms in her 50s, manifested by wheezing, chest tightness, shortness of breath, and mainly productive cough. Serum IgE for regional aeroallergen panel in December 2020 was negative.  In March 2021, slight sensitivity to Aspergillus was noted. She tried and failed omalizumab and mepolizumab.  Negative ABPA panel in the past.  Alpha-1 antitrypsin in the past was within normal limits.     Pulmonary function test in 2022 did not suggest an obstruction.      Pansinusitis  History of sinus symptoms since late 40s.  Previous sinus CT consistent with pansinusitis.  Sinus biopsy with chronic inflammation and markedly increased eosinophils.  No mention about possible vasculitis. Has a history of nasal polyposis.  Negative ANCA's in the past. Refractory to multiple treatments, requiring multiple antibiotics   In October, T and B cell panel was within normal limits.  Immunoglobulin G was within normal limits. IgA was within normal limits.    Immunoglobulin M was elevated. SPEP was within normal limits. Negative BRICE. Negative antimitochondrial antibodies.  Pneumococcal antibody levels were 13/22 protective.  Postvaccination all pneumococcal antibody levels are 18/22 protective, which is optimal level of protection.  While totals IgG is within normal limits, selective IgG2 subclass deficiency noted.Total complement was within normal limits.   She is planning to see [facility].  I talked to one of the providers.  They know the patient.  They saw her in the past.  At that time, they did not feel that she was a candidate for IgG replacement,  but at that time she was not as sick.    Gracy wonders if we could recheck for ABPA.  I will reorder the labs.    She is planning to see Pulmonology.  She is interested in bronchoscopy and lavage.  She continues experiencing wheezing, cough, and shortness of breath.  This is despite being on Z-Roberth, Trelegy Ellipta, and albuterol pretty much on a daily basis.  She stopped Dupixent.  She had some visual issues and she did not want to take a chance.  She is also planning to see GI.  She denies having heartburn, but there is some thinking that some of her symptoms could be GI related.  She had a chest CT in September.  Showed granulomatosis disease.  She is planning to see Pulmonary next week.  In August 2022, spirometry showed mild obstruction.    She is using intranasal fluticasone and gent irrigations.  Recent scope with thick copious secretions in bilateral middle meatus, sinus cavities, nasal cavities.        Patient Active Problem List   Diagnosis     Chronic pansinusitis     ADD (attention deficit disorder)     Alcohol use disorder, moderate, in sustained remission (H)     Blind right eye     Cardiac arrhythmia     Carpal tunnel syndrome     PTSD (post-traumatic stress disorder)     Hyperlipidemia     Hypogammaglobulinemia (H)     Immunoglobulin deficiency (H)     Insomnia     Moderate persistent asthma, uncomplicated     Myofascial pain     Peripheral vascular disease (H)     Tobacco abuse     Anticardiolipin antibody positive     Umbilical hernia with obstruction     Dysphagia, unspecified type     Chronic cough       Past Medical History:   Diagnosis Date     Allergic rhinitis      Arthritis      Chronic sinusitis      Depressive disorder      Hoarseness      Reduced vision      Uncomplicated asthma       Problem (# of Occurrences) Relation (Name,Age of Onset)    Cancer (1) Brother (Bill)    Diabetes (1) Father (Jer)    Heart Disease (1) Father (Jer)    Cerebrovascular Disease (1) Father (Jer)     Bleeding Disorder (2) Brother (Bill), Mother (Darlyn)        Past Surgical History:   Procedure Laterality Date     COLONOSCOPY N/A 8/22/2022    Procedure: COLONOSCOPY, WITH POLYPECTOMY AND BIOPSY;  Surgeon: Glen Dyer MD;  Location: [facility] OR     COLONOSCOPY WITH CO2 INSUFFLATION N/A 8/22/2022    Procedure: COLONOSCOPY, WITH CO2 INSUFFLATION;  Surgeon: Glen Dyer MD;  Location: [facility] OR     ESOPHAGOSCOPY, GASTROSCOPY, DUODENOSCOPY (EGD), COMBINED N/A 9/2/2022    Procedure: ESOPHAGOGASTRODUODENOSCOPY, WITH BIOPSY AND POLYPECTOMY;  Surgeon: Colton Sarkar MD;  Location: [facility] OR     NASAL/SINUS POLYPECTOMY       OPTICAL TRACKING SYSTEM ENDOSCOPIC SINUS SURGERY Bilateral 1/21/2022    Procedure: stealth guided, bilateral ethmoidectomy, bilateral frontal sinusotomy, bilateral maxillary antrostomies;  Surgeon: Digna Jaimes MD;  Location: [facility] OR     Social History     Socioeconomic History     Marital status:      Spouse name: None     Number of children: None     Years of education: None     Highest education level: None   Occupational History     Occupation: HOMEMAKER   Tobacco Use     Smoking status: Light Smoker     Packs/day: 0.00     Years: 15.00     Pack years: 0.00     Types: Cigarettes     Start date: 9/17/2021     Smokeless tobacco: Never     Tobacco comments:     3 cigarettes per day   Vaping Use     Vaping Use: Never used   Substance and Sexual Activity     Alcohol use: Not Currently     Drug use: Never     Sexual activity: Yes     Partners: Male     Birth control/protection: None   Social History Narrative    October 7, 2021    ENVIRONMENTAL HISTORY: [place] The home is heated with a electric furnace. They do have central air conditioning. The patient's bedroom is furnished with carpeting in bedroom, allergen mattress cover, and allergen pillowcase cover.  Pets inside the house include 1 dog. There is no history of cockroach or  mice infestation. There is/are 0 smokers in the house.  The house does have a damp basement.      Social Determinants of Health     Intimate Partner Violence: Not At Risk     Fear of Current or Ex-Partner: No     Emotionally Abused: No     Physically Abused: No     Sexually Abused: No           Review of Systems   Constitutional: Negative for activity change, chills and fever.   HENT: Positive for rhinorrhea. Negative for congestion, ear discharge, facial swelling, nosebleeds, postnasal drip, sinus pressure and sneezing.    Eyes: Positive for discharge and itching. Negative for redness.   Respiratory: Positive for cough, chest tightness, shortness of breath and wheezing.    Cardiovascular: Negative for chest pain.   Gastrointestinal: Positive for abdominal pain, diarrhea and nausea. Negative for vomiting.   Musculoskeletal: Negative for arthralgias and myalgias.   Skin: Negative for rash.   Allergic/Immunologic: Negative for environmental allergies.   Neurological: Positive for headaches.   Hematological: Negative for adenopathy.   Psychiatric/Behavioral: Negative for self-injury.           Current Outpatient Medications:      albuterol (PROAIR HFA/PROVENTIL HFA/VENTOLIN HFA) 108 (90 Base) MCG/ACT inhaler, Inhale 1-2 puffs into the lungs every 6 hours as needed for shortness of breath / dyspnea or wheezing, Disp: 24 g, Rfl: 3     azithromycin (ZITHROMAX) 250 MG tablet, Take 1 tablet (250 mg) by mouth Every Mon, Wed, Fri Morning, Disp: 36 tablet, Rfl: 3     bisacodyl (DULCOLAX) 5 MG EC tablet, Take 5 mg by mouth as needed for constipation, Disp: , Rfl:      CYMBALTA 60 MG capsule, Take 60 mg by mouth every morning , Disp: , Rfl:      Fluticasone-Umeclidin-Vilanterol (TRELEGY ELLIPTA) 200-62.5-25 MCG/INH oral inhaler, Inhale 1 puff into the lungs daily, Disp: 60 each, Rfl: 3     LAMICTAL 200 MG tablet, Take 200 mg by mouth every morning , Disp: , Rfl:      levalbuterol (XOPENEX) 0.31 MG/3ML neb solution, Take 3 mLs  (0.31 mg) by nebulization every 4 hours as needed for wheezing or shortness of breath / dyspnea, Disp: 125 mL, Rfl: 11     nicotine polacrilex (NICORETTE) 4 MG gum, TAKE 1 BY MOUTH FOUR TIMES DAILY, Disp: , Rfl:      ondansetron (ZOFRAN ODT) 4 MG ODT tab, Take 1 tablet (4 mg) by mouth every 8 hours as needed for nausea, Disp: 20 tablet, Rfl: 0     pantoprazole (PROTONIX) 40 MG EC tablet, Take 1 tablet (40 mg) by mouth daily, Disp: , Rfl:      predniSONE (DELTASONE) 20 MG tablet, Take 3 tabs by mouth daily x 3 days, then 2 tabs daily x 3 days, then 1 tab daily x 3 days, then 1/2 tab daily x 3 days., Disp: 20 tablet, Rfl: 0     spacer (OPTICHAMBER ELBA) holding chamber, Use with albuterol (PROAIR HFA/PROVENTIL HFA/VENTOLIN HFA) 108 (90 Base) MCG/ACT inhaler, Disp: 1 each, Rfl: 0    Current Facility-Administered Medications:      [START ON 10/20/2022] tezepelumab-ekko (TEZSPIRE) injection 210 mg, 210 mg, Subcutaneous, q28 days, Doni Lee MD  Immunization History   Administered Date(s) Administered     COVID-19,PF,Pfizer (12+ Yrs) 03/20/2021, 04/10/2021, 08/19/2021     FLU 6-35 months 11/21/2011     Influenza (IIV3) PF 11/04/2005, 10/23/2006, 10/20/2009     Influenza Quad, Recombinant, pf(RIV4) (Flublok) 10/07/2021     Influenza Vaccine IM > 6 months Valent IIV4 (Alfuria,Fluzone) 10/13/2016, 09/07/2017, 10/25/2019     Pneumo Conj 13-V (2010&after) 09/07/2017     Pneumococcal 23 valent 11/04/2005, 02/18/2019, 07/19/2022     TDAP Vaccine (Boostrix) 08/03/2011     Td (Adult), Adsorbed 01/01/2001, 10/25/2008     Tdap (Adacel,Boostrix) 07/08/2022     Allergies   Allergen Reactions     Bee Venom Angioedema, Swelling and Hives     Other reaction(s): Angioedema       Cefdinir Diarrhea and GI Disturbance              Levofloxacin Muscle Pain (Myalgia)     Prednisone Anxiety and Other (See Comments)     Hyperactivity and moodiness. Doesn't like how it makes her feel.       OBJECTIVE:                                        "                          /69   Pulse 72   Ht 1.638 m (5' 4.5\")   Wt 51.3 kg (113 lb)   SpO2 99%   BMI 19.10 kg/m          Physical Exam  Vitals and nursing note reviewed.   Constitutional:       General: She is not in acute distress.     Appearance: She is not ill-appearing, toxic-appearing or diaphoretic.   HENT:      Head: Normocephalic and atraumatic.      Right Ear: Tympanic membrane, ear canal and external ear normal.      Left Ear: Tympanic membrane, ear canal and external ear normal.      Nose:      Right Turbinates: Enlarged (Erythematous).      Left Turbinates: Enlarged (Erythematous).      Mouth/Throat:      Lips: Pink.      Mouth: Mucous membranes are moist.      Pharynx: Oropharynx is clear. No pharyngeal swelling, oropharyngeal exudate, posterior oropharyngeal erythema or uvula swelling.   Eyes:      General:         Right eye: No discharge.         Left eye: No discharge.      Conjunctiva/sclera: Conjunctivae normal.   Cardiovascular:      Rate and Rhythm: Normal rate and regular rhythm.      Heart sounds: Normal heart sounds.   Pulmonary:      Effort: Pulmonary effort is normal. No respiratory distress.      Breath sounds: No stridor. Wheezing (End expiratory, bilaterally) present. No decreased breath sounds or rales.   Neurological:      Mental Status: She is alert and oriented to person, place, and time.   Psychiatric:         Mood and Affect: Mood normal.         Behavior: Behavior normal.         WORKUP: ACT 7      ASSESSMENT/PLAN:    Not well controlled severe persistent asthma  Other form of dyspnea  Other cough    Considering previous eosinophilia, I will repeat CBC with differential.  However, it is also important to notice that she may have developed eosinophilia due to recent dupilumab.  I do understand her frustration.  The patient thinks about being evaluated at [facility].  I think it is not a bad idea.  She wants to wait until her insurance changes.  Meanwhile, I suggest a trial " off tezepelumab-ekko.  Considering that it works at the level of TSLP, I still have some hope.  - She will continue with Trelegy Ellipta.  - I ordered ABPA panel as she requested.  - She will continue working with Pulmonology.  She is planning to see GI as well.  Most patients on tezepelumab-ekko reports the symptoms within the first 1-2 doses.    - CBC with Platelets & Differential  - Bronchopulm Aspergillosis Allergic Panel  - Fluticasone-Umeclidin-Vilanterol (TRELEGY ELLIPTA) 200-62.5-25 MCG/INH oral inhaler  Dispense: 60 each; Refill: 3     IgG subclass deficiency (H)  Other chronic sinusitis  She will see [facility] for IgG subclass deficiency.  If she qualifies for IgG replacement, that would be a great trial.         Return in about 3 months (around 1/19/2023), or if symptoms worsen or fail to improve.    Thank you for allowing us to participate in the care of this patient. Please feel free to contact us if there are any questions or concerns about the patient.    Disclaimer: This note consists of symbols derived from keyboarding, dictation and/or voice recognition software. As a result, there may be errors in the script that have gone undetected. Please consider this when interpreting information found in this chart.    Doni Lee MD, FAAAAI, FACAAI  Allergy, Asthma and Immunology     [facility]

## 2022-10-19 NOTE — PATIENT INSTRUCTIONS
Get the bloodwork done.    Predinosone on hand.     See [facility]. Consider evaluation at [facility].     Continue same asthma management, and adding Tezspire.     Continue working with GI and Pulmonology.               Allergy Staff Appt Hours Shot Hours Locations    Physician     Doni Lee MD       Support Staff     ROSI Garcia, JAQUAN  Tuesday:   [place] :  [place]: :         [place]:  [place]        Tuesday: : : : : [facility] [place]       Tues & Wed: : & Thurs:        Fri: :         [facility]  [address] [place] [address]  Appt Line: [phone]    [facility]  [address] [place] [address]  Appt Line: [phone]    Pulmonary Function Scheduling:  [place]: [phone]  [place]: [phone]  [phone]     Prescription Assistance    If you need assistance with your prescriptions (cost, coverage, etc) please contact: [facility] [phone]    Important Scheduling Information    Appointments for skin testing: Appointment will last approximately 45 minutes.  Please call the appointment line for your clinic to schedule.  Discontinue oral antihistamines 7 days prior to the appointment.  Discontinue nasal and ocular antihistamines 4 days prior to appointment.    Appointments for challenges (oral food challenge, penicillin testing, aspirin desensitization) If your provider instructs you to that this additional testing is indicated, it will need to be scheduled directly through the allergy department.  Please contact them via [employer] or by calling the clinic and asking to speak with the allergy team.  They will provide additional information and instructions for the appointment.  Discontinue oral antihistamines 7 days prior to  the appointment.  Discontinue nasal and ocular antihistamines 4 days prior to the appointment.    Thank you for trusting us with your care. Please feel free to contact us with any questions or concerns you may have.

## 2022-10-19 NOTE — TELEPHONE ENCOUNTER
Patient will start in clinic injections for Tezspire 210mg every 28 days for moderate persistent asthma.  Patient will receive these injections at the [facility].  Forwarding to CAM team to please pursue PA, thank you.    Rosalinda Brooke RN

## 2022-10-19 NOTE — LETTER
10/19/2022         RE: Gracy Bautista  [address] [facility] [address]        Dear Colleague,    Thank you for referring your patient, Gracy Bautista, to the [facility]. Please see a copy of my visit note below.    SUBJECTIVE:                                                                   Gracy Bautista presents today to our Allergy Clinic at [facility] for a follow up visit. She is a 56 year old female with a historical diagnosis of severe asthma and chronic sinusitis.    Asthma  History of smoking 3 to 4 cigarettes/day since the age of 15 years. Developed chest symptoms in her 50s, manifested by wheezing, chest tightness, shortness of breath, and mainly productive cough. Serum IgE for regional aeroallergen panel in December 2020 was negative.  In March 2021, slight sensitivity to Aspergillus was noted. She tried and failed omalizumab and mepolizumab.  Negative ABPA panel in the past.  Alpha-1 antitrypsin in the past was within normal limits.     Pulmonary function test in 2022 did not suggest an obstruction.      Pansinusitis  History of sinus symptoms since late 40s.  Previous sinus CT consistent with pansinusitis.  Sinus biopsy with chronic inflammation and markedly increased eosinophils.  No mention about possible vasculitis. Has a history of nasal polyposis.  Negative ANCA's in the past. Refractory to multiple treatments, requiring multiple antibiotics   In October, T and B cell panel was within normal limits.  Immunoglobulin G was within normal limits. IgA was within normal limits.    Immunoglobulin M was elevated. SPEP was within normal limits. Negative BRICE. Negative antimitochondrial antibodies.  Pneumococcal antibody levels were 13/22 protective.  Postvaccination all pneumococcal antibody levels are 18/22 protective, which is optimal level of protection.  While totals IgG is within normal limits, selective IgG2 subclass deficiency noted.Total  complement was within normal limits.   She is planning to see [facility].  I talked to one of the providers.  They know the patient.  They saw her in the past.  At that time, they did not feel that she was a candidate for IgG replacement, but at that time she was not as sick.    Gracy wonders if we could recheck for ABPA.  I will reorder the labs.    She is planning to see Pulmonology.  She is interested in bronchoscopy and lavage.  She continues experiencing wheezing, cough, and shortness of breath.  This is despite being on Z-Roberth, Trelegy Ellipta, and albuterol pretty much on a daily basis.  She stopped Dupixent.  She had some visual issues and she did not want to take a chance.  She is also planning to see GI.  She denies having heartburn, but there is some thinking that some of her symptoms could be GI related.  She had a chest CT in September.  Showed granulomatosis disease.  She is planning to see Pulmonary next week.  In August 2022, spirometry showed mild obstruction.    She is using intranasal fluticasone and gent irrigations.  Recent scope with thick copious secretions in bilateral middle meatus, sinus cavities, nasal cavities.        Patient Active Problem List   Diagnosis     Chronic pansinusitis     ADD (attention deficit disorder)     Alcohol use disorder, moderate, in sustained remission (H)     Blind right eye     Cardiac arrhythmia     Carpal tunnel syndrome     PTSD (post-traumatic stress disorder)     Hyperlipidemia     Hypogammaglobulinemia (H)     Immunoglobulin deficiency (H)     Insomnia     Moderate persistent asthma, uncomplicated     Myofascial pain     Peripheral vascular disease (H)     Tobacco abuse     Anticardiolipin antibody positive     Umbilical hernia with obstruction     Dysphagia, unspecified type     Chronic cough       Past Medical History:   Diagnosis Date     Allergic rhinitis      Arthritis      Chronic sinusitis      Depressive disorder      Hoarseness      Reduced  vision      Uncomplicated asthma       Problem (# of Occurrences) Relation (Name,Age of Onset)    Cancer (1) Brother (Bill)    Diabetes (1) Father (Jer)    Heart Disease (1) Father (Jer)    Cerebrovascular Disease (1) Father (Jer)    Bleeding Disorder (2) Brother (Bill), Mother (Darlyn)        Past Surgical History:   Procedure Laterality Date     COLONOSCOPY N/A 8/22/2022    Procedure: COLONOSCOPY, WITH POLYPECTOMY AND BIOPSY;  Surgeon: Glen Dyer MD;  Location: [facility] OR     COLONOSCOPY WITH CO2 INSUFFLATION N/A 8/22/2022    Procedure: COLONOSCOPY, WITH CO2 INSUFFLATION;  Surgeon: Glen Dyer MD;  Location: [facility] OR     ESOPHAGOSCOPY, GASTROSCOPY, DUODENOSCOPY (EGD), COMBINED N/A 9/2/2022    Procedure: ESOPHAGOGASTRODUODENOSCOPY, WITH BIOPSY AND POLYPECTOMY;  Surgeon: Colton Sarkar MD;  Location: [facility] OR     NASAL/SINUS POLYPECTOMY       OPTICAL TRACKING SYSTEM ENDOSCOPIC SINUS SURGERY Bilateral 1/21/2022    Procedure: stealth guided, bilateral ethmoidectomy, bilateral frontal sinusotomy, bilateral maxillary antrostomies;  Surgeon: Digna Jaimes MD;  Location: [facility] OR     Social History     Socioeconomic History     Marital status:      Spouse name: None     Number of children: None     Years of education: None     Highest education level: None   Occupational History     Occupation: HOMEMAKER   Tobacco Use     Smoking status: Light Smoker     Packs/day: 0.00     Years: 15.00     Pack years: 0.00     Types: Cigarettes     Start date: 9/17/2021     Smokeless tobacco: Never     Tobacco comments:     3 cigarettes per day   Vaping Use     Vaping Use: Never used   Substance and Sexual Activity     Alcohol use: Not Currently     Drug use: Never     Sexual activity: Yes     Partners: Male     Birth control/protection: None   Social History Narrative    October 7, 2021    ENVIRONMENTAL HISTORY: [place] The home is heated with a electric  furnace. They do have central air conditioning. The patient's bedroom is furnished with carpeting in bedroom, allergen mattress cover, and allergen pillowcase cover.  Pets inside the house include 1 dog. There is no history of cockroach or mice infestation. There is/are 0 smokers in the house.  The house does have a damp basement.      Social Determinants of Health     Intimate Partner Violence: Not At Risk     Fear of Current or Ex-Partner: No     Emotionally Abused: No     Physically Abused: No     Sexually Abused: No           Review of Systems   Constitutional: Negative for activity change, chills and fever.   HENT: Positive for rhinorrhea. Negative for congestion, ear discharge, facial swelling, nosebleeds, postnasal drip, sinus pressure and sneezing.    Eyes: Positive for discharge and itching. Negative for redness.   Respiratory: Positive for cough, chest tightness, shortness of breath and wheezing.    Cardiovascular: Negative for chest pain.   Gastrointestinal: Positive for abdominal pain, diarrhea and nausea. Negative for vomiting.   Musculoskeletal: Negative for arthralgias and myalgias.   Skin: Negative for rash.   Allergic/Immunologic: Negative for environmental allergies.   Neurological: Positive for headaches.   Hematological: Negative for adenopathy.   Psychiatric/Behavioral: Negative for self-injury.           Current Outpatient Medications:      albuterol (PROAIR HFA/PROVENTIL HFA/VENTOLIN HFA) 108 (90 Base) MCG/ACT inhaler, Inhale 1-2 puffs into the lungs every 6 hours as needed for shortness of breath / dyspnea or wheezing, Disp: 24 g, Rfl: 3     azithromycin (ZITHROMAX) 250 MG tablet, Take 1 tablet (250 mg) by mouth Every Mon, Wed, Fri Morning, Disp: 36 tablet, Rfl: 3     bisacodyl (DULCOLAX) 5 MG EC tablet, Take 5 mg by mouth as needed for constipation, Disp: , Rfl:      CYMBALTA 60 MG capsule, Take 60 mg by mouth every morning , Disp: , Rfl:      Fluticasone-Umeclidin-Vilanterol (TRELEGY  ELLIPTA) 200-62.5-25 MCG/INH oral inhaler, Inhale 1 puff into the lungs daily, Disp: 60 each, Rfl: 3     LAMICTAL 200 MG tablet, Take 200 mg by mouth every morning , Disp: , Rfl:      levalbuterol (XOPENEX) 0.31 MG/3ML neb solution, Take 3 mLs (0.31 mg) by nebulization every 4 hours as needed for wheezing or shortness of breath / dyspnea, Disp: 125 mL, Rfl: 11     nicotine polacrilex (NICORETTE) 4 MG gum, TAKE 1 BY MOUTH FOUR TIMES DAILY, Disp: , Rfl:      ondansetron (ZOFRAN ODT) 4 MG ODT tab, Take 1 tablet (4 mg) by mouth every 8 hours as needed for nausea, Disp: 20 tablet, Rfl: 0     pantoprazole (PROTONIX) 40 MG EC tablet, Take 1 tablet (40 mg) by mouth daily, Disp: , Rfl:      predniSONE (DELTASONE) 20 MG tablet, Take 3 tabs by mouth daily x 3 days, then 2 tabs daily x 3 days, then 1 tab daily x 3 days, then 1/2 tab daily x 3 days., Disp: 20 tablet, Rfl: 0     spacer (OPTICHAMBER ELBA) holding chamber, Use with albuterol (PROAIR HFA/PROVENTIL HFA/VENTOLIN HFA) 108 (90 Base) MCG/ACT inhaler, Disp: 1 each, Rfl: 0    Current Facility-Administered Medications:      [START ON 10/20/2022] tezepelumab-ekko (TEZSPIRE) injection 210 mg, 210 mg, Subcutaneous, q28 days, Doni Lee MD  Immunization History   Administered Date(s) Administered     COVID-19,PF,Pfizer (12+ Yrs) 03/20/2021, 04/10/2021, 08/19/2021     FLU 6-35 months 11/21/2011     Influenza (IIV3) PF 11/04/2005, 10/23/2006, 10/20/2009     Influenza Quad, Recombinant, pf(RIV4) (Flublok) 10/07/2021     Influenza Vaccine IM > 6 months Valent IIV4 (Alfuria,Fluzone) 10/13/2016, 09/07/2017, 10/25/2019     Pneumo Conj 13-V (2010&after) 09/07/2017     Pneumococcal 23 valent 11/04/2005, 02/18/2019, 07/19/2022     TDAP Vaccine (Boostrix) 08/03/2011     Td (Adult), Adsorbed 01/01/2001, 10/25/2008     Tdap (Adacel,Boostrix) 07/08/2022     Allergies   Allergen Reactions     Bee Venom Angioedema, Swelling and Hives     Other reaction(s): Angioedema       Cefdinir  "Diarrhea and GI Disturbance              Levofloxacin Muscle Pain (Myalgia)     Prednisone Anxiety and Other (See Comments)     Hyperactivity and moodiness. Doesn't like how it makes her feel.       OBJECTIVE:                                                                 /69   Pulse 72   Ht 1.638 m (5' 4.5\")   Wt 51.3 kg (113 lb)   SpO2 99%   BMI 19.10 kg/m          Physical Exam  Vitals and nursing note reviewed.   Constitutional:       General: She is not in acute distress.     Appearance: She is not ill-appearing, toxic-appearing or diaphoretic.   HENT:      Head: Normocephalic and atraumatic.      Right Ear: Tympanic membrane, ear canal and external ear normal.      Left Ear: Tympanic membrane, ear canal and external ear normal.      Nose:      Right Turbinates: Enlarged (Erythematous).      Left Turbinates: Enlarged (Erythematous).      Mouth/Throat:      Lips: Pink.      Mouth: Mucous membranes are moist.      Pharynx: Oropharynx is clear. No pharyngeal swelling, oropharyngeal exudate, posterior oropharyngeal erythema or uvula swelling.   Eyes:      General:         Right eye: No discharge.         Left eye: No discharge.      Conjunctiva/sclera: Conjunctivae normal.   Cardiovascular:      Rate and Rhythm: Normal rate and regular rhythm.      Heart sounds: Normal heart sounds.   Pulmonary:      Effort: Pulmonary effort is normal. No respiratory distress.      Breath sounds: No stridor. Wheezing (End expiratory, bilaterally) present. No decreased breath sounds or rales.   Neurological:      Mental Status: She is alert and oriented to person, place, and time.   Psychiatric:         Mood and Affect: Mood normal.         Behavior: Behavior normal.         WORKUP: ACT 7      ASSESSMENT/PLAN:    Not well controlled severe persistent asthma  Other form of dyspnea  Other cough    Considering previous eosinophilia, I will repeat CBC with differential.  However, it is also important to notice that she may " have developed eosinophilia due to recent dupilumab.  I do understand her frustration.  The patient thinks about being evaluated at [facility].  I think it is not a bad idea.  She wants to wait until her insurance changes.  Meanwhile, I suggest a trial off tezepelumab-ekko.  Considering that it works at the level of TSLP, I still have some hope.  - She will continue with Trelegy Ellipta.  - I ordered ABPA panel as she requested.  - She will continue working with Pulmonology.  She is planning to see GI as well.  Most patients on tezepelumab-ekko reports the symptoms within the first 1-2 doses.    - CBC with Platelets & Differential  - Bronchopulm Aspergillosis Allergic Panel  - Fluticasone-Umeclidin-Vilanterol (TRELEGY ELLIPTA) 200-62.5-25 MCG/INH oral inhaler  Dispense: 60 each; Refill: 3     IgG subclass deficiency (H)  Other chronic sinusitis  She will see [facility] immunology for IgG subclass deficiency.  If she qualifies for IgG replacement, that would be a great trial.         Return in about 3 months (around 1/19/2023), or if symptoms worsen or fail to improve.    Thank you for allowing us to participate in the care of this patient. Please feel free to contact us if there are any questions or concerns about the patient.    Disclaimer: This note consists of symbols derived from keyboarding, dictation and/or voice recognition software. As a result, there may be errors in the script that have gone undetected. Please consider this when interpreting information found in this chart.    Doni Lee MD, FAAAAI, FACLEAHI  Allergy, Asthma and Immunology     [facility]       Again, thank you for allowing me to participate in the care of your patient.        Sincerely,        Doni Lee MD

## 2022-10-22 ENCOUNTER — HEALTH MAINTENANCE LETTER (OUTPATIENT)
Age: 57
End: 2022-10-22

## 2022-10-22 LAB — DEPRECATED MISC ALLERGEN IGE RAST QL: NORMAL

## 2022-10-26 LAB
A FUMIGATUS IGE QN: <0.1 KU/L
A FUMIGATUS1 AB SER QL ID: NORMAL
A FUMIGATUS6 AB SER QL ID: NORMAL
IGE SERPL-ACNC: 30 KU/L

## 2022-10-27 ENCOUNTER — VIRTUAL VISIT (OUTPATIENT)
Dept: PULMONOLOGY | Facility: CLINIC | Age: 57
End: 2022-10-27
Payer: COMMERCIAL

## 2022-10-27 DIAGNOSIS — J45.50 SEVERE PERSISTENT ASTHMA, UNSPECIFIED WHETHER COMPLICATED (H): ICD-10-CM

## 2022-10-27 DIAGNOSIS — J43.2 CENTRILOBULAR EMPHYSEMA (H): ICD-10-CM

## 2022-10-27 DIAGNOSIS — J43.2 CENTRILOBULAR EMPHYSEMA (H): Primary | ICD-10-CM

## 2022-10-27 PROCEDURE — 99215 OFFICE O/P EST HI 40 MIN: CPT | Mod: 95 | Performed by: INTERNAL MEDICINE

## 2022-10-27 RX ORDER — LEVALBUTEROL TARTRATE 45 UG/1
2 AEROSOL, METERED ORAL EVERY 4 HOURS PRN
Qty: 15 G | Refills: 11 | Status: SHIPPED | OUTPATIENT
Start: 2022-10-27 | End: 2023-03-31

## 2022-10-27 NOTE — PROGRESS NOTES
Gracy is a 56 year old who is being evaluated via a billable telephone visit.      What phone number would you like to be contacted at? [phone]  How would you like to obtain your AVS? Dee Pat VF

## 2022-10-27 NOTE — PROGRESS NOTES
Gracy is a 56 year old who is being evaluated via a billable telephone visit.      What phone number would you like to be contacted at? Mobile  How would you like to obtain your AVS? Dee Dubose is a 56 year old presenting for the following health issues: COPD/Asthma  No chief complaint on file.      HPI   Ms. Gracy Bautista is a 56 yr old female with a complex history of severe asthma/copd with severe nasal polyposis, chronic rhinosinusitis with multiple surgeries, IgG deficiency who presents to clinic for follow up for uncontrolled severe asthma/copd and persistent cough. I last saw her in clinic in 07/2022.  Please refer to my prior clinic office notes for an overview of her complicated history.  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 Continues to have repeated asthma/copd flares. During the last 12- 24 months of seeing her in clinic, her CT imaging has shown progressive emphysematous changes and she now has overt obstruction on PFTs, both of which were not very evident during our initial visit. She likely also has concomitant severe asthma which complicates her clinical condition.  Her care has been difficult due to intolerance with medications particularly inhalers and is not currently on a controller inhalers. Various inhalers have been prescribed including Trelegy.  Has been tried on Xolair, Dupixent and Nucala at different times with no improvement. Work up for ABPA, Churg-Misti disease and other vasculitic/autoimmune work up have returned negative. Chest imaging shows mostly subtle emphysema and low suspicion for conditions such as DIPNECH etc.   Sinus surgery with biopsy showing eosinophilic infiltration,  work up continues to show elevated peripheral eosinophilia, most recently at 1200 but was already on biologics at that time. IgE levels usually  "range from the 100's to 200's.  Had significant weight loss and dysphagia and had upper GI endoscopy with findings of gastritis and biopsies were taken. Had manometry and ambulatory pH and there is some concern for motility issues. Saw genetics and had work up for CF which returned negative.  Continues to have profuse sinus secretions, productive coughing, chest congestion and post nasal drainage. Using flonase and gent irrigations (1 daily). No longer on dupixent, caused visual issues, did not subjectively help.   Still smokes and takes 3 cigs/day      Review of Systems   Constitutional, HEENT, cardiovascular, pulmonary, GI, , musculoskeletal, neuro, skin, endocrine and psych systems are negative, except as otherwise noted.      Objective    Vitals - Patient Reported  Weight (Patient Reported): 50.8 kg (112 lb)  Height (Patient Reported): 165.1 cm (5' 5\")  BMI (Based on Pt Reported Ht/Wt): 18.64  Pain Score: No Pain (0)        Physical Exam   healthy, alert and no distress  PSYCH: Alert and oriented times 3; coherent speech, normal   rate and volume, able to articulate logical thoughts, able   to abstract reason, no tangential thoughts, no hallucinations   or delusions  Her affect is normal  RESP: No cough, no audible wheezing, able to talk in full sentences  Remainder of exam unable to be completed due to telephone visits    Most Recent [facility] Pulmonary Function Testing  Reviewed and discussed with patient- Mild obstruction  FVC-Pred   Date Value Ref Range Status   08/10/2022 3.38 L      FVC-Pre   Date Value Ref Range Status   08/10/2022 3.80 L      FVC-%Pred-Pre   Date Value Ref Range Status   08/10/2022 112 %      FEV1-Pre   Date Value Ref Range Status   08/10/2022 2.54 L      FEV1-%Pred-Pre   Date Value Ref Range Status   08/10/2022 95 %      FEV1FVC-Pred   Date Value Ref Range Status   08/10/2022 80 %      FEV1FVC-Pre   Date Value Ref Range Status   08/10/2022 67 %      No results found for: " 20029  FEFMax-Pred   Date Value Ref Range Status   08/10/2022 6.60 L/sec      FEFMax-Pre   Date Value Ref Range Status   08/10/2022 4.73 L/sec      FEFMax-%Pred-Pre   Date Value Ref Range Status   08/10/2022 71 %      ExpTime-Pre   Date Value Ref Range Status   08/10/2022 6.83 sec      FIFMax-Pre   Date Value Ref Range Status   08/10/2022 4.07 L/sec      FEV1FEV6-Pred   Date Value Ref Range Status   08/10/2022 81 %      FEV1FEV6-Pre   Date Value Ref Range Status   08/10/2022 67 %      No results found for: 20055     CT-scan of the chest- 08/30/2022  No CT evidence of chest malignancy. Granulomatous disease. Old trauma lateral right ribs.          Assessment and Plan:   Severe persistent Asthma with poor control/COPD   I had a very long and candid conversation with Gracy. Some of her symptoms particularly the pulmonary/respiratory ones can be explained by worsening emphysema/COPD. During the last 12- 24 months of seeing her in clinic, her CT imaging has shown progressive emphysematous changes and she now has overt obstruction on PFTs, both of which were not very evident during our initial visit. She likely also has concomitant severe asthma which complicates her clinical condition.  Her care has been difficult due to intolerance with medications particularly inhalers. We discussed that she has to at least be on a controller inhaler such as Trelegy. It appears that she has very poor inhaler technique and I sent her a link to watch a demo video for better inhaler technique.  She also still smokes albeit very light which I encouraged her to try to quit completely given the progressive emphysema/COPD on imaging.  I really do not think trying another biologic agent will be helpful as she has tried 3 different ones with no change in her symptoms which again makes me think that COPD may be more of the primary  than asthma.  She will benefit significantly from using a nebulizer as she is having a difficult time using  inhalers due to poor technique. Prescription for nebulizer was placed and she will use Levalbuterol 1.25 mg inhalation every 6 hrs.  Extensive work up has been done for auto-immune, vasculitis , ABPA and CF which are all negative.      Eosinophilic Chronic Sinusitis   Follows with Dr. Jaimes and has been intolerant to nasal sprays and rinses. Biologic agents have not been helpful. Recent sinus biopsy shows significant eosinophils indicative of eosinophilic chronic sinusitis. She has also grown pseudomonas and appears to respond to periodic antibiotics treatment. Again, work up for vasculitis has been negative.     Active Smoker  Amount of smoking does not meet threshold for lung cancer screening     Questions and concerns were answered to the patient's satisfaction.  she was provided with my contact information should new questions or concerns arise in the interim.    I spent a total of 30 minutes of telephone conversation with Gracy Bautista during today's office visit in addition to 10 minutes of chart review. Over 50% of this time was spent counseling the patient and/or coordinating care regarding their pulmonary disease.    She should return to clinic after 6 months     Up to date on vaccinations    Marco Bean MD  Pulmonary, Critical Care and Sleep Medicine  [facility]  Pager: [phone]    Phone call duration: 30 minutes

## 2022-10-28 ENCOUNTER — TELEPHONE (OUTPATIENT)
Dept: PULMONOLOGY | Facility: CLINIC | Age: 57
End: 2022-10-28

## 2022-10-28 NOTE — TELEPHONE ENCOUNTER
Prior Authorization Retail Medication Request    Medication/Dose: levalbuterol (XOPENEX HFA) 45 MCG/ACT inhaler    ICD code (if different than what is on RX):    Previously Tried and Failed:    Rationale:      Insurance Name:    Insurance ID:        Pharmacy Information (if different than what is on RX)  Name:    Phone:

## 2022-10-31 ENCOUNTER — PATIENT OUTREACH (OUTPATIENT)
Dept: ONCOLOGY | Facility: CLINIC | Age: 57
End: 2022-10-31

## 2022-10-31 DIAGNOSIS — D72.19 OTHER EOSINOPHILIA: Primary | ICD-10-CM

## 2022-10-31 NOTE — TELEPHONE ENCOUNTER
Central Prior Authorization Team   Phone: [phone]      PA Initiation    Medication: levalbuterol (XOPENEX HFA) 45 MCG/ACT inhaler-PA initiated  Insurance Company: [employer] - Phone [phone] Fax [phone]  Pharmacy Filling the Rx: [id number] [place], [place] - [address] [place] [address] AT [address]  Filling Pharmacy Phone: [phone]  Filling Pharmacy Fax:    Start Date: 10/31/2022

## 2022-10-31 NOTE — PROGRESS NOTES
October 31, 2022    Hematology/Oncology  referral reviewed.   Referred By Priority: Routine: Next available opening    Referred To   Doni Lee MD   [facility] ALLERGY        [address] [facility] [address]   Phone: [phone]   Fax: [phone]    Diagnoses: Other eosinophilia   Order: Adult Oncology/Hematology  Referral   My Clinical Question Is:Hypereosinophilia, sinus and chest symptoms that do not respond to conventional treatment including asthma Biologics.  Evaluation for hypereosinophilic syndrome.    Hematology         Pertinent labs -- BOOKMARKED    Referring provider visit note -- BOOKMARKED    Hematology intake scheduling team (NPS number below, hours are Monday - Friday 8am - 4:30 pm) will contact pt in the next 1-2 business days to schedule the consultation.    Kiana Fuchs, RN, BSN, OCN  [facility] Hematology/Oncology Nurse Navigator  [phone]

## 2022-10-31 NOTE — RESULT ENCOUNTER NOTE
[employer] message sent:     Hypereosinophilia noted.  This is while being off Dupixent for the past couple of months. Dupilumab can still be an explanation.  It may take several months for hypereosinophilia due to this biologic to get better.  However, considering the level and inability to get her symptoms under control, I recommend the patient to be evaluated by hematology oncology for possibility of hypereosinophilic syndrome.  Referral placed.

## 2022-11-01 ENCOUNTER — TELEPHONE (OUTPATIENT)
Dept: PULMONOLOGY | Facility: CLINIC | Age: 57
End: 2022-11-01

## 2022-11-01 NOTE — TELEPHONE ENCOUNTER
Patient contacted pulmonary clinic to inquire on PA request for levalbuterol (XOPENEX HFA) 45 MCG/ACT inhaler. Informed patient that PA team is still working on this request and that she will be notified once decision has been made. Patient expressed understanding and had no further questions.      Donna Gaspar LPN  Pulmonary Medicine:  [facility]  Phone: [phone] Fax: [phone]

## 2022-11-02 DIAGNOSIS — J32.4 CHRONIC PANSINUSITIS: Primary | ICD-10-CM

## 2022-11-02 RX ORDER — ALBUTEROL SULFATE 5 MG/ML
SOLUTION RESPIRATORY (INHALATION)
Refills: 0 | OUTPATIENT
Start: 2022-11-02

## 2022-11-02 NOTE — TELEPHONE ENCOUNTER
Prior Authorization Approval    Authorization Effective Date: 10/1/2022  Authorization Expiration Date: 10/31/2023  Medication: levalbuterol (XOPENEX HFA) 45 MCG/ACT inhaler-PA approved  Approved Dose/Quantity:   Reference #: [id number]   Insurance Company: [employer] - Phone [phone] Fax [phone]  Expected CoPay:       CoPay Card Available:      Foundation Assistance Needed:    Which Pharmacy is filling the prescription (Not needed for infusion/clinic administered): [facility] #[id number] - [place], [place] - [address] [place] [address]  Pharmacy Notified: Yes  Patient Notified: No

## 2022-11-02 NOTE — TELEPHONE ENCOUNTER
Informed patient via my chart that PA for levalbuterol (XOPENEX HFA) 45 MCG/ACT inhaler has been approved.      Donna Gaspar LPN  Pulmonary Medicine:  [facility]  Phone: [phone] Fax: [phone]

## 2022-11-03 NOTE — TELEPHONE ENCOUNTER
Left message on answering machine for patient to call back. Will need to ask patient what pharmacy she is currently using as an Epi pen will need to be sent. Will await patient call back.      Carmen Samuels MA

## 2022-11-03 NOTE — TELEPHONE ENCOUNTER
It is okay to start it.  Make sure the patient is aware of 1 hour wait before the injection.    Doni Lee MD

## 2022-11-03 NOTE — TELEPHONE ENCOUNTER
Tezspire is approved for 6 months until 4/21/23. Routing to provider to see if ok to call and schedule for Tezspire injection and follow up in 3 months once starting Tezspire. Please advise.      Carmen Samuels MA

## 2022-11-04 NOTE — TELEPHONE ENCOUNTER
Patient responded to [employer] message and will call the clinic on Monday 11/7/22 to schedule her first appointment for Eriberto.  Molly WILBURN MA

## 2022-11-07 NOTE — TELEPHONE ENCOUNTER
Per Dr. Lee, patient does not need Epi pen for injection. Will contact patient to schedule at our [place] site.      Carmen Samuels MA

## 2022-11-08 NOTE — TELEPHONE ENCOUNTER
Left message on answering machine for patient to call back. My-chart message sent.      Carmen Samuels MA

## 2022-11-09 ENCOUNTER — TELEPHONE (OUTPATIENT)
Dept: ALLERGY | Facility: OTHER | Age: 57
End: 2022-11-09

## 2022-11-09 NOTE — TELEPHONE ENCOUNTER
Patient needs epi-pen prescribed for teszpire first dose on 12/13. Pharamacy is [facility] [address] in [place], [place]. Forwarding to physician to prescribe Epi-pen and to allergy shot team at [place] to let them now this patient is scheduled for 12/13    Vanesa MALDONADO RN, [facility] 11/09/22 9:39 AM

## 2022-11-09 NOTE — TELEPHONE ENCOUNTER
Patient scheduled to receive Tezspire 210 mg injection on 12/13/2022 at [facility].    Carlos Davidson RN on 11/9/2022 at 2:50 PM

## 2022-11-21 ENCOUNTER — VIRTUAL VISIT (OUTPATIENT)
Dept: GASTROENTEROLOGY | Facility: CLINIC | Age: 57
End: 2022-11-21
Payer: COMMERCIAL

## 2022-11-21 DIAGNOSIS — R13.10 DYSPHAGIA, UNSPECIFIED TYPE: ICD-10-CM

## 2022-11-21 DIAGNOSIS — R05.3 CHRONIC COUGH: Primary | ICD-10-CM

## 2022-11-21 PROCEDURE — 99214 OFFICE O/P EST MOD 30 MIN: CPT | Mod: 95 | Performed by: INTERNAL MEDICINE

## 2022-11-21 NOTE — PROGRESS NOTES
Gracy is a 56 year old who is being evaluated via a billable video visit.      Pt is in [place]    How would you like to obtain your AVS? MyChart  If the video visit is dropped, the invitation should be resent by: Send to e-mail at: [email or web address]  Will anyone else be joining your video visit? No       Vanesa Dunn      Video-Visit Details    Video Start Time: 438 PM    Type of service:  Video Visit    Video End Time: 500 PM    Originating Location (pt. Location): Home    Distant Location (provider location):  [facility]     Platform used for Video Visit: [facility]     Gastroenterology Visit for: Gracy Bautista 1965   MRN: [id number]     Reason for Visit:  chief complaint    Referred by: Jaiden  / [id number] / [place] [address]  Patient Care Team:  Donna Hwang MD as PCP - General (Family Medicine)  Digna Jaimes MD as Assigned Surgical Provider  Marco Bean MD as Assigned Pulmonology Provider  Doni Lee MD as Assigned Allergy Provider  Donna Hwang MD as Assigned PCP  Donna Hwang MD as MD (Family Medicine)  Jayy Narvaez DO as MD (Gastroenterology)  Jayy Narvaez DO as Assigned Gastroenterology Provider  Tatyana Pickens [facility] as Assigned MTM Pharmacist  Zev Roach MD as MD (Hematology & Oncology)    History of Present Illness:   Gracy Bautista is a 56 year old female with asthma, chronic cough, tobacco abuse, umbilical hernia, nasal polyposis, chronic rhinosinusitis, COPD, who is presenting as a follow up patient in consultation at the request of Dr. Hwang with a chief complaint of chronic cough and dysphagia..  ---------------------------------------------------------------    11/21/22  Could not tolerate the pH study. The patient feels that she is having less regurgitation during meals. She continues to have coughing if she is eating late at night. She has tried to stop eating at 6pm and trying to  avoid laying down right after eating. Taking pantoprazole 40mg daily. She feels that she is a little better. She has dysphagia. Dysphagia to solid foods and pills. Highlights dysphagia to pumpkin pie.     It is unclear what the PPI is helping with. She feels that she has been eating more recently. Has never had classic heartburn. Started her PPI about a month ago.     Hasn't eliminated anything from her diet.     She notes that she is more constipated.     --------------------------------  8/29/2022  Gracy Bautista states for the past 8 month she states she has been sick. She has been having a chronic cough and has been in the hospital for 4 times because she couldn't breath. She states she was in [place] in January and was kept in the hospital for 1 week. She reports that she has had a hysterectomy. She reports that she has a hernia that has been evaluated by a surgeon. She has pain described as incredible cramping. It is located in two specific areas. This is not a new thing. She states that this dates back at least 6 years however it has been more frequent. It is not like a gas pain. She has tried omeprazole OTC for a week with no relief of symptoms. She drinks a lot of Fresca (2 cans a day) with a sip of this she feels as if it doesn't go down. She feels that when she swallows it she has belching and possibly some regurgitation. As of late, she states she doesn't sleep more than two hours because of coughing. She will have a popsicle at night if she wakes and begins coughing after that. She reports a significant amount of mucus. She states that whatever is happening has made her asthma aggravated. States that she used to be a pack every 2 weeks smoker. Currently 4 puffs a day. The patient saw Dr. Jennings regarding her umbilical hernia and is not interested in surgery at this time. Dysphagia has been progressive over the past 8-9months. Coincides with cough. No recent chest CT.     Egg consistency, yogurts  feel like they go down slowly. Needs to drink water to help it pass. More solid foods she has to regurgitate it back up. Coffee makes her nauseated.   ---------------------------------------------------------------     Wt Readings from Last 5 Encounters:   10/19/22 51.3 kg (113 lb)   10/17/22 51.6 kg (113 lb 11.2 oz)   09/30/22 52.2 kg (115 lb)   09/20/22 50.8 kg (112 lb)   09/02/22 50.8 kg (112 lb)        Esophageal Questionnaire(s)    BEDQ Questionnaire  BEDQ Questionnaire: How Often Have You Had the Following? 8/29/2022 9/18/2022 9/20/2022   Trouble eating solid food (meat, bread, vegetables) 5.0 5 5   Trouble eating soft foods (yogurt, jello, pudding) 4 4 5   Trouble swallowing liquids 4 3 4   Pain while swallowing 5 5 4   Coughing or choking while swallowing foods or liquids 5 5 5   Total Score: 23 22 23     BEDQ Questionnaire: Discomfort/Pain Ratings 8/29/2022 9/18/2022 9/20/2022   Eating solid food (meat, bread, vegetables) 3 4 5   Eating soft foods (yogurt, jello, pudding) 2 3 5   Drinking liquid 2 3 3   Total Score: 7 10 13       Eckardt Questionnaire  Eckardt Questionnaire 9/18/2022   Dysphagia 3   Regurgitation 3   Retrosternal Pain 3   Weight Loss (kg) 2   Total Score:  11       Promis 10 Questionnaire  PROMIS 10 FLOWSHEET DATA 9/18/2022   In general, would you say your health is: 2   In general, would you say your quality of life is: 2   In general, how would you rate your physical health? 1   In general, how would you rate your mental health, including your mood and your ability to think? 2   In general, how would you rate your satisfaction with your social activities and relationships? 2   In general, please rate how well you carry out your usual social activities and roles. (This includes activities at home, at work and in your community, and responsibilities as a parent, child, spouse, employee, friend, etc.) 2   To what extent are you able to carry out your everyday physical activities such as  walking, climbing stairs, carrying groceries, or moving a chair? 4   In the past 7 days, how often have you been bothered by emotional problems such as feeling anxious, depressed, or irritable? 3   In the past 7 days, how would you rate your fatigue on average? 3   In the past 7 days, how would you rate your pain on average, where 0 means no pain, and 10 means worst imaginable pain? 7   Mental health question re-calculation - no clinical value 3   Physical health question re-calculation - no clinical value 3   Pain question re-calculation - no clinical value 2   Global Mental Health Score 9   Global Physical Health Score 10   PROMIS TOTAL - SUBSCORES 19           STUDIES & PROCEDURES:    EGD:     9/2/2022 EGD  Impression:            - Abnormal esophageal motility.                          - Z-line irregular, 40 cm from the incisors.                          - Esophagogastric landmarks identified.                          - 2 cm hiatal hernia.                          - A single gastric polyp. Resected and retrieved.                          - Erosive gastropathy with stigmata of recent                          bleeding. Biopsied.                          - Submucosal nodule found in the duodenum. Biopsied.                          - Biopsies were taken with a cold forceps for                          evaluation of celiac disease.     Final Diagnosis   A.  DUODENUM, BIOPSY:  - Unremarkable duodenal mucosa  - No evidence of celiac disease    B.  DUODENUM, FIRST PORTION, SUBMUCOSAL NODULE, BIOPSY:  - Duodenal mucosa with foveolar metaplasia and Brunner gland hyperplasia  - No evidence of celiac disease    C.  GASTRIC, BIOPSY:  - Unremarkable gastric mucosa  - No H. pylori-like organisms identified on routine staining  - No intestinal metaplasia identified    D.  GASTRIC, POLYP, BIOPSY:  - Fragments of gastric mucosa with mild foveolar hyperplasia and oxyntic glandular dilatation, suggestive of a diminutive fundic gland  polyp  - No H. pylori-like organisms identified on routine staining  - No intestinal metaplasia identified    E.  ESOPHAGUS, DISTAL, BIOPSY:  - Unremarkable squamous mucosa  - Gastric-type mucosa with mild chronic inflammation  - No evidence of eosinophilic esophagitis  - No goblet cells (intestinal metaplasia) identified       Colonoscopy:    Date: 8/22/22  Impression:     The perianal and digital rectal examinations were normal.        A 2 mm polyp was found in the descending colon. The polyp was sessile.        The polyp was removed with a cold biopsy forceps. Resection and        retrieval were complete. Verification of patient identification for the        specimen was done. Estimated blood loss was minimal.        The retroflexed view of the distal rectum and anal verge was normal and        showed no anal or rectal abnormalities.                                                                                     Moderate Sedation:        Moderate Sedation was not administered   Impression:               - One 2 mm polyp in the descending colon, removed                             with a cold biopsy forceps. Resected and retrieved.                             - The distal rectum and anal verge are normal on                             retroflexion view.   Recommendation:           - Discharge patient to home (ambulatory).                             - Patient has a contact number available for                             emergencies. The signs and symptoms of potential                             delayed complications were discussed with the                             patient. Return to normal activities tomorrow.                             Written discharge instructions were provided to the                             patient.                             - Await pathology results.                             - Repeat colonoscopy in 5-10 years for surveillance                             based on pathology  results.                             - Return to primary care physician.     Final Diagnosis   DESCENDING COLON, POLYP, BIOPSY:  Tubular adenoma; negative for high grade dysplasia       EndoFLIP directed at the UES or LES (8cm (EF-325) balloon length or 16cm (EF-322) balloon length):   Date:  8cm balloon  Balloon inflation Balloon pressure CSA (mm^2) DI (mm^2/mmHg) Dmin (mm) Compliance   20 (ladmark ID)        30        40        50           16cm balloon  Balloon inflation Balloon pressure CSA (mm^2) DI (mm^2/mmHg) Dmin (mm) Compliance   30 (ladmark ID)        40        50        60        70           High Resolution Manometry:    9/20/2022 HRM:   Interpretation / Findings   The baseline tone of the lower esophageal sphincter was normotensive. The lower esophageal sphincter was not able to relax appropriately as measured by the IRP (17) in the supine position. The EGJ morphology was type 2. There was peristalsis visible. The DCI, DL, and contractile pattern were within normal limits. 7/10 were normal. 2 were weak. 1 was failed. There were 4/10 swallows with elevated intrabolus pressure and compartmentalized pressurization. Some swallows were associated with esophageal shortening. Rapid water swallows were performed with normal augmentation. Rapid Drink Challenge could not be completed. Supine liquid swallows were performed. Upright liquid swallows were performed with a median upright IRP of 11.1. Bolus transit as measured by impedance was complete in 8/10 swallows. This is most consistent with normal esophageal motility.     Wording of procedure description and interpretation was adapted from [facility] [employer] [facility] Weekly Motility Conference (2018).       Impressions:   Based on the most recent Stuart Classification v4.0, the findings are most consistent with normal esopahgeal motility.     PH/Impedance:  Date:  Impression:     Bravo:  48 or 96hr  Date:  Impression:    CT:  Date: 10/6/21                                                                    IMPRESSION: Unchanged 4 mm right upper lobe nodule. Unchanged right  lower lobe mucous plug with clearing of other right lower lobe mucous  plug. Hyperinflated lungs. Nonenlarged mediastinal nodes.  Cervicothoracic spondylosis. Osteopenia.    Esophagram:  Date: 8/15/22  Impression:                                                                   IMPRESSION: The patient was able to swallow various consistencies of  barium without difficulty. Intermittent flash penetration noted with  thin barium. Otherwise no penetration or aspiration. With solid  consistencies, there is relative stasis of the bolus within the upper  esophagus suggesting some degree of dysmotility. Recommend GI  consultation. Please see the speech pathologist note for further  details and recommendations.     Prior medical records were reviewed including, but not limited to, notes from referring providers, lab work, radiographic tests, and other diagnostic tests. Pertinent results were summarized above.     History     Past Medical History:   Diagnosis Date     Allergic rhinitis      Arthritis      Chronic sinusitis      Depressive disorder      Hoarseness      Reduced vision      Uncomplicated asthma        Past Surgical History:   Procedure Laterality Date     COLONOSCOPY N/A 8/22/2022    Procedure: COLONOSCOPY, WITH POLYPECTOMY AND BIOPSY;  Surgeon: Glen Dyer MD;  Location: [facility] OR     COLONOSCOPY WITH CO2 INSUFFLATION N/A 8/22/2022    Procedure: COLONOSCOPY, WITH CO2 INSUFFLATION;  Surgeon: Glen Dyer MD;  Location: [facility] OR     ESOPHAGOSCOPY, GASTROSCOPY, DUODENOSCOPY (EGD), COMBINED N/A 9/2/2022    Procedure: ESOPHAGOGASTRODUODENOSCOPY, WITH BIOPSY AND POLYPECTOMY;  Surgeon: Colton Sarkar MD;  Location: [facility] OR     NASAL/SINUS POLYPECTOMY       OPTICAL TRACKING SYSTEM ENDOSCOPIC SINUS SURGERY Bilateral 1/21/2022    Procedure: stealth guided, bilateral  ethmoidectomy, bilateral frontal sinusotomy, bilateral maxillary antrostomies;  Surgeon: Digna Jaimes MD;  Location: [facility] OR       Social History     Socioeconomic History     Marital status:      Spouse name: Not on file     Number of children: Not on file     Years of education: Not on file     Highest education level: Not on file   Occupational History     Occupation: HOMEMAKER   Tobacco Use     Smoking status: Light Smoker     Packs/day: 0.00     Years: 15.00     Pack years: 0.00     Types: Cigarettes     Start date: 9/17/2021     Smokeless tobacco: Never     Tobacco comments:     3 cigarettes per day   Vaping Use     Vaping Use: Never used   Substance and Sexual Activity     Alcohol use: Not Currently     Drug use: Never     Sexual activity: Yes     Partners: Male     Birth control/protection: None   Other Topics Concern     Not on file   Social History Narrative    October 7, 2021    ENVIRONMENTAL HISTORY: [place] The home is heated with a electric furnace. They do have central air conditioning. The patient's bedroom is furnished with carpeting in bedroom, allergen mattress cover, and allergen pillowcase cover.  Pets inside the house include 1 dog. There is no history of cockroach or mice infestation. There is/are 0 smokers in the house.  The house does have a damp basement.      Social Determinants of Health     Financial Resource Strain: Not on file   Food Insecurity: Not on file   Transportation Needs: Not on file   Physical Activity: Not on file   Stress: Not on file   Social Connections: Not on file   Intimate Partner Violence: Not At Risk     Fear of Current or Ex-Partner: No     Emotionally Abused: No     Physically Abused: No     Sexually Abused: No   Housing Stability: Not on file       Family History   Problem Relation Age of Onset     Cancer Brother      Bleeding Disorder Brother      Diabetes Father      Heart Disease Father      Cerebrovascular  Disease Father      Bleeding Disorder Mother      Family history reviewed and edited as appropriate    Medications and Allergies:     Outpatient Encounter Medications as of 11/21/2022   Medication Sig Dispense Refill     albuterol (PROAIR HFA/PROVENTIL HFA/VENTOLIN HFA) 108 (90 Base) MCG/ACT inhaler Inhale 1-2 puffs into the lungs every 6 hours as needed for shortness of breath / dyspnea or wheezing 24 g 3     azithromycin (ZITHROMAX) 250 MG tablet Take 1 tablet (250 mg) by mouth Every Mon, Wed, Fri Morning 36 tablet 3     bisacodyl (DULCOLAX) 5 MG EC tablet Take 5 mg by mouth as needed for constipation       CYMBALTA 60 MG capsule Take 60 mg by mouth every morning        Fluticasone-Umeclidin-Vilanterol (TRELEGY ELLIPTA) 200-62.5-25 MCG/INH oral inhaler Inhale 1 puff into the lungs daily 60 each 3     LAMICTAL 200 MG tablet Take 200 mg by mouth every morning        levalbuterol (XOPENEX HFA) 45 MCG/ACT inhaler Inhale 2 puffs into the lungs every 4 hours as needed for shortness of breath / dyspnea or wheezing 15 g 11     levalbuterol (XOPENEX) 0.31 MG/3ML neb solution Take 3 mLs (0.31 mg) by nebulization every 4 hours as needed for wheezing or shortness of breath / dyspnea 125 mL 11     nicotine polacrilex (NICORETTE) 4 MG gum TAKE 1 BY MOUTH FOUR TIMES DAILY       ondansetron (ZOFRAN ODT) 4 MG ODT tab Take 1 tablet (4 mg) by mouth every 8 hours as needed for nausea 20 tablet 0     pantoprazole (PROTONIX) 40 MG EC tablet Take 1 tablet (40 mg) by mouth daily       predniSONE (DELTASONE) 20 MG tablet Take 3 tabs by mouth daily x 3 days, then 2 tabs daily x 3 days, then 1 tab daily x 3 days, then 1/2 tab daily x 3 days. 20 tablet 0     spacer (OPTICHAMBER ELBA) holding chamber Use with albuterol (PROAIR HFA/PROVENTIL HFA/VENTOLIN HFA) 108 (90 Base) MCG/ACT inhaler 1 each 0     Facility-Administered Encounter Medications as of 11/21/2022   Medication Dose Route Frequency Provider Last Rate Last Admin      tezepelumab-ekko (TEZSPIRE) injection 210 mg  210 mg Subcutaneous q28 days Doni Lee MD            Allergies   Allergen Reactions     Bee Venom Angioedema, Swelling and Hives     Other reaction(s): Angioedema       Cefdinir Diarrhea and GI Disturbance              Levofloxacin Muscle Pain (Myalgia)     Prednisone Anxiety and Other (See Comments)     Hyperactivity and moodiness. Doesn't like how it makes her feel.          Review of systems:  A full 10 point review of systems was obtained and was negative except for the pertinent positives and negatives stated within the HPI.    Objective Findings:   Physical Exam:    Constitutional: There were no vitals taken for this visit.  General: Alert, cooperative, no distress, well-appearing  Head: Atraumatic, normocephalic, no obvious abnormalities   Eyes: Sclera anicteric, no obvious conjunctival hemorrhage   Nose: Nares normal, no obvious malformation, no obvious rhinorrhea   Respiratory: normal appearing respirations without cough during the duration of the encounter.  Musculoskeletal: Range of motion intact, no obvious strength deficit  Skin: No jaundice, no obvious rash  Neurologic: AAOx3, no obvious neurologic abnormality  Psychiatric: Normal Affect, appropriate mood  Extremities: No obvious edema, no obvious malformation     Labs, Radiology, Pathology     Lab Results   Component Value Date    WBC 11.9 (H) 10/19/2022    WBC 10.1 09/30/2022    WBC 12.7 (H) 08/18/2022    HGB 15.1 10/19/2022    HGB 14.8 09/30/2022    HGB 14.9 08/18/2022     10/19/2022     09/30/2022     08/18/2022    CHOL 219 (H) 09/30/2022    CHOL 250 (H) 08/30/2022    CHOL 245 (H) 08/18/2022    TRIG 313 (H) 09/30/2022    TRIG 50 08/30/2022    TRIG 108 08/18/2022    HDL 82 09/30/2022     08/30/2022     08/18/2022    ALT 27 09/30/2022    ALT 26 10/07/2021    ALT 27 06/27/2021    AST 22 09/30/2022    AST 20 10/07/2021    AST 18 06/27/2021     09/30/2022    NA  136 10/07/2021     06/27/2021    BUN 13 09/30/2022    BUN 21 10/07/2021    BUN 15 06/27/2021    CO2 30 09/30/2022    CO2 26 10/07/2021    CO2 26 06/27/2021    TSH 0.65 10/21/2020    TSH 0.652 02/18/2019    TSH 0.66 08/27/2013    INR 0.91 10/21/2020        Liver Function Studies -   Recent Labs   Lab Test 10/07/21  1124   PROTTOTAL 7.5   ALBUMIN 3.9   BILITOTAL 0.3   ALKPHOS 80   AST 20   ALT 26          Patient Active Problem List    Diagnosis Date Noted     Dysphagia, unspecified type 08/29/2022     Priority: Medium     Chronic cough 08/29/2022     Priority: Medium     Umbilical hernia with obstruction 07/20/2022     Priority: Medium     Added automatically from request for surgery [id number]       Anticardiolipin antibody positive 07/08/2022     Priority: Medium     Chronic pansinusitis 10/23/2021     Priority: Medium     Added automatically from request for surgery [id number]       Tobacco abuse 04/22/2021     Priority: Medium     Carpal tunnel syndrome 08/07/2019     Priority: Medium     Moderate persistent asthma, uncomplicated 12/04/2018     Priority: Medium     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  Overview:   Dx'd at [place] late 2013  Formatting of this note might be different from the original.  Dx'd at [place] late 2013       Hypogammaglobulinemia (H) 11/22/2013     Priority: Medium     Insomnia 10/25/2013     Priority: Medium     Formatting of this note might be different from the original.  Overview:   Insomnia, unspecified  Formatting of this note might be different from the original.  Insomnia, unspecified       ADD (attention deficit disorder) 01/20/2012     Priority: Medium     Alcohol use disorder, moderate, in sustained remission (H) 01/20/2012     Priority: Medium     PTSD (post-traumatic stress disorder)  01/20/2012     Priority: Medium     Cardiac arrhythmia 10/28/2008     Priority: Medium     Hyperlipidemia 09/12/2006     Priority: Medium     Formatting of this note might be different from the original.  Overview:   LW Onset:  10Phl25  Formatting of this note might be different from the original.  LW Onset:  [id number]       Peripheral vascular disease (H) 05/19/2005     Priority: Medium     Formatting of this note might be different from the original.  Overview:   LW Onset:  [id number]  ; Acrocyanosis        Assessment and Plan   Assessment:    Gracy Bautista is a 56 year old female with asthma, chronic cough, tobacco abuse, umbilical hernia, nasal polyposis, chronic rhinosinusitis, COPD, who is presenting as a follow up patient in consultation at the request of Dr. Hwang with a chief complaint of chronic cough and dysphagia..    The patient was seen in video telemedicine consultation regarding her symptoms of cough and dysphagia.     At this point in time the patient has undergone her CT scan for which she should follow-up with her primary care provider.  She has undergone upper endoscopy with no concerning features in the esophagus to explain her symptoms.  She has also undergone high-resolution esophageal manometry which was normal.  I explained the findings to the patient and showed her the study document.  She unfortunately was unable to tolerate the 24-hour pH impedance.  As such, I have asked that she undergo a repeat upper endoscopy with placement of a 96-hour Bravo off PPI therapy.  Until that time she may continue to take her PPI daily.  I do not suspect eosinophilic esophagitis based on the previous endoscopy and biopsy however more biopsies will be obtained in the proximal and distal esophagus to ensure that there is not a sampling error.    The patient denies any allergy to nickel and is interested in undergoing the Bravo as she does not want to take medication if she does not need to.  The benefit of  the Bravo will be to determine if she has abnormal acid exposure and if her symptoms are associated with reflux events.    Plan:    1. Please schedule upper endoscopy with bravo (wireless pH test) off of PPI therapy. Also we will obtain biopsies at the time of the procedure for EoE although I have a low suspicion based on the previous endoscopy.   2. Continue PPI daily until 2 weeks before the procedure.     Follow up plan:   Return to clinic 6 months and as needed.    The risks and benefits of my recommendations, as well as other treatment options were discussed with the patient and any available family today. All questions were answered.     o Follow up: As planned above. Today, I personally spent 22 minutes in direct face to face time with the patient, of which greater than 50% of the time was spent in patient education and counseling as described above. Approximately 10 minutes were spent on indirect care associated with the patient's consultation including but not limited to review of: patient medical records to date, clinic visits, hospital records, lab results, imaging studies, procedural documentation, and coordinating care with other providers. The findings from this review are summarized in the above note. All of the above accounted for a cumulative time of 32 minutes and was performed on the date of service.     The patient verbalized understanding of the plan and was appreciative for the time spent and information provided during the office visit.     Author:   Jayy Narvaez DO   of Medicine  Director, Esophageal Disorders Program  Division of Gastroenterology, Hepatology, and Nutrition  [facility]       Documentation assisted by voice recognition and documentation system.

## 2022-11-21 NOTE — PATIENT INSTRUCTIONS
It was a pleasure taking care of you today.  I've included a brief summary of our discussion and care plan from today's visit below.  Please review this information with your primary care provider.  _______________________________________________________________________    My recommendations are summarized as follows:    Please schedule upper endoscopy with bravo (wireless pH test) off of PPI therapy. Also we will obtain biopsies at the time of the procedure for EoE although I have a low suspicion based on the previous endoscopy.   Continue PPI daily until 2 weeks before the procedure.     To schedule endoscopic procedures you may call: [phone]  To schedule radiology tests you may call: [phone]  To schedule an ENT appointment you may call: [phone]    Please call my nurse Kathleen ([phone])T[place]ira ([phone]) with any questions or concerns.  If you were seen through the [facility] please feel free to reach out to Laura at [phone]   --    Return to GI Clinic in 6 months to review your progress.    _______________________________________________________________________    Who do I call with any questions after my visit?  Please be in touch if there are any further questions that arise following today's visit.  There are multiple ways to contact your gastroenterology care team.      During business hours, you may reach a Gastroenterology nurse at [phone] and choose option 3.       To schedule or reschedule an appointment, please call [phone].     You can always send a secure message through [employer].  [employer] messages are answered by your nurse or doctor typically within 24 hours.  Please allow extra time on weekends and holidays.      For urgent/emergent questions after business hours, you may reach the on-call GI Fellow by contacting the [facility]  at [phone].     How will I get the results of any tests ordered?    You will receive all of your results.   If you have signed up for [employer], any tests ordered at your visit will be available to you after your physician reviews them.  Typically this takes 1-2 weeks.  If there are urgent results that require a change in your care plan, your physician or nurse will call you to discuss the next steps.      What is [employer]?  On[employer] is a secure way for you to access all of your healthcare records from the [facility].  It is a web based computer program, so you can sign on to it from any location.  It also allows you to send secure messages to your care team.  I recommend signing up for [employer] access if you have not already done so and are comfortable with using a computer.      How to I schedule a follow-up visit?  If you did not schedule a follow-up visit today, please call [phone] to schedule a follow-up office visit.      If you feel you received exceptional care and are interested in supporting the clinical and research goals of Dr. Narvaez or the Division of Gastroenterology, Hepatology, and Nutrition please contact [email or web address] from the [facility] to discuss opportunities to donate.    Sincerely,    Jayy Narvaez DO   of Medicine  Director, Esophageal Disorders Program  Division of Gastroenterology, Hepatology, and Nutrition  [facility]

## 2022-11-28 NOTE — TELEPHONE ENCOUNTER
RECORDS STATUS - ALL OTHER DIAGNOSIS      RECORDS RECEIVED FROM: [facility]   DATE RECEIVED: 11/28   NOTES STATUS DETAILS   OFFICE NOTE from referring provider [facility] Doni Lee MD in ER ALLERGY: 10/19/22   OFFICE NOTE from medical oncologist [facility] Dr. Dawson [place]: 10/21/20   OPERATIVE REPORT Epic 9/2/22: EGD  8/22/22: Colonoscopy  1/21/22: stealth guided, bilateral ethmoidectomy, bilateral frontal sinusotomy, bilateral maxillary antrostomies   MEDICATION LIST Epic 10/27/22   PFT Epic 8/10/22, 11/11/20   LABS     PATHOLOGY REPORTS [facility] 9/2/22, 8/22/22, 8/2/22, 1/21/22: Surg Path   ANYTHING RELATED TO DIAGNOSIS Epic 10/19/22   GENONOMIC TESTING     TYPE: Epic 9/13/22: NGS

## 2022-12-05 ENCOUNTER — PRE VISIT (OUTPATIENT)
Dept: ONCOLOGY | Facility: CLINIC | Age: 57
End: 2022-12-05

## 2022-12-05 ENCOUNTER — LAB (OUTPATIENT)
Dept: LAB | Facility: CLINIC | Age: 57
End: 2022-12-05
Payer: COMMERCIAL

## 2022-12-05 ENCOUNTER — ONCOLOGY VISIT (OUTPATIENT)
Dept: ONCOLOGY | Facility: CLINIC | Age: 57
End: 2022-12-05
Attending: ALLERGY & IMMUNOLOGY
Payer: COMMERCIAL

## 2022-12-05 ENCOUNTER — HOSPITAL ENCOUNTER (OUTPATIENT)
Facility: AMBULATORY SURGERY CENTER | Age: 57
End: 2022-12-05
Attending: INTERNAL MEDICINE
Payer: COMMERCIAL

## 2022-12-05 ENCOUNTER — TELEPHONE (OUTPATIENT)
Dept: GASTROENTEROLOGY | Facility: CLINIC | Age: 57
End: 2022-12-05

## 2022-12-05 VITALS
WEIGHT: 112.5 LBS | TEMPERATURE: 98.4 F | SYSTOLIC BLOOD PRESSURE: 126 MMHG | HEART RATE: 76 BPM | DIASTOLIC BLOOD PRESSURE: 85 MMHG | BODY MASS INDEX: 19.01 KG/M2 | OXYGEN SATURATION: 96 % | RESPIRATION RATE: 20 BRPM

## 2022-12-05 DIAGNOSIS — D72.19 OTHER EOSINOPHILIA: ICD-10-CM

## 2022-12-05 LAB
BASOPHILS # BLD AUTO: 0 10E3/UL (ref 0–0.2)
BASOPHILS NFR BLD AUTO: 0 %
EOSINOPHIL # BLD AUTO: 0 10E3/UL (ref 0–0.7)
EOSINOPHIL NFR BLD AUTO: 1 %
ERYTHROCYTE [DISTWIDTH] IN BLOOD BY AUTOMATED COUNT: 13.2 % (ref 10–15)
HCT VFR BLD AUTO: 43.5 % (ref 35–47)
HGB BLD-MCNC: 14.9 G/DL (ref 11.7–15.7)
IMM GRANULOCYTES # BLD: 0 10E3/UL
IMM GRANULOCYTES NFR BLD: 0 %
LYMPHOCYTES # BLD AUTO: 2.3 10E3/UL (ref 0.8–5.3)
LYMPHOCYTES NFR BLD AUTO: 26 %
MCH RBC QN AUTO: 33.6 PG (ref 26.5–33)
MCHC RBC AUTO-ENTMCNC: 34.3 G/DL (ref 31.5–36.5)
MCV RBC AUTO: 98 FL (ref 78–100)
MONOCYTES # BLD AUTO: 0.5 10E3/UL (ref 0–1.3)
MONOCYTES NFR BLD AUTO: 6 %
NEUTROPHILS # BLD AUTO: 5.9 10E3/UL (ref 1.6–8.3)
NEUTROPHILS NFR BLD AUTO: 67 %
NRBC # BLD AUTO: 0 10E3/UL
NRBC BLD AUTO-RTO: 0 /100
PLATELET # BLD AUTO: 383 10E3/UL (ref 150–450)
RBC # BLD AUTO: 4.44 10E6/UL (ref 3.8–5.2)
VIT B12 SERPL-MCNC: 594 PG/ML (ref 232–1245)
WBC # BLD AUTO: 8.8 10E3/UL (ref 4–11)

## 2022-12-05 PROCEDURE — 85025 COMPLETE CBC W/AUTO DIFF WBC: CPT

## 2022-12-05 PROCEDURE — 82785 ASSAY OF IGE: CPT | Performed by: INTERNAL MEDICINE

## 2022-12-05 PROCEDURE — 36415 COLL VENOUS BLD VENIPUNCTURE: CPT

## 2022-12-05 PROCEDURE — 86160 COMPLEMENT ANTIGEN: CPT | Performed by: INTERNAL MEDICINE

## 2022-12-05 PROCEDURE — 86162 COMPLEMENT TOTAL (CH50): CPT | Performed by: INTERNAL MEDICINE

## 2022-12-05 PROCEDURE — 99214 OFFICE O/P EST MOD 30 MIN: CPT | Performed by: INTERNAL MEDICINE

## 2022-12-05 PROCEDURE — 83520 IMMUNOASSAY QUANT NOS NONAB: CPT | Performed by: INTERNAL MEDICINE

## 2022-12-05 PROCEDURE — 82607 VITAMIN B-12: CPT

## 2022-12-05 PROCEDURE — 99000 SPECIMEN HANDLING OFFICE-LAB: CPT | Performed by: INTERNAL MEDICINE

## 2022-12-05 ASSESSMENT — PAIN SCALES - GENERAL: PAINLEVEL: NO PAIN (0)

## 2022-12-05 NOTE — NURSING NOTE
"Oncology Rooming Note    December 5, 2022 2:13 PM   Gracy Bautista is a 57 year old female who presents for:    Chief Complaint   Patient presents with     Oncology Clinic Visit     New patient/Eosinophilia     Initial Vitals: /85 (BP Location: Left arm, Patient Position: Chair, Cuff Size: Adult Regular)   Pulse 76   Temp 98.4  F (36.9  C) (Oral)   Resp 20   Wt 51 kg (112 lb 8 oz)   SpO2 96%   BMI 19.01 kg/m   Estimated body mass index is 19.01 kg/m  as calculated from the following:    Height as of 10/19/22: 1.638 m (5' 4.5\").    Weight as of this encounter: 51 kg (112 lb 8 oz). Body surface area is 1.52 meters squared.  No Pain (0) Comment: Data Unavailable   No LMP recorded. Patient has had a hysterectomy.  Allergies reviewed: Yes  Medications reviewed: Yes    Medications: Medication refills not needed today.  Pharmacy name entered into EPIC:    [facility] - [place], [place] - [address] [place] [address]  [id number] - [place], [place] - [address] [facility] [place], [place] - [address]    Clinical concerns: New patient       Carmen Wang RN              "

## 2022-12-05 NOTE — PATIENT INSTRUCTIONS
1) Labs today.  2) Marrow per Gracy's schedule.  3) BJT MG 10-14 days after marrow to review.    Zev Roach MD.

## 2022-12-05 NOTE — TELEPHONE ENCOUNTER
Screening Questions  BLUE  KIND OF PREP RED  LOCATION [review exclusion criteria] GREEN  SEDATION TYPE        y Are you active on mychart?       [facility] Ordering/Referring Provider?        [employer] What type of coverage do you have?      n Have you had a positive covid test in the last 14 days?     19.1 1. BMI  [BMI 40+ - review exclusion criteria]    y  2. Are you able to give consent for your medical care? [IF NO,RN REVIEW]        n  3. Are you taking any prescription pain medications on a routine schedule?      n  3a. EXTENDED PREP What kind of prescription?     n 4. Do you have any chemical dependencies such as alcohol, street drugs, or methadone?    y 5. Do you have any history of post-traumatic stress syndrome, severe anxiety or history of psychosis?      **If yes 3- 5 , please schedule with MAC sedation.**          IF YES TO ANY 6 - 10 - HOSPITAL SETTING ONLY.     n 6.   Do you need assistance transferring?     n 7.   Have you had a heart or lung transplant?    n 8.   Are you currently on dialysis?   n 9.   Do you use daily home oxygen?   n 10. Do you take nitroglycerin?   10a. n If yes, how often?     11. [FEMALES]  n Are you currently pregnant?    11a. n If yes, how many weeks? [ Greater than 12 weeks, OR NEEDED]    n 12. Do you have Pulmonary Hypertension? *NEED PAC APPT AT [facility]*     n 13. [review exclusion criteria]  Do you have any implantable devices in your body (pacemaker, defib, LVAD)?    n 14. In the past 6 months, have you had any heart related issues including cardiomyopathy or heart attack?     14a. n If yes, did it require cardiac stenting if so when?     n 15. Have you had a stroke or Transient ischemic attack (TIA - aka  mini stroke ) within 6 months?      n 16. Do you have mod to severe Obstructive Sleep Apnea?  [Hospital only - Ok at [facility]]    n 17. Do you have SEVERE AND UNCONTROLLED asthma? *NEED PAC APPT AT [facility]*     n 18. Are you currently taking any blood thinners?     18a. If  "yes, inform patient to \"follow up w/ ordering provider for bridging instructions.\"    n 19. Do you take the medication Phentermine?    19a. If yes, \"Hold for 7 days before procedure.  Please consult your prescribing provider if you have questions about holding this medication.\"     n  20. Do you have chronic kidney disease?      n  21. Do you have a diagnosis of diabetes?       22. On a regular basis do you go 3-5 days between bowel movements?      23. Preferred LOCAL Pharmacy for Pre Prescription    [ LIST ONLY ONE PHARMACY]        [facility] - [place], [place] - [address] [place] [address]  [id number] - [place], [place] - [address] [facility] [address]  [facility] - [place], [place] - [facility] [address]        - CLOSING REMINDERS -    Informed patient they will need an adult    Cannot take any type of public or medical transportation alone    Conscious Sedation- Needs  for 6 hours after the procedure       MAC/General-Needs  for 24 hours after procedure    Pre-Procedure Cov[facility] test to be completed [facility]]    Confirmed Nurse will call to complete assessment       - SCHEDULING DETAILS -     Brice  Surgeon    3/6/23  Date of Procedure  Upper Endoscopy [EGD]/w Bravo Type of Procedure Scheduled  [facility] Location        MAC Sedation Type     y PAC / Pre-op Required         Additional comments:            "

## 2022-12-05 NOTE — LETTER
"    2022         RE: Gracy Bautista  [address] [place] [id number]        Dear Colleague,    Thank you for referring your patient, Gracy Bautista, to the [facility]. Please see a copy of my visit note below.    [facility] Hematology / Oncology  Initial Visit / Consultation Note Dec 5, 2022  Name: Gracy Bautista  :  1965  MRN:  [id number]    --------------------    Assessment / Plan:  Spent good portion of time reviewing her symptomatology as it may relate to the eosinophils.  I do think the eosinophils likely correlate with the onset of Dupixent and it may take up to a year for the eosinophilia to resolve but often time it peaks somewhere around several months after discontinuing treatment.  We had a good discussion about \"which came first, the chicken or the egg\" given that some eosinophils have been identified in tissues.  Given the concern for possible hypereosinophilia syndromes, we will plan on a bone marrow biopsy looking for eosinophils as well as a number of other molecular, genetic and histologic features.  We will plan to check complements as well as a tryptase and IgE as well as screen with a kappa and lambda.  I do not have a high suspicion for hypereosinophilia syndrome but given her increasing symptomatology and lack of a unifying diagnosis, I think it is fair to be thorough with respect to Gracy as well as the other Members of her team.    Thank you kindly for this consultation.  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 Lee.  She begins our visit by stating that she is really felt quite tired and weak for some time.  The child she does not recall having asthma eczema or allergies.  A lot of her issues began after pregnancy.  It sounds like she was found to have a positive anticardiolipin and even in the absence of documented venous " thromboembolic episode, she was started on heparin and transition to Coumadin for prevention of pregnancy loss due to history of miscarriages and requiring Vitro fertilization.  She denies any asthma eczema or allergies also in adulthood but did develop epilepsy.  Unfortunately somewhere around her 50s and 60s began developing worsening asthma a lot of which got significantly worse after COVID in November 2020.  She is also had a lot of itching and nasal polyps and nasal related complaints.  She is been treated for multiple episodes of pneumonia.  She has been on prior therapies including Glucola, Xolair and most recently on Dupixent from April to June.  Treatment evaluated by rheumatologist in the past for blotchy legs and cold hands and achy joints.  She was not found to have any vasculitis underlying autoimmunity.  Her mother does have Raynaud's and vasculitis and some Clotting disorder.  I do discussed and denies any lymphoma, leukemia, multiple myeloma in her family.  She is felt swollen recently and also easy bruising.  She uses Lamictal for mood and Cymbalta for fibromyalgia.    --------------------    Review of Systems:  10 point ROS negative except for that above.    Past Medical / Surgical History:  Past Medical History:   Diagnosis Date     Allergic rhinitis      Arthritis      Chronic sinusitis      Depressive disorder      Hoarseness      Reduced vision      Uncomplicated asthma      Past Surgical History:   Procedure Laterality Date     COLONOSCOPY N/A 8/22/2022    Procedure: COLONOSCOPY, WITH POLYPECTOMY AND BIOPSY;  Surgeon: Glen Dyer MD;  Location:  OR     COLONOSCOPY WITH CO2 INSUFFLATION N/A 8/22/2022    Procedure: COLONOSCOPY, WITH CO2 INSUFFLATION;  Surgeon: Glen Dyer MD;  Location:  OR     ESOPHAGOSCOPY, GASTROSCOPY, DUODENOSCOPY (EGD), COMBINED N/A 9/2/2022    Procedure: ESOPHAGOGASTRODUODENOSCOPY, WITH BIOPSY AND POLYPECTOMY;  Surgeon: Colton Sarkar,  MD;  [place] [facility] [place]     NASAL/SINUS POLYPECTOMY       OPTICAL TRACKING SYSTEM ENDOSCOPIC SINUS SURGERY Bilateral 1/21/2022    Procedure: stealth guided, bilateral ethmoidectomy, bilateral frontal sinusotomy, bilateral maxillary antrostomies;  Surgeon: iDgna Jaimes MD;  [place] [facility] [place]       Family History:  Family History   Problem Relation Age of Onset     Cancer Brother      Bleeding Disorder Brother      Diabetes Father      Heart Disease Father      Cerebrovascular Disease Father      Bleeding Disorder Mother        Social History:  Social History     Socioeconomic History     Marital status:      Spouse name: None     Number of children: None     Years of education: None     Highest education level: None   Occupational History     Occupation: HOMEMAKER   Tobacco Use     Smoking status: Light Smoker     Packs/day: 0.00     Years: 15.00     Pack years: 0.00     Types: Cigarettes     Start date: 9/17/2021     Smokeless tobacco: Never     Tobacco comments:     3 cigarettes per day   Vaping Use     Vaping Use: Never used   Substance and Sexual Activity     Alcohol use: Not Currently     Drug use: Never     Sexual activity: Yes     Partners: Male     Birth control/protection: None   Social History Narrative    October 7, 2021    ENVIRONMENTAL HISTORY: [place] The home is heated with a electric furnace. They do have central air conditioning. The patient's bedroom is furnished with carpeting in bedroom, allergen mattress cover, and allergen pillowcase cover.  Pets inside the house include 1 dog. There is no history of cockroach or mice infestation. There is/are 0 smokers in the house.  The house does have a damp basement.      Social Determinants of Health     Intimate Partner Violence: Not At Risk     Fear of Current or Ex-Partner: No     Emotionally Abused: No     Physically Abused: No     Sexually Abused: No       Medications / Allergies:  Reviewed in  EMR.    --------------------    Physical Exam:  VS: /85 (BP Location: Left arm, Patient Position: Chair, Cuff Size: Adult Regular)   Pulse 76   Temp 98.4  F (36.9  C) (Oral)   Resp 20   Wt 51 kg (112 lb 8 oz)   SpO2 96%   BMI 19.01 kg/m    GEN: Well appearing.  SAI: No cervical, clavicular, axillary adenopathy.    Labs / Imaging / Path:  We reviewed CBC, CMP, SPEP, B12, iron studies.        Again, thank you for allowing me to participate in the care of your patient.        Sincerely,        Zev Roach MD

## 2022-12-06 LAB
C3 SERPL-MCNC: 100 MG/DL (ref 81–157)
C4 SERPL-MCNC: 30 MG/DL (ref 13–39)
IGE SERPL-ACNC: 23 KU/L (ref 0–114)
TRYPTASE SERPL-MCNC: 2.8 UG/L

## 2022-12-06 NOTE — PROGRESS NOTES
"[facility] Hematology / Oncology  Initial Visit / Consultation Note Dec 5, 2022  Name: Gracy Bautista  :  1965  MRN:  [id number]    --------------------    Assessment / Plan:  Spent good portion of time reviewing her symptomatology as it may relate to the eosinophils.  I do think the eosinophils likely correlate with the onset of Dupixent and it may take up to a year for the eosinophilia to resolve but often time it peaks somewhere around several months after discontinuing treatment.  We had a good discussion about \"which came first, the chicken or the egg\" given that some eosinophils have been identified in tissues.  Given the concern for possible hypereosinophilia syndromes, we will plan on a bone marrow biopsy looking for eosinophils as well as a number of other molecular, genetic and histologic features.  We will plan to check complements as well as a tryptase and IgE as well as screen with a kappa and lambda.  I do not have a high suspicion for hypereosinophilia syndrome but given her increasing symptomatology and lack of a unifying diagnosis, I think it is fair to be thorough with respect to Gracy as well as the other Members of her team.    Thank you kindly for this consultation.  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 Lee.  She begins our visit by stating that she is really felt quite tired and weak for some time.  The child she does not recall having asthma eczema or allergies.  A lot of her issues began after pregnancy.  It sounds like she was found to have a positive anticardiolipin and even in the absence of documented venous thromboembolic episode, she was started on heparin and transition to Coumadin for prevention of pregnancy loss due to history of miscarriages and requiring Vitro fertilization.  She denies any asthma eczema or allergies also in adulthood but did develop " epilepsy.  Unfortunately somewhere around her 50s and 60s began developing worsening asthma a lot of which got significantly worse after COVID in November 2020.  She is also had a lot of itching and nasal polyps and nasal related complaints.  She is been treated for multiple episodes of pneumonia.  She has been on prior therapies including Glucola, Xolair and most recently on Dupixent from April to June.  Treatment evaluated by rheumatologist in the past for blotchy legs and cold hands and achy joints.  She was not found to have any vasculitis underlying autoimmunity.  Her mother does have Raynaud's and vasculitis and some Clotting disorder.  I do discussed and denies any lymphoma, leukemia, multiple myeloma in her family.  She is felt swollen recently and also easy bruising.  She uses Lamictal for mood and Cymbalta for fibromyalgia.    --------------------    Review of Systems:  10 point ROS negative except for that above.    Past Medical / Surgical History:  Past Medical History:   Diagnosis Date     Allergic rhinitis      Arthritis      Chronic sinusitis      Depressive disorder      Hoarseness      Reduced vision      Uncomplicated asthma      Past Surgical History:   Procedure Laterality Date     COLONOSCOPY N/A 8/22/2022    Procedure: COLONOSCOPY, WITH POLYPECTOMY AND BIOPSY;  Surgeon: Glen Dyer MD;  Location:  OR     COLONOSCOPY WITH CO2 INSUFFLATION N/A 8/22/2022    Procedure: COLONOSCOPY, WITH CO2 INSUFFLATION;  Surgeon: Glen Dyer MD;  Location:  OR     ESOPHAGOSCOPY, GASTROSCOPY, DUODENOSCOPY (EGD), COMBINED N/A 9/2/2022    Procedure: ESOPHAGOGASTRODUODENOSCOPY, WITH BIOPSY AND POLYPECTOMY;  Surgeon: Colton Sarkar MD;  [place] [facility] [place]     NASAL/SINUS POLYPECTOMY       OPTICAL TRACKING SYSTEM ENDOSCOPIC SINUS SURGERY Bilateral 1/21/2022    Procedure: stealth guided, bilateral ethmoidectomy, bilateral frontal sinusotomy, bilateral maxillary antrostomies;  Surgeon:  Digna Jaimes MD;  [place] [facility] [place]       Family History:  Family History   Problem Relation Age of Onset     Cancer Brother      Bleeding Disorder Brother      Diabetes Father      Heart Disease Father      Cerebrovascular Disease Father      Bleeding Disorder Mother        Social History:  Social History     Socioeconomic History     Marital status:      Spouse name: None     Number of children: None     Years of education: None     Highest education level: None   Occupational History     Occupation: HOMEMAKER   Tobacco Use     Smoking status: Light Smoker     Packs/day: 0.00     Years: 15.00     Pack years: 0.00     Types: Cigarettes     Start date: 9/17/2021     Smokeless tobacco: Never     Tobacco comments:     3 cigarettes per day   Vaping Use     Vaping Use: Never used   Substance and Sexual Activity     Alcohol use: Not Currently     Drug use: Never     Sexual activity: Yes     Partners: Male     Birth control/protection: None   Social History Narrative    October 7, 2021    ENVIRONMENTAL HISTORY: The family [place]. The home is heated with a electric furnace. They do have central air conditioning. The patient's bedroom is furnished with carpeting in bedroom, allergen mattress cover, and allergen pillowcase cover.  Pets inside the house include 1 dog. There is no history of cockroach or mice infestation. There is/are 0 smokers in the house.  The house does have a damp basement.      Social Determinants of Health     Intimate Partner Violence: Not At Risk     Fear of Current or Ex-Partner: No     Emotionally Abused: No     Physically Abused: No     Sexually Abused: No       Medications / Allergies:  Reviewed in EMR.    --------------------    Physical Exam:  VS: /85 (BP Location: Left arm, Patient Position: Chair, Cuff Size: Adult Regular)   Pulse 76   Temp 98.4  F (36.9  C) (Oral)   Resp 20   Wt 51 kg (112 lb 8 oz)   SpO2 96%   BMI 19.01 kg/m    GEN: Well  appearing.  SAI: No cervical, clavicular, axillary adenopathy.    Labs / Imaging / Path:  We reviewed CBC, CMP, SPEP, B12, iron studies.

## 2022-12-09 LAB — CH50 SERPL-ACNC: 80.4 U/ML

## 2022-12-12 ENCOUNTER — TELEPHONE (OUTPATIENT)
Dept: PULMONOLOGY | Facility: CLINIC | Age: 57
End: 2022-12-12

## 2022-12-12 NOTE — TELEPHONE ENCOUNTER
Spacer chamber orders have been signed and faxed to [facility].    Donna Gaspar LPN  Pulmonary Medicine:  [facility]  Phone: [phone] Fax: [phone]

## 2022-12-19 ENCOUNTER — OFFICE VISIT (OUTPATIENT)
Dept: OTOLARYNGOLOGY | Facility: CLINIC | Age: 57
End: 2022-12-19
Payer: COMMERCIAL

## 2022-12-19 VITALS
SYSTOLIC BLOOD PRESSURE: 116 MMHG | HEART RATE: 77 BPM | WEIGHT: 112 LBS | TEMPERATURE: 97.6 F | BODY MASS INDEX: 18.93 KG/M2 | OXYGEN SATURATION: 100 % | DIASTOLIC BLOOD PRESSURE: 79 MMHG | RESPIRATION RATE: 16 BRPM

## 2022-12-19 DIAGNOSIS — J32.4 CHRONIC PANSINUSITIS: ICD-10-CM

## 2022-12-19 DIAGNOSIS — F41.9 ANXIETY: Primary | ICD-10-CM

## 2022-12-19 PROCEDURE — 31231 NASAL ENDOSCOPY DX: CPT | Performed by: OTOLARYNGOLOGY

## 2022-12-19 PROCEDURE — 99212 OFFICE O/P EST SF 10 MIN: CPT | Mod: 25 | Performed by: OTOLARYNGOLOGY

## 2022-12-19 RX ORDER — DEXTROAMPHETAMINE SACCHARATE, AMPHETAMINE ASPARTATE, DEXTROAMPHETAMINE SULFATE, AND AMPHETAMINE SULFATE 1.25; 1.25; 1.25; 1.25 MG/1; MG/1; MG/1; MG/1
5 TABLET ORAL DAILY PRN
COMMUNITY
Start: 2021-12-23 | End: 2023-07-28

## 2022-12-19 ASSESSMENT — PAIN SCALES - GENERAL: PAINLEVEL: SEVERE PAIN (7)

## 2022-12-19 NOTE — PROGRESS NOTES
Gracy continues to have profuse secretions, coughing, chest congestion and post nasal drainage. She has had in depth evaluation with pulmonary, GI, hematology, rheum. Work up is unremarkable other than slightly low IgG. Would like to see a behavioral health specialist.     Brother had lung disease, diabetes, kidney failure  Father lung disease, diabetes, EtoH.     Exam: alert mildly distraught    Procedure:  Endoscopy indicated for exam of sinuses  Topical anesthetic/decongestant spray applied.  Rigid scope used for visualization.  Findings: thick copious secretions in bilateral middle meatus, sinus cavities and nasal cavities. Suction performed.      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 Has eosiinophilia, has cultured staph, pseudomonas, fungus. Allergy and rheumatologic work up not remarkable, has had low IgG in past. Will arrange S evaluation and see her monthly to suction sinuses.       15 minutes spent on the date of the encounter doing chart review, history and exam, documentation and further activities per the note. This time is in addition to separately billable procedure.

## 2022-12-19 NOTE — LETTER
12/19/2022       RE: Gracy Bautista  [address] [facility] [address]     Dear Colleague,    Thank you for referring your patient, Gracy Bautista, to the [facility] [place] at [facility]. Please see a copy of my visit note below.    Gracy continues to have profuse secretions, coughing, chest congestion and post nasal drainage. She has had in depth evaluation with pulmonary, GI, hematology, rheum. Work up is unremarkable other than slightly low IgG. Would like to see a behavioral health specialist.     Brother had lung disease, diabetes, kidney failure  Father lung disease, diabetes, EtoH.     Exam: alert mildly distraught    Procedure:  Endoscopy indicated for exam of sinuses  Topical anesthetic/decongestant spray applied.  Rigid scope used for visualization.  Findings: thick copious secretions in bilateral middle meatus, sinus cavities and nasal cavities. Suction performed.      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 Has eosiinophilia, has cultured staph, pseudomonas, fungus. Allergy and rheumatologic work up not remarkable, has had low IgG in past. Will arrange S evaluation and see her monthly to suction sinuses.       15 minutes spent on the date of the encounter doing chart review, history and exam, documentation and further activities per the note. This time is in addition to separately billable procedure.      Again, thank you for allowing me to participate in the care of your patient.      Sincerely,    Digna Jaimes MD

## 2022-12-19 NOTE — PATIENT INSTRUCTIONS
1. Please follow-up in clinic in 4 - 6 weeks  2. Please call the ENT clinic with any questions,concerns, new or worsening symptoms.    -Clinic number is [phone]   - Day's direct line (Dr. Jaimes's nurse) [phone]

## 2022-12-21 DIAGNOSIS — J32.4 CHRONIC PANSINUSITIS: ICD-10-CM

## 2022-12-26 ASSESSMENT — ENCOUNTER SYMPTOMS
PALPITATIONS: 1
JOINT SWELLING: 1
PARESTHESIAS: 1
HEADACHES: 1
CONSTIPATION: 1
HEMATURIA: 0
CHILLS: 1
NAUSEA: 1
MYALGIAS: 1
NERVOUS/ANXIOUS: 1
SORE THROAT: 0
ABDOMINAL PAIN: 1
DYSURIA: 0
COUGH: 1
HEMATOCHEZIA: 0
ARTHRALGIAS: 1
DIARRHEA: 0
WEAKNESS: 1
HEARTBURN: 1
FREQUENCY: 0
EYE PAIN: 1
FEVER: 0
DIZZINESS: 1
SHORTNESS OF BREATH: 1
BREAST MASS: 0

## 2022-12-26 NOTE — TELEPHONE ENCOUNTER
AMOXICILLIN-CLAV 875-125MG TAB  Last Written Prescription Date:  ?  Last Fill Quantity: /,   # refills: ?  Last Office Visit :  12/19/2022  Future Office visit:   2/20/2023    Routing refill request to provider for review/approval because:  Drug not on the [facility], P or [facility] refill protocol or controlled substance      Jaimie Grijalva RN  Central Triage Red Flags/Med Refills

## 2022-12-27 ENCOUNTER — OFFICE VISIT (OUTPATIENT)
Dept: FAMILY MEDICINE | Facility: CLINIC | Age: 57
End: 2022-12-27
Payer: COMMERCIAL

## 2022-12-27 VITALS
WEIGHT: 114.2 LBS | DIASTOLIC BLOOD PRESSURE: 73 MMHG | BODY MASS INDEX: 19.03 KG/M2 | TEMPERATURE: 98.3 F | HEIGHT: 65 IN | SYSTOLIC BLOOD PRESSURE: 107 MMHG | HEART RATE: 78 BPM | OXYGEN SATURATION: 98 %

## 2022-12-27 DIAGNOSIS — Z00.00 ROUTINE GENERAL MEDICAL EXAMINATION AT A HEALTH CARE FACILITY: Primary | ICD-10-CM

## 2022-12-27 DIAGNOSIS — J44.1 CHRONIC OBSTRUCTIVE PULMONARY DISEASE WITH (ACUTE) EXACERBATION (H): ICD-10-CM

## 2022-12-27 DIAGNOSIS — Z72.0 TOBACCO ABUSE: ICD-10-CM

## 2022-12-27 PROCEDURE — 99396 PREV VISIT EST AGE 40-64: CPT | Performed by: FAMILY MEDICINE

## 2022-12-27 RX ORDER — DULOXETINE HCL 60 MG
120 CAPSULE,DELAYED RELEASE (ENTERIC COATED) ORAL EVERY MORNING
COMMUNITY
Start: 2022-12-27

## 2022-12-27 RX ORDER — LEVALBUTEROL INHALATION SOLUTION 0.63 MG/3ML
SOLUTION RESPIRATORY (INHALATION)
Qty: 90 ML | Refills: 3 | Status: SHIPPED | OUTPATIENT
Start: 2022-12-27 | End: 2023-04-03

## 2022-12-27 ASSESSMENT — PATIENT HEALTH QUESTIONNAIRE - PHQ9
SUM OF ALL RESPONSES TO PHQ QUESTIONS 1-9: 6
SUM OF ALL RESPONSES TO PHQ QUESTIONS 1-9: 6
10. IF YOU CHECKED OFF ANY PROBLEMS, HOW DIFFICULT HAVE THESE PROBLEMS MADE IT FOR YOU TO DO YOUR WORK, TAKE CARE OF THINGS AT HOME, OR GET ALONG WITH OTHER PEOPLE: SOMEWHAT DIFFICULT

## 2022-12-27 ASSESSMENT — ENCOUNTER SYMPTOMS
COUGH: 1
DIZZINESS: 1
PARESTHESIAS: 1
NERVOUS/ANXIOUS: 1
HEMATURIA: 0
FREQUENCY: 0
DIARRHEA: 0
JOINT SWELLING: 1
CONSTIPATION: 1
FEVER: 0
ARTHRALGIAS: 1
DYSURIA: 0
BREAST MASS: 0
WEAKNESS: 1
ABDOMINAL PAIN: 1
HEARTBURN: 1
NAUSEA: 1
PALPITATIONS: 1
SHORTNESS OF BREATH: 1
MYALGIAS: 1
EYE PAIN: 1
HEMATOCHEZIA: 0
HEADACHES: 1
SORE THROAT: 0
CHILLS: 1

## 2022-12-27 ASSESSMENT — PAIN SCALES - GENERAL: PAINLEVEL: NO PAIN (0)

## 2022-12-27 NOTE — PROGRESS NOTES
SUBJECTIVE:   CC: Gracy is an 57 year old who presents for preventive health visit.     Healthy Habits:     Getting at least 3 servings of Calcium per day:  Yes    Bi-annual eye exam:  Yes    Dental care twice a year:  Yes    Sleep apnea or symptoms of sleep apnea:  Daytime drowsiness    Diet:  Vegetarian/vegan    Frequency of exercise:  2-3 days/week    Duration of exercise:  15-30 minutes    Taking medications regularly:  No    Barriers to taking medications:  Side effects    Medication side effects:  Not applicable    PHQ-2 Total Score: 2    Additional concerns today:  Yes    The 10-year ASCVD risk score (Rancho ROGEL, et al., 2019) is: 3.4%    Values used to calculate the score:      Age: 57 years      Sex: Female      Is Non- : No      Diabetic: No      Tobacco smoker: Yes      Systolic Blood Pressure: 107 mmHg      Is BP treated: No      HDL Cholesterol: 82 mg/dL      Total Cholesterol: 219 mg/dL    Today's PHQ-2 Score:   PHQ-2 ( 1999 [employer]) 12/26/2022   Q1: Little interest or pleasure in doing things 1   Q2: Feeling down, depressed or hopeless 1   PHQ-2 Score 2   PHQ-2 Total Score (12-17 Years)- Positive if 3 or more points; Administer PHQ-A if positive -   Q1: Little interest or pleasure in doing things Several days   Q2: Feeling down, depressed or hopeless Several days   PHQ-2 Score 2     She has a history of COPD/Asthma, chronic rhino sinusitis and eosinophilia. Manometry  With Normal esophageal motility. Cough is still present. I appreciate speciality input and expertise  Have you ever done Advance Care Planning? (For example, a Health Directive, POLST, or a discussion with a medical provider or your loved ones about your wishes): Yes, patient states has an Advance Care Planning document and will bring a copy to the clinic.    Social History     Tobacco Use     Smoking status: Light Smoker     Packs/day: 0.00     Years: 15.00     Pack years: 0.00     Types: Cigarettes     Start  date: 9/17/2021     Smokeless tobacco: Never     Tobacco comments:     I am willing.  I haven't been successful and my mindset is not completely there yet.   Substance Use Topics     Alcohol use: Not Currently     Comment: I have been sober for 13years.         Alcohol Use 12/26/2022   Prescreen: >3 drinks/day or >7 drinks/week? Not Applicable       Reviewed orders with patient.  Reviewed health maintenance and updated orders accordingly - Yes  BP Readings from Last 3 Encounters:   12/27/22 107/73   12/19/22 116/79   12/05/22 126/85    Wt Readings from Last 3 Encounters:   12/27/22 51.8 kg (114 lb 3.2 oz)   12/19/22 50.8 kg (112 lb)   12/05/22 51 kg (112 lb 8 oz)                  Patient Active Problem List   Diagnosis     Chronic pansinusitis     ADD (attention deficit disorder)     Alcohol use disorder, moderate, in sustained remission (H)     Blind right eye     Cardiac arrhythmia     Carpal tunnel syndrome     PTSD (post-traumatic stress disorder)     Hyperlipidemia     Hypogammaglobulinemia (H)     Immunoglobulin deficiency (H)     Insomnia     Moderate persistent asthma, uncomplicated     Myofascial pain     Peripheral vascular disease (H)     Tobacco abuse     Anticardiolipin antibody positive     Umbilical hernia with obstruction     Dysphagia, unspecified type     Chronic cough     Past Surgical History:   Procedure Laterality Date     BIOPSY       COLONOSCOPY N/A 08/22/2022    Procedure: COLONOSCOPY, WITH POLYPECTOMY AND BIOPSY;  Surgeon: Glen Dyer MD;  Location:  OR     COLONOSCOPY WITH CO2 INSUFFLATION N/A 08/22/2022    Procedure: COLONOSCOPY, WITH CO2 INSUFFLATION;  Surgeon: Glen Dyer MD;  Location:  OR     ESOPHAGOSCOPY, GASTROSCOPY, DUODENOSCOPY (EGD), COMBINED N/A 09/02/2022    Procedure: ESOPHAGOGASTRODUODENOSCOPY, WITH BIOPSY AND POLYPECTOMY;  Surgeon: Colton Sarkar MD;  [place] [facility] [place]     NASAL/SINUS POLYPECTOMY       OPTICAL TRACKING SYSTEM ENDOSCOPIC  SINUS SURGERY Bilateral 01/21/2022    Procedure: stealth guided, bilateral ethmoidectomy, bilateral frontal sinusotomy, bilateral maxillary antrostomies;  Surgeon: Digna Jaimes MD;  Location: [facility] OR     ORTHOPEDIC SURGERY         Social History     Tobacco Use     Smoking status: Light Smoker     Packs/day: 0.00     Years: 15.00     Pack years: 0.00     Types: Cigarettes     Start date: 9/17/2021     Smokeless tobacco: Never     Tobacco comments:     I am willing.  I haven't been successful and my mindset is not completely there yet.   Substance Use Topics     Alcohol use: Not Currently     Comment: I have been sober for 13years.     Family History   Problem Relation Age of Onset     Cancer Brother      Bleeding Disorder Brother      Diabetes Father      Heart Disease Father      Cerebrovascular Disease Father      Hypertension Father      Hyperlipidemia Father      Bleeding Disorder Mother      Hypertension Mother      Hyperlipidemia Mother          Current Outpatient Medications   Medication Sig Dispense Refill     ADDERALL 5 MG tablet Take 5 mg by mouth daily as needed       azithromycin (ZITHROMAX) 250 MG tablet Take 1 tablet (250 mg) by mouth Every Mon, Wed, Fri Morning 36 tablet 3     bisacodyl (DULCOLAX) 5 MG EC tablet Take 5 mg by mouth as needed for constipation       CYMBALTA 60 MG capsule Take 2 capsules (120 mg) by mouth every morning       Fluticasone-Umeclidin-Vilanterol (TRELEGY ELLIPTA) 200-62.5-25 MCG/INH oral inhaler Inhale 1 puff into the lungs daily 60 each 3     LAMICTAL 200 MG tablet Take 200 mg by mouth every morning        levalbuterol (XOPENEX) 0.31 MG/3ML neb solution Take 3 mLs (0.31 mg) by nebulization every 4 hours as needed for wheezing or shortness of breath / dyspnea 125 mL 11     nicotine polacrilex (NICORETTE) 4 MG gum TAKE 1 BY MOUTH FOUR TIMES DAILY       pantoprazole (PROTONIX) 40 MG EC tablet Take 1 tablet (40 mg) by mouth daily       spacer (OPTICHAMBER ELBA) holding  chamber Use with albuterol (PROAIR HFA/PROVENTIL HFA/VENTOLIN HFA) 108 (90 Base) MCG/ACT inhaler 1 each 0     levalbuterol (XOPENEX HFA) 45 MCG/ACT inhaler Inhale 2 puffs into the lungs every 4 hours as needed for shortness of breath / dyspnea or wheezing 15 g 11     levalbuterol (XOPENEX) 0.63 MG/3ML neb solution INHALE 3 ML (0.63 MG) VIA A NEBULIZER EVERY 6 HOURS. 90 mL 3     Allergies   Allergen Reactions     Bee Venom Angioedema, Swelling and Hives     Other reaction(s): Angioedema       Cefdinir Diarrhea and GI Disturbance              Levofloxacin Muscle Pain (Myalgia)     Prednisone Anxiety and Other (See Comments)     Hyperactivity and moodiness. Doesn't like how it makes her feel.       Recent Labs   Lab Test 09/30/22  1433 08/30/22  1342 08/18/22  1552 10/07/21  1124 06/27/21  1605 10/21/20  1353 09/21/20  1518 09/21/20  1518 02/18/19  0000   A1C 6.1*  --   --   --   --   --   --   --   --    LDL 74 137* 120*  --   --   --   --   --   --    HDL 82 103 103  --   --   --   --   --   --    TRIG 313* 50 108  --   --   --   --   --   --    ALT 27  --   --  26 27  --   --   --   --    CR 0.70  --   --  0.75 0.89  --    < > 0.78  --    GFRESTIMATED >90  --   --  89 72  --    < > 84  --    GFRESTBLACK  --   --   --   --  84  --   --  >90  --    POTASSIUM 4.2  --   --  3.7 4.7  --    < > 4.0  --    TSH  --   --   --   --   --  0.65  --   --  0.652    < > = values in this interval not displayed.        Breast Cancer Screening:    FHS-7:   Breast CA Risk Assessment (FHS-7) 7/21/2022   Did any of your first-degree relatives have breast or ovarian cancer? No   Did any of your relatives have bilateral breast cancer? No   Did any man in your family have breast cancer? No   Did any woman in your family have breast and ovarian cancer? No   Did any woman in your family have breast cancer before age 50 y? No   Do you have 2 or more relatives with breast and/or ovarian cancer? No   Do you have 2 or more relatives with breast  "and/or bowel cancer? No       Mammogram Screening: Recommended annual mammography and Mammogram Screening: Recommended mammography every 1-2 years with patient discussion and risk factor consideration  Pertinent mammograms are reviewed under the imaging tab.         Reviewed and updated as needed this visit by clinical staff   Tobacco  Allergies  Meds  Problems  Med Hx            Reviewed and updated as needed this visit by Provider     Meds  Problems  Med Hx               Review of Systems   Constitutional: Positive for chills. Negative for fever.   HENT: Positive for congestion. Negative for ear pain, hearing loss and sore throat.    Eyes: Positive for pain and visual disturbance.   Respiratory: Positive for cough and shortness of breath.    Cardiovascular: Positive for palpitations and peripheral edema. Negative for chest pain.   Gastrointestinal: Positive for abdominal pain, constipation, heartburn and nausea. Negative for diarrhea and hematochezia.   Breasts:  Negative for tenderness, breast mass and discharge.   Genitourinary: Negative for dysuria, frequency, genital sores, hematuria, pelvic pain, urgency, vaginal bleeding and vaginal discharge.   Musculoskeletal: Positive for arthralgias, joint swelling and myalgias.   Skin: Positive for rash.   Neurological: Positive for dizziness, weakness, headaches and paresthesias.   Psychiatric/Behavioral: Positive for mood changes. The patient is nervous/anxious.      OBJECTIVE:   /73 (BP Location: Left arm, Patient Position: Chair, Cuff Size: Adult Regular)   Pulse 78   Temp 98.3  F (36.8  C) (Temporal)   Ht 1.64 m (5' 4.57\")   Wt 51.8 kg (114 lb 3.2 oz)   LMP  (Exact Date)   SpO2 98%   BMI 19.26 kg/m    Physical Exam  GENERAL: healthy, alert and no distress  EYES: Eyes grossly normal to inspection, PERRL and conjunctivae and sclerae normal  HENT: ear canals and TM's normal, nose and mouth without ulcers or lesions  NECK: no adenopathy, no " asymmetry, masses, or scars and thyroid normal to palpation  RESP: lungs clear to auscultation - no rales, rhonchi or wheezes  BREAST: normal without masses, tenderness or nipple discharge and no palpable axillary masses or adenopathy  CV: regular rate and rhythm, normal S1 S2, no S3 or S4, no murmur, click or rub, no peripheral edema and peripheral pulses strong  ABDOMEN: soft, nontender, no hepatosplenomegaly, no masses and bowel sounds normal  MS: no gross musculoskeletal defects noted, no edema  SKIN: no suspicious lesions or rashes  NEURO: Normal strength and tone, mentation intact and speech normal  PSYCH: mentation appears normal, affect normal/bright        ASSESSMENT/PLAN:   (Z00.00) Routine general medical examination at a health care facility  (primary encounter diagnosis)  Comment: See counseling      (Z72.0) Tobacco abuse  Plan: Quit Partner (Tobacco Cessation) Referral      Patient has been advised of split billing requirements and indicates understanding: Yes      COUNSELING:  Reviewed preventive health counseling, as reflected in patient instructions       Regular exercise       Healthy diet/nutrition       Vision screening       Hearing screening       Osteoporosis prevention/bone health       Colorectal Cancer Screening       Advance Care Planning        She reports that she has been smoking cigarettes. She started smoking about 15 months ago. She has never used smokeless tobacco.  Nicotine/Tobacco Cessation Plan:   Information offered: Patient not interested at this time          Donna Hwang MD  [facility]UE  Answers for HPI/ROS submitted by the patient on 12/27/2022  If you checked off any problems, how difficult have these problems made it for you to do your work, take care of things at home, or get along with other people?: Somewhat difficult  PHQ9 TOTAL SCORE: 6

## 2022-12-27 NOTE — TELEPHONE ENCOUNTER
Medication:     levalbuterol (XOPENEX) 0.31 MG/3ML neb solution 125 mL 11 5/13/2022  No   Sig - Route: Take 3 mLs (0.31 mg) by nebulization every 4 hours as needed for wheezing or shortness of breath / dyspnea - Nebulization     Date last written: 05/13/2022  Dispensed amount: 125 mL  Refills: 11    Pt's last office visit: 10/27/2022  Next scheduled office visit: 04/27/2023      Per the RN/LPN medication refill protocol, writer is unable to refill this request.       Donna Gaspar LPN  Pulmonary Medicine:  [facility]  Phone: [phone] Fax: [phone]

## 2023-02-07 ENCOUNTER — OFFICE VISIT (OUTPATIENT)
Dept: ONCOLOGY | Facility: CLINIC | Age: 58
End: 2023-02-07
Attending: NURSE PRACTITIONER
Payer: COMMERCIAL

## 2023-02-07 ENCOUNTER — APPOINTMENT (OUTPATIENT)
Dept: LAB | Facility: CLINIC | Age: 58
End: 2023-02-07
Attending: INTERNAL MEDICINE
Payer: COMMERCIAL

## 2023-02-07 VITALS
WEIGHT: 112.6 LBS | SYSTOLIC BLOOD PRESSURE: 125 MMHG | BODY MASS INDEX: 18.99 KG/M2 | RESPIRATION RATE: 16 BRPM | OXYGEN SATURATION: 99 % | TEMPERATURE: 98 F | HEART RATE: 80 BPM | DIASTOLIC BLOOD PRESSURE: 81 MMHG

## 2023-02-07 DIAGNOSIS — D72.19 OTHER EOSINOPHILIA: Primary | ICD-10-CM

## 2023-02-07 LAB
BASOPHILS # BLD AUTO: 0.1 10E3/UL (ref 0–0.2)
BASOPHILS NFR BLD AUTO: 1 %
EOSINOPHIL # BLD AUTO: 0.4 10E3/UL (ref 0–0.7)
EOSINOPHIL NFR BLD AUTO: 5 %
ERYTHROCYTE [DISTWIDTH] IN BLOOD BY AUTOMATED COUNT: 14.1 % (ref 10–15)
HCT VFR BLD AUTO: 42.6 % (ref 35–47)
HGB BLD-MCNC: 14.8 G/DL (ref 11.7–15.7)
IMM GRANULOCYTES # BLD: 0 10E3/UL
IMM GRANULOCYTES NFR BLD: 0 %
LYMPHOCYTES # BLD AUTO: 3.4 10E3/UL (ref 0.8–5.3)
LYMPHOCYTES NFR BLD AUTO: 40 %
MCH RBC QN AUTO: 33.8 PG (ref 26.5–33)
MCHC RBC AUTO-ENTMCNC: 34.7 G/DL (ref 31.5–36.5)
MCV RBC AUTO: 97 FL (ref 78–100)
MONOCYTES # BLD AUTO: 0.6 10E3/UL (ref 0–1.3)
MONOCYTES NFR BLD AUTO: 7 %
NEUTROPHILS # BLD AUTO: 3.9 10E3/UL (ref 1.6–8.3)
NEUTROPHILS NFR BLD AUTO: 47 %
NRBC # BLD AUTO: 0 10E3/UL
NRBC BLD AUTO-RTO: 0 /100
PLATELET # BLD AUTO: 364 10E3/UL (ref 150–450)
RBC # BLD AUTO: 4.38 10E6/UL (ref 3.8–5.2)
WBC # BLD AUTO: 8.4 10E3/UL (ref 4–11)

## 2023-02-07 PROCEDURE — 36415 COLL VENOUS BLD VENIPUNCTURE: CPT | Performed by: NURSE PRACTITIONER

## 2023-02-07 PROCEDURE — 85025 COMPLETE CBC W/AUTO DIFF WBC: CPT | Performed by: NURSE PRACTITIONER

## 2023-02-07 PROCEDURE — 83521 IG LIGHT CHAINS FREE EACH: CPT | Performed by: NURSE PRACTITIONER

## 2023-02-07 ASSESSMENT — PAIN SCALES - GENERAL: PAINLEVEL: NO PAIN (0)

## 2023-02-07 NOTE — PROGRESS NOTES
BMT ONC Adult Bone Marrow Biopsy Procedure Note  February 7, 2023  There were no vitals taken for this visit.     Learning needs assessment complete within 12 months? YES    DIAGNOSIS: Eosinophilia    PROCEDURE: Unilateral Bone Marrow Biopsy and Unilateral Aspirate    [place] [facility] [place]    Patient s identification was positively verified by verbal identification and invasive procedure safety checklist was completed. Informed consent was obtained. Following the administration of NO pre-medication, patient was placed in the prone position and prepped and draped in a sterile manner. Approximately 15 cc (in divided doses) of 1% Lidocaine was used over the left posterior iliac spine. Following this a 3 mm incision was made. After 3 attempts to obtain core, unable to collect specimen. Gracy experienced significant pain during procedure. Decision was made to abort procedure and re-attempt with full sedation. Of note, Gracy was told not to bring a  so unable to receive IV versed.     Following this procedure a sterile dressing was applied to the bone marrow biopsy site(s). The patient was placed in the supine position to maintain pressure on the biopsy site. Post-procedure wound care instructions were given.     Complications: YES-- significant pain with procedure. Unable to collect specimen.       Interventions: YES-- will reschedule with full sedation [place] 5th floor [facility] [place]. Message sent to Karen, RNCC, and  scheduling. Will aim for 2/14 as there are openings available.     Length of procedure:21 minutes to 45 minutes    Procedure performed by: Vanesa Berry NP. Also assisted by Frances Carmona NP.

## 2023-02-07 NOTE — LETTER
2/7/2023         RE: Gracy Bautista  [address] [facility] [address]        Dear Colleague,    Thank you for referring your patient, Gracy Bautista, to the [facility]. Please see a copy of my visit note below.    BMT ONC Adult Bone Marrow Biopsy Procedure Note  February 7, 2023  There were no vitals taken for this visit.     Learning needs assessment complete within 12 months? YES    DIAGNOSIS: Eosinophilia    PROCEDURE: Unilateral Bone Marrow Biopsy and Unilateral Aspirate    [place] [facility] [place] [facility]    Patient s identification was positively verified by verbal identification and invasive procedure safety checklist was completed. Informed consent was obtained. Following the administration of NO pre-medication, patient was placed in the prone position and prepped and draped in a sterile manner. Approximately 15 cc (in divided doses) of 1% Lidocaine was used over the left posterior iliac spine. Following this a 3 mm incision was made. After 3 attempts to obtain core, unable to collect specimen. Gracy experienced significant pain during procedure. Decision was made to abort procedure and re-attempt with full sedation. Of note, Gracy was told not to bring a  so unable to receive IV versed.     Following this procedure a sterile dressing was applied to the bone marrow biopsy site(s). The patient was placed in the supine position to maintain pressure on the biopsy site. Post-procedure wound care instructions were given.     Complications: YES-- significant pain with procedure. Unable to collect specimen.       Interventions: YES-- will reschedule with full sedation [place] 5th floor [facility] [place]. Message sent to Karen, DANAE, and  scheduling. Will aim for 2/14 as there are openings available.     Length of procedure:21 minutes to 45 minutes    Procedure performed by: Vanesa Berry NP. Also assisted by Frances Carmona NP.

## 2023-02-07 NOTE — NURSING NOTE
Hx eosinophilia here for BMBx. VSS, A&Ox4. RN and provider explained procedure, no questions at this time. Provider obtained consent prior to procedure. Patient lying prone, call light within reach. Hem tech and provider at bedside.

## 2023-02-08 ENCOUNTER — NURSE TRIAGE (OUTPATIENT)
Dept: ONCOLOGY | Facility: CLINIC | Age: 58
End: 2023-02-08

## 2023-02-08 ENCOUNTER — ANCILLARY PROCEDURE (OUTPATIENT)
Dept: CT IMAGING | Facility: CLINIC | Age: 58
End: 2023-02-08
Attending: NURSE PRACTITIONER
Payer: COMMERCIAL

## 2023-02-08 ENCOUNTER — TELEPHONE (OUTPATIENT)
Dept: ONCOLOGY | Facility: CLINIC | Age: 58
End: 2023-02-08

## 2023-02-08 DIAGNOSIS — M89.8X8 ILIAC CREST BONE PAIN: ICD-10-CM

## 2023-02-08 DIAGNOSIS — M89.8X8 ILIAC CREST BONE PAIN: Primary | ICD-10-CM

## 2023-02-08 LAB
KAPPA LC FREE SER-MCNC: 2.11 MG/DL (ref 0.33–1.94)
KAPPA LC FREE/LAMBDA FREE SER NEPH: 1.24 {RATIO} (ref 0.26–1.65)
LAMBDA LC FREE SERPL-MCNC: 1.7 MG/DL (ref 0.57–2.63)

## 2023-02-08 PROCEDURE — 72193 CT PELVIS W/DYE: CPT | Mod: GC | Performed by: RADIOLOGY

## 2023-02-08 RX ORDER — ACETAMINOPHEN 500 MG
1000 TABLET ORAL EVERY 8 HOURS PRN
Qty: 10 TABLET | Refills: 0 | Status: SHIPPED | OUTPATIENT
Start: 2023-02-08

## 2023-02-08 RX ORDER — IOPAMIDOL 755 MG/ML
63 INJECTION, SOLUTION INTRAVASCULAR ONCE
Status: COMPLETED | OUTPATIENT
Start: 2023-02-08 | End: 2023-02-08

## 2023-02-08 RX ORDER — OXYCODONE HYDROCHLORIDE 5 MG/1
5 TABLET ORAL EVERY 6 HOURS PRN
Qty: 12 TABLET | Refills: 0 | Status: SHIPPED | OUTPATIENT
Start: 2023-02-08 | End: 2023-02-11

## 2023-02-08 RX ORDER — LIDOCAINE 4 G/G
1 PATCH TOPICAL EVERY 24 HOURS
Qty: 10 PATCH | Refills: 0 | Status: SHIPPED | OUTPATIENT
Start: 2023-02-08 | End: 2023-04-03

## 2023-02-08 RX ADMIN — IOPAMIDOL 63 ML: 755 INJECTION, SOLUTION INTRAVASCULAR at 12:51

## 2023-02-08 NOTE — PROGRESS NOTES
Discussed CT pelvis bone with Dr Roach. No acute etiology however did reveal intramuscular hematoma. Tried to call Gracy to review pain management at this point. Left VM and will follow up with [employer] message. Sent Rx for oxycodone and lidoderm patches to local [place] pharmacy.     Will have triage follow up with her in the morning.       Addendum-- spoke with Gracy at 1642. We discussed plan for pain mgmt including acetaminophen 1g TID as needed, oxycodone 2.5-5 mg q6 hours as needed, as well as lidoderm patches. Discussed monitoring for sleepiness with the oxycodone as well as dizziness or lightheadedness. Gracy appreciative of the call. I told her I would still ask triage to call her tomorrow to check in.

## 2023-02-08 NOTE — TELEPHONE ENCOUNTER
"Pt calls to report she had an attempted bone marrow biopsy yesterday, but PA-Cs were unable to obtain specimen, plan to have procedure rescheduled. She reports severe pain, rating 9.5/10, area is very swollen, pt in tears on phone and groans when trying to describe pain, says it hurts to walk. She has taken Tylenol 1g x2 yesterday and x1 today- reports taking it q6h, but states it is not taking any of the pain away. She has also tried ice, but says area hurts so much it is hard to put there- using crushed ice in a bag and sock so it is softer. She finds positioning on right side is more tolerable, but even when not moving, pain is severe.   She reports nausea d/t pain, makes her \"sick to even think about\".   She denies fever/chills- reports she is on abx already for another reason.   She reports dressing is still intact and there is no drainage seeping through at this time, was instructed to keep dressing on for 24 hours.       Pt is questioning if this amount of pain and swelling is expected and if this is \"normal\"?   Any other recommendations for pain relief? - continue tylenol and ice    Message routed to Vanesa Berry and Alma, RNCC.   2970 paged Vanesa Berry, waiting for response.   Per Vanesa Berry, ordering pelvis CT and suggested this get done today if possible in [place]. Message sent to scheduling at 0904 requesting this get scheduled asa.   0933 per scheduling, CT scan scheduled and pt notified.     "

## 2023-02-08 NOTE — TELEPHONE ENCOUNTER
9.5/10 pain and concerns for a complicated procedure should go to ED.  We should not be managing this as an outpatient.    Please stop and send to ED.    Zev Roach MD.

## 2023-02-08 NOTE — TELEPHONE ENCOUNTER
----- Message from PAT Sarabia CNP sent at 2/7/2023 12:23 PM CST -----  Regarding: Sedated BMBX-- 2/14 please  Patrha Jones,     This pt did not tolerate unsedated bmbx. I see there are openings next Tuesday 2/14-- would it be possible to reschedule her for sedated marrow that day? I submitted case request. Will need labs prior    Leah BAILEY-- she has Dr Roach follow up on 2/22.       Thanks!

## 2023-02-08 NOTE — TELEPHONE ENCOUNTER
Received pool message requesting patient be scheduled for a sedated BMBx at the [facility].    Requesting team: Marley    Spoke with patient regarding scheduling sedated BMBx.    Patient stated the [place] infusion center was calling her about pain while writer was on the phone and stated she would call writer back.    Noted the number on caller ID is direct line.  __    Karen Batista on 2/8/2023  P: [phone]

## 2023-02-08 NOTE — TELEPHONE ENCOUNTER
Left voicemail for patient regarding scheduling sedated BMBx.    Provided contact number to discuss.    Noted the team is requesting this to be scheduled on 2/14. Also notified the patient that a pre-operative physical is required prior to the procedure.     P: [phone]    __    Karen Batista on 2/8/2023

## 2023-02-09 ENCOUNTER — NURSE TRIAGE (OUTPATIENT)
Dept: ONCOLOGY | Facility: CLINIC | Age: 58
End: 2023-02-09
Payer: COMMERCIAL

## 2023-02-09 NOTE — TELEPHONE ENCOUNTER
VM left for pt to check in, requested she call triage back at [phone] option 5, option 2 and ask for triage.

## 2023-02-09 NOTE — TELEPHONE ENCOUNTER
"Return call pt, who states she is feeling \"much better\" today, rated pain 5/10, states she did  Oxycodone at took 1/2 tab when she picked it up and 1/2 tab at 4am, has not needed to use since. She continues to take Tylenol 1g, took at 9am today. She is also using Lidocaine patch and says this is also helpful. She reports she is able to sit for longer periods of time and walking a little bit, sounds overall much better compared to yesterday. She is very appreciative of Vanesa's help.     Message routed to Vanesa Berry.   "

## 2023-02-13 DIAGNOSIS — D72.19 OTHER EOSINOPHILIA: Primary | ICD-10-CM

## 2023-02-13 NOTE — TELEPHONE ENCOUNTER
FUTURE VISIT INFORMATION      SURGERY INFORMATION:    Date: 23    [facility]    Surgeon:  Kenzie Shaw PA-C    Anesthesia Type:  MAC with Local    Procedure: BIOPSY, BONE MARROW    RECORDS REQUESTED FROM:       Primary Care Provider: Donna Hwang MD- [facility]    Pertinent Medical History: cardiac arrhythmia    Most recent EKG+ Tracin/15/22- [facility]    Most recent PFT's: 8/10/22

## 2023-02-13 NOTE — TELEPHONE ENCOUNTER
Received pool message requesting patient be scheduled for a sedated BMBx at the [facility].    Requesting team: Jm    Spoke with patient. Confirmed all scheduled information.    Scheduled Sedated Bone Marrow Biopsy on 2/16   Location: [facility] [place].    Times - Day of Procedure:  Labs:  11:30 AM on the 2nd floor, [facility]  Biopsy: 2:00 PM on the 5th floor[facility]    Patient instructed to check in on the 5th floor for the sedated BMBx after the lab draw.     H&P: PAC clinic, scheduled on 2/15.    Referring care team to provide instructions regarding medications, NPO instructions, and teaching for the procedure.     Patient questions regarding the procedure directed to the referring care team.     Standard procedure information packet was sent via [employer].    Referring team notified of scheduled procedure and plan via staff message.  __    Karen Batista

## 2023-02-14 ENCOUNTER — MYC MEDICAL ADVICE (OUTPATIENT)
Dept: ONCOLOGY | Facility: CLINIC | Age: 58
End: 2023-02-14
Payer: COMMERCIAL

## 2023-02-14 ENCOUNTER — PATIENT OUTREACH (OUTPATIENT)
Dept: ONCOLOGY | Facility: CLINIC | Age: 58
End: 2023-02-14
Payer: COMMERCIAL

## 2023-02-14 DIAGNOSIS — D72.19 OTHER EOSINOPHILIA: ICD-10-CM

## 2023-02-14 NOTE — PROGRESS NOTES
Attempted to reach patient to review plan for upcoming bone marrow biopsy and follow up with Dr. Roach.  Recommend rescheduling follow up visit to ensure that results will be available.  Message left requesting return call.  Live[employer] message also sent inquiring if able to in person or virtual visit to follow up on results on 3/1/2023 at 3:45 PM.     Leah Romano RN

## 2023-02-14 NOTE — TELEPHONE ENCOUNTER
Received voicemail from Gracy. She stated someone called her and told her that her BMBx procedure is on Friday. She said she is confused because the person that called her told her she needs to talk to writer about this, because she thought her BMBx was Thursday. She said she thinks she is double-booked.    Writer looked at her chart, and she is now scheduled on 2/16 (what this writer scheduled) and she is scheduled on 2/17 for a image guided BMBx.    Writer was not involved in what was scheduled on Friday, or notified. Writer is unsure of plan.    LVM for Gracy - let her know that this writer is asking for clarification, but she is correct, she is scheduled for 2 BMBx procedures now. Writer is unclear who told her that writer can help clarify this issue.    Message sent to Vanesa Berry and RNCC Leah Romano. Noted that if Gracy is not having sedated BMBx at the [facility] on 2/16 anymore - writer needs to know so that it can be cancelled. She also has an H&P with PAC tomorrow and if she doesn't need that, it would be good to cancel so she doesn't have to pay for an entire H&P visit.    In VM to Gracy, notified her that message was sent to Xenia. Apologized that writer is unaware of plan.

## 2023-02-14 NOTE — CONSULTS
Outpatient IR Biopsy Referral    Patient is a 56 y/o female with a PMH of tobacco use, ADD, right eye blind, PTSD, HDL, PVD, carpal tunnel syndrome, asthma, hypereosinophilia- s/p unsuccessful traumatic bmbx previously attempted left iliac crest bmbx w/o sedation on 2/7/23 in clinic but unable to obtain core samples after three attempts c/b intramuscular hematoma. IR has been asked for a CT-guided BMBX per Dr. Roach request for concerns of her hypereosinophilia syndromes and look for a number of other molecular, genetic and histologic features.     CT 2/8/23 Impression:     Recent attempted bone marrow biopsy changes in the left sacral ala at  the level of S3 with mild asymmetrically enlarged left piriformis,  probable intramuscular hematoma. No other soft tissue hematoma or  other evidence for complication.    Procedure order placed, plan for fluro guidance and samples entered by referring team.    Primary team Vanesa STEWART BMT ONC made aware of IR recommendations via epic messaging.    Viji STEWART  Interventional Radiology   IR on-call pager: [phone]

## 2023-02-15 ENCOUNTER — TELEPHONE (OUTPATIENT)
Dept: ONCOLOGY | Facility: CLINIC | Age: 58
End: 2023-02-15

## 2023-02-15 ENCOUNTER — TELEPHONE (OUTPATIENT)
Dept: PULMONOLOGY | Facility: CLINIC | Age: 58
End: 2023-02-15

## 2023-02-15 ENCOUNTER — PRE VISIT (OUTPATIENT)
Dept: SURGERY | Facility: CLINIC | Age: 58
End: 2023-02-15

## 2023-02-15 ENCOUNTER — VIRTUAL VISIT (OUTPATIENT)
Dept: SURGERY | Facility: CLINIC | Age: 58
End: 2023-02-15
Payer: COMMERCIAL

## 2023-02-15 ENCOUNTER — ANESTHESIA EVENT (OUTPATIENT)
Dept: PLASTIC SURGERY | Facility: CLINIC | Age: 58
End: 2023-02-15

## 2023-02-15 DIAGNOSIS — Z01.818 PREOP EXAMINATION: Primary | ICD-10-CM

## 2023-02-15 DIAGNOSIS — J43.2 CENTRILOBULAR EMPHYSEMA (H): Primary | ICD-10-CM

## 2023-02-15 PROCEDURE — 99203 OFFICE O/P NEW LOW 30 MIN: CPT | Mod: VID | Performed by: PHYSICIAN ASSISTANT

## 2023-02-15 ASSESSMENT — ENCOUNTER SYMPTOMS: SEIZURES: 0

## 2023-02-15 ASSESSMENT — LIFESTYLE VARIABLES: TOBACCO_USE: 1

## 2023-02-15 NOTE — PATIENT INSTRUCTIONS
Preparing for Your Surgery      Name:  Gracy Bautista   MRN:  [id number]   :  1965   Today's Date:  2/15/2023       Arriving for surgery:  Surgery date:  23  Arrival time:  12:30 pm     Surgeries and procedures: Adult patients can have 2 visitors all through the surgery process.     Visiting hours: 8 a.m. to 8:30 p.m.     Hospital: Adult patients and children under age 18 can have 4 visitor at a time     No visitors under the age of 5 are allowed for hospital patients.  Double occupancy rooms: Patients can have only two visitors at a time.     Patients with disabilities: Can have a support person with them (family member, service provider     Or someone well informed about their needs) plus the allowed number of visitors     Patients confirmed or suspected to have symptoms of COVID 19 or flu:     No visitors allowed for adult patients.   Children (under age 18) can have 1 named visitor.     People who are sick or showing symptoms of COVID 19 or flu:    Are not allowed to visit patients--we can only make exceptions in special situations.       Please follow these guidelines for your visit:   Arrive wearing a mask over your mouth and nose; we will give you a medical mask to wear    If you arrive wearing a cloth mask.   Keep it on during your entire visit, even when in patient's room.   If you don't wear a mask we'll ask you to leave.     Clean your hands with alcohol hand . Do this when you arrive at and leave the building and patient room,    And again after you touch your mask or anything in the room.     You can t visit if you have a fever, cough, shortness of breath, muscle aches, headaches, sore throat    Or diarrhea      Stay 6 feet away from others during your visit and between visits     Go directly to and from the room you are visiting.     Stay in the patient s room during your visit. Limit going to other places in the hospital as much as possible     Leave bags and jackets at home  or in the car.     For everyone s health, please don t come and go during your visit. That includes for smoking   during your visit.     Please come to:     [facility] - [address] [address]     Parking is available in front of the Clinics and Surgery Center building from 5:30AM to 8:00PM.  -  Proceed to the 5th floor to check into the Ambulatory Surgery Center.              >> There will be patient concierges on the 1st and 5th floor, for assistance or an escort, if you would like.              >> Please call [phone] with any questions.    What can I eat or drink?  -  You may eat and drink normally up to 8 hours prior to arrival time.   -  You may have clear liquids until 2 hours prior to arrival time.    Examples of clear liquids:  Water  Clear broth  Juices (apple, white grape, white cranberry  and cider) without pulp  Noncarbonated, powder based beverages  (lemonade and Luis-Aid)  Sodas (Sprite, 7-Up, ginger ale and seltzer)  Coffee or tea (without milk or cream)  Gatorade    -  No Alcohol for at least 24 hours before surgery.     Which medicines can I take?  Hold Aspirin for 7 days before surgery.   Hold Multivitamins for 7 days before surgery.  Hold Supplements for 7 days before surgery.  Hold Ibuprofen (Advil, Motrin) for 1 day before surgery--unless otherwise directed by surgeon.  Hold Naproxen (Aleve) for 4 days before surgery.    -  DO NOT take these medications the day of surgery:  Adderall.    -  PLEASE TAKE these medications the day of surgery:  Tylenol if needed; take other morning medications. Bring inhalers if using currently.    How do I prepare myself?  - Please take 2 showers before surgery using Scrubcare or Hibiclens soap.    Use this soap only from the neck to your toes.     Leave the soap on your skin for one minute--then rinse thoroughly.      You may use your own shampoo and conditioner. No other hair products.    - Please remove all jewelry and body piercings.  - No lotions, deodorants or fragrance.  - No makeup or fingernail polish.   - Bring your ID and insurance card.    -If you have a Deep Brain Stimulator, Spinal Cord Stimulator, or any Neuro Stimulator device---you must bring the remote control to the hospital.      ALL PATIENTS GOING HOME THE SAME DAY OF SURGERY ARE REQUIRED TO HAVE A RESPONSIBLE ADULT TO DRIVE AND BE IN ATTENDANCE WITH THEM FOR 24 HOURS FOLLOWING SURGERY.    Covid testing policy as of 12/06/2022  Your surgeon will notify and schedule you for a COVID test if one is needed before surgery--please direct any questions or COVID symptoms to your surgeon      Questions or Concerns:    - For any questions regarding the day of surgery or your hospital stay, please contact the Pre Admission Nursing Office at [phone].       - If you have health changes between today and your surgery, please call your surgeon.       - For questions after surgery, please call your surgeons office.

## 2023-02-15 NOTE — PROGRESS NOTES
Gracy is a 57 year old who is being evaluated via a billable video visit.      How would you like to obtain your AVS? Dee Lugo   Gracy is a 57 year old; presenting for the following health issues:  Pre-Op Exam (/)      HPI         Review of Systems         Physical Exam     CAMERON Mary LPN

## 2023-02-15 NOTE — TELEPHONE ENCOUNTER
Nebulizer machine COMP Innospire Mini and Spacer w/large mask DME orders have been faxed to the [facility] in [place] @ [phone].    Donna Gaspar LPN  Pulmonary Medicine:  [facility]  Phone: [phone] Fax: [phone]

## 2023-02-15 NOTE — TELEPHONE ENCOUNTER
Confirmed with IR that image guided BMBX will be under sedation.  Contacted patient and informed that the image guided BMBX will be under IV sedation.  Patient agrees that this will be the best option, given difficulty with initial unsedated biopsy.   made aware and 2/16/2023 biopsy is cancelled.  Follow up visit rescheduled to 3/1/2023 to allow time for pathology results.    Leah Romano RN

## 2023-02-15 NOTE — H&P
Pre-Operative H & P     CC:  Preoperative exam to assess for increased cardiopulmonary risk while undergoing surgery and anesthesia.    Date of Encounter: 2/15/2023  Primary Care Physician:  Donna Hwang     Reason for Visit: Other eosinophilia     HPI  Gracy Bautista is a 57 year old female who presents for pre-operative H & P in preparation for  Procedure Information     Case: [id number] Date/Time: 02/16/23 1400    Procedure: BIOPSY, BONE MARROW (Update)    Anesthesia type: MAC with Local    Diagnosis: Other eosinophilia [D72.19]    Pre-op diagnosis: Other eosinophilia [D72.19]    Location: [facility] PROCEDURE ROOM 01 / [facility] [place]    Providers: Kenzie Shaw PA-C          Patient is being evaluated for comorbid conditions of PVD, HLD, asthma, chronic sinusitis, light tobacco use, insomnia, depression, PTSD, h/o alcohol abuse, right eye blindness, ADD, anticardiolipin antibody, dysphagia, fibromyalgia, hypogammaglobulinemia.       Ms. Bautista initially presented for evaluation of hypereosinophilia due to feeling really quite tired and weak for some time. Given the concern for possible hypereosinophilia syndromes, the above procedure has been recommended to look for eosinophils, as well as a number of other molecular, genetic and histologic features. She now presents for the above procedure.       History was obtained from patient & chart review.     Hx of abnormal bleeding or anti-platelet use: denies    Menstrual history: No LMP recorded. Patient has had a hysterectomy.:       Past Medical History  Past Medical History:   Diagnosis Date     Allergic rhinitis      Arthritis      Chronic sinusitis      COPD (chronic obstructive pulmonary disease) (H)      Depressive disorder      Hoarseness      Reduced vision      Uncomplicated asthma        Past Surgical History  Past Surgical History:   Procedure Laterality Date     BIOPSY       COLONOSCOPY N/A  08/22/2022    Procedure: COLONOSCOPY, WITH POLYPECTOMY AND BIOPSY;  Surgeon: Glen Dyer MD;  Location: [facility] OR     COLONOSCOPY WITH CO2 INSUFFLATION N/A 08/22/2022    Procedure: COLONOSCOPY, WITH CO2 INSUFFLATION;  Surgeon: Glen Dyer MD;  Location: [facility] OR     ESOPHAGOSCOPY, GASTROSCOPY, DUODENOSCOPY (EGD), COMBINED N/A 09/02/2022    Procedure: ESOPHAGOGASTRODUODENOSCOPY, WITH BIOPSY AND POLYPECTOMY;  Surgeon: Colton Sarkar MD;  Location: [facility] OR     NASAL/SINUS POLYPECTOMY       OPTICAL TRACKING SYSTEM ENDOSCOPIC SINUS SURGERY Bilateral 01/21/2022    Procedure: stealth guided, bilateral ethmoidectomy, bilateral frontal sinusotomy, bilateral maxillary antrostomies;  Surgeon: Digna Jaimes MD;  Location: [facility] OR     ORTHOPEDIC SURGERY         Prior to Admission Medications  Current Outpatient Medications   Medication Sig Dispense Refill     acetaminophen (TYLENOL) 500 MG tablet Take 2 tablets (1,000 mg) by mouth every 8 hours as needed for mild pain 10 tablet 0     ADDERALL 5 MG tablet Take 5 mg by mouth daily as needed       azithromycin (ZITHROMAX) 250 MG tablet Take 1 tablet (250 mg) by mouth Every Mon, Wed, Fri Morning 36 tablet 3     bisacodyl (DULCOLAX) 5 MG EC tablet Take 5 mg by mouth as needed for constipation       CYMBALTA 60 MG capsule Take 2 capsules (120 mg) by mouth every morning       Fluticasone-Umeclidin-Vilanterol (TRELEGY ELLIPTA) 200-62.5-25 MCG/INH oral inhaler Inhale 1 puff into the lungs daily (Patient taking differently: Inhale 1 puff into the lungs every morning) 60 each 3     LAMICTAL 200 MG tablet Take 200 mg by mouth every morning        levalbuterol (XOPENEX HFA) 45 MCG/ACT inhaler Inhale 2 puffs into the lungs every 4 hours as needed for shortness of breath / dyspnea or wheezing (Patient taking differently: Inhale 2 puffs into the lungs 2 times daily) 15 g 11     levalbuterol (XOPENEX) 0.31 MG/3ML neb solution Take 3 mLs (0.31 mg) by nebulization every  4 hours as needed for wheezing or shortness of breath / dyspnea 125 mL 11     Lidocaine (LIDOCARE) 4 % Patch Place 1 patch onto the skin every 24 hours To prevent lidocaine toxicity, patient should be patch free for 12 hrs daily. 10 patch 0     nicotine polacrilex (NICORETTE) 4 MG gum 4 mg every hour as needed       levalbuterol (XOPENEX) 0.63 MG/3ML neb solution INHALE 3 ML (0.63 MG) VIA A NEBULIZER EVERY 6 HOURS. (Patient taking differently: No sig reported) 90 mL 3     spacer (OPTICHAMBER ELBA) holding chamber Use with albuterol (PROAIR HFA/PROVENTIL HFA/VENTOLIN HFA) 108 (90 Base) MCG/ACT inhaler 1 each 0       Allergies  Allergies   Allergen Reactions     Bee Venom Angioedema, Swelling and Hives     Other reaction(s): Angioedema       Cefdinir Diarrhea and GI Disturbance              Levofloxacin Muscle Pain (Myalgia)     Prednisone Anxiety and Other (See Comments)     Hyperactivity and moodiness. Doesn't like how it makes her feel.         Social History  Social History     Socioeconomic History     Marital status:      Spouse name: Not on file     Number of children: Not on file     Years of education: Not on file     Highest education level: Not on file   Occupational History     Occupation: HOMEMAKER   Tobacco Use     Smoking status: Light Smoker     Packs/day: 0.00     Years: 15.00     Pack years: 0.00     Types: Cigarettes     Start date: 9/17/2021     Smokeless tobacco: Never     Tobacco comments:     I am willing.  I haven't been successful and my mindset is not completely there yet.   Vaping Use     Vaping Use: Never used   Substance and Sexual Activity     Alcohol use: Not Currently     Comment: I have been sober for 13years.     Drug use: Never     Sexual activity: Not Currently     Partners: Male     Birth control/protection: None     Comment: Hysterectomy   Other Topics Concern     Parent/sibling w/ CABG, MI or angioplasty before 65F 55M? Yes   Social History Narrative    October 7, 2021     ENVIRONMENTAL HISTORY: [place] The home is heated with a electric furnace. They do have central air conditioning. The patient's bedroom is furnished with carpeting in bedroom, allergen mattress cover, and allergen pillowcase cover.  Pets inside the house include 1 dog. There is no history of cockroach or mice infestation. There is/are 0 smokers in the house.  The house does have a damp basement.      Social Determinants of Health     Financial Resource Strain: Not on file   Food Insecurity: Not on file   Transportation Needs: Not on file   Physical Activity: Not on file   Stress: Not on file   Social Connections: Not on file   Intimate Partner Violence: Not At Risk     Fear of Current or Ex-Partner: No     Emotionally Abused: No     Physically Abused: No     Sexually Abused: No   Housing Stability: Not on file       Family History  Family History   Problem Relation Age of Onset     Bleeding Disorder Mother      Hypertension Mother      Hyperlipidemia Mother      Diabetes Father      Heart Disease Father      Cerebrovascular Disease Father      Hypertension Father      Hyperlipidemia Father      Cancer Brother      Bleeding Disorder Brother      Anesthesia Reaction No family hx of      Deep Vein Thrombosis (DVT) No family hx of        Review of Systems  The complete review of systems is negative other than noted in the HPI or here.     Anesthesia Evaluation   Pt has had prior anesthetic.     History of anesthetic complications   Nausea.    ROS/MED HX  ENT/Pulmonary: Comment: Hasn't used nebulizer in 2 weeks. Uses inhaler bid during winter months.    PFT 8/10/22: Mild airflow obstruction    Chronic sinusitis    (+) tobacco use (light intermittent smoking), Current use, asthma Treatment: Nebulizer prn,   (-) sleep apnea   Neurologic: Comment: ADD, hasn't taken Adderall in months    Right eye blindness    Insomnia   (-) no seizures and no CVA   Cardiovascular: Comment: Hx  slow pathway ablation in 2008 following single episode of syncope.      (+) -----Previous cardiac testing   Echo: Date: Results:    Stress Test: Date: Results:    ECG Reviewed: Date: 6/15/22 Results:  Normal sinus rhythm   Normal ECG   No significant change from 01/02/22   Ventricular rate 62 bpm     Cath: Date: Results:      METS/Exercise Tolerance: 4 - Raking leaves, gardening    Hematologic: Comments: Anticardiolipin antibody. No h/o thrombus.     (-) history of blood clots and history of blood transfusion   Musculoskeletal: Comment: Arthritis in right hand & foot    Fibromyalgia        GI/Hepatic: Comment: GERD controlled w/ diet modification. Takes TUMS prn    Dysphagia      (+) GERD,  (-) liver disease   Renal/Genitourinary:  - neg Renal ROS  (-) renal disease   Endo:  - neg endo ROS  (-) Type II DM   Psychiatric/Substance Use: Comment: PTSD      (+) psychiatric history depression     Infectious Disease:  - neg infectious disease ROS     Malignancy:  - neg malignancy ROS     Other: Comment: Hypogammaglobulinemia             Virtual visit -  No vitals were obtained    Physical Exam  Constitutional: Awake, alert, cooperative, no apparent distress, and appears stated age.  HENT: Normocephalic  Respiratory: non labored breathing   Neurologic: Awake, alert, oriented to name, place and time.   Neuropsychiatric: Calm, cooperative. Normal affect.      Prior Labs/Diagnostic Studies   All labs and imaging personally reviewed     EKG - please see in ROS above     PFT Latest Ref Rng & Units 8/10/2022   FVC L 3.80   FEV1 L 2.54   FVC% % 112   FEV1% % 95   The study should be interpreted with caution given the reported difficulties with testing.     Mild airflow obstruction.     The increase in the RV and RV/TLC suggests air trapping.The total lung capacity is normal.     Compared with testing from 11/11/2020 there has been a decrease in the FEV1.       The patient's records and results personally reviewed by this provider.  "    Outside records reviewed from: Care Everywhere (EKG @ [facility])       Assessment      Gracy Bautista is a 57 year old female seen as a PAC referral for risk assessment and optimization for anesthesia.    Plan/Recommendations  Pt will be optimized for the proposed procedure.  See below for details on the assessment, risk, and preoperative recommendations    NEUROLOGY  - No history of TIA, CVA or seizure  - right eye blindness  -Post Op delirium risk factors:  No risk identified    ENT  - No current airway concerns.  Will need to be reassessed day of surgery.  Mallampati: Unable to assess  TM: Unable to assess    CARDIAC  - Hx slow pathway ablation in 2008 following single episode of syncope.    - METS (Metabolic Equivalents)  Patient performs 4 or more METS exercise without symptoms            Total Score: 0      RCRI-Very low risk: Class 1 0.4% complication rate            Total Score: 0        PULMONARY  - PFT 8/10/22: Mild airflow obstruction  - Chronic sinusitis    SEBASTIAN Low Risk            Total Score: 1    SEBASTIAN: Over 50 ys old      - Asthma, chronic sinusitis. Hasn't used nebulizer in 2 weeks. Uses inhaler bid during winter months.    - Tobacco History      History   Smoking Status     Light Smoker     Packs/day: 0.00     Years: 15.00     Types: Cigarettes     Start date: 9/17/2021   Smokeless Tobacco     Never       GI  - GERD controlled w/ diet modification. Takes TUMS prn   - Dysphagia    PONV Medium Risk  Total Score: 2           1 AN PONV: Pt is Female    1 AN PONV: Patient has history of PONV          ENDOCRINE    - BMI: Estimated body mass index is 18.99 kg/m  as calculated from the following:    Height as of 12/27/22: 1.64 m (5' 4.57\").    Weight as of 2/7/23: 51.1 kg (112 lb 9.6 oz).  Healthy Weight (BMI 18.5-24.9)  - No history of Diabetes Mellitus    HEME/IMMUNE  VTE Low Risk 0.26%            Total Score: 0      - No history of abnormal bleeding or antiplatelet use.  - Anticardiolipin antibody. No " h/o thrombus.  - Hypogammaglobulinemia      PSYCH  - ADD, hasn't used Adderall in months  - Depression, PTSD, h/o alcohol abuse    The patient is aware that the final anesthesia plan will be decided by the assigned anesthesia provider on the date of service.    The patient is optimized for their procedure. AVS with information on surgery time/arrival time, meds and NPO status given by nursing staff. No further diagnostic testing indicated.    Please refer to the physical examination documented by the anesthesiologist in the anesthesia record on the day of surgery.    Video-Visit Details    Type of service:  Video Visit    Provider received verbal consent for a Video Visit from the patient? Yes     Originating Location (pt. Location): Home    Distant Location (provider location):  Off-site  Mode of Communication:  Video Conference via [employer]  On the day of service:     Prep time: 14 minutes  Visit time: 15 minutes  Documentation time: 11 minutes  ------------------------------------------  Total time: 40 minutes      Jessi Kemp PA-C  Preoperative Assessment Center  [facility]  Clinic and Surgery Center  Phone: [phone]  Fax: [phone]

## 2023-02-15 NOTE — TELEPHONE ENCOUNTER
----- Message from Karen Batista sent at 2/15/2023  9:50 AM CST -----  Regarding: RE: Sedated BMBX-- 2/14 please  Gracy called me - she hasn't heard from anyone about what has been scheduled/what the best plan is, and just had her pre-op with PAC.    I told her am not a nurse, just the middle-man, but that I am trying to help out. I tried to relay what Xenia stated in their messages.     She would like to know if Dr. Roach recommends [place] [facility] [place] or [place] guided at [facility] on Friday. Both are scheduled. She wants to do what Dr. Roach feels is best for her.     I can cancel my sedated BMBx if image guided is preferred - if the other way around - someone needs to call IR I am guessing. I do believe the CT guided biopsy is under sedation because the appointment notes have NPO instructions.     Can someone please call Gracy?    Karen   ----- Message -----  From: Leah Romano RN  Sent: 2/14/2023   5:16 PM CST  To: Vanesa Escalante APRN CNP, #  Subject: RE: Sedated BMBX-- 2/14 please                   Dr. Roach stated ok to proceed with sedated biopsy.  If you could update the patient, that would be great.  I left her a message also and sent a MyChart to reschedule her follow up with Dr. Roach.     Let me know if you need me to do anything else.    Leah Escamilla  ----- Message -----  From: Vanesa Berry, PAT CNP  Sent: 2/14/2023   4:58 PM CST  To: Leah Romano RN, Sravani Becerra RN, #  Subject: RE: Sedated BMBX-- 2/14 please                       After marrow attempt last week we pursued a sedated bmbx. However Dr Roach then said he though CT guided would be best.     Leah could you please confirm with Dr Roach about what he would like to do? Or if Gracy has a preference? I'm not aware if CT guided is with or without sedation.      thanks    ----- Message -----  From: Karen Batista  Sent: 2/14/2023   4:51 PM CST  To: Leah Romano,  RN, Sravani Becerra RN, #  Subject: RE: Sedated BMBX-- 2/14 please                   I got a voicemail from Gracy and she said someone called her and told her that her procedure is on Friday. She said she is confused because the person that called her told her she needs to talk to me. She said she thinks she is double-booked.    I looked at her chart, and she is now scheduled on 2/16 (what I scheduled) and she is scheduled on 2/17 for a image guided BMBx?    I am 100% unaware of what was scheduled Friday - did the plans change? I will call Gracy to tell her I am asking for clarification but she is correct, she is scheduled for 2 BMBx procedures now. I am not sure who told her that I am the person to help clarify this.    If she is not having [place] [facility] [place] anymore - I need to know so that I can cancel. She also has an H&P with PAC tomorrow and if she doesn't need that, it would be good to cancel so she doesn't have to pay for an entire H&P visit.    Karen   ----- Message -----  From: Leah Romano RN  Sent: 2/14/2023  12:29 PM CST  To: Sravani Becerra RN, Karen Batista, #  Subject: RE: Sedated BMBX-- 2/14 please                   I will contact pt today.     Thank you!    Leah  ----- Message -----  From: Vanesa Berry APRN CNP  Sent: 2/13/2023   2:44 PM CST  To: Monica Zuñiga RN, Leah Romano RN, #  Subject: RE: Sedated BMBX-- 2/14 please                   Hi Leah and Sravani,     There are multiple conversations about this pt and her bone marrow biopsy. Could one of you please reach out to Gracy to discuss plan?    thanks  ----- Message -----  From: Karen Batista  Sent: 2/13/2023   1:53 PM CST  To: Monica Zuñiga RN, PAT Sarabia CNP, #  Subject: RE: Sedated BMBX-- 2/14 please                   Gracy called me. :-)     I had a BMT cancellation on 2/16 - so I put her in that spot so we don't need to wait til 2/21 (since she needs an H&P etc).    Vanesa - can you call her about  keeping or rescheduling the visit with Dr. Roach 2/22? She isn't sure if it is too soon, and also isn't sure if it would be in person. She also wants to know more about the recovery after the sedated BMBx.   __      Spoke with patient. Confirmed all scheduled information.    H&P: PAC clinic, scheduled on 2/15    COVID test: Patient instructed that no COVID test is required before surgery. If patient has symptoms before surgery, patient should take a COVID-19 test. If patient tests positive for COVID-19 within 2-3 weeks of surgery, they need to notify their care team.     The patient is scheduled for a sedated BMBX on 2/16, [facility].     Labs:  11:30 AM  Biopsy: 2:00 PM    Thanks,  Karen Batista    ----- Message -----  From: Vanesa Berry APRN CNP  Sent: 2/13/2023   7:22 AM CST  To: Monica Zuñiga RN, Karen Batista, #  Subject: RE: Sedated BMBX-- 2/14 please                   Partha Jones,     I reached out to Dr Roach last week per pt's request to confirm he would like to proceed with procedure but have not heard back yet. Monica would you be able to help with this situation please? This pt had an unsuccessful and traumatic bmbx here at the  last week, we were pursuing a sedated procedure but both pt and MD have not confirmed the plan.       ThanksVanesa NP       ----- Message -----  From: Karen Batista  Sent: 2/9/2023   2:21 PM CST  To: Leah Romano RN, Karen Batista, #  Subject: RE: Sedated BMBX-- 2/14 please                   Just an update that she hasn't called me back. We don't have openings for an H&P with PAC. I am not sure what to do?    It looks like there's pain going on and other discussions so I am not sure she wants to schedule?    Karen   ----- Message -----  From: Karen Batista  Sent: 2/8/2023  11:12 AM CST  To: Leah Romano RN, #  Subject: RE: Sedated BMBX-- 2/14 please                   I called her this AM but she said someone from the clinic was  calling her about pain so she asked to call me back. I hadn't heard back yet so I just LVM about the requested date for the sedated BMBx being 2/14 and noted she will need an H&P prior as well.    Karen   ----- Message -----  From: Vanesa Berry APRN CNP  Sent: 2/7/2023   5:07 PM CST  To: Leah Romano RN, Karen Batista, #  Subject: RE: Sedated BMBX-- 2/14 please                   I saw her only for procedure, I did not do a physical exam on her-- sorry!  ----- Message -----  From: Karen Batista  Sent: 2/7/2023  12:47 PM CST  To: Leah Romano RN, #  Subject: RE: Sedated BMBX-- 2/14 please                   Would your visit today be addended to be used as her H&P?    Karen   ----- Message -----  From: Vanesa Berry APRN CNP  Sent: 2/7/2023  12:24 PM CST  To: Leah Romano RN, Karen Batista, #  Subject: Sedated BMBX-- 2/14 please                       Partha Jones,     This pt did not tolerate unsedated bmbx. I see there are openings next Tuesday 2/14-- would it be possible to reschedule her for sedated marrow that day? I submitted case request. Will need labs prior    Leah BAILEY-- she has Dr Roach follow up on 2/22.       Thanks!

## 2023-02-16 ENCOUNTER — TELEPHONE (OUTPATIENT)
Dept: INTERVENTIONAL RADIOLOGY/VASCULAR | Facility: CLINIC | Age: 58
End: 2023-02-16
Payer: COMMERCIAL

## 2023-02-17 ENCOUNTER — APPOINTMENT (OUTPATIENT)
Dept: INTERVENTIONAL RADIOLOGY/VASCULAR | Facility: CLINIC | Age: 58
End: 2023-02-17
Attending: NURSE PRACTITIONER
Payer: COMMERCIAL

## 2023-02-17 ENCOUNTER — APPOINTMENT (OUTPATIENT)
Dept: MEDSURG UNIT | Facility: CLINIC | Age: 58
End: 2023-02-17
Attending: NURSE PRACTITIONER
Payer: COMMERCIAL

## 2023-02-17 ENCOUNTER — HOSPITAL ENCOUNTER (OUTPATIENT)
Facility: CLINIC | Age: 58
Discharge: HOME | End: 2023-02-17
Attending: NURSE PRACTITIONER | Admitting: STUDENT IN AN ORGANIZED HEALTH CARE EDUCATION/TRAINING PROGRAM
Payer: COMMERCIAL

## 2023-02-17 VITALS
SYSTOLIC BLOOD PRESSURE: 95 MMHG | RESPIRATION RATE: 16 BRPM | HEIGHT: 65 IN | OXYGEN SATURATION: 97 % | TEMPERATURE: 97.9 F | WEIGHT: 112.5 LBS | BODY MASS INDEX: 18.74 KG/M2 | HEART RATE: 68 BPM | DIASTOLIC BLOOD PRESSURE: 71 MMHG

## 2023-02-17 DIAGNOSIS — D72.19 OTHER EOSINOPHILIA: ICD-10-CM

## 2023-02-17 LAB — INR PPP: 1.01 (ref 0.85–1.15)

## 2023-02-17 PROCEDURE — 99152 MOD SED SAME PHYS/QHP 5/>YRS: CPT | Mod: GC | Performed by: STUDENT IN AN ORGANIZED HEALTH CARE EDUCATION/TRAINING PROGRAM

## 2023-02-17 PROCEDURE — 88341 IMHCHEM/IMCYTCHM EA ADD ANTB: CPT | Mod: 26 | Performed by: PATHOLOGY

## 2023-02-17 PROCEDURE — 88342 IMHCHEM/IMCYTCHM 1ST ANTB: CPT | Mod: 26 | Performed by: PATHOLOGY

## 2023-02-17 PROCEDURE — 99152 MOD SED SAME PHYS/QHP 5/>YRS: CPT

## 2023-02-17 PROCEDURE — 77002 NEEDLE LOCALIZATION BY XRAY: CPT | Mod: 26 | Performed by: STUDENT IN AN ORGANIZED HEALTH CARE EDUCATION/TRAINING PROGRAM

## 2023-02-17 PROCEDURE — 77002 NEEDLE LOCALIZATION BY XRAY: CPT

## 2023-02-17 PROCEDURE — 88368 INSITU HYBRIDIZATION MANUAL: CPT | Mod: 26 | Performed by: MEDICAL GENETICS

## 2023-02-17 PROCEDURE — 88185 FLOWCYTOMETRY/TC ADD-ON: CPT | Performed by: NURSE PRACTITIONER

## 2023-02-17 PROCEDURE — 20225 BONE BIOPSY TROCAR/NDL DEEP: CPT | Mod: RT | Performed by: STUDENT IN AN ORGANIZED HEALTH CARE EDUCATION/TRAINING PROGRAM

## 2023-02-17 PROCEDURE — 250N000009 HC RX 250

## 2023-02-17 PROCEDURE — 88275 CYTOGENETICS 100-300: CPT | Performed by: NURSE PRACTITIONER

## 2023-02-17 PROCEDURE — 88305 TISSUE EXAM BY PATHOLOGIST: CPT | Mod: 26 | Performed by: PATHOLOGY

## 2023-02-17 PROCEDURE — 88189 FLOWCYTOMETRY/READ 16 & >: CPT | Mod: GC | Performed by: PATHOLOGY

## 2023-02-17 PROCEDURE — 88184 FLOWCYTOMETRY/ TC 1 MARKER: CPT | Performed by: NURSE PRACTITIONER

## 2023-02-17 PROCEDURE — 36415 COLL VENOUS BLD VENIPUNCTURE: CPT | Performed by: NURSE PRACTITIONER

## 2023-02-17 PROCEDURE — 88291 CYTO/MOLECULAR REPORT: CPT | Performed by: MEDICAL GENETICS

## 2023-02-17 PROCEDURE — 88311 DECALCIFY TISSUE: CPT | Mod: TC | Performed by: NURSE PRACTITIONER

## 2023-02-17 PROCEDURE — 88313 SPECIAL STAINS GROUP 2: CPT | Mod: 26 | Performed by: PATHOLOGY

## 2023-02-17 PROCEDURE — 999N000133 HC STATISTIC PP CARE STAGE 2

## 2023-02-17 PROCEDURE — 88369 M/PHMTRC ALYSISHQUANT/SEMIQ: CPT | Mod: 26 | Performed by: MEDICAL GENETICS

## 2023-02-17 PROCEDURE — 999N000054 HC STATISTIC EKG NON-CHARGEABLE

## 2023-02-17 PROCEDURE — 93005 ELECTROCARDIOGRAM TRACING: CPT

## 2023-02-17 PROCEDURE — 93010 ELECTROCARDIOGRAM REPORT: CPT | Mod: 59 | Performed by: INTERNAL MEDICINE

## 2023-02-17 PROCEDURE — 88311 DECALCIFY TISSUE: CPT | Mod: 26 | Performed by: PATHOLOGY

## 2023-02-17 PROCEDURE — 258N000003 HC RX IP 258 OP 636: Performed by: NURSE PRACTITIONER

## 2023-02-17 PROCEDURE — 85610 PROTHROMBIN TIME: CPT | Performed by: NURSE PRACTITIONER

## 2023-02-17 PROCEDURE — 88264 CHROMOSOME ANALYSIS 20-25: CPT | Performed by: NURSE PRACTITIONER

## 2023-02-17 PROCEDURE — 88237 TISSUE CULTURE BONE MARROW: CPT | Performed by: NURSE PRACTITIONER

## 2023-02-17 PROCEDURE — 999N000142 HC STATISTIC PROCEDURE PREP ONLY

## 2023-02-17 PROCEDURE — 272N000155 HC KIT CR15

## 2023-02-17 PROCEDURE — 250N000011 HC RX IP 250 OP 636

## 2023-02-17 PROCEDURE — 85097 BONE MARROW INTERPRETATION: CPT | Mod: GC | Performed by: PATHOLOGY

## 2023-02-17 RX ORDER — ACETAMINOPHEN 325 MG/1
650 TABLET ORAL EVERY 4 HOURS PRN
Status: DISCONTINUED | OUTPATIENT
Start: 2023-02-17 | End: 2023-02-20 | Stop reason: HOSPADM

## 2023-02-17 RX ORDER — NALOXONE HYDROCHLORIDE 0.4 MG/ML
0.4 INJECTION, SOLUTION INTRAMUSCULAR; INTRAVENOUS; SUBCUTANEOUS
Status: DISCONTINUED | OUTPATIENT
Start: 2023-02-17 | End: 2023-02-17 | Stop reason: HOSPADM

## 2023-02-17 RX ORDER — NALOXONE HYDROCHLORIDE 0.4 MG/ML
0.2 INJECTION, SOLUTION INTRAMUSCULAR; INTRAVENOUS; SUBCUTANEOUS
Status: DISCONTINUED | OUTPATIENT
Start: 2023-02-17 | End: 2023-02-17 | Stop reason: HOSPADM

## 2023-02-17 RX ORDER — SODIUM CHLORIDE 9 MG/ML
INJECTION, SOLUTION INTRAVENOUS CONTINUOUS
Status: DISCONTINUED | OUTPATIENT
Start: 2023-02-17 | End: 2023-02-17 | Stop reason: HOSPADM

## 2023-02-17 RX ORDER — FENTANYL CITRATE 50 UG/ML
25-50 INJECTION, SOLUTION INTRAMUSCULAR; INTRAVENOUS EVERY 5 MIN PRN
Status: DISCONTINUED | OUTPATIENT
Start: 2023-02-17 | End: 2023-02-17 | Stop reason: HOSPADM

## 2023-02-17 RX ORDER — FLUMAZENIL 0.1 MG/ML
0.2 INJECTION, SOLUTION INTRAVENOUS
Status: DISCONTINUED | OUTPATIENT
Start: 2023-02-17 | End: 2023-02-17 | Stop reason: HOSPADM

## 2023-02-17 RX ORDER — LIDOCAINE 40 MG/G
CREAM TOPICAL
Status: DISCONTINUED | OUTPATIENT
Start: 2023-02-17 | End: 2023-02-17 | Stop reason: HOSPADM

## 2023-02-17 RX ADMIN — FENTANYL CITRATE 50 MCG: 50 INJECTION, SOLUTION INTRAMUSCULAR; INTRAVENOUS at 14:17

## 2023-02-17 RX ADMIN — FENTANYL CITRATE 50 MCG: 50 INJECTION, SOLUTION INTRAMUSCULAR; INTRAVENOUS at 14:23

## 2023-02-17 RX ADMIN — LIDOCAINE HYDROCHLORIDE 6 ML: 10 INJECTION, SOLUTION EPIDURAL; INFILTRATION; INTRACAUDAL; PERINEURAL at 14:47

## 2023-02-17 RX ADMIN — FENTANYL CITRATE 50 MCG: 50 INJECTION, SOLUTION INTRAMUSCULAR; INTRAVENOUS at 14:42

## 2023-02-17 RX ADMIN — FENTANYL CITRATE 50 MCG: 50 INJECTION, SOLUTION INTRAMUSCULAR; INTRAVENOUS at 14:49

## 2023-02-17 RX ADMIN — SODIUM CHLORIDE: 9 INJECTION, SOLUTION INTRAVENOUS at 12:55

## 2023-02-17 RX ADMIN — MIDAZOLAM HYDROCHLORIDE 1 MG: 1 INJECTION, SOLUTION INTRAMUSCULAR; INTRAVENOUS at 14:23

## 2023-02-17 RX ADMIN — MIDAZOLAM HYDROCHLORIDE 1 MG: 1 INJECTION, SOLUTION INTRAMUSCULAR; INTRAVENOUS at 14:31

## 2023-02-17 RX ADMIN — MIDAZOLAM HYDROCHLORIDE 1 MG: 1 INJECTION, SOLUTION INTRAMUSCULAR; INTRAVENOUS at 14:42

## 2023-02-17 RX ADMIN — MIDAZOLAM HYDROCHLORIDE 1 MG: 1 INJECTION, SOLUTION INTRAMUSCULAR; INTRAVENOUS at 14:17

## 2023-02-17 RX ADMIN — FENTANYL CITRATE 50 MCG: 50 INJECTION, SOLUTION INTRAMUSCULAR; INTRAVENOUS at 14:32

## 2023-02-17 ASSESSMENT — ACTIVITIES OF DAILY LIVING (ADL)
ADLS_ACUITY_SCORE: 35

## 2023-02-17 NOTE — PROGRESS NOTES
Pt on 2A per litter with RN post Bone Marrow Biopsy, pt awake and alert, denies pain. Site at.left posterior hip is flat, dry and intact; covered in guaze dressing, scant shadow (approx. 1/4 inch in diameter) marked.  Pt taking clears without problem. Will continue to monitor.     Updated  by phone per pt's request; will  pt after bedrest.

## 2023-02-17 NOTE — DISCHARGE INSTRUCTIONS
[facility]    Interventional Radiology  Patient Instructions Following BONE MARROW Biopsy    AFTER YOU GO HOME  If you were given sedation DO NOT drive or operate machinery at home or at work for at least 24 hours  DO relax and take it easy for 48 hours, no strenuous activity for 24 hours  DO drink plenty of fluids  DO resume your regular diet, unless otherwise instructed by your Primary Physician  Keep the dressing dry and in place for 24 hours.  DO NOT SMOKE FOR AT LEAST 24 HOURS, if you have been given any medications that were to help you relax or sedate you during your procedure  DO NOT drink alcoholic beverages the day of your procedure  DO NOT do any strenuous exercise or lifting (> 10 lbs) for at least 7 days following your procedure  DO NOT take a bath or shower for at least 12 hours following your procedure  Remove dressing after shower the next day. Replace with Band aid for 2 days.  Never leave a wet dressing in place.  DO NOT make any important or legal decisions for 24 hours following your procedure  There should be minimum drainage from the biopsy site    CALL THE PHYSICIAN IF:  You start bleeding from the procedure site.  If you do start to bleed from that site, lie down flat and hold pressure on the site for a minimum of 10 minutes.  Your physician will tell you if you need to return to the hospital  You develop nausea or vomiting  You have excessive swelling, redness, or tenderness at the site  You have drainage that looks like it is infected.  You experience severe pain  You develop hives or a rash or unexplained itching  You develop shortness of breath  You develop a temperature of 101 degrees F or greater  You develop bloody clots or red urine after you are discharged  You develop chest pain or cough up blood, lightheadedness or fainting    ADDITIONAL INSTRUCTIONS  If you are taking Coumadin, restart tonight.  Follow up with your Coumadin Clinic or Primary Care MD to have  your INR rechecked.    [facility] INTERVENTIONAL RADIOLOGY DEPARTMENT  Procedure Physician:   DR CHARITY REAVES       Date of procedure:   February 17, 2023  Telephone Numbers: [phone]      Monday-Friday 7:30 am to 4:00 pm  [phone] After 4:00 pm Monday-Friday, Weekends & Holidays.     Ask for the Interventional Radiologist on call.  Someone is on call 24 hrs/day  [facility] toll free number: [phone] Monday-Friday 8:00 am to 4:30 pm  [facility] Emergency Dept: [phone]

## 2023-02-17 NOTE — PROGRESS NOTES
Tolerated bedrest without issues.  Slept for the two hours of bedrest.  Tolerated water, declined food.  Denies feeling nauseated....wants to save her appetite for dinner tonight.  Left sacral biopsy site  CDI. Tolerated ambulation and urination.  Reviewed discharge instructions with Gracy.  PIV removed.  Discharged to home with .

## 2023-02-17 NOTE — PROGRESS NOTES
Arrived from home for a bone marrow biopsy.  VSS.  Denies pain.  PIV placed and labs sent.  H&P current.  Consent obtained.  Ready for procedure.

## 2023-02-17 NOTE — PRE-PROCEDURE
GENERAL PRE-PROCEDURE:   Procedure:  IR Bone Marrow Biopsy  Date/Time:  2/17/2023 1:54 PM    Written consent obtained?: Yes    Risks and benefits: Risks, benefits and alternatives were discussed    Consent given by:  Patient  Patient states understanding of procedure being performed: Yes    Patient's understanding of procedure matches consent: Yes    Procedure consent matches procedure scheduled: Yes    Expected level of sedation:  Moderate  Appropriately NPO:  Yes  ASA Class:  2  Mallampati  :  Grade 1- soft palate, uvula, tonsillar pillars, and posterior pharyngeal wall visible  Lungs:  Wheezes  Heart:  Normal heart sounds and rate  History & Physical reviewed:  History and physical reviewed and no updates needed  Statement of review:  I have reviewed the lab findings, diagnostic data, medications, and the plan for sedation

## 2023-02-17 NOTE — IR NOTE
Patient Name: Gracy Bautista  Medical Record Number: [id number]  Today's Date: 2/17/2023    Procedure: Bone marrow biopsy  Proceduralist: Dr. Neo Lewis  Pathology present: Yes special heme    Procedure Start: 1437  Procedure end: 1452  Sedation medications administered: 250 mcg fentanyl and 4 mg versed     Report given to: Leah VAUGHN RN 2A  : NA    Other Notes: Pt arrived to IR room 5 from . Consent reviewed. Pt denies any questions or concerns regarding procedure. Pt positioned prone and monitored per protocol. Pt tolerated procedure without any noted complications. Pt transferred back to .  2 hour bedrest post procedure

## 2023-02-17 NOTE — PROCEDURES
[facility]    Procedure: Bone marrow biopsy    Date/Time: 2/17/2023 2:56 PM  Performed by: Tyshawn Larson DO  Authorized by: Tyshawn Larson DO       UNIVERSAL PROTOCOL   Site Marked: NA  Prior Images Obtained and Reviewed:  Yes  Required items: Required blood products, implants, devices and special equipment available    Patient identity confirmed:  Verbally with patient, arm band, provided demographic data and hospital-assigned identification number  Patient was reevaluated immediately before administering moderate or deep sedation or anesthesia  Confirmation Checklist:  Patient's identity using two indicators, relevant allergies, procedure was appropriate and matched the consent or emergent situation and correct equipment/implants were available  Time out: Immediately prior to the procedure a time out was called    Universal Protocol: the Joint Commission Universal Protocol was followed    Preparation: Patient was prepped and draped in usual sterile fashion       ANESTHESIA    Anesthesia: Local infiltration  Local Anesthetic:  Lidocaine 1% without epinephrine      SEDATION  Patient Sedated: Yes    Sedation Type:  Moderate (conscious) sedation  Vital signs: Vital signs monitored during sedation    See dictated procedure note for full details.  Findings: Uncomplicated left posterior iliac bone marrow biopsy and aspiration.    Specimens: core needle biopsy specimens sent for pathological analysis and fine needle aspirate for cytological analysis    Complications: None    Condition: Stable    Plan: Routine aftercare, 2 hours bedrest.      PROCEDURE    Patient Tolerance:  Patient tolerated the procedure well with no immediate complications  Length of time physician/provider present for 1:1 monitoring during sedation: 15

## 2023-02-18 ASSESSMENT — ACTIVITIES OF DAILY LIVING (ADL)
ADLS_ACUITY_SCORE: 35

## 2023-02-19 ASSESSMENT — ACTIVITIES OF DAILY LIVING (ADL)
ADLS_ACUITY_SCORE: 35

## 2023-02-20 LAB
ATRIAL RATE - MUSE: 61 BPM
DIASTOLIC BLOOD PRESSURE - MUSE: NORMAL MMHG
INTERPRETATION ECG - MUSE: NORMAL
P AXIS - MUSE: 5 DEGREES
PR INTERVAL - MUSE: 130 MS
QRS DURATION - MUSE: 78 MS
QT - MUSE: 408 MS
QTC - MUSE: 410 MS
R AXIS - MUSE: 78 DEGREES
SYSTOLIC BLOOD PRESSURE - MUSE: NORMAL MMHG
T AXIS - MUSE: 65 DEGREES
VENTRICULAR RATE- MUSE: 61 BPM

## 2023-02-20 ASSESSMENT — ACTIVITIES OF DAILY LIVING (ADL)
ADLS_ACUITY_SCORE: 35

## 2023-02-21 ENCOUNTER — TELEPHONE (OUTPATIENT)
Dept: GASTROENTEROLOGY | Facility: CLINIC | Age: 58
End: 2023-02-21

## 2023-02-21 NOTE — TELEPHONE ENCOUNTER
Attempted to contact patient regarding upcoming Upper endoscopy with Bravo capsule placement procedure on 3/6/2023 for pre assessment questions. No answer.     Left message to return call to [phone] #4    Discuss Covid policy and designated  policy.    Pre op exam scheduled: N/A    Arrival time: 1030. Procedure time: 1130    Facility location: [facility]; [address] [place] [address], [address] Floor, [place] [address]    Sedation type: MAC    Anticoagulants: No    Electronic implanted devices? No    Diabetic? No    Indication for procedure: History of regurgitation/cough. Periphreal eopsinophillia please obtain proximal and dital biopsies as well as gastric biopdies to rule out GI involvement    BRAVO - hold PPIs 7 days prior     Prep instructions sent via [employer].     Tatyana Cohen RN  Endoscopy Procedure Pre Assessment RN

## 2023-02-24 ENCOUNTER — TELEPHONE (OUTPATIENT)
Dept: GASTROENTEROLOGY | Facility: CLINIC | Age: 58
End: 2023-02-24
Payer: COMMERCIAL

## 2023-02-24 NOTE — TELEPHONE ENCOUNTER
Patient declined pre-assessment at this time.  Patient would like to discuss with Dr. Narvaez's careteam whether the EGD BRAVO is necessary at this time.  Pt was transferred to the GI  main line and instructed to hit option 3 for the GI clinic.    Shanna Guzman RN

## 2023-02-24 NOTE — TELEPHONE ENCOUNTER
[facility] Call Center    Phone Message    May a detailed message be left on voicemail: yes     Reason for Call: Other:      Pt is requesting a call back to discuss if their 03/06/2023 procedure  is needed.       Action Taken: Message routed to:  [facility]    Travel Screening: Not Applicable

## 2023-02-27 ENCOUNTER — TELEPHONE (OUTPATIENT)
Dept: GASTROENTEROLOGY | Facility: CLINIC | Age: 58
End: 2023-02-27
Payer: COMMERCIAL

## 2023-02-27 ENCOUNTER — PATIENT OUTREACH (OUTPATIENT)
Dept: GASTROENTEROLOGY | Facility: CLINIC | Age: 58
End: 2023-02-27
Payer: COMMERCIAL

## 2023-02-27 NOTE — TELEPHONE ENCOUNTER
Caller: Gracy Bautista    Reason for Reschedule/Cancellation (please be detailed, any staff messages or encounters to note?): Patient requested to cancel, declined rescheduling.    Kathleen Marin RN  P Endoscopy Scheduling Pool  Cc: Kathleen Marin RN; Jayy Narvaez DO; Daisy Larson Wendy is needing to cancel egd with bravo on 3/6.   We talked at length - please just go ahead and cancel it.     Thanks!    - Kathleen     Prior to reschedule please review:    Ordering Provider:Jayy Narvaez DO    Sedation per order:MAC    Does patient have any ASC Exclusions, please identify?: NO      Notes on Cancelled Procedure:    Procedure:Upper Endoscopy with BRAVO [EGD BRAVO]     Date: 3/6    [facility]; [address] [place] [address], 5[address] Floor, [place] [address]    Surgeon: DENISA        Rescheduled: No

## 2023-02-27 NOTE — TELEPHONE ENCOUNTER
Spoke with pt regarding upcoming scheduled egd with bravo.  Pt wishing to cancel at this time as they are not concerned about reflux at this time.  Endoscopy schedulers notified.

## 2023-02-28 ENCOUNTER — PATIENT OUTREACH (OUTPATIENT)
Dept: ONCOLOGY | Facility: CLINIC | Age: 58
End: 2023-02-28
Payer: COMMERCIAL

## 2023-02-28 NOTE — PROGRESS NOTES
Attempted to reach patient, still awaiting some of the the results from bone marrow biopsy testing to be resulted.  Would like to reschedule to the following week in hopes that all results will be finalized at that time.  Message left requesting return call.     Leah Romano RN

## 2023-02-28 NOTE — PROGRESS NOTES
Patient returned call and is agreeable to rescheduling appointment to the following week in hopes that all results will be available at that time.    Leah Romano RN

## 2023-03-08 ENCOUNTER — VIRTUAL VISIT (OUTPATIENT)
Dept: ONCOLOGY | Facility: CLINIC | Age: 58
End: 2023-03-08
Attending: INTERNAL MEDICINE
Payer: COMMERCIAL

## 2023-03-08 DIAGNOSIS — D72.10: ICD-10-CM

## 2023-03-08 DIAGNOSIS — T50.905A: ICD-10-CM

## 2023-03-08 DIAGNOSIS — D50.8 IRON DEFICIENCY ANEMIA SECONDARY TO INADEQUATE DIETARY IRON INTAKE: Primary | ICD-10-CM

## 2023-03-08 PROCEDURE — 99214 OFFICE O/P EST MOD 30 MIN: CPT | Mod: VID | Performed by: INTERNAL MEDICINE

## 2023-03-08 RX ORDER — FERROUS SULFATE 325(65) MG
TABLET ORAL
Qty: 45 TABLET | Refills: 3 | Status: SHIPPED | OUTPATIENT
Start: 2023-03-08 | End: 2023-07-28

## 2023-03-08 NOTE — NURSING NOTE
Is the patient currently in the state of [place]? YES    Visit mode:VIDEO    If the visit is dropped, the patient can be reconnected by: VIDEO VISIT: Send to e-mail at: [email or web address]    Will anyone else be joining the visit? NO      How would you like to obtain your AVS? MyChart    Are changes needed to the allergy or medication list? NO    Reason for visit: return video visit    Stephanie Ely, JOSE

## 2023-03-08 NOTE — PROGRESS NOTES
[facility] Hematology / Oncology  Progress Note  Name: Gracy Bautista  :  1965    MRN:  [id number]    --------------------    Assessment / Plan:  Gracy and I reviewed her recent bone marrow biopsy results.  No morphologic or flow signs of leukemia, lymphoma, MDS, systemic mastocytosis.  Specialized cytogenetic studies for eosinophilic process also negative.  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  Incidental low marrow seen on marrow iron stores.  Start half tab oral iron in the morning; reviewed logistics of administration.  If tolerating increase to full tab in the morning.  Recovered from initial marrow complications.  RTC virtually 3 months w/ iron labs.    Zev Roach MD    --------------------    Interval History:  Gracy returns for follow up of eosinophilia.  Feels recovered from first complicated marrow  As well as some confusion.  No other symptoms since our initial visit.    --------------------    Physical Exam:  Video visit.    Labs / Imaging:  We reviewed marrow morphology, flow, cytogenetics.    Video Visit:  Gracy is a 57 year old female who is being evaluated via a billable video visit.  }    Video start time: 8:57 AM  Video end time: 9:16 AM    Provider location: [facility]  Patient location: Home    Mode of transmission:  "[employer] / [employer].

## 2023-03-08 NOTE — LETTER
3/8/2023         RE: Gracy Bautista  [address] [place] [id number]        Dear Colleague,    Thank you for referring your patient, Gracy Bautista, to the [facility]. Please see a copy of my visit note below.    [facility] Hematology / Oncology  Progress Note  Name: Gracy Bautista  :  1965    MRN:  [id number]    --------------------    Assessment / Plan:  Gracy and I reviewed her recent bone marrow biopsy results.  No morphologic or flow signs of leukemia, lymphoma, MDS, systemic mastocytosis.  Specialized cytogenetic studies for eosinophilic process also negative.  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  Incidental low marrow seen on marrow iron stores.  Start half tab oral iron in the morning; reviewed logistics of administration.  If tolerating increase to full tab in the morning.  Recovered from initial marrow complications.  RTC virtually 3 months w/ iron labs.    Zev Roach MD    --------------------    Interval History:  Gracy returns for follow up of eosinophilia.  Feels recovered from first complicated marrow  As well as some confusion.  No other symptoms since our initial visit.    --------------------    Physical Exam:  Video visit.    Labs / Imaging:  We reviewed marrow morphology, flow, cytogenetics.    Video Visit:  Gracy is a 57 year old female who is being evaluated via a billable video visit.  }    Video start time: 8:57 AM  Video end time: 9:16 AM    Provider location: [facility]  Patient location: Home    Mode of transmission:  [employer] / [employer].        Again, thank you for allowing me to participate in the care of your patient.        Sincerely,        Zev Roach MD

## 2023-03-09 ENCOUNTER — PATIENT OUTREACH (OUTPATIENT)
Dept: CARE COORDINATION | Facility: CLINIC | Age: 58
End: 2023-03-09
Payer: COMMERCIAL

## 2023-03-09 ENCOUNTER — VIRTUAL VISIT (OUTPATIENT)
Dept: BEHAVIORAL HEALTH | Facility: CLINIC | Age: 58
End: 2023-03-09
Payer: COMMERCIAL

## 2023-03-09 DIAGNOSIS — F43.23 ADJUSTMENT DISORDER WITH MIXED ANXIETY AND DEPRESSED MOOD: Primary | ICD-10-CM

## 2023-03-09 PROCEDURE — 90834 PSYTX W PT 45 MINUTES: CPT | Mod: VID | Performed by: MARRIAGE & FAMILY THERAPIST

## 2023-03-09 ASSESSMENT — COLUMBIA-SUICIDE SEVERITY RATING SCALE - C-SSRS
2. HAVE YOU ACTUALLY HAD ANY THOUGHTS OF KILLING YOURSELF?: NO
1. HAVE YOU WISHED YOU WERE DEAD OR WISHED YOU COULD GO TO SLEEP AND NOT WAKE UP?: YES
TOTAL  NUMBER OF INTERRUPTED ATTEMPTS LIFETIME: NO
1. IN THE PAST MONTH, HAVE YOU WISHED YOU WERE DEAD OR WISHED YOU COULD GO TO SLEEP AND NOT WAKE UP?: NO
TOTAL  NUMBER OF ABORTED OR SELF INTERRUPTED ATTEMPTS LIFETIME: NO
6. HAVE YOU EVER DONE ANYTHING, STARTED TO DO ANYTHING, OR PREPARED TO DO ANYTHING TO END YOUR LIFE?: NO
ATTEMPT LIFETIME: NO

## 2023-03-09 NOTE — PROGRESS NOTES
[facility] (ENT referral)  March 9, 2023  Video Visit      Behavioral Health Clinician Progress Note    Patient Name: Gracy Bautista           Service Type:  Individual      Service Location:  Video Visit     Session Start Time: 10am  Session End Time: 1050am      Session Length: 38 - 52      Attendees: Patient     Service Modality:  Video Visit:      Provider verified identity through the following two step process.  Patient provided:  Patient photo    Telemedicine Visit: The patient's condition can be safely assessed and treated via synchronous audio and visual telemedicine encounter.      Reason for Telemedicine Visit: Patient has requested telehealth visit    Originating Site (Patient Location): Patient's home    Distant Site (Provider Location): Provider Remote Setting- Home Office    Consent:  The patient/guardian has verbally consented to: the potential risks and benefits of telemedicine (video visit) versus in person care; bill my insurance or make self-payment for services provided; and responsibility for payment of non-covered services.     Patient would like the video invitation sent by:  My Chart    Mode of Communication:  Video Conference via Amwell    Distant Location (Provider):  Off-site    As the provider I attest to compliance with applicable laws and regulations related to telemedicine.    Visit Activities (Refresh list every visit): [facility] Only    Diagnostic Assessment Date: will do next visit  Treatment Plan Review Date: none in place yet      See Flowsheets for today's PHQ-9 and WESTON-7 results  Previous PHQ-9:   PHQ-9 SCORE 9/15/2022 12/27/2022   PHQ-9 Total Score MyChart 14 (Moderate depression) 6 (Mild depression)   PHQ-9 Total Score 14 6     Previous WESTON-7:   WESTON-7 SCORE 9/15/2022   Total Score 4 (minimal anxiety)   Total Score 4       IMANI LEVEL:  No flowsheet data found.    DATA  Extended Session (60+ minutes): No  Interactive Complexity: No  Crisis: No  [facility]  Patient: No    Treatment Objective(s) Addressed in This Session:  Target Behavior(s): disease management/lifestyle changes      Patient  will experience a reduction in depressed mood, will develop more effective coping skills to manage depressive symptoms, will develop healthy cognitive patterns and beliefs and will increase ability to function adaptively, will experience a reduction in anxiety, will develop more effective coping skills to manage anxiety symptoms and will develop healthy cognitive patterns and beliefs and will develop coping/problem-solving skills to facilitate more adaptive adjustment    Current Stressors / Issues:  [facility] met with Gracy at health care team's request to assess her current behavioral health needs and provide appropriate intervention. Gracy was referred by Dr Digna Jaimes (ENT) for support with chronic illness and life stressors.  We spent today's session discussing Gracy's history, current stressors, and exploring the possible benefits of psychotherapy. Focus was on establishing a positive therapeutic alliance and establishing initial goals.    Gracy reports that she has a complex personal situation and is looking for a new mental health support person to work with. Her current stressors include chronic health issues, difficulties in her family and marriage, and long-standing anxiety and depression. She has worked with multiple therapists and psychiatrists in the past, and currently has a psychiatrist. She is articulate and open to suggestions, and we began problem-solving around some practical points already today - specifically some behavioral activation since she knows going to the gym regularly really helps her feel good and she has been avoiding it for some time.    We discussed some of the broad perspectives on her family stress, family history of her current family ( and two sons), as well as a brief overview of some childhood trauma and how this impacts her today. She  prefers to have a present-day focus and work on what is changeable now, she reports.    We agree to begin a course of therapy to explore goals for improving her anxiety, depression, activation towards valued actions, and management of life stressors.    I affirmed the steps this patient has taken to address physical and behavioral health issues, and we scheduled two follow-up sessions..    Progress on Treatment Objective(s) / Homework:  New Objective established this session - ACTION (Actively working towards change); Intervened by reinforcing change plan / affirming steps taken    Interventions drawn from:  Psycho-education regarding mental health diagnoses and treatment options    Motivational Interviewing    Solution-Focused Therapy    Explored patterns in patient's behaviors and relationships and discussed options for new behaviors    Explored new options for problem-solving, communication, managing stress, etc.    Cognitive-behavioral Therapy    Discussed common cognitive distortions, identified them in patient's life    Explored ways to challenge, replace, and act against these cognitions    Explore behavioral changes that might benefit patient in improving mood and engage in meaningful activity    Acceptance and Commitment Therapy    Explored aspects of psychological flexibility, including de-fusion, self-as-context, acceptance, present moment awareness, values, and committed action.    Developed mindfulness and acceptance skills.    Identified important values in patient's life, and discussed ways to commit to behavioral activation around these values    Narrative Therapy    Explored the patient's story of their life from their perspective,     Explored alternate ways of understanding their experience, identifying exceptions, developing new themes    Behavioral Activation    Discussed steps patient can take to become more involved in meaningful activity    Identified barriers to these activities and explored  possible solutions    Medication Review:  No problems reported to [facility] today.    Medication Compliance:  No problems reported to [facility] today.    Changes in Health Issues:  No problems reported to [facility] today.    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      Mental Status Assessment:  Appearance:   Appropriate   Eye Contact:   Good   Psychomotor Behavior: Normal   Attitude:   Cooperative   Orientation:   All  Speech   Rate / Production: Normal    Volume:  Normal   Mood:    Anxious  Sad   Affect:    Appropriate   Thought Content:  Clear   Thought Form:  Coherent  Logical   Insight:    Good     Safety Assessment:  Patient denies a history of suicidal ideation, suicide attempts, self-injurious behavior, homicidal ideation, homicidal behavior and and other safety concerns  Patient denies current or recent suicidal ideation or behaviors.  Patient denies current or recent homicidal ideation or behaviors.  Patient denies current or recent self injurious behavior or ideation.  Patient denies other safety concerns.  Patient reports there are no firearms in the house  Protective Factors Sense of responsibility to family, Reality testing ability, Positive coping skills, Positive problem-solving skills, Positive social support and Positive therapeutic releationships   Risk Factors Chronic illness    [place] Suicide Severity Rating Scale (Lifetime/Recent)  [place] Suicide Severity Rating (Lifetime/Recent) 3/9/2023   1. Wish to be Dead (Lifetime) 1   Wish to be Dead Description (Lifetime) occasional passive thoughts of being better off dead - never any plans or intent or attempts   1. Wish to be Dead (Past 1 Month) 0   2. Non-Specific Active Suicidal Thoughts (Lifetime) 0   Actual Attempt (Lifetime) 0   Has subject engaged in non-suicidal self-injurious behavior? (Lifetime) 0   Interrupted Attempts  (Lifetime) 0   Aborted or Self-Interrupted Attempt (Lifetime) 0   Preparatory Acts or Behavior (Lifetime) 0   Calculated C-SSRS Risk Score (Lifetime/Recent) No Risk Indicated       Plan for Safety and Risk Management:  A safety and risk management plan has not been developed at this time, however patient was encouraged to call [place] [phone] should there be a change in any of these risk factors.    Diagnoses:  1. Adjustment disorder with mixed anxiety and depressed mood    Review previous dx of PTSD    Collateral Reports Completed:  Will collaborate with care team as indicated during treatment    Plan: (Homework, other):  Patient was given information about behavioral services and encouraged to schedule a follow up appointment with the clinic [facility] as needed.  She was also given information about mental health symptoms and treatment options  and Cognitive Behavioral Therapy skills to practice when experiencing anxiety and depression.  CD Recommendations: Maintain Sobriety. We agree to begin a course of psychotherapy to address her anxiety, depression, and life stressors.      RAY James, [facility]

## 2023-03-09 NOTE — PROGRESS NOTES
Oncology Distress Screening Follow-up  Clinical Social Work  [facility]    Identified Concern and Score From Distress Screenin. How concerned are you about your ability to eat?  0       2. How concerned are you about unintended weight loss or your current weight?  2       3. How concerned are you about feeling depressed or very sad?  4       4. How concerned are you about feeling anxious or very scared?  3       5. Do you struggle with the loss of meaning and halie in your life?  Quite a bit       6. How concerned are you about work and home life issues that may be affected by your cancer?  8 Abnormal        7. How concerned are you about knowing what resources are available to help you?  0       8. Do you currently have what you would describe as Buddhist or spiritual struggles?             Not at all               Date of Distress Screening: 3/8/23      Data: At time of last visit, Patient scored positive on distress screen.  called Patient today with intention of introducing them to psychosocial services and support, and following up on elevated distress.        Intervention/Education Provided: Phone call to patient about distress screening. No answer - message left. Provided contact information in order to reach writer.       Follow-up Required: Will remain available for support and await patient's return call.     Gracy is followed by behavioral health team and is not a oncology patient, so no long term follow up by onc SW is anticipated.      Erica Hill, MSW, LICSW, OSW-C (she/her)  Clinical , Adult Oncology  Phone: [phone]    [place] (M, W, F)  [place] (u)

## 2023-03-13 ENCOUNTER — TELEPHONE (OUTPATIENT)
Dept: PULMONOLOGY | Facility: CLINIC | Age: 58
End: 2023-03-13
Payer: COMMERCIAL

## 2023-03-13 DIAGNOSIS — J44.1 CHRONIC OBSTRUCTIVE PULMONARY DISEASE WITH (ACUTE) EXACERBATION (H): Primary | ICD-10-CM

## 2023-03-13 NOTE — TELEPHONE ENCOUNTER
Contacted patient regarding neb questions. Patient is requesting an order for the Innospire mini compressor neb system. Patient states that it is available at the [facility] DME site in [facility]. Patient is requesting that an order be faxed to [facility] @ F: [phone]. Patient also requests that a mask be ordered for the neb machine. Orders for nebulizer have been faxed to [facility].     Patient is requesting a new spacer. 1 spacer has been mailed to the pts home address. confirmed mailing address with pt.      Donna Gaspar LPN  Pulmonary Medicine:  [facility]  Phone: [phone] Fax: [phone]

## 2023-03-13 NOTE — TELEPHONE ENCOUNTER
[facility] Call Center    Phone Message    May a detailed message be left on voicemail: yes     Reason for Call: The patient is requesting a call to discuss difficulties getting her nebulizer.  Please advise.  Thank you..     Action Taken: Message routed to:  Adult Clinics: Pulmonology p [phone]    Travel Screening: Not Applicable

## 2023-03-14 NOTE — TELEPHONE ENCOUNTER
Received fax communication from [facility] regarding neb machine request. Per [facility], they require signed office visit notes supporting the medical necessity for the neb machine. [facility] also requires that the nebulized medicine, strength, and dosage is listed in the chart notes.     Message sent to Dr. Bean requesting that he addend patient's office visit note to include this information. Will fax updated notes to [facility] @ [phone] upon completion.      Donna Gaspar LPN  Pulmonary Medicine:  [facility]  Phone: [phone] Fax: [phone]

## 2023-03-15 RX ORDER — LEVALBUTEROL INHALATION SOLUTION 1.25 MG/3ML
1 SOLUTION RESPIRATORY (INHALATION) EVERY 4 HOURS PRN
Qty: 90 ML | Refills: 11 | Status: SHIPPED | OUTPATIENT
Start: 2023-03-15 | End: 2023-07-28

## 2023-03-15 NOTE — TELEPHONE ENCOUNTER
Faxed Signed office visit notes to [facility] in order to assist patient with receiving portable neb machine.      Donna Gaspar LPN  Pulmonary Medicine:  [facility]  Phone: [phone] Fax: [phone]

## 2023-03-15 NOTE — PROGRESS NOTES
Gracy is a 55 year old who is being evaluated via a billable telephone visit.      What phone number would you like to be contacted at? Mobile  How would you like to obtain your AVS? Dee Dubose is a 55 year old who presents for the severe asthma    HPI   Ms. Gracy Bautista is a pleasant 55 yr old female with a complex history of aspirin induced asthma with severe nasal polyposis, chronic rhinosinusitis with multiple surgeries, IgG deficiency. She presents to clinic for a post ER visit follow up for lung nodules and asthma. I just recently seen her in clinic on 06/09/2021. She was recently seen at the ER at [facility] for asthma exacerbation on 06/19/2021 and was treated with prednisone and albuterol Nebs.  Complicated history and please refer to my office note from 06/09/2021 for details.  In summary, Gracy has difficult to control asthma on maximal inhaler therapy including high dose Symbicort, Spiriva, Singulair, Claritin as well as albuterol as needed and still continued to be symptomatic. Prior CT chest were unremarkable as well as evaluation of autoimmune, rheumatologic and vasculitic work up. ANCA levels were not elevated. I evaluated her for biologics and her total IgE levels at 255, but her environmental IgE allergen tests were normal. Allergen A fumigatus was borderline. No findings on CT imaging to suggest similar diseases such as DIPNECH.  I thought she will benefit from Xolair infusions and paper work has been done and awaiting commencement of infusions. Of note, she has been on Nucala in the past with no positive response. There was no peripheral eosinophilia.  Some improvement since ER discharge and continues to have episodic SOB, wheezing and chest tightness. She also noticed significant sneezing, rhinorrhea and post nasal drip. She notices that when she opens her window, her symptoms are worse due to the pollens.       Review of Systems   Constitutional, HEENT,  cardiovascular, pulmonary, GI, , musculoskeletal, neuro, skin, endocrine and psych systems are negative, except as otherwise noted.      Objective           Vitals:  No vitals were obtained today due to virtual visit.    Physical Exam   healthy, alert and no distress  PSYCH: Alert and oriented times 3; coherent speech, normal   rate and volume, able to articulate logical thoughts, able   to abstract reason, no tangential thoughts, no hallucinations   or delusions  Her affect is normal  RESP: No cough, no audible wheezing, able to talk in full sentences  Remainder of exam unable to be completed due to telephone visits        Severe persistent Asthma with poor control  Reassured her of her recent CT chest from 10/2020 and PFTs which were unremarkable. She is scheduled to start her Xolair infusions on 7/1 and encouraged her to keep appointment.  Continue current regimen of high dose Symbicort, Spiriva, Singulair, Claritin as well as albuterol as needed.  Trigger avoidance.      Chronic Rhinosinusitis  Yet to see ENT, still trying to get a sooner visit. Continue Flonase.  Chronic Rhinosinusitis is likely contributing to her cough.        Questions and concerns were answered to the patient's satisfaction.  she was provided with my contact information should new questions or concerns arise in the interim.     She should return to clinic after 4 months post Xolair infusions     Up to date on vaccinations     Marco Bean MD  Pulmonary, Critical Care and Sleep Medicine  [facility]  Pager: [phone]     aMrco Bean MD  Pulmonary, Critical Care and Sleep Medicine  [facility]  Office: [phone]        Phone call duration: 30 minutes   Bactrim Counseling:  I discussed with the patient the risks of sulfa antibiotics including but not limited to GI upset, allergic reaction, drug rash, diarrhea, dizziness, photosensitivity, and yeast infections.  Rarely, more serious reactions can occur including but not limited to aplastic anemia, agranulocytosis, methemoglobinemia, blood dyscrasias, liver or kidney failure, lung infiltrates or desquamative/blistering drug rashes.

## 2023-03-22 ENCOUNTER — NURSE TRIAGE (OUTPATIENT)
Dept: NURSING | Facility: CLINIC | Age: 58
End: 2023-03-22
Payer: COMMERCIAL

## 2023-03-22 DIAGNOSIS — U07.1 INFECTION DUE TO 2019 NOVEL CORONAVIRUS: Primary | ICD-10-CM

## 2023-03-22 PROCEDURE — 99207 PR NO BILLABLE SERVICE THIS VISIT: CPT | Mod: CS | Performed by: FAMILY MEDICINE

## 2023-03-23 NOTE — TELEPHONE ENCOUNTER
Recommendations should have been made to schedule appointment and an appointment should have been offered either telephone/virtual.  Paxlovid ordered.    Donna Hwang MD on 3/23/2023 at 9:46 AM

## 2023-03-23 NOTE — TELEPHONE ENCOUNTER
Patient Contact    Attempt # 1    Was call answered?  No.  Left message on voicemail with information to call me back.    Patient seeking covid treatment.     Mary SERRAN, RN  [facility]

## 2023-03-23 NOTE — TELEPHONE ENCOUNTER
Sent my chart message to patient informing that PCP has prescribed Paxlovid.     Please close encounter once my chart message has been read.     Mary SERRAN, RN  [facility]

## 2023-03-23 NOTE — TELEPHONE ENCOUNTER
Caller reports ill for 24 hrs ; tested positive for Covid ; high risk with asthma COPD;  Requesting antiviral.  Triage  protocol , home area an isolation/quaratine guidelines and red flag symptoms  Reviewed and understood   States currently she has  some wheezing responding to her usual meds; states she has had to go on prednisone in the past but does think she needs in now and will call PCP if she does.     COVID Positive/Requesting COVID treatment    Patient is positive for COVID and requesting treatment options.    Date of positive COVID test (PCR or at home)? 03/22/23  Current COVID symptoms: fever or chills, cough, fatigue, muscle or body aches and congestion or runny nose  Date COVID symptoms began: 03/21/23    Message should be routed to clinic RN pool. Best phone number to use for call back  [phone]  Antoinette Askew RN  [facility]    Reason for Disposition    [1] HIGH RISK for severe COVID complications (e.g., weak immune system, age > 64 years, obesity with BMI > 25, pregnant, chronic lung disease or other chronic medical condition) AND [2] COVID symptoms (e.g., cough, fever)  (Exceptions: Already seen by PCP and no new or worsening symptoms.)    Additional Information    Negative: SEVERE difficulty breathing (e.g., struggling for each breath, speaks in single words)    Negative: Difficult to awaken or acting confused (e.g., disoriented, slurred speech)    Negative: Bluish (or gray) lips or face now    Negative: Shock suspected (e.g., cold/pale/clammy skin, too weak to stand, low BP, rapid pulse)    Negative: Sounds like a life-threatening emergency to the triager    Negative: [1] Diagnosed or suspected COVID-19 AND [2] symptoms lasting 3 or more weeks    Negative: [1] COVID-19 exposure AND [2] no symptoms    Negative: COVID-19 vaccine reaction suspected (e.g., fever, headache, muscle aches) occurring 1 to 3 days after getting vaccine    Negative: COVID-19 vaccine, questions about    Negative: [1] Lives with  someone known to have influenza (flu test positive) AND [2] flu-like symptoms (e.g., cough, runny nose, sore throat, SOB; with or without fever)    Negative: [1] Adult with possible COVID-19 symptoms AND [2] triager concerned about severity of symptoms or other causes    Negative: COVID-19 and breastfeeding, questions about    Negative: SEVERE or constant chest pain or pressure  (Exception: Mild central chest pain, present only when coughing.)    Negative: MODERATE difficulty breathing (e.g., speaks in phrases, SOB even at rest, pulse 100-120)    Negative: [1] Headache AND [2] stiff neck (can't touch chin to chest)    Negative: Oxygen level (e.g., pulse oximetry) 90 percent or lower    Negative: Chest pain or pressure    Negative: Patient sounds very sick or weak to the triager    Negative: MILD difficulty breathing (e.g., minimal/no SOB at rest, SOB with walking, pulse <100)    Negative: Fever > 103 F (39.4 C)    Negative: [1] Fever > 101 F (38.3 C) AND [2] age > 60 years    Negative: [1] Fever > 100.0 F (37.8 C) AND [2] bedridden (e.g., nursing home patient, CVA, chronic illness, recovering from surgery)    Protocols used: CORONAVIRUS (COVID-19) DIAGNOSED OR [id number]

## 2023-03-23 NOTE — TELEPHONE ENCOUNTER
Patient read mychart.   Closing encounter.   PONCE GutierrezN, RN, PHN  [place]  March 23, 2023

## 2023-03-29 ENCOUNTER — TELEPHONE (OUTPATIENT)
Dept: PULMONOLOGY | Facility: CLINIC | Age: 58
End: 2023-03-29
Payer: COMMERCIAL

## 2023-03-29 DIAGNOSIS — J44.1 COPD EXACERBATION (H): Primary | ICD-10-CM

## 2023-03-29 RX ORDER — PREDNISONE 20 MG/1
40 TABLET ORAL DAILY
Qty: 10 TABLET | Refills: 0 | Status: SHIPPED | OUTPATIENT
Start: 2023-03-29 | End: 2023-04-03

## 2023-03-29 NOTE — TELEPHONE ENCOUNTER
Contacted patient regarding Prednisone request. Patient states that she tested positive for COVID-19 last week. Patient was given Paxlovid which was completed on Monday 03/27/2023. Patient states that she is using Trelegy, Levalbuterol HFA, as well as the Levalbuterol nebs. Patient is requesting a burst of Prednisone in order to manage cough sxs. Spoke with Dr. Bean regarding pts current condition. Dr. Bean states that Prednisone, 40mg x5days may be sent in. Patient prefers that the Rx goes to [facility], along with an order for a nebulizer mask vs mouthpiece. Rx for Prednisone 40mg x5days and nebulizer mask has been sent to [facility] per Dr. Bean's request.      Donna Gaspar LPN  Pulmonary Medicine:  [facility]  Phone: [phone] Fax: [phone]

## 2023-03-29 NOTE — TELEPHONE ENCOUNTER
[facility] Call Center    Phone Message    May a detailed message be left on voicemail: yes     Reason for Call: The patient is requesting a call to discuss a prescription for prednisone.  The patient stated more detail would get too complicated.  Please advise.  Thank you.          Preferred Pharmacy: [id number] [place], [place] - [address]    ( preferred pharmacy just for this request )      Action Taken: Message routed to:  Adult Clinics: Pulmonology p [phone]    Travel Screening: Not Applicable

## 2023-03-31 DIAGNOSIS — J43.2 CENTRILOBULAR EMPHYSEMA (H): ICD-10-CM

## 2023-03-31 RX ORDER — LEVALBUTEROL TARTRATE 45 UG/1
2 AEROSOL, METERED ORAL EVERY 4 HOURS PRN
Qty: 45 G | Refills: 3 | Status: SHIPPED | OUTPATIENT
Start: 2023-03-31 | End: 2024-04-22

## 2023-03-31 NOTE — TELEPHONE ENCOUNTER
Medication:     levalbuterol (XOPENEX HFA) 45 MCG/ACT inhaler 15 g 11 10/27/2022 2/15/2023 --   Sig - Route: Inhale 2 puffs into the lungs every 4 hours as needed for shortness of breath / dyspnea or wheezing - Inhalation     Date last written: 10/27/2022  Dispensed amount: 15g  Refills: 11    Pt's last office visit: 10/27/2022  Next scheduled office visit: 04/27/2023      Per the RN/LPN medication refill protocol, writer is unable to refill this request.     Donna Gaspar LPN  Pulmonary Medicine:  [facility]  Phone: [phone] Fax: [phone]

## 2023-04-03 ENCOUNTER — OFFICE VISIT (OUTPATIENT)
Dept: OTOLARYNGOLOGY | Facility: CLINIC | Age: 58
End: 2023-04-03
Payer: COMMERCIAL

## 2023-04-03 ENCOUNTER — TELEPHONE (OUTPATIENT)
Dept: OTOLARYNGOLOGY | Facility: CLINIC | Age: 58
End: 2023-04-03

## 2023-04-03 VITALS
TEMPERATURE: 98.6 F | OXYGEN SATURATION: 100 % | BODY MASS INDEX: 18.66 KG/M2 | SYSTOLIC BLOOD PRESSURE: 131 MMHG | HEART RATE: 70 BPM | WEIGHT: 112 LBS | DIASTOLIC BLOOD PRESSURE: 83 MMHG | HEIGHT: 65 IN

## 2023-04-03 DIAGNOSIS — J45.50 SEVERE PERSISTENT ASTHMA WITH ALLERGIC RHINITIS, UNSPECIFIED WHETHER COMPLICATED (H): ICD-10-CM

## 2023-04-03 DIAGNOSIS — J45.50 NOT WELL CONTROLLED SEVERE PERSISTENT ASTHMA (H): ICD-10-CM

## 2023-04-03 DIAGNOSIS — J32.4 CHRONIC PANSINUSITIS: Primary | ICD-10-CM

## 2023-04-03 PROCEDURE — 31231 NASAL ENDOSCOPY DX: CPT | Performed by: OTOLARYNGOLOGY

## 2023-04-03 PROCEDURE — 99212 OFFICE O/P EST SF 10 MIN: CPT | Mod: 25 | Performed by: OTOLARYNGOLOGY

## 2023-04-03 ASSESSMENT — PAIN SCALES - GENERAL: PAINLEVEL: NO PAIN (0)

## 2023-04-03 NOTE — LETTER
4/3/2023       RE: Gracy Bautista  [address] [place] [address]     Dear Colleague,    Thank you for referring your patient, Gracy Bautista, to the [facility]ROAT [facility] at [facility]. Please see a copy of my visit note below.    Gracy continues to have profuse secretions, coughing, chest congestion and post nasal drainage. She has had in depth evaluation with pulmonary, GI, hematology, rheum. Work up is unremarkable other than slightly low IgG. She has seen a behavioral health specialist and is very happy with the discussion. She is currently on prednisone, and as typical is improving. Recovering from covid.    Brother had lung disease, diabetes, kidney failure  Father lung disease, diabetes, EtoH.     Exam: alert, in a good mood    Procedure:  Endoscopy indicated for exam of sinuses  Topical anesthetic/decongestant spray applied.  Rigid scope used for visualization.  Findings: patent sinus cavities with scant clear and slightly thick secretions.     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 Has eosiinophilia, has cultured staph, pseudomonas, fungus. Allergy and rheumatologic work up not remarkable, has had low IgG in past. Continue BHS intervention, see me every 2 months to assess sinuses.     15 minutes spent on the date of the encounter doing chart review, history and exam, documentation and further activities per the note. This time is in addition to separately billable procedure.    Again, thank you for allowing me to participate in the care of your patient.      Sincerely,    Digna Jaimes MD

## 2023-04-03 NOTE — TELEPHONE ENCOUNTER
LVM with scheduled appt info and call back number if needed    --Scheduled appt info--  2 month follow up with Dr. Jaimes on 6/5 @ 11:30am

## 2023-04-03 NOTE — PROGRESS NOTES
Gracy continues to have profuse secretions, coughing, chest congestion and post nasal drainage. She has had in depth evaluation with pulmonary, GI, hematology, rheum. Work up is unremarkable other than slightly low IgG. She has seen a behavioral health specialist and is very happy with the discussion. She is currently on prednisone, and as typical is improving. Recovering from covid.    Brother had lung disease, diabetes, kidney failure  Father lung disease, diabetes, EtoH.     Exam: alert, in a good mood    Procedure:  Endoscopy indicated for exam of sinuses  Topical anesthetic/decongestant spray applied.  Rigid scope used for visualization.  Findings: patent sinus cavities with scant clear and slightly thick secretions.     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 Has eosiinophilia, has cultured staph, pseudomonas, fungus. Allergy and rheumatologic work up not remarkable, has had low IgG in past. Continue S intervention, see me every 2 months to assess sinuses.     15 minutes spent on the date of the encounter doing chart review, history and exam, documentation and further activities per the note. This time is in addition to separately billable procedure.

## 2023-04-13 ENCOUNTER — NURSE TRIAGE (OUTPATIENT)
Dept: NURSING | Facility: CLINIC | Age: 58
End: 2023-04-13
Payer: COMMERCIAL

## 2023-04-14 NOTE — TELEPHONE ENCOUNTER
Pt calling with concerns of asthma problem today, pt went to  last week Thursday 4/6, was given 3 nebulizer treatment, and prednisone.   Been taking prednisone, last night pt had trouble breathing, racing HR.   Just did a nebulizer just now at 730pm, prior to treatment, O2 sats 86%, p 110,   Rechecked Now O2 level 90%. HR 80   Heart feels racing, breathing in deeply leads to coughing  Pt having to speak in phrases, wheezing. SOB. Pt's spouse got on the phone, advised ED.    Pt will go to [facility] now.    Domenic Ireland, RN, BSN  4/13/2023 at 8:29 PM  [facility]        Reason for Disposition    Oxygen level (e.g., pulse oximetry) 90 percent or lower    Additional Information    Negative: SEVERE asthma attack (e.g., struggling for each breath, speaks in single words, loud wheezes, pulse >120)  (RED  Zone)    Negative: Bluish (or gray) lips or face now    Negative: Difficult to awaken or acting confused (e.g., disoriented, slurred speech)    Negative: Passed out (i.e., lost consciousness, collapsed and was not responding)    Negative: Wheezing started suddenly after medicine, an allergic food, or bee sting    Negative: Sounds like a life-threatening emergency to the triager    Negative: [1] MODERATE asthma attack (e.g., SOB at rest, speaks in phrases, audible wheezes) AND [2] doesn't have neb or inhaler available    Negative: Peak flow rate less than 50% of baseline level  (RED  Zone)    Protocols used: ASTHMA ATTACK-A-AH

## 2023-04-24 ENCOUNTER — VIRTUAL VISIT (OUTPATIENT)
Dept: PSYCHIATRY | Facility: CLINIC | Age: 58
End: 2023-04-24
Payer: COMMERCIAL

## 2023-04-24 VITALS — WEIGHT: 116 LBS | BODY MASS INDEX: 19.3 KG/M2

## 2023-04-24 DIAGNOSIS — F43.10 PTSD (POST-TRAUMATIC STRESS DISORDER): ICD-10-CM

## 2023-04-24 DIAGNOSIS — F33.2 SEVERE RECURRENT MAJOR DEPRESSION WITHOUT PSYCHOTIC FEATURES (H): Primary | ICD-10-CM

## 2023-04-24 RX ORDER — DEXTROAMPHETAMINE SULFATE, DEXTROAMPHETAMINE SACCHARATE, AMPHETAMINE SULFATE AND AMPHETAMINE ASPARTATE 5; 5; 5; 5 MG/1; MG/1; MG/1; MG/1
20 CAPSULE, EXTENDED RELEASE ORAL 2 TIMES DAILY PRN
COMMUNITY
Start: 2023-03-01

## 2023-04-24 ASSESSMENT — PATIENT HEALTH QUESTIONNAIRE - PHQ9: SUM OF ALL RESPONSES TO PHQ QUESTIONS 1-9: 20

## 2023-04-24 ASSESSMENT — PAIN SCALES - GENERAL: PAINLEVEL: NO PAIN (0)

## 2023-04-24 NOTE — NURSING NOTE
PHQ-9 score is 20.     Is the patient currently in the state of [place]? YES    Visit mode:VIDEO    If the visit is dropped, the patient can be reconnected by: VIDEO VISIT: Text to cell phone: [phone]    Will anyone else be joining the visit? NO    How would you like to obtain your AVS? MyChart    Are changes needed to the allergy or medication list? NO    Reason for visit: Video Visit (New pt- Evaluation for ECT )    Medication and allergies have been reviewed.     Gregg Barnett, VF
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
## 2023-04-27 ENCOUNTER — VIRTUAL VISIT (OUTPATIENT)
Dept: PULMONOLOGY | Facility: CLINIC | Age: 58
End: 2023-04-27
Payer: COMMERCIAL

## 2023-04-27 DIAGNOSIS — J43.2 CENTRILOBULAR EMPHYSEMA (H): Primary | ICD-10-CM

## 2023-04-27 PROCEDURE — 99215 OFFICE O/P EST HI 40 MIN: CPT | Mod: VID | Performed by: INTERNAL MEDICINE

## 2023-04-27 RX ORDER — SODIUM CHLORIDE FOR INHALATION 3 %
3 VIAL, NEBULIZER (ML) INHALATION 2 TIMES DAILY
Qty: 180 ML | Refills: 11 | Status: SHIPPED | OUTPATIENT
Start: 2023-04-27 | End: 2023-05-27

## 2023-04-27 NOTE — PROGRESS NOTES
Gracy is a 57 year old who is being evaluated via a billable video visit.      How would you like to obtain your AVS? MyChart  If the video visit is dropped, the invitation should be resent by: Text to cell phone: [phone]  Will anyone else be joining your video visit? No        Subjective   Gracy is a 57 year old, presenting for the following health issues- Asthma and COPD  :Video Visit (Follow up)    HPI   Ms. Gracy Bautista is a 57 yr old female with a complex history of severe asthma/copd with severe nasal polyposis, chronic rhinosinusitis with multiple surgeries, IgG deficiency who presents to clinic for follow up for uncontrolled severe asthma/copd and persistent cough. I last saw her in clinic in 10/2022.  Please refer to my prior clinic office notes for an overview of her complicated history.  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 Continues to have repeated asthma/copd flares. During the last 12- 24 months of seeing her in clinic, her CT imaging has shown progressive emphysematous changes and she now has overt obstruction on PFTs, both of which were not very evident during our initial visit. She likely also has concomitant severe asthma which complicates her clinical condition.  Has COPD based on worsening emphysema on imaging on a background of asthma. There has been difficulty in adherent use of controller medications/inhalers for a plethora of reasons including intolerance and some hesitancy towards medications in general. She likely has pharmacophobia which has made treating her lung disease extremely difficult. She is being seen by psych and as an evaluation for possible ECT for severe depression. She is using levalbuterol as needed for control of her symptoms and continues to have frequent exacerbations. Recent exacerbation and she was seen in the ED and was  "given treatments of nebulized albuterol with improvement. She was discharged on a prednisone taper.  Has been tried on Xolair, Dupixent and Nucala at different times with no improvement. Work up for ABPA, Churg-Misti disease and other vasculitic/autoimmune work up have returned negative. Chest imaging shows mostly subtle emphysema and low suspicion for conditions such as DIPNECH etc.   Sinus surgery with biopsy showing eosinophilic infiltration,  work up continues to show elevated peripheral eosinophilia, most recently at 1200 but was already on biologics at that time. IgE levels usually range from the 100's to 200's.      Review of Systems   Constitutional, HEENT, cardiovascular, pulmonary, GI, , musculoskeletal, neuro, skin, endocrine and psych systems are negative, except as otherwise noted.      Objective    Vitals - Patient Reported  Weight (Patient Reported): 52.2 kg (115 lb)  Height (Patient Reported): 165.1 cm (5' 5\")  BMI (Based on Pt Reported Ht/Wt): 19.14  Pain Score: No Pain (0)    Current Outpatient Medications   Medication Instructions     acetaminophen (TYLENOL) 1,000 mg, Oral, EVERY 8 HOURS PRN     ADDERALL 5 MG tablet 5 mg, Oral, DAILY PRN     ADDERALL XR 20 MG 24 hr capsule 20 mg, Oral, 2 TIMES DAILY PRN     azithromycin (ZITHROMAX) 250 mg, Oral, EVERY MONDAY WEDNESDAY AND FRIDAY MORNING     bisacodyl (DULCOLAX) 5 mg, Oral, PRN     Cymbalta 120 mg, Oral, EVERY MORNING     ferrous sulfate (FEROSUL) 325 (65 Fe) MG tablet 1/2 tablet by mouth in the morning.     Fluticasone-Umeclidin-Vilanterol (TRELEGY ELLIPTA) 200-62.5-25 MCG/INH oral inhaler 1 puff, Inhalation, DAILY     LaMICtal 200 mg, Oral, EVERY MORNING     levalbuterol (XOPENEX HFA) 45 MCG/ACT inhaler 2 puffs, Inhalation, EVERY 4 HOURS PRN     levalbuterol (XOPENEX) 0.31 mg, Nebulization, EVERY 4 HOURS PRN     levalbuterol (XOPENEX) 1.25 mg, Nebulization, EVERY 4 HOURS PRN     nicotine polacrilex (NICORETTE) 4 mg, EVERY 1 HOUR PRN     " spacer (OPTICHAMBER ELBA) holding chamber Use with albuterol (PROAIR HFA/PROVENTIL HFA/VENTOLIN HFA) 108 (90 Base) MCG/ACT inhaler         Physical Exam   GENERAL: Healthy, alert and no distress  EYES: Eyes grossly normal to inspection.  No discharge or erythema, or obvious scleral/conjunctival abnormalities.  RESP: No audible wheeze, cough, or visible cyanosis.  No visible retractions or increased work of breathing.    SKIN: Visible skin clear. No significant rash, abnormal pigmentation or lesions.  NEURO: Cranial nerves grossly intact.  Mentation and speech appropriate for age.  PSYCH: Mentation appears normal, affect normal/bright, judgement and insight intact, normal speech and appearance well-groomed.      Assessment and Plan:   Severe persistent Asthma with poor control/COPD   Has COPD based on worsening emphysema on imaging on a background of asthma. There has been difficulty in adherent use of controller medications/inhalers for a plethora of reasons including intolerance and some hesitancy towards medications in general. She likely has pharmacophobia which has made treating her lung disease extremely difficult. She is being seen by psych and as an evaluation for possible ECT for severe depression  She is using levalbuterol as needed for control of her symptoms and continues to have frequent exacerbations. Ongoing smoking is still unclear but with worsening emphysema, I wonder if she still smokes.  Given her excellent response to nebulized albuterol during exacerbations and the sporadic nature of exacerbations, I encouraged her to procure battery operated nebulizers and have one available in her car to use if needed as well as during travels and trips.  I really do not think trying another biologic agent will be helpful as she has tried 3 different ones with no change in her symptoms which again makes me think that COPD may be more of the primary  than asthma.  Extensive work up has been done for  auto-immune, vasculitis , ABPA and CF which are all negative.       Eosinophilic Chronic Sinusitis   Follows with Dr. Jaimes and has been intolerant to nasal sprays and rinses. Biologic agents have not been helpful. Recent sinus biopsy shows significant eosinophils indicative of eosinophilic chronic sinusitis. She has also grown pseudomonas and appears to respond to periodic antibiotics treatment. Again, work up for vasculitis has been negative.     Active Smoker  Amount of smoking does not meet threshold for lung cancer screening     Questions and concerns were answered to the patient's satisfaction.  she was provided with my contact information should new questions or concerns arise in the interim.     I spent a total of 30 minutes of video conversation with Gracy Bautista during today's office visit in addition to 10 minutes of chart review. Over 50% of this time was spent counseling the patient and/or coordinating care regarding their pulmonary disease.     She should return to clinic after 6 months      Up to date on vaccinations     Marco Bean MD  Pulmonary, Critical Care and Sleep Medicine  [facility]-opinions.hth  Pager: [phone]  Video-Visit Details    Type of service:  Video Visit   Start time : 11:00 am  End time: 11:30 am  Originating Location (pt. Location): Home    Distant Location (provider location):  On-site  Platform used for Video Visit: N[employer]Well

## 2023-04-27 NOTE — NURSING NOTE
Is the patient currently in the state of [place]? YES    Visit mode:VIDEO    If the visit is dropped, the patient can be reconnected by: VIDEO VISIT: Send to e-mail at: [email or web address]    Will anyone else be joining the visit? NO      How would you like to obtain your AVS? MyChart    Are changes needed to the allergy or medication list? NO    Reason for visit: Video Visit (Follow up)    Rachelle GARCIA

## 2023-04-28 ENCOUNTER — TELEPHONE (OUTPATIENT)
Dept: PULMONOLOGY | Facility: CLINIC | Age: 58
End: 2023-04-28
Payer: COMMERCIAL

## 2023-04-28 NOTE — CONFIDENTIAL NOTE
Left Voicemail (1st Attempt) for the patient to call back and schedule the following:    Appointment type: return  Provider:   Return date:  10/27/2023  Specialty phone number: [phone]  Additional appointment(s) needed: none  Additonal Notes: Return in about 6 months (around 10/27/2023).      Nika desai Procedure   Cardiology, Nephrology, Rheumatology, GI, Pulmonology, Infectious Disease Specialties   [facility]

## 2023-05-02 DIAGNOSIS — J43.2 CENTRILOBULAR EMPHYSEMA (H): Primary | ICD-10-CM

## 2023-05-02 NOTE — TELEPHONE ENCOUNTER
Contacted patient regarding pulmonary questions/concerns.    Patient is requesting Rx refill for Levalbuterol neb solution. Patient states that she has been cleaning and packing since Friday 04/28/2023 as she is moving to a new home. Patient reports wearing a mask during the cleaning process, however, patient states that her asthma/COPD was triggered by dust and drywall particles. Patient reports using Levalbuterol HFA and neb Q4h and is needing more boxes on hand.    Patient is requesting a 90 day supply of Levalbuterol neb solution and emergency dose of Prednisone to have on file in case of emergencies. Patient prefers the [facility] in [place]. Rxs pended and sent to Dr. Bean for review.      Donna Gaspar LPN  Pulmonary Medicine:  [facility] [place]  Phone: [phone] Fax: [phone]

## 2023-05-02 NOTE — TELEPHONE ENCOUNTER
[facility] Call Center    Phone Message    May a detailed message be left on voicemail: yes     Reason for Call: Other: pt calling, something about what she talked with Dr Bean on thursday, pt wouldnt reveal anything, please advise     Action Taken: Message routed to:  Adult Clinics: Pulmonology p [phone]    Travel Screening: Not Applicable

## 2023-05-03 ENCOUNTER — TELEPHONE (OUTPATIENT)
Facility: CLINIC | Age: 58
End: 2023-05-03

## 2023-05-03 ENCOUNTER — TELEPHONE (OUTPATIENT)
Dept: PULMONOLOGY | Facility: CLINIC | Age: 58
End: 2023-05-03

## 2023-05-03 RX ORDER — PREDNISONE 20 MG/1
40 TABLET ORAL DAILY
Qty: 10 TABLET | Refills: 0 | Status: SHIPPED | OUTPATIENT
Start: 2023-05-03 | End: 2023-05-08

## 2023-05-03 RX ORDER — LEVALBUTEROL INHALATION SOLUTION 1.25 MG/3ML
1 SOLUTION RESPIRATORY (INHALATION) EVERY 4 HOURS PRN
Qty: 1620 ML | Refills: 3 | Status: SHIPPED | OUTPATIENT
Start: 2023-05-03 | End: 2024-04-27

## 2023-05-03 NOTE — CONFIDENTIAL NOTE
05/03 Called patient to schedule the following;     -6 month follow up with     Patient declined scheduling and stated that she will no longer be receiving care from  and will be seeing a new pulmonologist.       Nika desai Procedure   Cardiology, Nephrology, Rheumatology, GI, Pulmonology, Infectious Disease Specialties   [facility]

## 2023-05-10 ENCOUNTER — VIRTUAL VISIT (OUTPATIENT)
Dept: PSYCHIATRY | Facility: CLINIC | Age: 58
End: 2023-05-10
Payer: COMMERCIAL

## 2023-05-10 DIAGNOSIS — F33.2 SEVERE RECURRENT MAJOR DEPRESSION WITHOUT PSYCHOTIC FEATURES (H): Primary | ICD-10-CM

## 2023-05-10 ASSESSMENT — PATIENT HEALTH QUESTIONNAIRE - PHQ9: SUM OF ALL RESPONSES TO PHQ QUESTIONS 1-9: 21

## 2023-05-10 NOTE — PROGRESS NOTES
Depression Response    Patient completed the PHQ-9 assessment for depression and scored >9? Yes  Question 9 on the PHQ-9 was positive for suicidality? Yes  Does patient have current mental health provider? Yes    Is this a virtual visit? Yes   Does patient have suicidal ideation (positive question 9)? No - offer to place Mental Health Referral.  Patient declined referral/not needed    I personally notified the following: visit provider         Virtual Visit Details    Type of service:  Video Visit     Originating Location (pt. Location): Home    Distant Location (provider location):  Off-site  Platform used for Video Visit: Suly

## 2023-05-10 NOTE — NURSING NOTE
Patient scored 21 on PHQ-9    Is the patient currently in the state of [place]? YES    Visit mode:VIDEO    If the visit is dropped, the patient can be reconnected by: VIDEO VISIT: Text to cell phone: [phone]    Will anyone else be joining the visit? NO      How would you like to obtain your AVS? MyChart    Are changes needed to the allergy or medication list? NO    Reason for visit: Video Visit      Kiana Marsh, VF

## 2023-06-05 ENCOUNTER — TELEPHONE (OUTPATIENT)
Dept: OTOLARYNGOLOGY | Facility: CLINIC | Age: 58
End: 2023-06-05
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
## 2023-06-05 NOTE — TELEPHONE ENCOUNTER
Patient called stating she needs to cancel her appointment today with Dr. Jaimes. She was recently discharged from the hospital and does not feel well enough to come today.     Patient added to the wait list.     Day Caraballo RN on 6/5/2023 at 8:55 AM

## 2023-07-26 ENCOUNTER — TELEPHONE (OUTPATIENT)
Dept: OTOLARYNGOLOGY | Facility: CLINIC | Age: 58
End: 2023-07-26
Payer: COMMERCIAL

## 2023-07-26 DIAGNOSIS — J32.4 CHRONIC PANSINUSITIS: Primary | ICD-10-CM

## 2023-07-26 NOTE — TELEPHONE ENCOUNTER
Signed Prescriptions:                        Disp   Refills    amoxicillin-clavulanate (AUGMENTIN) 875-12*28 tab*0        Sig: Take 1 tablet by mouth 2 times daily for 14 days  Authorizing Provider: RUBEN MCCORMACK  Ordering User: EYAL RBANCH

## 2023-07-28 ENCOUNTER — OFFICE VISIT (OUTPATIENT)
Dept: PSYCHIATRY | Facility: CLINIC | Age: 58
End: 2023-07-28
Payer: COMMERCIAL

## 2023-07-28 VITALS
DIASTOLIC BLOOD PRESSURE: 83 MMHG | HEIGHT: 65 IN | WEIGHT: 117.4 LBS | SYSTOLIC BLOOD PRESSURE: 118 MMHG | BODY MASS INDEX: 19.56 KG/M2 | HEART RATE: 80 BPM | TEMPERATURE: 97 F

## 2023-07-28 DIAGNOSIS — F43.10 PTSD (POST-TRAUMATIC STRESS DISORDER): ICD-10-CM

## 2023-07-28 DIAGNOSIS — F33.2 SEVERE EPISODE OF RECURRENT MAJOR DEPRESSIVE DISORDER, WITHOUT PSYCHOTIC FEATURES (H): Primary | ICD-10-CM

## 2023-07-28 DIAGNOSIS — F10.11 ALCOHOL USE DISORDER, MILD, IN SUSTAINED REMISSION: ICD-10-CM

## 2023-07-28 DIAGNOSIS — F34.1 PERSISTENT DEPRESSIVE DISORDER: ICD-10-CM

## 2023-07-28 DIAGNOSIS — F17.200 NICOTINE USE DISORDER: ICD-10-CM
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
## 2023-07-28 NOTE — PATIENT INSTRUCTIONS
Keep these instructions to take to your PCP for the pre-op and until the end of ECT for your reference. These instructions will help you recover from depression during ECT.    If you have decided you definitely are going to do ECT in the next month, go to your primary care MD for an ECT pre-op exam ASAP. Bring these instructions and show your doctor that you need an EKG, CBC, BMP, and exam. If you are undecided about ECT or plan to do it later, wait and get the pre-op exam later, because it must be done within 30 days of the first ECT. The exam should include a physical exam, including neurological examination and evaluation of potential risks for ECT and anesthesia.    Unless your clinic is in the [facility] system (where we can see their notes), ask the doctor's office to fax the evaluation and lab results, including EKG tracing to [phone] to ATTN: Dr. Kolby Salter. When your doctor's office has sent the information call Purnima Montana RN at [phone] to make sure they were all received.    As soon as we have medical clearance and insurance approval, we will schedule first ECT.    During ECT, you may call the ECT area [phone] between 7 AM and 2 PM on Monday, Wednesday and Friday about scheduling issues. At other times, you will have to leave a voice message which will be retrieved the next ECT day. For all clinical questions for Dr. Salter, call the clinic at [phone] and ask to speak to Purnima Montana RN who will direct any questions to Dr. Salter.    For further information about ECT, go to these websites:      [email or web address]      https://www.AWIDube.com/watch?v=WEey8ky0fqr      https://www.youtube.com/watch?v=MrbJ57XP8Lq      https://www.isen-ect.org/educational-content      http://www.[facility].org/tests-procedures/electroconvulsive-therapy/basics/definition/prc-01611923      Start using a calendar and notebook or apps on your smartphone to keep track of appointments, conversations,   and other things you need to remember, as this will be more helpful as the ECT continues.    Start an ECT journal to track your progress. Spend a few minutes writing down how you feel now before ECT and why you are doing ECT. Throughout the course of treatments (probably this will be easier on non-ECT days), write what changes you are noticing in your depression, daily activities or side effects of treatment. This can be useful later on in the ECT if you are having some memory loss day-to-day and can't recall how you used to feel. If you have trouble writing this, try recording a video on non-ECT days.describing the above.    Eat healthily, keep a regular sleep schedule, exercise or other physical activity at least on non-ECT days as able.    Keep mentally active by reading, doing crossword puzzles or other word games, play video or other games. You can improve your memory for events happening over the previous few days by regularly reviewing things that happened over that period of time which will refresh your memory.    Medication changes before and/or during ECT:  1.Make the following changes in your medication before you start ECT: reduce lamotrigine to 150 mg daily for one week, then reduce to 100 mg daily.  2.Do not take these medications the evening before an ECT is planned: none  3.Take these medications in the morning before ECT with a sip of water (take all other AM medications after ECT): none

## 2023-07-31 ENCOUNTER — ONCOLOGY VISIT (OUTPATIENT)
Dept: ONCOLOGY | Facility: CLINIC | Age: 58
End: 2023-07-31
Attending: INTERNAL MEDICINE
Payer: COMMERCIAL

## 2023-07-31 ENCOUNTER — LAB (OUTPATIENT)
Dept: LAB | Facility: CLINIC | Age: 58
End: 2023-07-31
Attending: INTERNAL MEDICINE
Payer: COMMERCIAL

## 2023-07-31 VITALS
OXYGEN SATURATION: 99 % | DIASTOLIC BLOOD PRESSURE: 82 MMHG | WEIGHT: 117 LBS | HEART RATE: 67 BPM | SYSTOLIC BLOOD PRESSURE: 120 MMHG | BODY MASS INDEX: 19.47 KG/M2

## 2023-07-31 DIAGNOSIS — Z72.0 TOBACCO ABUSE: ICD-10-CM

## 2023-07-31 DIAGNOSIS — D50.8 IRON DEFICIENCY ANEMIA SECONDARY TO INADEQUATE DIETARY IRON INTAKE: Primary | ICD-10-CM

## 2023-07-31 DIAGNOSIS — T50.905A: ICD-10-CM

## 2023-07-31 DIAGNOSIS — D72.10: ICD-10-CM

## 2023-07-31 DIAGNOSIS — D50.8 IRON DEFICIENCY ANEMIA SECONDARY TO INADEQUATE DIETARY IRON INTAKE: ICD-10-CM

## 2023-07-31 DIAGNOSIS — J45.40 MODERATE PERSISTENT ASTHMA, UNCOMPLICATED: ICD-10-CM

## 2023-07-31 LAB
BASOPHILS # BLD AUTO: 0.1 10E3/UL (ref 0–0.2)
BASOPHILS NFR BLD AUTO: 1 %
EOSINOPHIL # BLD AUTO: 0.3 10E3/UL (ref 0–0.7)
EOSINOPHIL NFR BLD AUTO: 3 %
ERYTHROCYTE [DISTWIDTH] IN BLOOD BY AUTOMATED COUNT: 12.8 % (ref 10–15)
FERRITIN SERPL-MCNC: 34 NG/ML (ref 11–328)
HCT VFR BLD AUTO: 46.2 % (ref 35–47)
HGB BLD-MCNC: 15.8 G/DL (ref 11.7–15.7)
HOLD SPECIMEN: NORMAL
IMM GRANULOCYTES # BLD: 0.1 10E3/UL
IMM GRANULOCYTES NFR BLD: 1 %
LYMPHOCYTES # BLD AUTO: 3.1 10E3/UL (ref 0.8–5.3)
LYMPHOCYTES NFR BLD AUTO: 29 %
MCH RBC QN AUTO: 33.8 PG (ref 26.5–33)
MCHC RBC AUTO-ENTMCNC: 34.2 G/DL (ref 31.5–36.5)
MCV RBC AUTO: 99 FL (ref 78–100)
MONOCYTES # BLD AUTO: 0.8 10E3/UL (ref 0–1.3)
MONOCYTES NFR BLD AUTO: 7 %
NEUTROPHILS # BLD AUTO: 6.6 10E3/UL (ref 1.6–8.3)
NEUTROPHILS NFR BLD AUTO: 59 %
NRBC # BLD AUTO: 0 10E3/UL
NRBC BLD AUTO-RTO: 0 /100
PLATELET # BLD AUTO: 455 10E3/UL (ref 150–450)
RBC # BLD AUTO: 4.67 10E6/UL (ref 3.8–5.2)
WBC # BLD AUTO: 10.9 10E3/UL (ref 4–11)

## 2023-07-31 PROCEDURE — 85025 COMPLETE CBC W/AUTO DIFF WBC: CPT

## 2023-07-31 PROCEDURE — 99214 OFFICE O/P EST MOD 30 MIN: CPT | Performed by: NURSE PRACTITIONER

## 2023-07-31 PROCEDURE — 82728 ASSAY OF FERRITIN: CPT

## 2023-07-31 PROCEDURE — 36415 COLL VENOUS BLD VENIPUNCTURE: CPT

## 2023-07-31 ASSESSMENT — PAIN SCALES - GENERAL: PAINLEVEL: NO PAIN (0)

## 2023-07-31 NOTE — PROGRESS NOTES
Oncology Follow Up Visit: July 31, 2023    Oncologist: Dr Zev Roach  PCP: Donna Hwang    Diagnosis: Anemia and eosinophilia  History take from previous notes by Dr Heart and Dr Roach:  Gracy Bautista is a 57 year old female with a history of severe nasal polyposis and chronic pansinusitis requiring several surgeries in the past, hypogammaglobulinemia who comes in today for evaluation. Reporting several years  of issues with chronic pansinusitis requiring multiple surgeries along ith asthma and pneumonia.  Several years ago she was noted to have IgG level in high 400s and low 500s so she was given IVIG for about 2-1/2 years but it did not improve her symptoms.  Also has history on Nucala for several months but it was a stopped because she did not notice much improvement.  She was following with [place] [facility] at that time.  She continued with fatigue and feeling exhausted all the time.  She had chronic shortness of breath from asthma.  She continued to have issues with facial pain from sinusitis.  She was diagnosed with fibromyalgia as well as depression.  She is a smoker, had 2 C-sections, total abdominal hysterectomy/bilateral salpingo-oophorectomy and 4 sinus surgeries.  She denies any other spontaneous bleeding like melena hematochezia or hematuria or hemoptysis, hematemesis or epistaxis.  Treatment:   2/17/2023 BMBX- Normal findings    Interval History: Ms Bautista comes to clinic for follow up to her iron deficiency and eosinophilia. Pt reports she has been in and out of hospital 5 x this year with asthma issues and is now seeing new pulmonologist. She has just gotten off of steroids again. Mentions feet are red and has blotchy skin. Smoking 3 cigarettes daily yet. She is not working outside the home as she gets very tired in the day. States her stomach is no longer tolerating the oral iron- get nauseated from it now even with every other day use and has not used for some time.   Rest of  comprehensive and complete ROS is reviewed and is negative.   Past Medical History:   Diagnosis Date    Allergic rhinitis     Arthritis     Chronic sinusitis     COPD (chronic obstructive pulmonary disease) (H)     Depressive disorder     Hoarseness     Reduced vision     Uncomplicated asthma      Current Outpatient Medications   Medication    acetaminophen (TYLENOL) 500 MG tablet    ADDERALL XR 20 MG 24 hr capsule    amoxicillin-clavulanate (AUGMENTIN) 875-125 MG tablet    azithromycin (ZITHROMAX) 250 MG tablet    bisacodyl (DULCOLAX) 5 MG EC tablet    CYMBALTA 60 MG capsule    LAMICTAL 200 MG tablet    levalbuterol (XOPENEX HFA) 45 MCG/ACT inhaler    levalbuterol (XOPENEX) 1.25 MG/3ML neb solution    nicotine polacrilex (NICORETTE) 4 MG gum    spacer (OPTICHAMBER ELBA) holding chamber     Current Facility-Administered Medications   Medication    tezepelumab-ekko (TEZSPIRE) injection 210 mg     Allergies   Allergen Reactions    Bee Venom Angioedema, Swelling and Hives     Other reaction(s): Angioedema      Cefdinir Diarrhea and GI Disturbance             Levofloxacin Muscle Pain (Myalgia)    Prednisone Anxiety and Other (See Comments)     Hyperactivity and moodiness. Doesn't like how it makes her feel.         Physical Exam:/82 (BP Location: Right arm, Patient Position: Chair, Cuff Size: Adult Small)   Pulse 67   Wt 53.1 kg (117 lb)   SpO2 99%   BMI 19.47 kg/m     ECOG PS-   Constitutional: Alert, healthy, and in no distress.   ENT: Eyes bright , No mouth sores  Neck: Supple, No adenopathy.  Cardiac: Heart rate and rhythm is regular and strong without murmur  Respiratory: Breathing easy. Lung sounds clear to auscultation though is coughing intermittently with a loose cough- not productive.   GI: Abdomen is soft, non-tender, BS normal. No masses or organomegaly  MS: Muscle tone normal, extremities normal with no edema.   Skin: No suspicious lesions or rashes- feet have no edema but are mildly red in  appearance.   Neuro: Sensory grossly WNL, gait normal.   Lymph: Normal ant/post cervical, axillary, supraclavicular nodes  Psych: Mentation appears normal and affect normal with good conversation    Laboratory/Imaging Results:   Results for orders placed or performed in visit on 07/31/23   Ferritin     Status: Normal   Result Value Ref Range    Ferritin 34 11 - 328 ng/mL   CBC with platelets and differential     Status: Abnormal   Result Value Ref Range    WBC Count 10.9 4.0 - 11.0 10e3/uL    RBC Count 4.67 3.80 - 5.20 10e6/uL    Hemoglobin 15.8 (H) 11.7 - 15.7 g/dL    Hematocrit 46.2 35.0 - 47.0 %    MCV 99 78 - 100 fL    MCH 33.8 (H) 26.5 - 33.0 pg    MCHC 34.2 31.5 - 36.5 g/dL    RDW 12.8 10.0 - 15.0 %    Platelet Count 455 (H) 150 - 450 10e3/uL    % Neutrophils 59 %    % Lymphocytes 29 %    % Monocytes 7 %    % Eosinophils 3 %    % Basophils 1 %    % Immature Granulocytes 1 %    NRBCs per 100 WBC 0 <1 /100    Absolute Neutrophils 6.6 1.6 - 8.3 10e3/uL    Absolute Lymphocytes 3.1 0.8 - 5.3 10e3/uL    Absolute Monocytes 0.8 0.0 - 1.3 10e3/uL    Absolute Eosinophils 0.3 0.0 - 0.7 10e3/uL    Absolute Basophils 0.1 0.0 - 0.2 10e3/uL    Absolute Immature Granulocytes 0.1 <=0.4 10e3/uL    Absolute NRBCs 0.0 10e3/uL   Extra Tube     Status: None ()    Narrative    The following orders were created for panel order Extra Tube.  Procedure                               Abnormality         Status                     ---------                               -----------         ------                     Extra Serum Separator Tu...[[id number]]                                                   Please view results for these tests on the individual orders.   CBC with Platelets & Differential     Status: Abnormal    Narrative    The following orders were created for panel order CBC with Platelets & Differential.  Procedure                               Abnormality         Status                     ---------                                -----------         ------                     CBC with platelets and d...[[id number]]  Abnormal            Final result                 Please view results for these tests on the individual orders.     Assessment and Plan:   Iron deficiency anemia-today hemoglobin is well within normal level however ferritin is low but not to the point where she would have to have iron replacement.  She states she has an intolerance to the oral iron now with stomach irritation every time she takes it even on an every other day basis so if she needs it in the future we may have to discuss IV iron.  She is a smoker which can push hemoglobin up higher however iron would not be covered due to normal hemoglobin yet at this time.  Suggest we not use touch with her and we will set up a lab and visit in 6 months.  Eosinophilia-appears to be under good control at this time since she has just been on another steroid burst with her concerns with her recurrent and constant issues with the asthma.  She is now seeing a new pulmonologist and is hoping for even better results.  She will return for the eosinophilia if labs change as they are being taken often by pulmonology.  Tobacco use-counseled patient to discontinue all smoking however patient states she is trying to decrease use.  Also being worked on by pulmonary specialty.  The total time of this encounter amounted to 30 minutes. This time included time spent with the patient, prep work, ordering tests, and performing post visit documentation.  Dee Whyte,Cnp

## 2023-07-31 NOTE — LETTER
7/31/2023         RE: Gracy Bautista  [address] [address]        Dear Colleague,    Thank you for referring your patient, Gracy Bautista, to the [facility]. Please see a copy of my visit note below.    Oncology Follow Up Visit: July 31, 2023    Oncologist: Dr Zev Roach  PCP: Donna Hwang    Diagnosis: Anemia and eosinophilia  History take from previous notes by Dr Heart and Dr Roach:  Gracy Bautista is a 57 year old female with a history of severe nasal polyposis and chronic pansinusitis requiring several surgeries in the past, hypogammaglobulinemia who comes in today for evaluation. Reporting several years  of issues with chronic pansinusitis requiring multiple surgeries along ith asthma and pneumonia.  Several years ago she was noted to have IgG level in high 400s and low 500s so she was given IVIG for about 2-1/2 years but it did not improve her symptoms.  Also has history on Nucala for several months but it was a stopped because she did not notice much improvement.  She was following with [employer] [facility] at that time.  She continued with fatigue and feeling exhausted all the time.  She had chronic shortness of breath from asthma.  She continued to have issues with facial pain from sinusitis.  She was diagnosed with fibromyalgia as well as depression.  She is a smoker, had 2 C-sections, total abdominal hysterectomy/bilateral salpingo-oophorectomy and 4 sinus surgeries.  She denies any other spontaneous bleeding like melena hematochezia or hematuria or hemoptysis, hematemesis or epistaxis.  Treatment:   2/17/2023 BMBX- Normal findings    Interval History: Ms Bautista comes to clinic for follow up to her iron deficiency and eosinophilia. Pt reports she has been in and out of hospital 5 x this year with asthma issues and is now seeing new pulmonologist. She has just gotten off of steroids again. Mentions feet are red and has blotchy  skin. Smoking 3 cigarettes daily yet. She is not working outside the home as she gets very tired in the day. States her stomach is no longer tolerating the oral iron- get nauseated from it now even with every other day use and has not used for some time.   Rest of comprehensive and complete ROS is reviewed and is negative.   Past Medical History:   Diagnosis Date     Allergic rhinitis      Arthritis      Chronic sinusitis      COPD (chronic obstructive pulmonary disease) (H)      Depressive disorder      Hoarseness      Reduced vision      Uncomplicated asthma      Current Outpatient Medications   Medication     acetaminophen (TYLENOL) 500 MG tablet     ADDERALL XR 20 MG 24 hr capsule     amoxicillin-clavulanate (AUGMENTIN) 875-125 MG tablet     azithromycin (ZITHROMAX) 250 MG tablet     bisacodyl (DULCOLAX) 5 MG EC tablet     CYMBALTA 60 MG capsule     LAMICTAL 200 MG tablet     levalbuterol (XOPENEX HFA) 45 MCG/ACT inhaler     levalbuterol (XOPENEX) 1.25 MG/3ML neb solution     nicotine polacrilex (NICORETTE) 4 MG gum     spacer (OPTICHAMBER ELBA) holding chamber     Current Facility-Administered Medications   Medication     tezepelumab-ekko (TEZSPIRE) injection 210 mg     Allergies   Allergen Reactions     Bee Venom Angioedema, Swelling and Hives     Other reaction(s): Angioedema       Cefdinir Diarrhea and GI Disturbance              Levofloxacin Muscle Pain (Myalgia)     Prednisone Anxiety and Other (See Comments)     Hyperactivity and moodiness. Doesn't like how it makes her feel.         Physical Exam:/82 (BP Location: Right arm, Patient Position: Chair, Cuff Size: Adult Small)   Pulse 67   Wt 53.1 kg (117 lb)   SpO2 99%   BMI 19.47 kg/m     ECOG PS-   Constitutional: Alert, healthy, and in no distress.   ENT: Eyes bright , No mouth sores  Neck: Supple, No adenopathy.  Cardiac: Heart rate and rhythm is regular and strong without murmur  Respiratory: Breathing easy. Lung sounds clear to  auscultation though is coughing intermittently with a loose cough- not productive.   GI: Abdomen is soft, non-tender, BS normal. No masses or organomegaly  MS: Muscle tone normal, extremities normal with no edema.   Skin: No suspicious lesions or rashes- feet have no edema but are mildly red in appearance.   Neuro: Sensory grossly WNL, gait normal.   Lymph: Normal ant/post cervical, axillary, supraclavicular nodes  Psych: Mentation appears normal and affect normal with good conversation    Laboratory/Imaging Results:   Results for orders placed or performed in visit on 07/31/23   Ferritin     Status: Normal   Result Value Ref Range    Ferritin 34 11 - 328 ng/mL   CBC with platelets and differential     Status: Abnormal   Result Value Ref Range    WBC Count 10.9 4.0 - 11.0 10e3/uL    RBC Count 4.67 3.80 - 5.20 10e6/uL    Hemoglobin 15.8 (H) 11.7 - 15.7 g/dL    Hematocrit 46.2 35.0 - 47.0 %    MCV 99 78 - 100 fL    MCH 33.8 (H) 26.5 - 33.0 pg    MCHC 34.2 31.5 - 36.5 g/dL    RDW 12.8 10.0 - 15.0 %    Platelet Count 455 (H) 150 - 450 10e3/uL    % Neutrophils 59 %    % Lymphocytes 29 %    % Monocytes 7 %    % Eosinophils 3 %    % Basophils 1 %    % Immature Granulocytes 1 %    NRBCs per 100 WBC 0 <1 /100    Absolute Neutrophils 6.6 1.6 - 8.3 10e3/uL    Absolute Lymphocytes 3.1 0.8 - 5.3 10e3/uL    Absolute Monocytes 0.8 0.0 - 1.3 10e3/uL    Absolute Eosinophils 0.3 0.0 - 0.7 10e3/uL    Absolute Basophils 0.1 0.0 - 0.2 10e3/uL    Absolute Immature Granulocytes 0.1 <=0.4 10e3/uL    Absolute NRBCs 0.0 10e3/uL   Extra Tube     Status: None ()    Narrative    The following orders were created for panel order Extra Tube.  Procedure                               Abnormality         Status                     ---------                               -----------         ------                     Extra Serum Separator Tu...[[id number]]                                                   Please view results for these tests on the  individual orders.   CBC with Platelets & Differential     Status: Abnormal    Narrative    The following orders were created for panel order CBC with Platelets & Differential.  Procedure                               Abnormality         Status                     ---------                               -----------         ------                     CBC with platelets and d...[[id number]]  Abnormal            Final result                 Please view results for these tests on the individual orders.     Assessment and Plan:   Iron deficiency anemia-today hemoglobin is well within normal level however ferritin is low but not to the point where she would have to have iron replacement.  She states she has an intolerance to the oral iron now with stomach irritation every time she takes it even on an every other day basis so if she needs it in the future we may have to discuss IV iron.  She is a smoker which can push hemoglobin up higher however iron would not be covered due to normal hemoglobin yet at this time.  Suggest we not use touch with her and we will set up a lab and visit in 6 months.  Eosinophilia-appears to be under good control at this time since she has just been on another steroid burst with her concerns with her recurrent and constant issues with the asthma.  She is now seeing a new pulmonologist and is hoping for even better results.  She will return for the eosinophilia if labs change as they are being taken often by pulmonology.  Tobacco use-counseled patient to discontinue all smoking however patient states she is trying to decrease use.  Also being worked on by pulmonary specialty.  The total time of this encounter amounted to 30 minutes. This time included time spent with the patient, prep work, ordering tests, and performing post visit documentation.  Dee Whyte Cnp      Again, thank you for allowing me to participate in the care of your patient.        Sincerely,        Dee Whyte NP,  APRN CNP

## 2023-07-31 NOTE — NURSING NOTE
"Oncology Rooming Note    July 31, 2023 8:31 AM   Gracy Bautista is a 57 year old female who presents for:    Chief Complaint   Patient presents with    Oncology Clinic Visit     Follow up     Initial Vitals: /82 (BP Location: Right arm, Patient Position: Chair, Cuff Size: Adult Small)   Pulse 67   Wt 53.1 kg (117 lb)   SpO2 99%   BMI 19.47 kg/m   Estimated body mass index is 19.47 kg/m  as calculated from the following:    Height as of 7/28/23: 1.651 m (5' 5\").    Weight as of this encounter: 53.1 kg (117 lb). Body surface area is 1.56 meters squared.  No Pain (0) Comment: Data Unavailable   No LMP recorded. Patient has had a hysterectomy.  Allergies reviewed: Yes  Medications reviewed: Yes    Medications: Medication refills not needed today.  Pharmacy name entered into EPIC:    [id number] - [address] AT [place] [address]  WRITTEN PRESCRIPTION REQUESTED  [facility] [id number] [facility] [employer] - [place] [address]    Clinical concerns: Yes  Dee was notified.      Kirsten Masterson CMA              "

## 2023-08-04 ENCOUNTER — TELEPHONE (OUTPATIENT)
Dept: PSYCHIATRY | Facility: CLINIC | Age: 58
End: 2023-08-04

## 2023-08-04 NOTE — TELEPHONE ENCOUNTER
Writer called patient to review ECT and next steps as writer received notification from Dr. Salter that patient is approved for ECT.  Received voicemail.  Left message asking for a return call.  Clinic number provided.

## 2023-08-04 NOTE — PROGRESS NOTES
[facility] Emergency Department    [address] [address]    Phone:  [phone]    Fax:  [phone]                                       Mary Bueno   MRN: [id number]    Department:  [facility] Emergency Department   Date of Visit:  5/22/2017           Patient Information     Date Of Birth          2003        Your diagnoses for this visit were:     Excessive or frequent menstruation        You were seen by Manisha Odom PA-C.      Follow-up Information     Follow up with [facility].    Specialty:  OB/Gyn    Why:  As needed, If symptoms worsen    Contact information:    [facility] [place]  [facility] [address] [phone]    Additional information:    The medical center is located at   [address] (between [address] in [place], [place] miles north   of [place]).        Discharge Instructions           Heavy Menstrual Bleeding    Heavy menstrual bleeding means that your periods are heavier or longer than usual. You may soak through a pad or tampon every 1 to 3 hours on the heaviest days of your period. You may also pass large, dark clots. And your periods may last longer than 7 days.  If you have heavy periods often, this can cause a problem called anemia. With anemia, your red blood cell count is too low. Red blood cells are needed because they help carry oxygen throughout your body. Severe anemia may cause you to look pale and feel weak or tired. You might also become short of breath easily.  There are many possible causes of heavy menstrual bleeding. Hormonal imbalance is the most common cause. Having benign growths in your uterus, such as fibroids or polyps, is another cause. Taking certain medicines or having certain health problems or bleeding disorders are also causes.  To treat heavy menstrual bleeding, medicines are often tried first. If these don t help, further testing and treatments will likely be  Duplicate note/encounter created in error.  Please see separate encounter of 07/28/2023.   needed.  Home care  Medicines  If you re prescribed medicines, be sure to take them as directed.    To help control heavy bleeding, any of the following may be used:    Hormone therapy (this includes all methods of hormonal birth control such as pills, shots, cream, ring, patch, or hormone-releasing IUD)    Nonsteroidal anti-inflammatory drugs (NSAIDs), such as ibuprofen    Antifibrinolytic medicines, such as transexamic acid    To help treat anemia, iron supplements may be prescribed.            General care    Get plenty of rest if you tire easily. Avoid heavy exertion.    To help relieve pain or cramping, try using a heating pad on the lower belly or back. A warm bath may also help.  Follow-up care  Follow up with your healthcare provider as directed.  When to seek medical advice  Call your healthcare provider right away if any of these occur:    Heavier bleeding (soaking 1 pad or tampon every hour for at least 3 hours)    Heavy bleeding that lasts longer than 1 week    Fever of 100.4 F (38 C) or higher, or as directed by your provider    Pain or cramping that gets worse instead of better    Signs of anemia such as pale skin, extreme fatigue or weakness, or shortness of breath    Dizziness or fainting    [id number] [employer]. [address] [place] [address]. All rights reserved. This information is not intended as a substitute for professional medical care. Always follow your healthcare professional's instructions.            Future Appointments        Provider Department Dept Phone Center    5/23/2017 2:30 PM Tami Gutierrez PT [facility] [phone] [facility]      24 Hour Appointment Hotline       To make an appointment at any [facility] clinic, call [phone] ([phone]). If you don't have a family doctor or clinic, we will help you find one. [facility] clinics are conveniently located to serve the needs of you and your family.             Review of  your medicines      Our records show that you are taking the medicines listed below. If these are incorrect, please call your family doctor or clinic.        Dose / Directions Last dose taken    diphenhydrAMINE-acetaminophen  MG tablet   Commonly known as:  TYLENOL PM   Dose:  1 tablet        Take 1 tablet by mouth nightly as needed for sleep   Refills:  0        HYDROmorphone 2 MG tablet   Commonly known as:  DILAUDID   Dose:  2 mg   Quantity:  30 tablet        Take 1 tablet (2 mg) by mouth every 4 hours as needed for moderate to severe pain   Refills:  0        hydrOXYzine 25 MG tablet   Commonly known as:  ATARAX   Dose:  25 mg   Quantity:  36 tablet        Take 1 tablet (25 mg) by mouth every 6 hours as needed for itching (and nausea)   Refills:  0        MELATONIN PO   Dose:  2 mg   Indication:  Trouble Sleeping        Take 2 mg by mouth nightly as needed   Refills:  0        MOTRIN IB PO   Dose:  800 mg        Take 800 mg by mouth every 8 hours as needed   Refills:  0        naproxen 500 MG tablet   Commonly known as:  NAPROSYN   Quantity:  60 tablet        Take 1 pill 2 times per day during the period   Refills:  1        norgestim-eth estrad triphasic 0.18/0.215/0.25 MG-35 MCG per tablet   Commonly known as:  TRINESSA (28)   Dose:  1 tablet   Quantity:  84 tablet        Take 1 tablet by mouth daily   Refills:  3        norgestimate-ethinyl estradiol 0.25-35 MG-MCG per tablet   Commonly known as:  ORTHO-CYCLEN, SPRINTEC   Dose:  1 tablet   Quantity:  84 tablet        Take 1 tablet by mouth daily   Refills:  1        omeprazole 20 MG CR capsule   Commonly known as:  priLOSEC   Dose:  20 mg   Quantity:  90 capsule        Take 1 capsule (20 mg) by mouth daily Take 30-60 minutes before a meal.   Refills:  1        sertraline 100 MG tablet   Commonly known as:  ZOLOFT   Dose:  100 mg   Quantity:  30 tablet        Take 1 tablet (100 mg) by mouth daily   Refills:  1                Procedures and tests  performed during your visit     Basic metabolic panel    CBC with platelets, differential    HCG qualitative urine    UA with Microscopic      Orders Needing Specimen Collection     None      Pending Results     No orders found from 5/20/2017 to 5/23/2017.            Pending Culture Results     No orders found from 5/20/2017 to 5/23/2017.            Pending Results Instructions     If you had any lab results that were not finalized at the time of your Discharge, you can call the ED Lab Result RN at [phone]. You will be contacted by this team for any positive Lab results or changes in treatment. The nurses are available 7 days a week from 10A to 6:30P.  You can leave a message 24 hours per day and they will return your call.        Test Results From Your Hospital Stay        5/22/2017 12:02 PM      Component Results     Component Value Ref Range & Units Status    HCG Qual Urine Negative NEG Final         5/22/2017 12:35 PM      Component Results     Component Value Ref Range & Units Status    Color Urine Light Red  Final    Appearance Urine Slightly Cloudy  Final    Glucose Urine Negative NEG mg/dL Final    Bilirubin Urine Negative NEG Final    Ketones Urine Negative NEG mg/dL Final    Specific Gravity Urine 1.016 1.003 - 1.035 Final    Blood Urine Moderate (A) NEG Final    pH Urine 5.0 5.0 - 7.0 pH Final    Protein Albumin Urine 10 (A) NEG mg/dL Final    Urobilinogen mg/dL Normal 0.0 - 2.0 mg/dL Final    Nitrite Urine Negative NEG Final    Leukocyte Esterase Urine Small (A) NEG Final    Source Unspecified Urine  Final    WBC Urine 0 0 - 2 /HPF Final    RBC Urine >182 (H) 0 - 2 /HPF Final         5/22/2017 12:02 PM      Component Results     Component Value Ref Range & Units Status    WBC 8.3 4.0 - 11.0 10e9/L Final    RBC Count 5.27 3.7 - 5.3 10e12/L Final    Hemoglobin 13.0 11.7 - 15.7 g/dL Final    Hematocrit 42.0 35.0 - 47.0 % Final    MCV 80 77 - 100 fl Final    MCH 24.7 (L) 26.5 - 33.0 pg Final    MCHC  31.0 (L) 31.5 - 36.5 g/dL Final    RDW 13.5 10.0 - 15.0 % Final    Platelet Count 205 150 - 450 10e9/L Final    Diff Method Automated Method  Final    % Neutrophils 62.1 % Final    % Lymphocytes 27.7 % Final    % Monocytes 8.8 % Final    % Eosinophils 1.1 % Final    % Basophils 0.2 % Final    % Immature Granulocytes 0.1 % Final    Absolute Neutrophil 5.1 1.3 - 7.0 10e9/L Final    Absolute Lymphocytes 2.3 1.0 - 5.8 10e9/L Final    Absolute Monocytes 0.7 0.0 - 1.3 10e9/L Final    Absolute Eosinophils 0.1 0.0 - 0.7 10e9/L Final    Absolute Basophils 0.0 0.0 - 0.2 10e9/L Final    Abs Immature Granulocytes 0.0 0 - 0.4 10e9/L Final         5/22/2017 12:14 PM      Component Results     Component Value Ref Range & Units Status    Sodium 137 133 - 143 mmol/L Final    Potassium 3.4 3.4 - 5.3 mmol/L Final    Chloride 104 96 - 110 mmol/L Final    Carbon Dioxide 25 20 - 32 mmol/L Final    Anion Gap 8 3 - 14 mmol/L Final    Glucose 81 70 - 99 mg/dL Final    Urea Nitrogen 9 7 - 19 mg/dL Final    Creatinine 0.48 0.39 - 0.73 mg/dL Final    GFR Estimate  mL/min/1.7m2 Final    GFR not calculated, patient <16 years old.  Non  GFR Calc      GFR Estimate If Black  mL/min/1.7m2 Final    GFR not calculated, patient <16 years old.   GFR Calc      Calcium 9.2 9.1 - 10.3 mg/dL Final                Thank you for choosing [facility]       Thank you for choosing [facility] for your care. Our goal is always to provide you with excellent care. Hearing back from our patients is one way we can continue to improve our services. Please take a few minutes to complete the written survey that you may receive in the mail after you visit with us. Thank you!        [employer] Information     [employer] gives you secure access to your electronic health record. If you see a primary care provider, you can also send messages to your care team and make appointments. If you have questions, please call your primary care clinic.  If you do not  have a primary care provider, please call [phone] and they will assist you.        Care EveryWhere ID     This is your Care EveryWhere ID. This could be used by other organizations to access your [facility] medical records  [id number]        After Visit Summary       This is your record. Keep this with you and show to your community pharmacist(s) and doctor(s) at your next visit.

## 2023-08-08 NOTE — TELEPHONE ENCOUNTER
Writer called patient back.      Patient had some complaints about how the  staff treated her.      Patient reports that she has received several calls from the research department ([employer]) to ask if she was still interested in research.       Patient stated that after talking for a couple minutes that she needed to take another call.      Writer will call back later

## 2023-08-08 NOTE — TELEPHONE ENCOUNTER
Writer called patient back.  She states that she is confused about some of the phone calls she has gotten from the research team.  Writer will follow up with research to see what the next steps are

## 2023-08-15 NOTE — TELEPHONE ENCOUNTER
Ramone Underwood Melanie A, RN  Caller: Unspecified (1 week ago)  Hi Purnima,    She is a FEAST candidate, I was meant to contact her after her ECT consult to move forward with scheduling a baseline but have been unable to reach her. My voicemails may have been confusing as I was not able to leave details due to our practice of limiting PHI in voicemails. I sent her an email this morning but I will try giving her a call again shortly. Thanks for letting me know!    Ramone                  Writer called patient back to make sure that she had connected with [employer].  Received voicemail.  Left message asking for a return call.  Clinic number provided.

## 2023-08-25 ENCOUNTER — HOSPITAL ENCOUNTER (OUTPATIENT)
Dept: MAMMOGRAPHY | Facility: CLINIC | Age: 58
Discharge: HOME | End: 2023-08-25
Attending: FAMILY MEDICINE | Admitting: FAMILY MEDICINE
Payer: COMMERCIAL

## 2023-08-25 DIAGNOSIS — Z12.31 VISIT FOR SCREENING MAMMOGRAM: ICD-10-CM

## 2023-08-25 PROCEDURE — 77067 SCR MAMMO BI INCL CAD: CPT

## 2023-08-29 ENCOUNTER — APPOINTMENT (OUTPATIENT)
Dept: GENERAL RADIOLOGY | Facility: CLINIC | Age: 58
End: 2023-08-29
Attending: EMERGENCY MEDICINE
Payer: COMMERCIAL

## 2023-08-29 ENCOUNTER — HOSPITAL ENCOUNTER (EMERGENCY)
Facility: CLINIC | Age: 58
Discharge: HOME | End: 2023-08-30
Attending: EMERGENCY MEDICINE | Admitting: EMERGENCY MEDICINE
Payer: COMMERCIAL

## 2023-08-29 DIAGNOSIS — J44.1 COPD WITH ACUTE EXACERBATION (H): ICD-10-CM

## 2023-08-29 LAB
ANION GAP SERPL CALCULATED.3IONS-SCNC: 16 MMOL/L (ref 7–15)
BASOPHILS # BLD AUTO: 0.1 10E3/UL (ref 0–0.2)
BASOPHILS NFR BLD AUTO: 0 %
BUN SERPL-MCNC: 19.1 MG/DL (ref 6–20)
CALCIUM SERPL-MCNC: 9.9 MG/DL (ref 8.6–10)
CHLORIDE SERPL-SCNC: 101 MMOL/L (ref 98–107)
CREAT SERPL-MCNC: 0.86 MG/DL (ref 0.51–0.95)
D DIMER PPP FEU-MCNC: <0.27 UG/ML FEU (ref 0–0.5)
DEPRECATED HCO3 PLAS-SCNC: 21 MMOL/L (ref 22–29)
EOSINOPHIL # BLD AUTO: 0.1 10E3/UL (ref 0–0.7)
EOSINOPHIL NFR BLD AUTO: 1 %
ERYTHROCYTE [DISTWIDTH] IN BLOOD BY AUTOMATED COUNT: 12.7 % (ref 10–15)
GFR SERPL CREATININE-BSD FRML MDRD: 78 ML/MIN/1.73M2
GLUCOSE SERPL-MCNC: 108 MG/DL (ref 70–99)
HCO3 BLDV-SCNC: 26 MMOL/L (ref 21–28)
HCT VFR BLD AUTO: 41.9 % (ref 35–47)
HGB BLD-MCNC: 14.5 G/DL (ref 11.7–15.7)
IMM GRANULOCYTES # BLD: 0.1 10E3/UL
IMM GRANULOCYTES NFR BLD: 0 %
LACTATE BLD-SCNC: 0.5 MMOL/L
LYMPHOCYTES # BLD AUTO: 2.5 10E3/UL (ref 0.8–5.3)
LYMPHOCYTES NFR BLD AUTO: 18 %
MCH RBC QN AUTO: 34.4 PG (ref 26.5–33)
MCHC RBC AUTO-ENTMCNC: 34.6 G/DL (ref 31.5–36.5)
MCV RBC AUTO: 99 FL (ref 78–100)
MONOCYTES # BLD AUTO: 0.9 10E3/UL (ref 0–1.3)
MONOCYTES NFR BLD AUTO: 7 %
NEUTROPHILS # BLD AUTO: 9.9 10E3/UL (ref 1.6–8.3)
NEUTROPHILS NFR BLD AUTO: 74 %
NRBC # BLD AUTO: 0 10E3/UL
NRBC BLD AUTO-RTO: 0 /100
PCO2 BLDV: 50 MM HG (ref 40–50)
PH BLDV: 7.32 [PH] (ref 7.32–7.43)
PLATELET # BLD AUTO: 435 10E3/UL (ref 150–450)
PO2 BLDV: 27 MM HG (ref 25–47)
POTASSIUM SERPL-SCNC: 4.1 MMOL/L (ref 3.4–5.3)
RBC # BLD AUTO: 4.22 10E6/UL (ref 3.8–5.2)
SAO2 % BLDV: 44 % (ref 94–100)
SODIUM SERPL-SCNC: 138 MMOL/L (ref 136–145)
TROPONIN T SERPL HS-MCNC: 6 NG/L
WBC # BLD AUTO: 13.5 10E3/UL (ref 4–11)

## 2023-08-29 PROCEDURE — 85025 COMPLETE CBC W/AUTO DIFF WBC: CPT | Performed by: EMERGENCY MEDICINE

## 2023-08-29 PROCEDURE — 85379 FIBRIN DEGRADATION QUANT: CPT | Performed by: EMERGENCY MEDICINE

## 2023-08-29 PROCEDURE — 94640 AIRWAY INHALATION TREATMENT: CPT

## 2023-08-29 PROCEDURE — 99285 EMERGENCY DEPT VISIT HI MDM: CPT | Mod: 25

## 2023-08-29 PROCEDURE — 93005 ELECTROCARDIOGRAM TRACING: CPT

## 2023-08-29 PROCEDURE — 250N000011 HC RX IP 250 OP 636: Mod: JZ | Performed by: EMERGENCY MEDICINE

## 2023-08-29 PROCEDURE — 82803 BLOOD GASES ANY COMBINATION: CPT

## 2023-08-29 PROCEDURE — 84484 ASSAY OF TROPONIN QUANT: CPT | Performed by: EMERGENCY MEDICINE

## 2023-08-29 PROCEDURE — 96365 THER/PROPH/DIAG IV INF INIT: CPT

## 2023-08-29 PROCEDURE — 96366 THER/PROPH/DIAG IV INF ADDON: CPT

## 2023-08-29 PROCEDURE — 250N000009 HC RX 250: Performed by: EMERGENCY MEDICINE

## 2023-08-29 PROCEDURE — 80048 BASIC METABOLIC PNL TOTAL CA: CPT | Performed by: EMERGENCY MEDICINE

## 2023-08-29 PROCEDURE — 71046 X-RAY EXAM CHEST 2 VIEWS: CPT

## 2023-08-29 PROCEDURE — 36415 COLL VENOUS BLD VENIPUNCTURE: CPT | Performed by: EMERGENCY MEDICINE

## 2023-08-29 PROCEDURE — 83605 ASSAY OF LACTIC ACID: CPT

## 2023-08-29 RX ORDER — PREDNISONE 10 MG/1
TABLET ORAL
Qty: 26 TABLET | Refills: 0 | Status: SHIPPED | OUTPATIENT
Start: 2023-08-29 | End: 2024-04-23

## 2023-08-29 RX ORDER — MAGNESIUM SULFATE HEPTAHYDRATE 40 MG/ML
2 INJECTION, SOLUTION INTRAVENOUS ONCE
Status: COMPLETED | OUTPATIENT
Start: 2023-08-29 | End: 2023-08-30

## 2023-08-29 RX ORDER — IPRATROPIUM BROMIDE AND ALBUTEROL SULFATE 2.5; .5 MG/3ML; MG/3ML
3 SOLUTION RESPIRATORY (INHALATION) ONCE
Status: COMPLETED | OUTPATIENT
Start: 2023-08-29 | End: 2023-08-29

## 2023-08-29 RX ADMIN — MAGNESIUM SULFATE HEPTAHYDRATE 2 G: 40 INJECTION, SOLUTION INTRAVENOUS at 20:23

## 2023-08-29 RX ADMIN — IPRATROPIUM BROMIDE AND ALBUTEROL SULFATE 3 ML: .5; 3 SOLUTION RESPIRATORY (INHALATION) at 20:23

## 2023-08-29 ASSESSMENT — ACTIVITIES OF DAILY LIVING (ADL)
ADLS_ACUITY_SCORE: 35
ADLS_ACUITY_SCORE: 35

## 2023-08-30 VITALS
DIASTOLIC BLOOD PRESSURE: 66 MMHG | SYSTOLIC BLOOD PRESSURE: 101 MMHG | HEART RATE: 78 BPM | RESPIRATION RATE: 15 BRPM | OXYGEN SATURATION: 97 % | TEMPERATURE: 97.6 F

## 2023-08-30 LAB
ATRIAL RATE - MUSE: 75 BPM
DIASTOLIC BLOOD PRESSURE - MUSE: NORMAL MMHG
INTERPRETATION ECG - MUSE: NORMAL
P AXIS - MUSE: 85 DEGREES
PR INTERVAL - MUSE: 158 MS
QRS DURATION - MUSE: 74 MS
QT - MUSE: 402 MS
QTC - MUSE: 448 MS
R AXIS - MUSE: 74 DEGREES
SYSTOLIC BLOOD PRESSURE - MUSE: NORMAL MMHG
T AXIS - MUSE: 82 DEGREES
VENTRICULAR RATE- MUSE: 75 BPM

## 2023-08-30 NOTE — ED TRIAGE NOTES
Patient presents via EMS. Per report, patient comes from [facility] in [place] where she called EMS for SOB. Experienced it for an hour, found to be 84% on room air. Patient self administered inhaler, nebulizer and 40 of prednisone prior to EMS arrival. 125 of Solumedrol given in route. Patient arrived on Bipap at 50%     Triage Assessment       Row Name 08/29/23 2016       Triage Assessment (Adult)    Airway WDL WDL       Respiratory WDL    Respiratory WDL X;rhythm/pattern    Rhythm/Pattern, Respiratory shortness of breath;labored       Skin Circulation/Temperature WDL    Skin Circulation/Temperature WDL WDL       Cardiac WDL    Cardiac WDL X  Chest tightness       Peripheral/Neurovascular WDL    Peripheral Neurovascular WDL WDL       Cognitive/Neuro/Behavioral WDL    Cognitive/Neuro/Behavioral WDL WDL

## 2023-08-30 NOTE — ED PROVIDER NOTES
History     Chief Complaint:  Shortness of Breath       HPI   Gracy Bautista is a 57 year old female with history of COPD, asthma and tobacco abuse who presents via EMS with shortness of breath that came on suddenly tonight. EMS reports that the patient having waited 40 minutes to call. She took 40 mg of prednisone and she did take her neb and inhaler which provided no relief. EMS gave her 1 duo neb while she was put on BiPAP. The patient reports having right sided back pain, chest tightness, a cough, but denies any fever nausea, or prior intubation.    Independent Historian:    EMS and the patient provided the history noted above.    Review of External Notes:  Reviewed discharge summary from 2023.  Patient was admitted to [facility] with COPD and asthma exacerbation.    Medications:    Keflex  Catapres  Vibramycin  Monodox  Vitamin D  Ativan  Nucala  Medrol dosepak  Protonix  Prednisone  Senokot  Desyrel  Lamictal  Nicorette  Zithromax  Adderall  Dulcolax    Past Medical History:    Asthma  Osteoarthritis   COPD  Fibromyalgia  Syncope  Depression  Dysphagia  Hyperlipidemia  ADD  PTSD  Tobacco abuse  Insomnia  DJD    Past Surgical History:    Biopsy  Colonoscopy xz2  EGD  GYN surgery  IR bone biopsy deep right  Nasal/sinus polypectomy  Optical tracking system endoscopic sinus surgery  Orthopedic surgery    section  Hysterectomy    Physical Exam   Patient Vitals for the past 24 hrs:   BP Temp Temp src Pulse Resp SpO2   23 2200 101/66 -- -- 78 15 92 %   23 -- -- -- 73 21 92 %   23 120/85 97.6  F (36.4  C) Temporal 75 19 98 %   23 -- -- -- 75 -- 100 %      Physical Exam  Vitals and nursing note reviewed.   Constitutional:       General: She is in acute distress.      Appearance: She is ill-appearing. She is not toxic-appearing.   HENT:      Head: Normocephalic and atraumatic.      Right Ear: External ear normal.      Left Ear: External ear normal.      Nose:  Nose normal.   Eyes:      Extraocular Movements: Extraocular movements intact.      Conjunctiva/sclera: Conjunctivae normal.   Cardiovascular:      Rate and Rhythm: Normal rate and regular rhythm.      Heart sounds: No murmur heard.  Pulmonary:      Effort: Pulmonary effort is normal. No respiratory distress.      Breath sounds: Wheezing (Diffuse coarse wheezes bilaterally) present. No rhonchi or rales.   Abdominal:      General: Abdomen is flat. Bowel sounds are normal. There is no distension.      Palpations: Abdomen is soft.      Tenderness: There is no abdominal tenderness. There is no guarding or rebound.   Musculoskeletal:         General: No swelling, deformity or signs of injury.      Cervical back: Normal range of motion and neck supple.   Skin:     General: Skin is warm and dry.      Findings: No rash.   Neurological:      Mental Status: She is alert and oriented to person, place, and time.   Psychiatric:         Behavior: Behavior normal.           Emergency Department Course   ECG  ECG taken at 2021, ECG read at 2028  Normal sinus rhythm  Possible left atrial enlargement  Septal infarct, age undetermined  Abnormal ECG   Rate 75 bpm. WY interval 158 ms. QRS duration 74 ms. QT/QTc 402/448 ms. P-R-T axes 85 74 82.    Imaging:  XR Chest 2 Views   Final Result   IMPRESSION: Heart size is normal. Lungs are mildly hyperinflated but clear. No pneumothorax or pleural effusion.        Report per radiology    Laboratory:  Labs Ordered and Resulted from Time of ED Arrival to Time of ED Departure   BASIC METABOLIC PANEL - Abnormal       Result Value    Sodium 138      Potassium 4.1      Chloride 101      Carbon Dioxide (CO2) 21 (*)     Anion Gap 16 (*)     Urea Nitrogen 19.1      Creatinine 0.86      Calcium 9.9      Glucose 108 (*)     GFR Estimate 78     CBC WITH PLATELETS AND DIFFERENTIAL - Abnormal    WBC Count 13.5 (*)     RBC Count 4.22      Hemoglobin 14.5      Hematocrit 41.9      MCV 99      MCH 34.4 (*)      MCHC 34.6      RDW 12.7      Platelet Count 435      % Neutrophils 74      % Lymphocytes 18      % Monocytes 7      % Eosinophils 1      % Basophils 0      % Immature Granulocytes 0      NRBCs per 100 WBC 0      Absolute Neutrophils 9.9 (*)     Absolute Lymphocytes 2.5      Absolute Monocytes 0.9      Absolute Eosinophils 0.1      Absolute Basophils 0.1      Absolute Immature Granulocytes 0.1      Absolute NRBCs 0.0     ISTAT GASES LACTATE VENOUS POCT - Abnormal    Lactic Acid POCT 0.5      Bicarbonate Venous POCT 26      O2 Sat, Venous POCT 44 (*)     pCO2 Venous POCT 50      pH Venous POCT 7.32      pO2 Venous POCT 27     D DIMER QUANTITATIVE - Normal    D-Dimer Quantitative <0.27     TROPONIN T, HIGH SENSITIVITY - Normal    Troponin T, High Sensitivity 6        Emergency Department Course & Assessments:       Interventions:  Medications   ipratropium - albuterol 0.5 mg/2.5 mg/3 mL (DUONEB) neb solution 3 mL (3 mLs Nebulization $Given 23)   ipratropium - albuterol 0.5 mg/2.5 mg/3 mL (DUONEB) neb solution 3 mL (3 mLs Nebulization $Given 23)   magnesium sulfate 2 g in 50 mL sterile water intermittent infusion (2 g Intravenous $New Bag 23)      Assessments:  08 I obtained history and examined the patient as noted above.    I rechecked and updated the patient.    I rechecked and updated the patient.    I rechecked and updated the patient. I deemed the patient safe to discharge home.    Independent Interpretation (X-rays, CTs, rhythm strip):  I reviewed the chest x-ray and there does not appear to be any obvious infiltrate or pneumothorax per my read    Consultations/Discussion of Management or Tests:  None       Social Determinants of Health affecting care:  None     Disposition:  The patient was discharged to home.     Impression & Plan    CMS Diagnoses: None    Medical Decision Makin-year-old female presenting with shortness of breath.  She has diffuse wheezing and  apparently was hypoxic in the upper 80s for EMS.  She was placed on BiPAP and given Solu-Medrol and DuoNeb by EMS.  Patient was taken off of the BiPAP on arrival and did not appear to be in any distress and her O2 saturation was maintained in the upper 90s.  We will hold off on additional BiPAP at this time but we will give her 2 additional DuoNebs as well as 2 g of magnesium.  We will check labs and chest x-ray for further evaluation.    2337 rechecked patient and updated on results.  Patient was low risk for PE and her D-dimer came back normal.  Her troponin also was not elevated and her EKG was nonischemic.  Chest x-ray did not show any pneumonia or pneumothorax.  Patient maintained O2 saturations greater than 90%.  She notes that her baseline O2 sat is 91 to 92%.  Patient is able to ambulate around the ED with O2 saturation of around 95%.  She feels that she has improved greatly and would like to go home.  I will give a prolonged steroid taper and recommend that she continue with her DuoNeb's at home.  We discussed return precautions and I recommended that she follow-up closely with her primary care physician or pulmonologist.    Diagnosis:    ICD-10-CM    1. COPD with acute exacerbation (H)  J44.1            Discharge Medications:  New Prescriptions    PREDNISONE (DELTASONE) 10 MG TABLET    Take 40 mg x 2 days, then 30 mg x 3 days, then 20 mg x 3 days, then 10 mg x 3 days      Scribe Disclosure:  Mendoza HENSON, am serving as a scribe at 8:26 PM on 8/29/2023 to document services personally performed by Tien Angelo MD based on my observations and the provider's statements to me.               Tien Angelo MD  08/30/23 0017

## 2023-08-30 NOTE — ED NOTES
Bed: ST03  Expected date:   Expected time:   Means of arrival:   Comments:  541 [id number] respiratory distress

## 2024-03-01 ENCOUNTER — MYC MEDICAL ADVICE (OUTPATIENT)
Dept: FAMILY MEDICINE | Facility: CLINIC | Age: 59
End: 2024-03-01
Payer: COMMERCIAL

## 2024-03-01 NOTE — TELEPHONE ENCOUNTER
Patient Quality Outreach    Patient is due for the following:   Asthma  -  ACT needed and AAP  Depression  -  PHQ-9 needed  Physical Preventive Adult Physical      Topic Date Due    Zoster (Shingles) Vaccine (1 of 2) Never done    Hepatitis B Vaccine (1 of 3 - 19+ 3-dose series) Never done    Flu Vaccine (1) 09/01/2023    COVID-19 Vaccine (4 - 2023-24 season) 09/01/2023       Next Steps:   Schedule a Adult Preventative  Patient was assigned appropriate questionnaire to complete    Type of outreach:    Sent [employer] message.  Send letter in 1 week if not read/completed    Questions for provider review:    Update AAP           Yue Wyatt, [facility]  Chart routed to Care Team.

## 2024-03-01 NOTE — LETTER
[facility]  [address]  [place] [address]  Phone: [phone]  Fax: [phone]  March 8, 2024      Gracy Bautista  [address]  [place] [address]      Dear Gracy,    We care about your health and have reviewed your health plan including your medical conditions, medications, and lab results.  Based on this review, it is recommended that you follow up regarding the following health topic(s):  -Asthma  -Depression  -Wellness (Physical) Visit   -Immunizations:  Health Maintenance Due   Topic Date Due    ZOSTER IMMUNIZATION (1 of 2) Never done    HEPATITIS B IMMUNIZATION (1 of 3 - 19+ 3-dose series) Never done    INFLUENZA VACCINE (1) 09/01/2023    COVID-19 Vaccine (4 - 2023-24 season) 09/01/2023     We recommend you take the following action(s):  -schedule a WELLNESS (Physical) APPOINTMENT.  We will perform the following labs: Lipids (fasting cholesterol - nothing to eat except water and/or meds for 8-10 hours).   -Complete and return the attached ASTHMA CONTROL TEST.  If your total score is 19 or less or you have been to the ER or urgent care for your asthma, then please schedule an asthma followup appointment.  -Complete and return the attached PHQ-9 Form.  If your total score is greater than 9, please schedule a followup appointment.  If you answer Yes to question 9, call your clinic between the hours of 8 to 5.  You may also call the Suicide Hotline at 0-968-525-MADB (4621) any time.     Please call us at the [facility] [phone] (or use [employer]) to address the above recommendations. Thank you for trusting [facility] and we appreciate the opportunity to serve you.  We look forward to supporting your healthcare needs in the future.    Healthy Regards,    Your Health Care Team-[facility]

## 2024-03-08 NOTE — TELEPHONE ENCOUNTER
Patient Quality Outreach Summary      Summary:    Patient is due/failing the following:   ACT needed and AAP, PHQ-9 needed, Adult/Adolescent physical, date due: now , and   Health Maintenance Due   Topic    Hepatitis B Vaccine (1 of 3 - 19+ 3-dose series)    Flu Vaccine (1)       Type of outreach:    Sent letter.    Questions for provider review:    Update AAP                                                                                                                    Yue Wyatt CMA (AAMA)       Chart routed to provider.

## 2024-03-11 ENCOUNTER — OFFICE VISIT (OUTPATIENT)
Dept: OTOLARYNGOLOGY | Facility: CLINIC | Age: 59
End: 2024-03-11
Payer: COMMERCIAL

## 2024-03-11 VITALS — OXYGEN SATURATION: 100 % | WEIGHT: 119.4 LBS | BODY MASS INDEX: 19.89 KG/M2 | HEART RATE: 90 BPM | HEIGHT: 65 IN

## 2024-03-11 DIAGNOSIS — J32.4 CHRONIC PANSINUSITIS: Primary | ICD-10-CM

## 2024-03-11 DIAGNOSIS — J45.50 SEVERE PERSISTENT ASTHMA WITH ALLERGIC RHINITIS, UNSPECIFIED WHETHER COMPLICATED (H): ICD-10-CM

## 2024-03-11 PROCEDURE — 31231 NASAL ENDOSCOPY DX: CPT | Performed by: OTOLARYNGOLOGY

## 2024-03-11 PROCEDURE — 99212 OFFICE O/P EST SF 10 MIN: CPT | Mod: 25 | Performed by: OTOLARYNGOLOGY

## 2024-03-11 RX ORDER — FLUTICASONE PROPIONATE 50 MCG
1 SPRAY, SUSPENSION (ML) NASAL
COMMUNITY
End: 2024-04-23

## 2024-03-11 RX ORDER — HYDROCODONE BITARTRATE AND HOMATROPINE METHYLBROMIDE 5; 1.5 MG/1; MG/1
TABLET ORAL
COMMUNITY
Start: 2023-01-17 | End: 2024-04-23

## 2024-03-11 RX ORDER — FLUTICASONE FUROATE, UMECLIDINIUM BROMIDE AND VILANTEROL TRIFENATATE 200; 62.5; 25 UG/1; UG/1; UG/1
1 POWDER RESPIRATORY (INHALATION)
COMMUNITY
Start: 2023-06-14

## 2024-03-11 RX ORDER — LORAZEPAM 0.5 MG/1
TABLET ORAL
COMMUNITY

## 2024-03-11 RX ORDER — IPRATROPIUM BROMIDE AND ALBUTEROL SULFATE 2.5; .5 MG/3ML; MG/3ML
3 SOLUTION RESPIRATORY (INHALATION)
COMMUNITY
Start: 2024-02-26 | End: 2024-04-23

## 2024-03-11 RX ORDER — BUDESONIDE 0.5 MG/2ML
INHALANT ORAL
Qty: 60 ML | Refills: 3 | Status: SHIPPED | OUTPATIENT
Start: 2024-03-11 | End: 2024-04-23

## 2024-03-11 RX ORDER — DOXYCYCLINE 100 MG/1
CAPSULE ORAL
COMMUNITY
End: 2024-04-23

## 2024-03-11 RX ORDER — DOXYCYCLINE 100 MG/1
CAPSULE ORAL
COMMUNITY
Start: 2023-08-15 | End: 2024-04-23

## 2024-03-11 RX ORDER — PREDNISOLONE 5 MG/1
TABLET ORAL
COMMUNITY
End: 2024-04-23

## 2024-03-11 ASSESSMENT — PAIN SCALES - GENERAL: PAINLEVEL: SEVERE PAIN (7)

## 2024-03-11 NOTE — NURSING NOTE
"Chief Complaint   Patient presents with    RECHECK   Pulse 90, height 1.651 m (5' 5\"), weight 54.2 kg (119 lb 6.4 oz), SpO2 100%, not currently breastfeeding. Nitesh Reyna, EMT    "

## 2024-03-11 NOTE — PATIENT INSTRUCTIONS
You were seen in the ENT Clinic today by Dr. Jaimes. If you have any questions or concerns after your appointment, please contact us (see below)       2.   The following recommendations have been made based upon your appointment today:   -Perform sinus rinses twice daily. In the morning rinse with budesonide. In the evening rinse with gentamicin. These prescriptions will come from [facility].      3.   Please return to the ENT clinic in 2 months.           How to Contact Us:  Send a [employer] message to your provider. Our team will respond to you via [employer]. Occasionally, we will need to call you to get further information.  For urgent matters (Monday-Friday), call the ENT Clinic: [phone] and speak with a call center team member - they will route your call appropriately.   If you'd like to speak directly with a nurse, please find our contact information below. We do our best to check voicemail frequently throughout the day, and will work to call you back within 1-2 days. For urgent matters, please use the general clinic phone numbers listed above.        Rosalinda AGUAYO LPN  Direct: [phone]         [facility]  Department of Otolaryngology

## 2024-03-11 NOTE — PROGRESS NOTES
Gracy continues to have profuse secretions, coughing, chest congestion and post nasal drainage. She has had in depth evaluation with pulmonary, GI, hematology, rheum. Work up is unremarkable other than slightly low IgG. She was hospitalized for pulmonary exacerbation - still on prednisone. Also now on Azithro 250 mg MWF. Also on Nucala.    Brother had lung disease, diabetes, kidney failure  Father lung disease, diabetes, EtoH.     Exam: alert, in a good mood    Procedure:  Endoscopy indicated for exam of sinuses  Topical anesthetic/decongestant spray applied.  Rigid scope used for visualization.  Findings: patent sinus cavities with thick yellow secretions debrided with suction and alligator forceps.     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 Following with local pulmonary MD and also here. Has eosiinophilia, has cultured staph, pseudomonas, fungus. Allergy and rheumatologic work up not remarkable, has had low IgG in past. Continue current regimen, add alternating gentamicin and budesonide irrigations. See me in 2 months for repeat suctioning.     15 minutes spent on the date of the encounter doing chart review, history and exam, documentation and further activities per the note. This time is in addition to separately billable procedure.

## 2024-03-11 NOTE — LETTER
3/11/2024       RE: Gracy Bautista  [address] [address]     Dear Colleague,    Thank you for referring your patient, Gracy Bautista, to the [facility] EAR NOSE AND THROAT CLINIC [place] at [facility]. Please see a copy of my visit note below.       Gracy continues to have profuse secretions, coughing, chest congestion and post nasal drainage. She has had in depth evaluation with pulmonary, GI, hematology, rheum. Work up is unremarkable other than slightly low IgG. She was hospitalized for pulmonary exacerbation - still on prednisone. Also now on Azithro 250 mg MWF. Also on Nucala.    Brother had lung disease, diabetes, kidney failure  Father lung disease, diabetes, EtoH.     Exam: alert, in a good mood    Procedure:  Endoscopy indicated for exam of sinuses  Topical anesthetic/decongestant spray applied.  Rigid scope used for visualization.  Findings: patent sinus cavities with thick yellow secretions debrided with suction and alligator forceps.     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 Following with local pulmonary MD and also here. Has eosiinophilia, has cultured staph, pseudomonas, fungus. Allergy and rheumatologic work up not remarkable, has had low IgG in past. Continue current regimen, add alternating gentamicin and budesonide irrigations. See me in 2 months for repeat suctioning.     15 minutes spent on the date of the encounter doing chart review, history and exam, documentation and further activities per the note. This time is in addition to separately billable procedure.          Again, thank you for allowing me to participate in the care of your patient.      Sincerely,    Digna Jaimes MD

## 2024-03-18 ENCOUNTER — TELEPHONE (OUTPATIENT)
Dept: OTOLARYNGOLOGY | Facility: CLINIC | Age: 59
End: 2024-03-18
Payer: COMMERCIAL

## 2024-03-18 NOTE — TELEPHONE ENCOUNTER
[facility] Call Center    Phone Message    May a detailed message be left on voicemail: yes     Reason for Call: Other: Per pharmacy pt is uncomfortable mixing the prescription should it have gone to the compounding pharmacy. Please call to discuss. Thank you     Action Taken: Message routed to:  [facility] ENT    Travel Screening: Not Applicable

## 2024-03-18 NOTE — TELEPHONE ENCOUNTER
This writer reached out to [facility] regarding patient's request. Patient is not comfortable compounding the medication. This writer spoke with pharmacist who stated that they could compound the budesonide as 2 mg of budesonide in 1 liter of nasal saline. Reviewed this with Dr. Jaimes who is in agreement with compounding the budesonide this way. This writer called patient to follow up on request and left voicemail message with direct call back number. Patient returned call and this writer reviewed the sinus irrigations. Patient stated that she was okay to add the budesonide to her rinses in her sinus rinse bottles. Patient is aware to use distilled water and a saline packet and then add the budesonide to that. Patient will use the budesonide rinse once daily and use the gentamicin rinse once daily. Patient will call this writer back if there are any further questions.    ERIKA JACOBSON LPN on 3/18/2024 at 12:38 PM

## 2024-03-20 ENCOUNTER — TELEPHONE (OUTPATIENT)
Dept: OTOLARYNGOLOGY | Facility: CLINIC | Age: 59
End: 2024-03-20
Payer: COMMERCIAL

## 2024-03-20 NOTE — TELEPHONE ENCOUNTER
[facility] Call Center    Phone Message    May a detailed message be left on voicemail: yes     Reason for Call: Other: Please return call to Lluvia at [facility] to clarify questions about prescrip.  [facility] location thanks     Action Taken: Other: ENT    Travel Screening: Not Applicable

## 2024-03-20 NOTE — TELEPHONE ENCOUNTER
This writer called pharmacy and discussed their request for clarification on a prescription that was sent to their pharmacy on a patient.    Spoke with Malu the pharmacist and clarified the medication. Pharmacy was requesting additional information regarding the budesonide order. Reviewed with pharmacist the instructions and that the patient will be using this for her sinus rinse. Notes in chart for pharmacy stated that patient had questions and they had spoke with her in the morning of 3/18. Reviewed with Malu that we spoke with patient in the afternoon and she was aware of how to do the rinses. Pharmacist was advised to call back with any further questions.    ERIKA JACOBSON LPN on 3/20/2024 at 8:54 AM

## 2024-03-24 ENCOUNTER — HEALTH MAINTENANCE LETTER (OUTPATIENT)
Age: 59
End: 2024-03-24

## 2024-04-22 ENCOUNTER — TELEPHONE (OUTPATIENT)
Dept: PULMONOLOGY | Facility: CLINIC | Age: 59
End: 2024-04-22
Payer: COMMERCIAL

## 2024-04-22 DIAGNOSIS — J43.2 CENTRILOBULAR EMPHYSEMA (H): ICD-10-CM

## 2024-04-22 RX ORDER — LEVALBUTEROL TARTRATE 45 UG/1
2 AEROSOL, METERED ORAL EVERY 4 HOURS PRN
Qty: 45 G | Refills: 0 | Status: SHIPPED | OUTPATIENT
Start: 2024-04-22

## 2024-04-22 NOTE — TELEPHONE ENCOUNTER
Received Rx refill request from [facility] for levalbuterol (XOPENEX HFA) 45 MCG/ACT inhaler. Patient is overdue for follow up with Dr. Bean. 90 day gabby refill pended and message sent to scheduling in order to assist patient with scheduling a follow up appointment.    Donna Gaspar LPN  Pulmonary Medicine:  [facility]  Phone: [phone] Fax: [phone]

## 2024-04-22 NOTE — CONFIDENTIAL NOTE
04/22 Called patient (1st attempt) left voicemail and provided [phone] for patient to call back and schedule overdue follow up visit with .       [facility]   Gastroenterology, Infectious Diseases, Nephrology, Pulmonology and Rheumatology Specialties  [facility] and Surgery [facility]

## 2024-04-22 NOTE — TELEPHONE ENCOUNTER
----- Message from ALBIN DONOVAN sent at 4/22/2024 11:50 AM CDT -----  Regarding: Pulmonary follow up  Hello, can someone please assist pt with scheduling pulmonary follow up appointment with Dr. Bean? Patient is overdue. Thank you!    Albin

## 2024-04-23 ENCOUNTER — LAB (OUTPATIENT)
Dept: INFUSION THERAPY | Facility: CLINIC | Age: 59
End: 2024-04-23
Attending: INTERNAL MEDICINE
Payer: COMMERCIAL

## 2024-04-23 ENCOUNTER — ONCOLOGY VISIT (OUTPATIENT)
Dept: ONCOLOGY | Facility: CLINIC | Age: 59
End: 2024-04-23
Attending: NURSE PRACTITIONER
Payer: COMMERCIAL

## 2024-04-23 VITALS
BODY MASS INDEX: 20.01 KG/M2 | RESPIRATION RATE: 16 BRPM | HEIGHT: 65 IN | DIASTOLIC BLOOD PRESSURE: 84 MMHG | WEIGHT: 120.1 LBS | SYSTOLIC BLOOD PRESSURE: 122 MMHG | HEART RATE: 85 BPM | OXYGEN SATURATION: 100 %

## 2024-04-23 DIAGNOSIS — D72.10: Primary | ICD-10-CM

## 2024-04-23 DIAGNOSIS — T50.905A: Primary | ICD-10-CM

## 2024-04-23 DIAGNOSIS — Z72.0 TOBACCO ABUSE: ICD-10-CM

## 2024-04-23 DIAGNOSIS — D50.8 IRON DEFICIENCY ANEMIA SECONDARY TO INADEQUATE DIETARY IRON INTAKE: ICD-10-CM

## 2024-04-23 LAB
BASOPHILS # BLD AUTO: 0.1 10E3/UL (ref 0–0.2)
BASOPHILS NFR BLD AUTO: 1 %
EOSINOPHIL # BLD AUTO: 0.3 10E3/UL (ref 0–0.7)
EOSINOPHIL NFR BLD AUTO: 4 %
ERYTHROCYTE [DISTWIDTH] IN BLOOD BY AUTOMATED COUNT: 13.3 % (ref 10–15)
FERRITIN SERPL-MCNC: 31 NG/ML (ref 11–328)
HCT VFR BLD AUTO: 47 % (ref 35–47)
HGB BLD-MCNC: 16.1 G/DL (ref 11.7–15.7)
HOLD SPECIMEN: NORMAL
IMM GRANULOCYTES # BLD: 0 10E3/UL
IMM GRANULOCYTES NFR BLD: 1 %
LYMPHOCYTES # BLD AUTO: 2.5 10E3/UL (ref 0.8–5.3)
LYMPHOCYTES NFR BLD AUTO: 31 %
MCH RBC QN AUTO: 33.8 PG (ref 26.5–33)
MCHC RBC AUTO-ENTMCNC: 34.3 G/DL (ref 31.5–36.5)
MCV RBC AUTO: 99 FL (ref 78–100)
MONOCYTES # BLD AUTO: 0.8 10E3/UL (ref 0–1.3)
MONOCYTES NFR BLD AUTO: 10 %
NEUTROPHILS # BLD AUTO: 4.5 10E3/UL (ref 1.6–8.3)
NEUTROPHILS NFR BLD AUTO: 55 %
NRBC # BLD AUTO: 0 10E3/UL
NRBC BLD AUTO-RTO: 0 /100
PLATELET # BLD AUTO: 410 10E3/UL (ref 150–450)
RBC # BLD AUTO: 4.77 10E6/UL (ref 3.8–5.2)
WBC # BLD AUTO: 8.1 10E3/UL (ref 4–11)

## 2024-04-23 PROCEDURE — 36415 COLL VENOUS BLD VENIPUNCTURE: CPT

## 2024-04-23 PROCEDURE — 82728 ASSAY OF FERRITIN: CPT

## 2024-04-23 PROCEDURE — 85048 AUTOMATED LEUKOCYTE COUNT: CPT

## 2024-04-23 PROCEDURE — G0463 HOSPITAL OUTPT CLINIC VISIT: HCPCS | Performed by: INTERNAL MEDICINE

## 2024-04-23 PROCEDURE — 99214 OFFICE O/P EST MOD 30 MIN: CPT | Performed by: INTERNAL MEDICINE

## 2024-04-23 ASSESSMENT — PAIN SCALES - GENERAL: PAINLEVEL: NO PAIN (0)

## 2024-04-23 NOTE — PROGRESS NOTES
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  Iron deficiency anemia.  EGD and colonoscopy  w/o malignancy.  Polycythemia, mild and intermittent, likely 2/2 smoking.    Continue observation.  Eosinophils remain normal.  Counts stable (mild an intermittent polycythemia).  Iron stable; not on oral iron.  Appreciates follow-up; RTC 6 months.    Zev Roach MD    --------------------    Interval History:  Gracy returns for follow-up of abnormal blood findings.  All in all, she has been feeling great.  She has had several episodes of COPD and asthma flares requiring prednisone.  It is because of easy bruising on her forearms and hands.  She has had some circulation concerns in her right hand and left foot.  She plans to bring that up to her primary care provider in the near future.    Social History:  Social History     Tobacco Use    Smoking status: Light Smoker     Types: Cigarettes     Start date: 2021    Smokeless tobacco: Never    Tobacco comments:     I am willing.  I haven't been successful and my mindset is not completely there yet.   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     Family History:  Family History   Problem Relation Age of Onset    Bleeding Disorder Mother     Hypertension Mother     Hyperlipidemia Mother     Diabetes Father     Heart Disease Father     Cerebrovascular Disease Father     Hypertension Father     Hyperlipidemia Father     Cancer Brother     Bleeding Disorder Brother     Anesthesia Reaction No family hx of     Deep Vein Thrombosis (DVT) No family hx of      Immunizations:  Immunization History   Administered Date(s) Administered    COVID-19 MONOVALENT 12+ (Pfizer) 2021, 04/10/2021, 2021    Influenza (IIV3) PF 2005, 10/23/2006, 10/20/2009     "Influenza Vaccine 18-64 (Flublok) 10/07/2021    Influenza Vaccine >6 months,quad, PF 10/13/2016, 09/07/2017, 10/25/2019    Influenza, seasonal, injectable, PF 11/21/2011    Pneumo Conj 13-V (2010&after) 09/07/2017    Pneumococcal 23 valent 11/04/2005, 02/18/2019, 07/19/2022    TDAP (Adacel,Boostrix) 08/03/2011, 07/08/2022, 08/12/2023    Td (Adult), Adsorbed 01/01/2001, 10/25/2008     Health Maintenance Due   Topic Date Due    ZOSTER IMMUNIZATION (1 of 2) Never done    HEPATITIS B IMMUNIZATION (1 of 3 - 19+ 3-dose series) Never done    INFLUENZA VACCINE (1) 09/01/2023    COVID-19 Vaccine (4 - 2023-24 season) 09/01/2023     --------------------    Physical Exam:  VS: /84 (BP Location: Right arm)   Pulse 85   Resp 16   Ht 1.651 m (5' 5\")   Wt 54.5 kg (120 lb 1.6 oz)   SpO2 100%   BMI 19.99 kg/m  .  GEN: Well appearing.    Labs / Imaging:  Reviewed CBC, ferritin.  "

## 2024-04-23 NOTE — NURSING NOTE
"Oncology Rooming Note    April 23, 2024 9:55 AM   Gracy Bautista is a 58 year old female who presents for:    Chief Complaint   Patient presents with    Oncology Clinic Visit     Follow up     Initial Vitals: /84 (BP Location: Right arm)   Pulse 85   Resp 16   Ht 1.651 m (5' 5\")   Wt 54.5 kg (120 lb 1.6 oz)   SpO2 100%   BMI 19.99 kg/m   Estimated body mass index is 19.99 kg/m  as calculated from the following:    Height as of this encounter: 1.651 m (5' 5\").    Weight as of this encounter: 54.5 kg (120 lb 1.6 oz). Body surface area is 1.58 meters squared.  No Pain (0) Comment: nothing unusual   No LMP recorded. Patient has had a hysterectomy.  Allergies reviewed: Yes  Medications reviewed: Yes    Medications: Medication refills not needed today.  Pharmacy name entered into [employer] [facility]: [facility] [id number] [place] [address]    Frailty Screening:   Is the patient here for a new oncology consult visit in cancer care? 2. No      Clinical concerns: has several questions       Alisa Sexton LPN              "

## 2024-04-23 NOTE — Clinical Note
2024         RE: Gracy Bautista  [address] [address]        Dear Colleague,    Thank you for referring your patient, Gracy Bautista, to the [facility]. Please see a copy of my visit note below.    [facility] Hematology / Oncology  Progress Note  Name: Gracy Bautista  :  1965    MRN:  [id number]    --------------------    Assessment / Plan:  ***          Zev Roach MD    --------------------    Interval History:  Gracy returns for follow-up of abnormal blood findings.  All in all, she has been feeling great.  She has had several episodes of COPD and asthma flares requiring prednisone.  It is because of easy bruising on her forearms and hands.  She has had some circulation concerns in her right hand and left foot.  She plans to bring that up to her primary care provider in the near future.    Social History:  Social History     Tobacco Use    Smoking status: Light Smoker     Types: Cigarettes     Start date: 2021    Smokeless tobacco: Never    Tobacco comments:     I am willing.  I haven't been successful and my mindset is not completely there yet.   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     Family History:  Family History   Problem Relation Age of Onset    Bleeding Disorder Mother     Hypertension Mother     Hyperlipidemia Mother     Diabetes Father     Heart Disease Father     Cerebrovascular Disease Father     Hypertension Father     Hyperlipidemia Father     Cancer Brother     Bleeding Disorder Brother     Anesthesia Reaction No family hx of     Deep Vein Thrombosis (DVT) No family hx of      Immunizations:  Immunization History   Administered Date(s) Administered    COVID-19 MONOVALENT 12+ (Pfizer) 2021, 04/10/2021, 2021    Influenza (IIV3) PF 2005, 10/23/2006, 10/20/2009    Influenza Vaccine 18-64 (Flublok) 10/07/2021    Influenza  "Vaccine >6 months,quad, PF 10/13/2016, 09/07/2017, 10/25/2019    Influenza, seasonal, injectable, PF 11/21/2011    Pneumo Conj 13-V (2010&after) 09/07/2017    Pneumococcal 23 valent 11/04/2005, 02/18/2019, 07/19/2022    TDAP (Adacel,Boostrix) 08/03/2011, 07/08/2022, 08/12/2023    Td (Adult), Adsorbed 01/01/2001, 10/25/2008     Health Maintenance Due   Topic Date Due    ZOSTER IMMUNIZATION (1 of 2) Never done    HEPATITIS B IMMUNIZATION (1 of 3 - 19+ 3-dose series) Never done    INFLUENZA VACCINE (1) 09/01/2023    COVID-19 Vaccine (4 - 2023-24 season) 09/01/2023     --------------------    Physical Exam:  VS: /84 (BP Location: Right arm)   Pulse 85   Resp 16   Ht 1.651 m (5' 5\")   Wt 54.5 kg (120 lb 1.6 oz)   SpO2 100%   BMI 19.99 kg/m  .  GEN: Well appearing.    Labs / Imaging:  Reviewed CBC, ferritin.      Again, thank you for allowing me to participate in the care of your patient.        Sincerely,        Zev Roach MD  "

## 2024-04-26 NOTE — TELEPHONE ENCOUNTER
04/26 Called patient (2nd attempt) left voicemail and provided [phone] for patient to call back and schedule overdue follow up visit with .         [facility]   Gastroenterology, Infectious Diseases, Nephrology, Pulmonology and Rheumatology Specialties  [facility] and Surgery [facility]

## 2024-05-20 ENCOUNTER — OFFICE VISIT (OUTPATIENT)
Dept: OTOLARYNGOLOGY | Facility: CLINIC | Age: 59
End: 2024-05-20
Payer: COMMERCIAL

## 2024-05-20 VITALS — WEIGHT: 120 LBS | BODY MASS INDEX: 19.99 KG/M2 | HEIGHT: 65 IN

## 2024-05-20 DIAGNOSIS — J32.4 CHRONIC PANSINUSITIS: Primary | ICD-10-CM

## 2024-05-20 PROCEDURE — 99212 OFFICE O/P EST SF 10 MIN: CPT | Mod: 25 | Performed by: OTOLARYNGOLOGY

## 2024-05-20 PROCEDURE — 31231 NASAL ENDOSCOPY DX: CPT | Performed by: OTOLARYNGOLOGY

## 2024-05-20 PROCEDURE — 87205 SMEAR GRAM STAIN: CPT | Performed by: OTOLARYNGOLOGY

## 2024-05-20 PROCEDURE — 99000 SPECIMEN HANDLING OFFICE-LAB: CPT | Performed by: PATHOLOGY

## 2024-05-20 NOTE — PATIENT INSTRUCTIONS
You were seen in the ENT Clinic today by Dr. Jaimes. If you have any questions or concerns after your appointment, please contact us (see below)       2.   The following recommendations have been made based upon your appointment today:   -a prescription for Augmentin has been sent to your pharmacy, please take as directed      3.   Plan to return to the ENT clinic in 2 months           How to Contact Us:  Send a [employer] message to your provider. Our team will respond to you via [employer]. Occasionally, we will need to call you to get further information.  For urgent matters (Monday-Friday), call the ENT Clinic: [phone] and speak with a call center team member - they will route your call appropriately.   If you'd like to speak directly with a nurse, please find our contact information below. We do our best to check voicemail frequently throughout the day, and will work to call you back within 1-2 days. For urgent matters, please use the general clinic phone numbers listed above.     Ashley SAUCEDA RN  Direct: [phone]  Rosalinda AGUAYO LPN  Direct: [phone]         [facility]  Department of Otolaryngology

## 2024-05-20 NOTE — LETTER
5/20/2024       RE: Gracy Bautista  [address] [address]     Dear Colleague,    Thank you for referring your patient, Gracy Bautista, to the [facility] EAR NOSE AND THROAT CLINIC [place] at [facility]. Please see a copy of my visit note below.      [facility] EAR NOSE AND THROAT CLINIC [place] [address] [facility]  4TH FLOOR  [facility] [address]  Phone: [phone]  Fax: [phone]    Patient:  Gracy Bautista, Date of birth 1965  Date of Visit:  05/20/2024  Referring Provider Referred Self      Assessment & Plan     (J32.4) Chronic pansinusitis  (primary encounter diagnosis)  Comment: ongoing disease: Ongoing sinopulmonary disease with no clear diagnosis. Has eosiinophilia, has cultured staph, pseudomonas, fungus. Allergy and rheumatologic work up not remarkable, has had low IgG in past. Continue S intervention, see me every 2 months to assess sinuses. Secretions cultured again today.   Plan: Adult ENT Clinic Follow-up Order (Blank),         Respiratory Aerobic Bacterial Culture with Gram        Stain, amoxicillin-clavulanate (AUGMENTIN)         875-125 MG tablet, NASAL ENDOSCOPY, DIAGNOSTIC,                15 minutes spent by me on the date of the encounter doing chart review, history and exam, documentation and further activities per the note. This time is in addition to separately billable procedure.         Digna Jaimes MD         Gracy has increased green/yellow secretions with difficulty clearing them. She has had in depth evaluation with pulmonary, GI, hematology, rheum. Work up is unremarkable other than slightly low IgG. She has seen a behavioral health specialist and is very happy with the discussion. Has not been on prednisone lately, lungs are doing ok.     Brother had lung disease, diabetes, kidney failure  Father lung disease, diabetes, EtoH.     Exam: alert, in a good  mood    Procedure:  Endoscopy indicated for exam of sinuses  Topical anesthetic/decongestant spray applied.  Rigid scope used for visualization.  Findings: copious rubber cement like secretions. Suction and forceps used to remove.              Again, thank you for allowing me to participate in the care of your patient.      Sincerely,    Digna Jaimes MD

## 2024-05-21 NOTE — PROGRESS NOTES
[facility] EAR NOSE AND THROAT CLINIC [address]  [address] [facility] [address]  Phone: [phone]  Fax: [phone]    Patient:  Gracy Bautista, Date of birth 1965  Date of Visit:  05/20/2024  Referring Provider Referred Self      Assessment & Plan      (J32.4) Chronic pansinusitis  (primary encounter diagnosis)  Comment: ongoing disease: Ongoing sinopulmonary disease with no clear diagnosis. Has eosiinophilia, has cultured staph, pseudomonas, fungus. Allergy and rheumatologic work up not remarkable, has had low IgG in past. Continue S intervention, see me every 2 months to assess sinuses. Secretions cultured again today.   Plan: Adult ENT Clinic Follow-up Order (Blank),         Respiratory Aerobic Bacterial Culture with Gram        Stain, amoxicillin-clavulanate (AUGMENTIN)         875-125 MG tablet, NASAL ENDOSCOPY, DIAGNOSTIC,                15 minutes spent by me on the date of the encounter doing chart review, history and exam, documentation and further activities per the note. This time is in addition to separately billable procedure.         Digna Jaimes MD         Gracy has increased green/yellow secretions with difficulty clearing them. She has had in depth evaluation with pulmonary, GI, hematology, rheum. Work up is unremarkable other than slightly low IgG. She has seen a behavioral health specialist and is very happy with the discussion. Has not been on prednisone lately, lungs are doing ok.     Brother had lung disease, diabetes, kidney failure  Father lung disease, diabetes, EtoH.     Exam: alert, in a good mood    Procedure:  Endoscopy indicated for exam of sinuses  Topical anesthetic/decongestant spray applied.  Rigid scope used for visualization.  Findings: copious rubber cement like secretions. Suction and forceps used to remove.

## 2024-06-26 ENCOUNTER — TELEPHONE (OUTPATIENT)
Dept: OTOLARYNGOLOGY | Facility: CLINIC | Age: 59
End: 2024-06-26
Payer: COMMERCIAL

## 2024-06-26 NOTE — TELEPHONE ENCOUNTER
Writer LVM for pt asking for a health update stating that the last time pt was in clinic on 5/20 DChristopher Jaimes prescribed pt Augmentin so writer is calling to see how pt is feeling and if pt thinks the Augmentin helped. Writer requested call back from pt. Writer left direct line for pt to call.      Ashley Rosen RN

## 2024-06-27 ENCOUNTER — TELEPHONE (OUTPATIENT)
Dept: OTOLARYNGOLOGY | Facility: CLINIC | Age: 59
End: 2024-06-27
Payer: COMMERCIAL

## 2024-06-27 DIAGNOSIS — J32.4 CHRONIC PANSINUSITIS: Primary | ICD-10-CM

## 2024-06-27 RX ORDER — CIPROFLOXACIN 500 MG/1
500 TABLET, FILM COATED ORAL 2 TIMES DAILY
Qty: 42 TABLET | Refills: 0 | Status: CANCELLED | OUTPATIENT
Start: 2024-06-27 | End: 2024-07-18

## 2024-06-27 RX ORDER — LEVOFLOXACIN 500 MG/1
500 TABLET, FILM COATED ORAL DAILY
Refills: 0 | Status: CANCELLED | OUTPATIENT
Start: 2024-06-27 | End: 2024-07-18

## 2024-06-27 RX ORDER — LEVOFLOXACIN 25 MG/ML
500 SOLUTION ORAL DAILY
Qty: 280 ML | Refills: 0 | Status: SHIPPED | OUTPATIENT
Start: 2024-06-27 | End: 2024-07-26

## 2024-06-27 NOTE — TELEPHONE ENCOUNTER
Writer LVM stating that Dr. Jaimes has sent in a prescription for Levaquin to pt pharmacy. We sent in the liquid version due to pt stating that this is her preference over a pill form. Writer stated that pt needs to take 20mL by mouth for 14 days. Writer provided direct line for pt to call with any questions or concerns.    Ashley Rosen RN

## 2024-06-27 NOTE — TELEPHONE ENCOUNTER
"Writer received call from pt regarding VM left by writer yesterday. Pt stated that she is still not feeling well, pt stated that when she lays down mucus drips down the back of her throat and she then coughs it up. The mucus coughs up is green. Pt stated \"the Augmentin barely touched her symptoms\". Writer stated that Dr. Jaimes would like pt to try a new abx either cipro of Levaquin, writer stated that we are aware that pt experiences muscle aches when on these medication, pt stated she is willing to try either of those medications and will endure the side effects. Pt stated \"I would like to get the abx as a shot if possible and I am happy to come in everyday to get it if necessary, or if there is a liquid version I would like that because the pills are so big and hard to swallow\". Writer state we will look into this request. Pt also stated that she saw an immunologist at [place] yesterday. Writer stated that we are unable to see those notes because they are not within our system, pt repeatedly stated that \"Dr. Jaimes has access to those records\". Writer stated that we will reach out to our records department to obtain the office note. Writer stated that we will reach back out with the abx that Dr. Jaimes will be prescribing. Pt expressed understanding.      Ashley Rosen RN  "

## 2024-07-26 ENCOUNTER — OFFICE VISIT (OUTPATIENT)
Dept: OTOLARYNGOLOGY | Facility: CLINIC | Age: 59
End: 2024-07-26
Payer: COMMERCIAL

## 2024-07-26 VITALS
SYSTOLIC BLOOD PRESSURE: 122 MMHG | HEIGHT: 65 IN | OXYGEN SATURATION: 97 % | DIASTOLIC BLOOD PRESSURE: 82 MMHG | WEIGHT: 119.2 LBS | HEART RATE: 76 BPM | BODY MASS INDEX: 19.86 KG/M2

## 2024-07-26 DIAGNOSIS — J45.50 SEVERE PERSISTENT ASTHMA WITH ALLERGIC RHINITIS, UNSPECIFIED WHETHER COMPLICATED (H): Primary | ICD-10-CM

## 2024-07-26 DIAGNOSIS — J32.4 CHRONIC PANSINUSITIS: ICD-10-CM

## 2024-07-26 PROCEDURE — 99212 OFFICE O/P EST SF 10 MIN: CPT | Mod: 25 | Performed by: OTOLARYNGOLOGY

## 2024-07-26 PROCEDURE — 31231 NASAL ENDOSCOPY DX: CPT | Performed by: OTOLARYNGOLOGY

## 2024-07-26 ASSESSMENT — PAIN SCALES - GENERAL: PAINLEVEL: WORST PAIN (10)

## 2024-07-26 NOTE — PROGRESS NOTES
[facility] EAR NOSE AND THROAT CLINIC [address] [address]  Phone: [phone]  Fax: [phone]    Patient:  Gracy Bautista, Date of birth 1965  Date of Visit:  07/26/2024  Referring Provider Referred Self      Assessment & Plan      (J32.4) Chronic pansinusitis  (primary encounter diagnosis)  Comment: ongoing disease: Ongoing sinopulmonary disease with no clear diagnosis. Has eosiinophilia, has cultured staph, pseudomonas, fungus. Allergy and rheumatologic work up not remarkable, will see allergy/immunology regarding IgG and subclass levels. Acute on chronic exacerbation evidenced today.                Restart budesonide, repeat course of levaquin, consider trying dupixent again. She will discuss this with allergist/immunologist.       15 minutes spent by me on the date of the encounter doing chart review, history and exam, documentation and further activities per the note. This time is in addition to separately billable procedure.         Digna Jaimes MD         Gracy improved on Levaquin after last visit, but improvement is waning.  She has had in depth evaluation with pulmonary, GI, hematology, rheum. Repeat work up shows reduced IgG and several subclasses. She has been on and off of prednisone.     Brother had lung disease, diabetes, kidney failure  Father lung disease, diabetes, EtoH.     Exam: alert, in a good mood    Procedure:  Endoscopy indicated for exam of sinuses  Topical anesthetic/decongestant spray applied.  Rigid scope used for visualization.  Findings: copious rubber cement like secretions. Suction and forceps used to remove.

## 2024-07-26 NOTE — PATIENT INSTRUCTIONS
You were seen in the ENT Clinic today by Dr. Jaimes. If you have any questions or concerns after your appointment, please contact us (see below)       2.   The following recommendations have been made based upon your appointment today:   -We have sent prescriptions for the following:  Budesonide: Use 1 respule in 240 ml of nasal saline and rinse once daily.  Levaquin: Take 20 ml by mouth daily.      3.   Plan to return to the ENT clinic in 2-3 months.           How to Contact Us:  Send a [employer] message to your provider. Our team will respond to you via [employer]. Occasionally, we will need to call you to get further information.  For urgent matters (Monday-Friday), call the ENT Clinic: [phone] and speak with a call center team member - they will route your call appropriately.   If you'd like to speak directly with a nurse, please find our contact information below. We do our best to check voicemail frequently throughout the day, and will work to call you back within 1-2 days. For urgent matters, please use the general clinic phone numbers listed above.     Ashley SAUCEDA RN  Direct: [phone]  Rosalinda AGUAYO LPN  Direct: [phone]         [facility]  Department of Otolaryngology

## 2024-07-26 NOTE — LETTER
7/26/2024       RE: Gracy Bautista  [address] [address]     Dear Colleague,    Thank you for referring your patient, Gracy Bautista, to the [facility] EAR NOSE AND THROAT CLINIC [place] at [facility]. Please see a copy of my visit note below.      [facility] EAR NOSE AND THROAT CLINIC [place] [address] [facility]  4TH FLOOR  [facility] [address]  Phone: [phone]  Fax: [phone]    Patient:  Gracy Bautista, Date of birth 1965  Date of Visit:  07/26/2024  Referring Provider Referred Self      Assessment & Plan     (J32.4) Chronic pansinusitis  (primary encounter diagnosis)  Comment: ongoing disease: Ongoing sinopulmonary disease with no clear diagnosis. Has eosiinophilia, has cultured staph, pseudomonas, fungus. Allergy and rheumatologic work up not remarkable, will see allergy/immunology regarding IgG and subclass levels. Acute on chronic exacerbation evidenced today.                Restart budesonide, repeat course of levaquin, consider trying dupixent again. She will discuss this with allergist/immunologist.       15 minutes spent by me on the date of the encounter doing chart review, history and exam, documentation and further activities per the note. This time is in addition to separately billable procedure.         Digna Jaimes MD         Gracy improved on Levaquin after last visit, but improvement is waning.  She has had in depth evaluation with pulmonary, GI, hematology, rheum. Repeat work up shows reduced IgG and several subclasses. She has been on and off of prednisone.     Brother had lung disease, diabetes, kidney failure  Father lung disease, diabetes, EtoH.     Exam: alert, in a good mood    Procedure:  Endoscopy indicated for exam of sinuses  Topical anesthetic/decongestant spray applied.  Rigid scope used for visualization.  Findings: copious rubber cement like secretions. Suction  and forceps used to remove.              Again, thank you for allowing me to participate in the care of your patient.      Sincerely,    Digna Jaimes MD

## 2024-07-27 RX ORDER — LEVOFLOXACIN 25 MG/ML
500 SOLUTION ORAL DAILY
Qty: 280 ML | Refills: 0 | Status: SHIPPED | OUTPATIENT
Start: 2024-07-27

## 2024-07-27 RX ORDER — BUDESONIDE 0.5 MG/2ML
INHALANT ORAL
Qty: 60 ML | Refills: 3 | Status: SHIPPED | OUTPATIENT
Start: 2024-07-27

## 2024-08-07 ENCOUNTER — TELEPHONE (OUTPATIENT)
Dept: OTOLARYNGOLOGY | Facility: CLINIC | Age: 59
End: 2024-08-07
Payer: COMMERCIAL

## 2024-08-07 DIAGNOSIS — J32.4 CHRONIC PANSINUSITIS: Primary | ICD-10-CM

## 2024-08-07 NOTE — TELEPHONE ENCOUNTER
"Writer received call from pt stating that she is not feeling well, thinks she has another sinus infection. Pt stated that she stopped taking the levaqin that she was given at the end of July because \"she did not like the way it made her feel\". Pt stated that she was \"trying to wait it out until my appointment with Dr. Jaimes but that is too far from no\". Pt reports thick green mucus, sinus pain and post nasal drip at night. Pt continues to use budesonide sinus rinses but does not think that are providing any relief. Writer stated a message will be sent to Dr. Jaimes.    Ashley Rosen RN        "

## 2024-08-09 RX ORDER — DOXYCYCLINE 100 MG/1
100 CAPSULE ORAL 2 TIMES DAILY
Qty: 28 CAPSULE | Refills: 0 | Status: SHIPPED | OUTPATIENT
Start: 2024-08-09 | End: 2024-08-23

## 2024-08-19 ENCOUNTER — TELEPHONE (OUTPATIENT)
Dept: OTOLARYNGOLOGY | Facility: CLINIC | Age: 59
End: 2024-08-19
Payer: COMMERCIAL

## 2024-08-19 DIAGNOSIS — J32.4 CHRONIC PANSINUSITIS: Primary | ICD-10-CM

## 2024-08-19 RX ORDER — METHYLPREDNISOLONE 4 MG
TABLET, DOSE PACK ORAL
Qty: 21 TABLET | Refills: 0 | Status: SHIPPED | OUTPATIENT
Start: 2024-08-19

## 2024-08-19 NOTE — TELEPHONE ENCOUNTER
Writer received VM from pt stating that she is continuing to experience symptom. Pt reports mucus draining down the back of her throat. Pt reports pain above eyebrows, nose and sides of head. Pt requesting to schedule appointment with Dr. Theodore sctot. PT requesting call back.      Ashley Rosen RN

## 2024-08-19 NOTE — TELEPHONE ENCOUNTER
Writer LVM for pt asking if pt has consistently been taking the doxycycline that was prescribed to pt. Writer requesting a call back from pt.    Ashley Rosen RN

## 2024-08-19 NOTE — TELEPHONE ENCOUNTER
Writer received call from pt stating that she will  the medrol dosepak and start taking it.    Ashley Rosen RN

## 2024-08-19 NOTE — TELEPHONE ENCOUNTER
Writer LVM for pt stating that writer reviewed symptoms with Dr. Jaimes and she recommended pt take a Medrol DosePak, writer stated that a prescription has been sent to pts pharmacy.       Ashley Rosen RN

## 2024-09-09 ENCOUNTER — TRANSCRIBE ORDERS (OUTPATIENT)
Dept: OTHER | Age: 59
End: 2024-09-09

## 2024-09-09 ENCOUNTER — MEDICAL CORRESPONDENCE (OUTPATIENT)
Dept: HEALTH INFORMATION MANAGEMENT | Facility: CLINIC | Age: 59
End: 2024-09-09
Payer: COMMERCIAL

## 2024-09-09 DIAGNOSIS — J45.50 SEVERE PERSISTENT ASTHMA WITHOUT COMPLICATION (H): Primary | ICD-10-CM

## 2024-09-10 DIAGNOSIS — J45.909 ASTHMA: Primary | ICD-10-CM

## 2024-09-13 ENCOUNTER — HOSPITAL ENCOUNTER (OUTPATIENT)
Dept: MAMMOGRAPHY | Facility: CLINIC | Age: 59
Discharge: HOME | End: 2024-09-13
Attending: FAMILY MEDICINE | Admitting: FAMILY MEDICINE
Payer: COMMERCIAL

## 2024-09-13 DIAGNOSIS — Z12.31 VISIT FOR SCREENING MAMMOGRAM: ICD-10-CM

## 2024-09-13 PROCEDURE — 77063 BREAST TOMOSYNTHESIS BI: CPT

## 2024-09-30 ENCOUNTER — OFFICE VISIT (OUTPATIENT)
Dept: OTOLARYNGOLOGY | Facility: CLINIC | Age: 59
End: 2024-09-30
Attending: OTOLARYNGOLOGY
Payer: COMMERCIAL

## 2024-09-30 VITALS — BODY MASS INDEX: 19.99 KG/M2 | HEIGHT: 65 IN | WEIGHT: 120 LBS

## 2024-09-30 DIAGNOSIS — J32.4 CHRONIC PANSINUSITIS: Primary | ICD-10-CM

## 2024-09-30 PROCEDURE — 99212 OFFICE O/P EST SF 10 MIN: CPT | Mod: 25 | Performed by: OTOLARYNGOLOGY

## 2024-09-30 PROCEDURE — 31231 NASAL ENDOSCOPY DX: CPT | Mod: 52 | Performed by: OTOLARYNGOLOGY

## 2024-09-30 RX ORDER — BUPROPION HYDROCHLORIDE 100 MG/1
300 TABLET ORAL 2 TIMES DAILY
COMMUNITY

## 2024-09-30 ASSESSMENT — PAIN SCALES - GENERAL: PAINLEVEL: EXTREME PAIN (9)

## 2024-09-30 NOTE — PROGRESS NOTES
[facility] EAR NOSE AND THROAT CLINIC [address] [address]  Phone: [phone]  Fax: [phone]    Patient:  Gracy Bautista, Date of birth 1965  Date of Visit:  09/30/2024  Referring Provider Referred Self    Assessment & Plan      (J32.4) Chronic pansinusitis  (primary encounter diagnosis)  Comment: ongoing disease: Ongoing sinopulmonary disease with no clear diagnosis. Has eosinophilia, has cultured staph, pseudomonas, fungus. Allergy and rheumatologic work up not remarkable, will see allergy/immunology regarding IgG and subclass levels. Somewhat improved today with R > L inspissated secretions. May consider repeat trial of dupixent. Continue budesonide and gent irrigations.     15 minutes spent by me on the date of the encounter doing chart review, history and exam, documentation and further activities per the note. This time is in addition to separately billable procedure.         MD Gracy Bertrand is about the same.  She has had in depth evaluation with pulmonary, GI, hematology, rheum. Repeat work up shows reduced IgG and several subclasses. She has been on and off of prednisone.     Brother had lung disease, diabetes, kidney failure  Father lung disease, diabetes, EtoH.     Exam: alert, in a good mood    Procedure:  Endoscopy indicated for exam of sinuses  Topical anesthetic/decongestant spray applied.  Rigid scope used for visualization.  Findings: copious rubber cement like secretions on R, scant on left. Suction and forceps used to remove.

## 2024-09-30 NOTE — LETTER
9/30/2024       RE: Gracy Bautista  [address] [address]     Dear Colleague,    Thank you for referring your patient, Gracy Bautista, to the [facility] EAR NOSE AND THROAT CLINIC [place] at [facility]. Please see a copy of my visit note below.      [facility] EAR NOSE AND THROAT CLINIC [place] [address] [facility]  4TH FLOOR  [facility] [address]  Phone: [phone]  Fax: [phone]    Patient:  Gracy Bautista, Date of birth 1965  Date of Visit:  09/30/2024  Referring Provider Referred Self    Assessment & Plan     (J32.4) Chronic pansinusitis  (primary encounter diagnosis)  Comment: ongoing disease: Ongoing sinopulmonary disease with no clear diagnosis. Has eosinophilia, has cultured staph, pseudomonas, fungus. Allergy and rheumatologic work up not remarkable, will see allergy/immunology regarding IgG and subclass levels. Somewhat improved today with R > L inspissated secretions. May consider repeat trial of dupixent. Continue budesonide and gent irrigations.     15 minutes spent by me on the date of the encounter doing chart review, history and exam, documentation and further activities per the note. This time is in addition to separately billable procedure.         MD Gracy Bertrand is about the same.  She has had in depth evaluation with pulmonary, GI, hematology, rheum. Repeat work up shows reduced IgG and several subclasses. She has been on and off of prednisone.     Brother had lung disease, diabetes, kidney failure  Father lung disease, diabetes, EtoH.     Exam: alert, in a good mood    Procedure:  Endoscopy indicated for exam of sinuses  Topical anesthetic/decongestant spray applied.  Rigid scope used for visualization.  Findings: copious rubber cement like secretions on R, scant on left. Suction and forceps used to remove.              Again, thank you for allowing me to  participate in the care of your patient.      Sincerely,    Digna Jaimes MD

## 2024-10-03 DIAGNOSIS — J45.50 SEVERE PERSISTENT ASTHMA, UNSPECIFIED WHETHER COMPLICATED (H): Primary | ICD-10-CM

## 2024-10-07 ENCOUNTER — TELEPHONE (OUTPATIENT)
Dept: OTOLARYNGOLOGY | Facility: CLINIC | Age: 59
End: 2024-10-07
Payer: COMMERCIAL

## 2024-10-07 DIAGNOSIS — J32.4 CHRONIC PANSINUSITIS: Primary | ICD-10-CM

## 2024-10-07 NOTE — TELEPHONE ENCOUNTER
Writer LVM stating that Dr. Jaimes has sent in a prescription for Augmentin to pts pharmacy. Writer encouraged pt to call back with questions or concerns.      Ashley Rosen RN

## 2024-10-07 NOTE — CONFIDENTIAL NOTE
Writer received call from pt stating that she is having increase congestion and the budesonide and gentamicin rinses are not helping. Pt stated that she would like an abx from Dr. Jaimes. Pt stated that she is trying to get in to see her allergist. Writer stated that Dr. Jaimes had discussed dupixent as a next option for the patient. Pt unsure if she wants to go on dupixent yet.  Writer stated that we will review this message with Dr. Jaimes and call pt back, pt expressed understanding.      Ashley Rosen RN

## 2024-10-15 DIAGNOSIS — J43.2 CENTRILOBULAR EMPHYSEMA (H): ICD-10-CM

## 2024-10-15 RX ORDER — LEVALBUTEROL TARTRATE 45 UG/1
2 AEROSOL, METERED ORAL EVERY 4 HOURS PRN
OUTPATIENT
Start: 2024-10-15

## 2024-10-30 ENCOUNTER — ONCOLOGY VISIT (OUTPATIENT)
Dept: ONCOLOGY | Facility: CLINIC | Age: 59
End: 2024-10-30
Attending: INTERNAL MEDICINE
Payer: COMMERCIAL

## 2024-10-30 ENCOUNTER — MEDICAL CORRESPONDENCE (OUTPATIENT)
Dept: HEALTH INFORMATION MANAGEMENT | Facility: CLINIC | Age: 59
End: 2024-10-30

## 2024-10-30 ENCOUNTER — TRANSCRIBE ORDERS (OUTPATIENT)
Dept: OTHER | Age: 59
End: 2024-10-30

## 2024-10-30 ENCOUNTER — LAB (OUTPATIENT)
Dept: INFUSION THERAPY | Facility: CLINIC | Age: 59
End: 2024-10-30
Attending: INTERNAL MEDICINE
Payer: COMMERCIAL

## 2024-10-30 VITALS
DIASTOLIC BLOOD PRESSURE: 83 MMHG | WEIGHT: 117.3 LBS | HEART RATE: 71 BPM | BODY MASS INDEX: 19.52 KG/M2 | OXYGEN SATURATION: 99 % | SYSTOLIC BLOOD PRESSURE: 120 MMHG

## 2024-10-30 DIAGNOSIS — D50.8 IRON DEFICIENCY ANEMIA SECONDARY TO INADEQUATE DIETARY IRON INTAKE: ICD-10-CM

## 2024-10-30 DIAGNOSIS — T50.905A: Primary | ICD-10-CM

## 2024-10-30 DIAGNOSIS — T50.905A: ICD-10-CM

## 2024-10-30 DIAGNOSIS — R06.02 SHORTNESS OF BREATH: Primary | ICD-10-CM

## 2024-10-30 DIAGNOSIS — D72.10: ICD-10-CM

## 2024-10-30 DIAGNOSIS — D72.10: Primary | ICD-10-CM

## 2024-10-30 DIAGNOSIS — J45.50 SEVERE PERSISTENT ALLERGIC ASTHMA (H): ICD-10-CM

## 2024-10-30 LAB
BASOPHILS # BLD AUTO: 0.1 10E3/UL (ref 0–0.2)
BASOPHILS NFR BLD AUTO: 1 %
EOSINOPHIL # BLD AUTO: 0.7 10E3/UL (ref 0–0.7)
EOSINOPHIL NFR BLD AUTO: 9 %
ERYTHROCYTE [DISTWIDTH] IN BLOOD BY AUTOMATED COUNT: 14.1 % (ref 10–15)
ERYTHROCYTE [SEDIMENTATION RATE] IN BLOOD BY WESTERGREN METHOD: 2 MM/HR (ref 0–30)
FERRITIN SERPL-MCNC: 49 NG/ML (ref 11–328)
HCT VFR BLD AUTO: 46.7 % (ref 35–47)
HGB BLD-MCNC: 15.8 G/DL (ref 11.7–15.7)
IMM GRANULOCYTES # BLD: 0 10E3/UL
IMM GRANULOCYTES NFR BLD: 0 %
LYMPHOCYTES # BLD AUTO: 2.9 10E3/UL (ref 0.8–5.3)
LYMPHOCYTES NFR BLD AUTO: 34 %
MCH RBC QN AUTO: 33.1 PG (ref 26.5–33)
MCHC RBC AUTO-ENTMCNC: 33.8 G/DL (ref 31.5–36.5)
MCV RBC AUTO: 98 FL (ref 78–100)
MONOCYTES # BLD AUTO: 0.8 10E3/UL (ref 0–1.3)
MONOCYTES NFR BLD AUTO: 9 %
NEUTROPHILS # BLD AUTO: 3.9 10E3/UL (ref 1.6–8.3)
NEUTROPHILS NFR BLD AUTO: 47 %
NRBC # BLD AUTO: 0 10E3/UL
NRBC BLD AUTO-RTO: 0 /100
PLATELET # BLD AUTO: 378 10E3/UL (ref 150–450)
RBC # BLD AUTO: 4.78 10E6/UL (ref 3.8–5.2)
WBC # BLD AUTO: 8.3 10E3/UL (ref 4–11)

## 2024-10-30 PROCEDURE — 85014 HEMATOCRIT: CPT

## 2024-10-30 PROCEDURE — 99214 OFFICE O/P EST MOD 30 MIN: CPT | Performed by: INTERNAL MEDICINE

## 2024-10-30 PROCEDURE — 36415 COLL VENOUS BLD VENIPUNCTURE: CPT

## 2024-10-30 PROCEDURE — 85004 AUTOMATED DIFF WBC COUNT: CPT

## 2024-10-30 PROCEDURE — G0463 HOSPITAL OUTPT CLINIC VISIT: HCPCS | Performed by: INTERNAL MEDICINE

## 2024-10-30 PROCEDURE — 82785 ASSAY OF IGE: CPT

## 2024-10-30 PROCEDURE — 82728 ASSAY OF FERRITIN: CPT

## 2024-10-30 PROCEDURE — 85652 RBC SED RATE AUTOMATED: CPT

## 2024-10-30 ASSESSMENT — PAIN SCALES - GENERAL: PAINLEVEL_OUTOF10: NO PAIN (0)

## 2024-10-30 NOTE — PROGRESS NOTES
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  Iron deficiency anemia.  EGD and colonoscopy  w/o malignancy.  Polycythemia, mild and intermittent, likely 2/2 smoking.    Continue observation.  Eosinophils remain normal following Dupixent discontinuation.  Counts stable (mild an intermittent polycythemia) likely secondary to underlying lung disease; MPN testing could be considered in the future.  Iron stable; not on oral iron; no signs of GI bleeding  Given long-term stability and blood counts now, we agreed to follow-up as needed and we are happy to see her back should blood count issues arise.  Reviewed importance of RSV vaccine and she will reach out to her primary care provider; status post flu; will consider COVID.    Follow-up:  PRN.    Zev Roach MD    Diagnosis Codes:  1. Drug-induced eosinophilia    2. Iron deficiency anemia secondary to inadequate dietary iron intake      Lab and Imaging Orders:  No orders of the defined types were placed in this encounter.    Medication Orders:  No orders of the defined types were placed in this encounter.    --------------------    Interval History:  Gracy presents for follow-up of eosinophilia and iron deficiency anemia.  All in all, rGacy has been enjoying pretty good health.  No signs of bleeding such as melena or bright red blood per rectum.  She does have some easy bruising affecting the hands and forearms as a result of prednisone.  Her breathing issues remain the primary issues affecting her life as well as the treatment she requires to remain comfortable.  She is back on prednisone which unfortunately has her feeling a bit revved up as well as ornery.    --------------------    Physical Exam:  VS: /83 (BP Location: Left arm)   Pulse 71   Wt 53.2 kg (117 lb 4.8 oz)   SpO2 99%   BMI 19.52 kg/m     GEN: Well appearing.    Data:  Reviewed CBC, ferritin, ESR, IgE.

## 2024-10-30 NOTE — LETTER
10/30/2024      Grayc Bautista  [address] [address]      Dear Colleague,    Thank you for referring your patient, Gracy Bautista, to the [facility] [place]. Please see a copy of my visit note below.    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  Iron deficiency anemia.  EGD and colonoscopy  w/o malignancy.  Polycythemia, mild and intermittent, likely 2/2 smoking.    Continue observation.  Eosinophils remain normal following Dupixent discontinuation.  Counts stable (mild an intermittent polycythemia) likely secondary to underlying lung disease; MPN testing could be considered in the future.  Iron stable; not on oral iron; no signs of GI bleeding  Given long-term stability and blood counts now, we agreed to follow-up as needed and we are happy to see her back should blood count issues arise.  Reviewed importance of RSV vaccine and she will reach out to her primary care provider; status post flu; will consider COVID.    Follow-up:  PRN.    Zev Roach MD    Diagnosis Codes:  1. Drug-induced eosinophilia    2. Iron deficiency anemia secondary to inadequate dietary iron intake      Lab and Imaging Orders:  No orders of the defined types were placed in this encounter.    Medication Orders:  No orders of the defined types were placed in this encounter.    --------------------    Interval History:  Gracy presents for follow-up of eosinophilia and iron deficiency anemia.  All in all, Gracy has been enjoying pretty good health.  No signs of bleeding such as melena or bright red blood per rectum.  She does have some easy bruising affecting the hands and forearms as a result of prednisone.  Her breathing issues remain the primary issues affecting her life as well as the treatment she requires to remain  comfortable.  She is back on prednisone which unfortunately has her feeling a bit revved up as well as ornery.    --------------------    Physical Exam:  VS: /83 (BP Location: Left arm)   Pulse 71   Wt 53.2 kg (117 lb 4.8 oz)   SpO2 99%   BMI 19.52 kg/m    GEN: Well appearing.    Data:  Reviewed CBC, ferritin, ESR, IgE.      Again, thank you for allowing me to participate in the care of your patient.        Sincerely,        Zev Roach MD

## 2024-10-30 NOTE — NURSING NOTE
"Oncology Rooming Note    October 30, 2024 10:35 AM   Gracy Bautista is a 58 year old female who presents for:    Chief Complaint   Patient presents with    Oncology Clinic Visit     Initial Vitals: /83 (BP Location: Left arm)   Pulse 71   Wt 53.2 kg (117 lb 4.8 oz)   SpO2 99%   BMI 19.52 kg/m   Estimated body mass index is 19.52 kg/m  as calculated from the following:    Height as of 9/30/24: 1.651 m (5' 5\").    Weight as of this encounter: 53.2 kg (117 lb 4.8 oz). Body surface area is 1.56 meters squared.  No Pain (0) Comment: Data Unavailable   No LMP recorded. Patient has had a hysterectomy.  Allergies reviewed: Yes  Medications reviewed: Yes    Medications: Medication refills not needed today.  Pharmacy name entered into [employer] [facility] [id number] [place] [address]    Frailty Screening:   Is the patient here for a new oncology consult visit in cancer care? 2. No      Clinical concerns: No Concerns        Darius Mosqueda MA            "

## 2024-10-31 LAB — IGE SERPL-ACNC: 141 KU/L (ref 0–114)

## 2024-11-13 NOTE — CONFIDENTIAL NOTE
RECORDS RECEIVED FROM: internal    DATE RECEIVED: 12.9.24    NOTES STATUS DETAILS   OFFICE NOTE from referring provider internal    Erica Hunter MD      OFFICE NOTE from other specialist internal  internal -  7.26.24 waldron      DISCHARGE REPORT from the ER [facility]-  11.6.24 trevor   9.11.24  white  [facility] 8.28.24 Svobidny   12.18.23 Cheryl   5.31.23 joing  5.3.23 [place]    MEDICATION LIST internal     IMAGING  (NEED IMAGES AND REPORTS)     CT SCAN ce [facility]- 5.23.24, 2.6.24,    CHEST XRAY (CXR) ce [facility]- 11/7/24, 10.31.24, 8.28.24, 4.11.24, 2.6.24, 1.26.24, 1.3.24, 12.22.23, 12.18.23, 10.18.23     Internal- 8.29.23   TESTS     PULMONARY FUNCTION TESTING (PFT) internal  Scheduled 12.9.24        Action 11.13.24 sv    Action Taken Image request sent to [facility] for     CXR- 11/7/24, 10.31.24, 8.28.24, 4.11.24, 2.6.24, 1.26.24, 1.3.24, 12.22.23, 12.18.23, 10.18.23     CT- 5.23.24, 2.6.24,

## 2024-11-15 ENCOUNTER — TRANSCRIBE ORDERS (OUTPATIENT)
Dept: OTHER | Age: 59
End: 2024-11-15

## 2024-11-15 DIAGNOSIS — K20.0 EOSINOPHILIC ESOPHAGITIS: Primary | ICD-10-CM

## 2024-11-25 NOTE — TELEPHONE ENCOUNTER
FUTURE VISIT INFORMATION      FUTURE VISIT INFORMATION:  Date: 1/8/25  Time: 9:30am  Location: [facility]  REFERRAL INFORMATION:  Referring provider:  Dr. Bautista   Referring providers clinic:  [facility]   Reason for visit/diagnosis  Eosinophilic esophagitis, propjoint Lid Retraction     RECORDS REQUESTED FROM:       Clinic name Comments Records Status Imaging Status   [facility]  Note/rec? Scanned under chart 11/14/24 epic    [facility] 7/2/18 Care everywhere    Imaging MR Orbits face 11/15/24- [place]- requested image be pushed

## 2024-12-02 ENCOUNTER — TRANSCRIBE ORDERS (OUTPATIENT)
Dept: OTHER | Age: 59
End: 2024-12-02

## 2024-12-02 DIAGNOSIS — Z79.52: Primary | ICD-10-CM

## 2024-12-02 DIAGNOSIS — J44.9: Primary | ICD-10-CM

## 2024-12-05 ENCOUNTER — TELEPHONE (OUTPATIENT)
Facility: CLINIC | Age: 59
End: 2024-12-05
Payer: COMMERCIAL

## 2024-12-05 NOTE — TELEPHONE ENCOUNTER
[facility] Call Center    Phone Message    May a detailed message be left on voicemail: yes     Reason for Call: Other: Per pt had to cancel her apt with Dr. Ford on 12/09/2024 due being out state for emergency. Per pt wants to see Dr. Doyle, writer tried offering next available in February, but pt denied to schedule. Pt is requesting to be seen sooner than Feb. Please follow up with pt and advise on scheduling.       Action Taken: Other: Pulm      Travel Screening: Not Applicable     Date of Service:

## 2024-12-05 NOTE — TELEPHONE ENCOUNTER
Left Voicemail (1st Attempt) and Sent Mychart (1st Attempt) for the patient to call back and schedule the following:    Appointment type: Return Asthma  Provider: Logan  Return date: Next Avail  Specialty phone number: [phone]  Additional appointment(s) needed: full pft  Additonal Notes: pt has previously seen Dr. Bean in .

## 2024-12-09 ENCOUNTER — PRE VISIT (OUTPATIENT)
Dept: PULMONOLOGY | Facility: CLINIC | Age: 59
End: 2024-12-09

## 2024-12-29 NOTE — PROGRESS NOTES
[facility] EAR NOSE AND THROAT CLINIC [address] [address]  Phone: [phone]  Fax: [phone]    Patient:  Gracy Bautista, Date of birth 1965  Date of Visit:  12/29/2024  Referring Provider Referred Self    Assessment & Plan    (H92.02) Right ear pain  (primary encounter diagnosis)  Comment: New symptom, normal ear exam warranted thorough head and neck exam. Right TVC leukoplakia noted in a patient with heavy tobacco use.   Plan: Referral to laryngology. I will continue to manage sinus disease. See me 2-3 months.     (J32.4) Chronic pansinusitis  Comment: Ongoing sinopulmonary disease with no clear diagnosis. Has eosinophilia, has cultured staph, pseudomonas, fungus. Allergy and rheumatologic work up not remarkable, will see allergy/immunology regarding IgG and subclass levels. Inspissated secretions suctioned from sinus cavities including bilateral maxillary antra. Continue budesonide and gent irrigations. Will repeat course of levaquin.      Plan: levofloxacin (LEVAQUIN) 25 MG/ML solution,             20 minutes spent by me on the date of the encounter doing chart review, history and exam, documentation and further activities per the note. This time is in addition to separately billable procedure.         Digna Jaimes MD         Gracy has a new complaint of right ear pain. Not worse with chewing. No prior TMD. Did see dentist, no infections found, but poor dentition. Sinuses seem worse. She feels like she needs more antibiotics. Was hospitalized a few months ago for breathing issues and was noted to have positive cultures from her sputum, treated with antibiotics and sent home on bactrim.     Brother had lung disease, diabetes, kidney failure  Father lung disease, diabetes, EtoH.     Exam:   TM on R normal. Palpation of parotid/TMJ neck without mass or lesion.   Careful visualization and palpation of oral cavity and oropharynx reveals no  lesions. Mild whitish lacy changes along retromolar trigone, but no gladys erythroleukoplakia.     Procedure:  Endoscopy indicated for exam of sinuses  Topical anesthetic/decongestant spray applied.  Rigid scope used for visualization.  Findings: copious rubber cement like secretions on R and left. Suction and forceps used to remove.      Procedure: Laryngoscopy: scope passed through nose. Larynx well visualized. R TVC with 2-3 mm patch of dense leukoplakia. TVC with normal mobility. No other lesions seen.

## 2024-12-30 ENCOUNTER — TELEPHONE (OUTPATIENT)
Dept: OTOLARYNGOLOGY | Facility: CLINIC | Age: 59
End: 2024-12-30

## 2024-12-30 ENCOUNTER — OFFICE VISIT (OUTPATIENT)
Dept: OTOLARYNGOLOGY | Facility: CLINIC | Age: 59
End: 2024-12-30
Payer: COMMERCIAL

## 2024-12-30 VITALS
HEIGHT: 65 IN | SYSTOLIC BLOOD PRESSURE: 123 MMHG | BODY MASS INDEX: 20.83 KG/M2 | DIASTOLIC BLOOD PRESSURE: 75 MMHG | WEIGHT: 125 LBS | HEART RATE: 63 BPM

## 2024-12-30 DIAGNOSIS — H92.02 LEFT EAR PAIN: Primary | ICD-10-CM

## 2024-12-30 DIAGNOSIS — J32.4 CHRONIC PANSINUSITIS: ICD-10-CM

## 2024-12-30 PROCEDURE — 99212 OFFICE O/P EST SF 10 MIN: CPT | Mod: 25 | Performed by: OTOLARYNGOLOGY

## 2024-12-30 PROCEDURE — 31575 DIAGNOSTIC LARYNGOSCOPY: CPT | Performed by: OTOLARYNGOLOGY

## 2024-12-30 RX ORDER — LEVOFLOXACIN 25 MG/ML
500 SOLUTION ORAL DAILY
Qty: 280 ML | Refills: 0 | Status: SHIPPED | OUTPATIENT
Start: 2024-12-30 | End: 2025-01-13

## 2024-12-30 RX ORDER — LEVOFLOXACIN 25 MG/ML
500 SOLUTION ORAL DAILY
Qty: 280 ML | Refills: 0 | Status: SHIPPED | OUTPATIENT
Start: 2024-12-30 | End: 2024-12-30

## 2024-12-30 NOTE — LETTER
12/30/2024       RE: Gracy Bautista  [address] [facility] [address]     Dear Colleague,    Thank you for referring your patient, Gracy Bautista, to the [facility] [place] at [facility]. Please see a copy of my visit note below.      [facility] [address]  4TH FLOOR  [facility] [address]  Phone: [phone]  Fax: [phone]    Patient:  Gracy Bautista, Date of birth 1965  Date of Visit:  12/29/2024  Referring Provider Referred Self    Assessment & Plan   (H92.02) Right ear pain  (primary encounter diagnosis)  Comment: New symptom, normal ear exam warranted thorough head and neck exam. Right TVC leukoplakia noted in a patient with heavy tobacco use.   Plan: Referral to laryngology. I will continue to manage sinus disease. See me 2-3 months.     (J32.4) Chronic pansinusitis  Comment: Ongoing sinopulmonary disease with no clear diagnosis. Has eosinophilia, has cultured staph, pseudomonas, fungus. Allergy and rheumatologic work up not remarkable, will see allergy/immunology regarding IgG and subclass levels. Inspissated secretions suctioned from sinus cavities including bilateral maxillary antra. Continue budesonide and gent irrigations. Will repeat course of levaquin.      Plan: levofloxacin (LEVAQUIN) 25 MG/ML solution,             20 minutes spent by me on the date of the encounter doing chart review, history and exam, documentation and further activities per the note. This time is in addition to separately billable procedure.    {Provider  Link to [facility] Help Grid :[id number]}     Digna Jaimes MD   {Do not delete. Used for tracking note template use:[id number]}      Gracy has a new complaint of right ear pain. Not worse with chewing. No prior TMD. Did see dentist, no infections found, but poor dentition. Sinuses seem worse. She feels like she needs  more antibiotics. Was hospitalized a few months ago for breathing issues and was noted to have positive cultures from her sputum, treated with antibiotics and sent home on bactrim.     Brother had lung disease, diabetes, kidney failure  Father lung disease, diabetes, EtoH.     Exam:   TM on R normal. Palpation of parotid/TMJ neck without mass or lesion.   Careful visualization and palpation of oral cavity and oropharynx reveals no lesions. Mild whitish lacy changes along retromolar trigone, but no gladys erythroleukoplakia.     Procedure:  Endoscopy indicated for exam of sinuses  Topical anesthetic/decongestant spray applied.  Rigid scope used for visualization.  Findings: copious rubber cement like secretions on R and left. Suction and forceps used to remove.      Procedure: Laryngoscopy: scope passed through nose. Larynx well visualized. R TVC with 2-3 mm patch of dense leukoplakia. TVC with normal mobility. No other lesions seen.         Again, thank you for allowing me to participate in the care of your patient.      Sincerely,    Digna Jaimes MD

## 2024-12-30 NOTE — PATIENT INSTRUCTIONS
You were seen in the ENT Clinic today by Dr. Jaimes. If you have any questions or concerns after your appointment, please contact us (see below)       2.   The following recommendations have been made based upon your appointment today:   -a prescription for Levaquin has been sent to your pharmacy, please take as directed   -follow up with Laryngology, our clinic will reach out to you with an appointment      3.   Plan to return to the ENT clinic in 2-3 months           How to Contact Us:  Send a [employer] message to your provider. Our team will respond to you via [employer]. Occasionally, we will need to call you to get further information.  For urgent matters (Monday-Friday), call the ENT Clinic: [phone] and speak with a call center team member - they will route your call appropriately.   If you'd like to speak directly with a nurse, please find our contact information below. We do our best to check voicemail frequently throughout the day, and will work to call you back within 1-2 days. For urgent matters, please use the general clinic phone numbers listed above.     Ashley SAUCEDA RN  Direct: [phone]  Rosalinda AGUAYO LPN  Direct: [phone]         [facility]  Department of Otolaryngology

## 2024-12-30 NOTE — TELEPHONE ENCOUNTER
FUTURE VISIT INFORMATION      FUTURE VISIT INFORMATION:  Date: 1/23/25  Time: 12 PM  [place] [facility] [place]   REFERRAL INFORMATION:  Referring provider:    Referring providers clinic:    Reason for visit/diagnosis:  New Throat Panel, beth'd alo Capps in clinic     RECORDS REQUESTED FROM      Clinic name Comments Records Status Imaging Status   [facility] ENT 12/30/24 HUGO- Digna Jaimes MD  [facility]    [facility] Procedure 9/2/22 Upper GI Endoscopy  [facility]    [facility] ALLERGY  10/19/22 OV -Doni Lee MD  [facility]    [facility] 11/7/24 XR chest  7/9/24 XR ESOPHAGRAM   5/23/24 CT chest  *more in pacs CE PACS   [facility] PULMONOLOGY   6/14/23 OV- Erica Hunter MD  [facility]    [facility] ALLERGY  6/25/24 OV- Yahaira Cuevas MBBS   [facility]

## 2024-12-30 NOTE — TELEPHONE ENCOUNTER
Left Voicemail (1st Attempt) for the patient to call back and schedule the following:    Appointment Type: New Throat Panel  Provider: Dr. Pineda ([facility]) Reynaldo   Appt date: 1/23 12pm  Specialty phone number: [phone]  Additional appointment(s) needed:   Additional Notes:

## 2025-01-02 ENCOUNTER — MEDICAL CORRESPONDENCE (OUTPATIENT)
Dept: HEALTH INFORMATION MANAGEMENT | Facility: CLINIC | Age: 60
End: 2025-01-02
Payer: COMMERCIAL

## 2025-01-06 ENCOUNTER — TRANSCRIBE ORDERS (OUTPATIENT)
Dept: OTHER | Age: 60
End: 2025-01-06

## 2025-01-06 DIAGNOSIS — R52 GENERALIZED PAIN: ICD-10-CM

## 2025-01-06 DIAGNOSIS — F43.10 PTSD (POST-TRAUMATIC STRESS DISORDER): Primary | ICD-10-CM

## 2025-01-06 NOTE — PROGRESS NOTES
"     Gracy Bautista is a 59 year old female with the following diagnoses:   1. Pain around right eye    2. Dry eye syndrome of both eyes    3. Exophthalmos of right eye         Patient was sent for consultation by Dr. Otis Bautista and [facility] for Eosinophilic esophagitis, and proptosis with lid Retraction    HPI:    Patient reports that the RE is bulging with extreme pain which started to get very bad and unbearable about 6 months ago. 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 She has had poor vision in the right eye since childhood and did have strabismus surgery around age 4.     Today, the pain feels like very bad pressure, not a stabbing pain but it feels like it is going to pop out. She has a burning pain. Her lid will flip up on its own and she needs help from  to flip it back down. It is giving an esotropia appearance and people have been noticing this. Her good eye (LE) is blurry and having difficulty doing every day tasks. +photophobic with monocular diplopia. R>L. +fatigue. Although vision in the right eye has never been \"good,\" she feels that the right eye vision is worse in the past few years. But she is most bothered by the right eye pain at this time.     Of note, she was seen by me in neuro-op clinic back in 2013 with similar reported symptoms. At that time, she also had been reporting 6 years of worsening pain in the right eye. She had 2 mm of measured proptosis in the right eye at that time. 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       Independent historians:  Patient    Review of outside testing:    MRI orbits/face/neck w/w/o contrast 11/16/24 - ordered by primary care physician for proptosis    INDICATION:  Exophthalmos.  COMPARISON:  None.    IMPRESSION:  1. Dedicated imaging of the orbits " demonstrates asymmetric enlargement and  elongated morphology of the right globe compared to the left. 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  2. Remainder of the orbits is normal.  3. No acute intracranial abnormality.  4. No abnormal enhancement or enhancing lesions.  5. Normal brain parenchymal morphology       CT orbits with contrast 5/29/2019:      My interpretation performed today of outside testing:  There is enlargement of the right globe causing pseudo proptosis        Review of outside clinical notes:    Visit with Dr. Bautista on 11/14/24:       Neuro-op visit on 9/11/2013:   Thank you for asking me to see Gracy Bautista in Neuro-ophthalmic consultation. I would like to thank you for sending your records and I will summarize them here. She is a 47-year-old woman who has a history of amblyopia in the right eye. She underwent bilateral ptosis surgery by you in August, 2011. She states her chief complaint is that her right eye hurts. This began about six years ago and she feels that it has overall gotten worse. She does not feel that the surgery affected the pain in any way. She feels better when she closes it. She states that it is much worse toward the afternoon and evening. She states that her  notes her eye does not close all the way when she sleeps. Her right eye gets red as the day goes on. She states that if she could keep her eyes closed all day long, it would not hurt. She states that the right eye de la o. She feels like the left eye is dry and she also feels like her mouth is dry.      EXAMINATION:   Her visual acuity is count fingers right eye, 20/20 left eye. Pupils are normal. Color vision is normal in the left eye and could not be assessed in the right eye. Intraocular pressure is 19 in each eye. There is no ptosis, in fact there is mild lid retraction. Her MRD1 is five millimeters in both eyes. She has two millimeters of proptosis of the right eye. There is  one millimeter of lagophthalmos bilaterally. She has an abduction deficit of the right eye of about 50%. Slit lamp examination shows punctate changes on the corneas of both eyes. She has an abnormal tear film with an early tear break up time. Fundus examination is unremarkable.     IMPRESSION/PLAN:   It is my impression that she has eye pain in the right eye. All of her symptoms and examination are consistent with dry eye. I see that you obtained a thyroid stimulating immunoglobulin and TSH as well as acetylcholine receptor antibodies on August 27, 2013 and these were all normal. I reviewed her CT scan and MRI which showed a staphyloma and a very large eye on the right hand side and I believe that this is more than likely the cause of her pseudo proptosis. For her dry eye, I am going to place her on Pred Forte four times a day for a week, three times a day for a week, twice a day for a week, once a day for a week. I have asked her to use good lid hygiene as well as artificial tears. For her lagophthalmos, I recommend that she use Refresh PM at bedtime. I believe that part of her problem is this lagophthalmos. She has an unusual eyelid contour of the right lower lid probably from the pseudo proptosis. She is wondering whether this can be fixed and I think it would certainly help her with her dry eye symptoms. She is going to touch base with you about when, if and how this can be done.       Ghanshyam Diaz M.D.   Professor   Neuro-ophthalmology Service      Past medical history:  Patient Active Problem List   Diagnosis    Chronic pansinusitis    ADD (attention deficit disorder)    Alcohol use disorder, moderate, in sustained remission (H)    Blind right eye    Cardiac arrhythmia    Carpal tunnel syndrome    PTSD (post-traumatic stress disorder)    Hyperlipidemia    Hypogammaglobulinemia    Immunoglobulin deficiency    Insomnia    Moderate persistent asthma, uncomplicated    Myofascial pain    Peripheral vascular  disease    Tobacco abuse    Anticardiolipin antibody positive    Umbilical hernia with obstruction    Dysphagia, unspecified type    Chronic cough    Other eosinophilia     Medications:   acetaminophen (TYLENOL) 500 MG tablet  ADDERALL XR 20 MG 24 hr capsule  azithromycin (ZITHROMAX) 250 MG tablet  bisacodyl (DULCOLAX) 5 MG EC tablet  budesonide (PULMICORT) 0.5 MG/2ML neb solution  CYMBALTA 60 MG capsule  LAMICTAL 200 MG tablet  levalbuterol (XOPENEX HFA) 45 MCG/ACT inhaler  levofloxacin (LEVAQUIN) 25 MG/ML solution  LORazepam (ATIVAN) 0.5 MG tablet  nicotine polacrilex (NICORETTE) 4 MG gum  Cholecalciferol (VITAMIN D3) 50 MCG (2000 UT) CAPS  levalbuterol (XOPENEX) 1.25 MG/3ML neb solution ()  spacer (OPTICHAMBER ELBA) holding chamber  TRELEGY ELLIPTA 200-62.5-25 MCG/ACT oral inhaler       Family history / social history:  Patient's family history includes Bleeding Disorder in her brother and mother; Cancer in her brother; Cerebrovascular Disease in her father; Diabetes in her father; Heart Disease in her father; Hyperlipidemia in her father and mother; Hypertension in her father and mother.   Father had stroke at 55, diabetes mellitus, and poor vision but unknown by patient why.   Mother has poor vision requiring eye injection, likely age related macular degeneration.     Patient  reports that she has been smoking cigarettes. She started smoking about 3 years ago. She has never used smokeless tobacco. She reports that she does not currently use alcohol. She reports that she does not use drugs. Smokes 1 1/2 cigarettes per day and chews nicotine gum.     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     Exam:  Visual acuity HM right eye 20/30-2 left eye.  Color vision unable in the right eye and 11/11 left eye.  Pupils with minimal reaction to light in the right eye but no afferent pupillary defect.  Intraocular pressure 18 right eye and 19 left eye.  External  exam shows proptosis right eye. Anterior segment exam showed trace punctate epithelial changes in both eyes and an early tear break-up time in both eyes.  Strabismus exam reveals abduction deficit in the right eye.  She noted significant improvement in the eye pain with topical anesthetic.        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 Exam today appears similar to that of her exam with me in 2013. Recent imaging including an MRI of the brain on 11/16/24 are consistent with axial myopia and posterior staphyloma.  To correct this, she could consider an orbital decompression or fat decompression in the right eye.    Regarding her eye pain, this appears to be due to exposure keratopathy and dry eye syndrome.  I recommend that she use refresh p.m. at bedtime.  I also recommend that she use preservative-free artificial tears throughout the day.    For her eyelid flipping over and her dry eye she could consider a surgical procedure to correct this.  1 consideration might be a lateral tarsorrhaphy to correct both of these conditions.  She will consider seeing an oculoplastic specialist for this.         Attending Physician Attestation:  Complete documentation of historical and exam elements from today's encounter can be found in the full encounter summary report (not reduplicated in this progress note).  I personally obtained the chief complaint(s) and history of present illness.  I confirmed and edited as necessary the review of systems, past medical/surgical history, family history, social history, and examination findings as documented by others; and I examined the patient myself.  I personally reviewed the relevant tests, images, and reports as documented above.  I formulated and edited as necessary the assessment and plan and discussed the findings and management plan  with the patient and family. - Ghanshyam Adame MD  Resident Physician, PGY-3  Department of Ophthalmology

## 2025-01-07 DIAGNOSIS — H53.10 SUBJECTIVE VISUAL DISTURBANCE: Primary | ICD-10-CM

## 2025-01-08 ENCOUNTER — OFFICE VISIT (OUTPATIENT)
Dept: OPHTHALMOLOGY | Facility: CLINIC | Age: 60
End: 2025-01-08
Attending: OPTOMETRIST
Payer: COMMERCIAL

## 2025-01-08 ENCOUNTER — PRE VISIT (OUTPATIENT)
Dept: OPHTHALMOLOGY | Facility: CLINIC | Age: 60
End: 2025-01-08

## 2025-01-08 DIAGNOSIS — H04.123 DRY EYE SYNDROME OF BOTH EYES: ICD-10-CM

## 2025-01-08 DIAGNOSIS — H05.20 EXOPHTHALMOS OF RIGHT EYE: ICD-10-CM

## 2025-01-08 DIAGNOSIS — H57.11 PAIN AROUND RIGHT EYE: Primary | ICD-10-CM

## 2025-01-08 RX ORDER — ACETAMINOPHEN 160 MG
4000 TABLET,DISINTEGRATING ORAL
COMMUNITY

## 2025-01-08 ASSESSMENT — CUP TO DISC RATIO
OD_RATIO: UA
OS_RATIO: 0.3

## 2025-01-08 ASSESSMENT — CONF VISUAL FIELD
OD_INFERIOR_NASAL_RESTRICTION: 1
OD_SUPERIOR_TEMPORAL_RESTRICTION: 1
OS_INFERIOR_NASAL_RESTRICTION: 0
OS_INFERIOR_TEMPORAL_RESTRICTION: 0
OD_SUPERIOR_NASAL_RESTRICTION: 1
METHOD: COUNTING FINGERS
OS_SUPERIOR_NASAL_RESTRICTION: 0
OD_INFERIOR_TEMPORAL_RESTRICTION: 1
OS_NORMAL: 1
OS_SUPERIOR_TEMPORAL_RESTRICTION: 0

## 2025-01-08 ASSESSMENT — TONOMETRY
OD_IOP_MMHG: 18
IOP_METHOD: ICARE
OS_IOP_MMHG: 19

## 2025-01-08 ASSESSMENT — VISUAL ACUITY
OD_CC: CF
METHOD: SNELLEN - LINEAR
CORRECTION_TYPE: GLASSES
OS_CC+: -2
OS_CC: 20/30

## 2025-01-08 ASSESSMENT — SLIT LAMP EXAM - LIDS: COMMENTS: NORMAL

## 2025-01-08 ASSESSMENT — EXTERNAL EXAM - LEFT EYE: OS_EXAM: NORMAL

## 2025-01-08 ASSESSMENT — REFRACTION_WEARINGRX
OD_CYLINDER: +0.75
OS_CYLINDER: +0.75
OD_AXIS: 080
SPECS_TYPE: SVL
OS_SPHERE: -6.75
OS_AXIS: 080
OD_SPHERE: -6.75

## 2025-01-08 ASSESSMENT — EXTERNAL EXAM - RIGHT EYE: OD_EXAM: PROPTOSIS

## 2025-01-08 NOTE — LETTER
"2025     RE:  :  MRN: Gracy Bautista  1965  [id number]     Dear Dr. Bautista    Thank you for asking me to see your very pleasant patient,Gracy Bautista, in neuro-ophthalmic consultation.  I would like to thank you for sending your records and I have summarized them in the history of present illness.   My assessment and plan are below.  For further details, please see my attached clinic note.      Gracy Bautista is a 59 year old female with the following diagnoses:   1. Pain around right eye    2. Dry eye syndrome of both eyes    3. Exophthalmos of right eye       Patient was sent for consultation by Dr. Otis Bautista and [facility] for Eosinophilic esophagitis, and proptosis with lid Retraction    HPI:  Patient reports that the RE is bulging with extreme pain which started to get very bad and unbearable about 6 months ago. 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 She has had poor vision in the right eye since childhood and did have strabismus surgery around age 4.     Today, the pain feels like very bad pressure, not a stabbing pain but it feels like it is going to pop out. She has a burning pain. Her lid will flip up on its own and she needs help from  to flip it back down. It is giving an esotropia appearance and people have been noticing this. Her good eye (LE) is blurry and having difficulty doing every day tasks. +photophobic with monocular diplopia. R>L. +fatigue. Although vision in the right eye has never been \"good,\" she feels that the right eye vision is worse in the past few years. But she is most bothered by the right eye pain at this time.     Of note, she was seen by me in neuro-op clinic back in  with similar reported symptoms. At that time, she also had been reporting 6 years of worsening pain in the right eye. She had 2 " mm of measured proptosis in the right eye at that time. 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     Independent historians: Patient    Review of outside testing:  MRI orbits/face/neck w/w/o contrast 11/16/24 - ordered by primary care physician for proptosis    INDICATION:  Exophthalmos.  COMPARISON:  None.    IMPRESSION:  1. Dedicated imaging of the orbits demonstrates asymmetric enlargement and  elongated morphology of the right globe compared to the left. 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  2. Remainder of the orbits is normal.  3. No acute intracranial abnormality.  4. No abnormal enhancement or enhancing lesions.  5. Normal brain parenchymal morphology       CT orbits with contrast 5/29/2019:    My interpretation performed today of outside testing: There is enlargement of the right globe causing pseudo proptosis    Review of outside clinical notes:    Visit with Dr. Bautista on 11/14/24:       Neuro-op visit on 9/11/2013:   Thank you for asking me to see Gracy Bautista in Neuro-ophthalmic consultation. I would like to thank you for sending your records and I will summarize them here. She is a 47-year-old woman who has a history of amblyopia in the right eye. She underwent bilateral ptosis surgery by you in August, 2011. She states her chief complaint is that her right eye hurts. This began about six years ago and she feels that it has overall gotten worse. She does not feel that the surgery affected the pain in any way. She feels better when she closes it. She states that it is much worse toward the afternoon and evening. She states that her  notes her eye does not close all the way when she sleeps. Her right eye gets red as the day goes on. She states that if she could keep her eyes closed all day long, it would not hurt. She states that the right eye de la o. She feels like the left eye is dry and she also feels like her mouth is  dry.      EXAMINATION:   Her visual acuity is count fingers right eye, 20/20 left eye. Pupils are normal. Color vision is normal in the left eye and could not be assessed in the right eye. Intraocular pressure is 19 in each eye. There is no ptosis, in fact there is mild lid retraction. Her MRD1 is five millimeters in both eyes. She has two millimeters of proptosis of the right eye. There is one millimeter of lagophthalmos bilaterally. She has an abduction deficit of the right eye of about 50%. Slit lamp examination shows punctate changes on the corneas of both eyes. She has an abnormal tear film with an early tear break up time. Fundus examination is unremarkable.     IMPRESSION/PLAN:   It is my impression that she has eye pain in the right eye. All of her symptoms and examination are consistent with dry eye. I see that you obtained a thyroid stimulating immunoglobulin and TSH as well as acetylcholine receptor antibodies on August 27, 2013 and these were all normal. I reviewed her CT scan and MRI which showed a staphyloma and a very large eye on the right hand side and I believe that this is more than likely the cause of her pseudo proptosis. For her dry eye, I am going to place her on Pred Forte four times a day for a week, three times a day for a week, twice a day for a week, once a day for a week. I have asked her to use good lid hygiene as well as artificial tears. For her lagophthalmos, I recommend that she use Refresh PM at bedtime. I believe that part of her problem is this lagophthalmos. She has an unusual eyelid contour of the right lower lid probably from the pseudo proptosis. She is wondering whether this can be fixed and I think it would certainly help her with her dry eye symptoms. She is going to touch base with you about when, if and how this can be done.       Ghanshyam Diaz M.D.   Professor   Neuro-ophthalmology Service    Past medical history:  Patient Active Problem List   Diagnosis    Chronic  pansinusitis    ADD (attention deficit disorder)    Alcohol use disorder, moderate, in sustained remission (H)    Blind right eye    Cardiac arrhythmia    Carpal tunnel syndrome    PTSD (post-traumatic stress disorder)    Hyperlipidemia    Hypogammaglobulinemia    Immunoglobulin deficiency    Insomnia    Moderate persistent asthma, uncomplicated    Myofascial pain    Peripheral vascular disease    Tobacco abuse    Anticardiolipin antibody positive    Umbilical hernia with obstruction    Dysphagia, unspecified type    Chronic cough    Other eosinophilia     Medications:   acetaminophen (TYLENOL) 500 MG tablet  ADDERALL XR 20 MG 24 hr capsule  azithromycin (ZITHROMAX) 250 MG tablet  bisacodyl (DULCOLAX) 5 MG EC tablet  budesonide (PULMICORT) 0.5 MG/2ML neb solution  CYMBALTA 60 MG capsule  LAMICTAL 200 MG tablet  levalbuterol (XOPENEX HFA) 45 MCG/ACT inhaler  levofloxacin (LEVAQUIN) 25 MG/ML solution  LORazepam (ATIVAN) 0.5 MG tablet  nicotine polacrilex (NICORETTE) 4 MG gum  Cholecalciferol (VITAMIN D3) 50 MCG (2000 UT) CAPS  levalbuterol (XOPENEX) 1.25 MG/3ML neb solution ()  spacer (OPTICHAMBER ELBA) holding chamber  TRELEGY ELLIPTA 200-62.5-25 MCG/ACT oral inhaler     Family history / social history: Patient's family history includes Bleeding Disorder in her brother and mother; Cancer in her brother; Cerebrovascular Disease in her father; Diabetes in her father; Heart Disease in her father; Hyperlipidemia in her father and mother; Hypertension in her father and mother.   Father had stroke at 55, diabetes mellitus, and poor vision but unknown by patient why.   Mother has poor vision requiring eye injection, likely age related macular degeneration.     Patient  reports that she has been smoking cigarettes. She started smoking about 3 years ago. She has never used smokeless tobacco. She reports that she does not currently use alcohol. She reports that she does not use drugs. Smokes 1 1/2 cigarettes per day  and chews nicotine gum.     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     Exam:  Visual acuity HM right eye 20/30-2 left eye.  Color vision unable in the right eye and 11/11 left eye.  Pupils with minimal reaction to light in the right eye but no afferent pupillary defect.  Intraocular pressure 18 right eye and 19 left eye.  External exam shows proptosis right eye. Anterior segment exam showed trace punctate epithelial changes in both eyes and an early tear break-up time in both eyes.  Strabismus exam reveals abduction deficit in the right eye.  She noted significant improvement in the eye pain with topical anesthetic.    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 Exam today appears similar to that of her exam with me in 2013. Recent imaging including an MRI of the brain on 11/16/24 are consistent with axial myopia and posterior staphyloma.  To correct this, she could consider an orbital decompression or fat decompression in the right eye.    Regarding her eye pain, this appears to be due to exposure keratopathy and dry eye syndrome.  I recommend that she use refresh p.m. at bedtime.  I also recommend that she use preservative-free artificial tears throughout the day.    For her eyelid flipping over and her dry eye she could consider a surgical procedure to correct this.  1 consideration might be a lateral tarsorrhaphy to correct both of these conditions.  She will consider seeing an oculoplastic specialist for this.    Again, thank you for allowing me to participate in the care of your patient.      Sincerely,    Ghanshyam Diaz MD  Professor  Director of Neuro-Ophthalmology  [facility] [facility] [employer] Endowed Chair  Departments of Ophthalmology, Neurology, and Neurosurgery  [facility] [address],  [place]  [address]  T - [phone]  F - [phone]  [email or web address]      CC: Otis Bautista  [facility]  [address] [place] [address]  Via Fax: [phone]

## 2025-01-08 NOTE — PATIENT INSTRUCTIONS
You have been diagnosed with Dry eye syndrome.  Symptoms of Dry eye syndrome can include double vision, eye pain, blurry vision, stinging, burning, tearing, eye redness.      Some useful instructions are listed below:     1.  Use artificial tears on a regular basis.  If you use artificial tear drops with preservative, you can use them up to 4-6 times per day. If you use artificial tear drops without preservatives, then you can use them more often.      2.  Wash your eyelashes with hot water.  Do not make the water so hot that you burn yourself, but the water should be more than just warm.  Often patients will wash their eyelashes in the shower under the hot water.  You should rub gently along the base of your eyelashes.      3.  Taking fish oil capsules twice a day for a month and then once a day after that can help improve Dry eye symptoms.      4.  Drink a lot of water.  Hydration can be helpful.      5.  Humidify your environment.      6. Use Refresh pm at bedtime in the RIGHT eye     7.  If this is not beneficial, other treatments can include punctal plugs or cautery, corticosteroid eye drops, Restasis, Lipiflow, or autologous serum.  If you are still struggling with dry eye symptoms, call Dr. Diaz's office for other therapies or a referral to a dry eye specialist.        -Hold on further Lasix given LEONIDES

## 2025-01-08 NOTE — CONFIDENTIAL NOTE
RECORDS RECEIVED FROM: internal / ce    DATE RECEIVED: 2.10.25    NOTES STATUS DETAILS   OFFICE NOTE from referring provider internal    Anh Avalos DO Roeder, Nicole, MD       DISCHARGE REPORT from the ER ce [facility]-   11.6.24- trevor      MEDICATION LIST internal     IMAGING  (NEED IMAGES AND REPORTS)     CT SCAN Ce  [facility]- 5.23.24, 2.6.24,    CHEST XRAY (CXR) ce [facility]- 11.7.24, 10.31.24, 8.28.24, 4.11.24, 2.6.24, 1.26.24, more in epic     Internal- 8.29.23,    TESTS     PULMONARY FUNCTION TESTING (PFT) internal         Action 1.8.25 sv    Action Taken Image request sent to [facility]   --Cxr-  11.7.24, 10.31.24, 8.28.24, 4.11.24, 2.6.24, 1.26.24, more in epic (past 2 years)   -- CT- -5.23.24, 2.6.24,

## 2025-01-18 ENCOUNTER — VIRTUAL VISIT (OUTPATIENT)
Dept: URGENT CARE | Facility: CLINIC | Age: 60
End: 2025-01-18
Payer: COMMERCIAL

## 2025-01-18 DIAGNOSIS — J32.9 CHRONIC SINUSITIS, UNSPECIFIED LOCATION: Primary | ICD-10-CM

## 2025-01-18 PROCEDURE — 98006 SYNCH AUDIO-VIDEO EST MOD 30: CPT

## 2025-01-18 RX ORDER — DOXYCYCLINE HYCLATE 100 MG
100 TABLET ORAL 2 TIMES DAILY
Qty: 14 TABLET | Refills: 0 | Status: SHIPPED | OUTPATIENT
Start: 2025-01-18 | End: 2025-01-25

## 2025-01-18 NOTE — PROGRESS NOTES
Gracy is a 59 year old who is being evaluated via a billable video visit.          Assessment & Plan     Chronic sinusitis, unspecified location    - doxycycline hyclate (VIBRA-TABS) 100 MG tablet; Take 1 tablet (100 mg) by mouth 2 times daily for 7 days.          Nicotine/Tobacco Cessation  She reports that she has been smoking cigarettes. She started smoking about 3 years ago. She has never used smokeless tobacco.  Nicotine/Tobacco Cessation Plan        Return for Follow up with ENT as scheduled on Thrusday.    Subjective   Gracy is a 59 year old, presenting for the following health issues:  No chief complaint on file.    HPI     Hx chronic sinusitis who see's ENT. Has appointment later this week  Recently on Levaquin early January for 3 days. Feels it did not help and sx are back and worse.   No fever.   Sinus pain, pressure, PND, nasal drainage and headache.         Review of Systems  Constitutional, HEENT, cardiovascular, pulmonary, gi and gu systems are negative, except as otherwise noted.      Objective           Vitals:  No vitals were obtained today due to virtual visit.    Physical Exam   GENERAL: alert and no distress  RESP: No audible wheeze, cough, or visible cyanosis.    PSYCH: Appropriate affect, tone, and pace of words          Video-Visit Details    Type of service:  Video Visit   Originating Location (pt. Location): Home    Distant Location (provider location):  Off-site  Platform used for Video Visit: Suly  Signed Electronically by: [facility]

## 2025-01-23 ENCOUNTER — OFFICE VISIT (OUTPATIENT)
Dept: OTOLARYNGOLOGY | Facility: CLINIC | Age: 60
End: 2025-01-23
Payer: COMMERCIAL

## 2025-01-23 ENCOUNTER — PRE VISIT (OUTPATIENT)
Dept: OTOLARYNGOLOGY | Facility: CLINIC | Age: 60
End: 2025-01-23

## 2025-01-23 ENCOUNTER — PREP FOR PROCEDURE (OUTPATIENT)
Dept: OTOLARYNGOLOGY | Facility: CLINIC | Age: 60
End: 2025-01-23

## 2025-01-23 ENCOUNTER — MYC MEDICAL ADVICE (OUTPATIENT)
Dept: OTOLARYNGOLOGY | Facility: CLINIC | Age: 60
End: 2025-01-23

## 2025-01-23 DIAGNOSIS — R49.0 DYSPHONIA: Primary | ICD-10-CM

## 2025-01-23 DIAGNOSIS — J38.7 LESION OF LARYNX: Primary | ICD-10-CM

## 2025-01-23 DIAGNOSIS — J38.7 LEUKOPLAKIA OF LARYNX: ICD-10-CM

## 2025-01-23 PROCEDURE — 92524 BEHAVRAL QUALIT ANALYS VOICE: CPT | Mod: GN | Performed by: SPEECH-LANGUAGE PATHOLOGIST

## 2025-01-23 NOTE — PROGRESS NOTES
"[facility]  Clinical Voice and Upper Airway Evaluation Report    Patient's Name: Gracy Bautista  Date of Evaluation: 1/23/2025  Providing SLP: Melinda Marquis MS CCC-SLP  Seen in Conjunction With: Miriam Jacobs MD - Aleksandra Perez, CCC-SLP  Referring Provider and Facility: Digna Jaimes MD - Otolaryngology  Insurance Coverage: [facility] Individual and Family Plans  Chief Complaint: Throat Pain  Evaluation Location: [facility]  Others in Attendance for the Evaluation: her   Time of Evaluation: 10:26 - 11:26 AM      Patient History: Gracy is a 59-year-old female who presents today for evaluation of throat pain.     Dysphonia: denies voice changes  Previous voice therapy or other interventions: was seen by Dr. Jaimes for sinus issues on 12/30/24, and \"R TVC with 2-3 mm patch of dense leukoplakia\" was reported    Dyspnea:   Respiratory conditions:   Severe COPD with multiple ED visits for exacerbations  Severe asthma  Inhaled treatments:   Levalbuterol inhaler and nebulizer  DuoNebs for emergency  Trelegy: denies benefits, discontinued  Previous biologic use: Xolair, Dupixent, Nucala  Prednisone  Most recent PFTs:   CT chest: progressive emphysematous changes  PFTs: overt obstruction  Concerns  Hard to catch her breath  Chest discomfort  Biphasic noise    Dysphagia:   Onset: unsure   Progression: gradually worsening  Concerns  Items feeling stuck in throat after swallowing  Will need to move the item from her throat  Will need to cough/eject it out gently, and this makes it worse  Effort with swallowing  Difficult items:   Doesn't eat meat but not because of her swallowing  Oatmeal  No issues with liquids  Fresca will come up   Cheese  Weight loss: 7lbs since 12/30/24  GERD symptoms:   Denies heartburn  Had taken pantoprazole in the past  Intermittent Tums  Nocturnal awakenings with coughing every night    Cough / Throat Clearing:   Concerns  Chest and throat " "based  Sometimes productive  Nocturnal awakenings with coughing every night    Throat Pain:  Onset: unsure  Progression: gradually worsening over the past 2-3 months  Concerns  R ear and throat pain  Burning and fullness  Worsens with: talking, coughing, eating  Denies improvements with: drinking warm liquids    Medical / Surgical History:   Severe COPD and asthma  Severe nasal polyps and eosinophilic sinus disease    Other Medical Evaluations, Testing, and Treatments:   Multiple sinus surgeries    Past / Current Tobacco Use / Exposure: current - 16 years - about 6 cigarettes per day      Quality of Life Questionnaires:        1/20/2025     6:29 PM   Voice Handicap Index (VHI-10)   My voice makes it difficult for people to hear me 2   People have difficulty understanding me in a noisy room 2   My voice difficulties restrict my personal and social life.  0   I feel left out of conversations because of my voice 0   My voice problem causes me to lose income 0   I feel as though I have to strain to produce voice 0   The clarity of my voice is unpredictable 2   My voice problem upsets me 0   My voice makes me feel handicapped 0   People ask, \"What's wrong with your voice?\" 1   VHI-10 7        Patient-reported         1/20/2025     6:29 PM   Cough Severity Index (CSI)   My cough is worse when I lie down 3   My coughing problem causes me to restrict my personal and social life 2   I tend to avoid places because of my cough problem 2   I feel embarrassed because of my coughing problem 2   People ask, ''What's wrong?'' because I cough a lot 2   I run out of air when I cough 3   My coughing problem affects my voice 2   My coughing problem limits my physical activity 2   My coughing problem upsets me 2   People ask me if I am sick because I cough a lot 2   CSI Score 22        Patient-reported         1/20/2025     6:29 PM   Eating Assessment Tool (EAT-10)   My swallowing problem has caused me to lose weight 0   My swallowing " "problem interferes with my ability to go out for meals 0   Swallowing liquids takes extra effort 1   Swallowing solids takes extra effort 2   Swallowing pills takes extra effort 2   Swallowing is painful 2   The pleasure of eating is affected by my swallowing 1   When I swallow food sticks in my throat 2   I cough when I eat 2   Swallowing is stressful 1   EAT-10 13        Patient-reported         1/20/2025     6:29 PM   Dyspnea Index   1. I have trouble getting air in. 3   2. I feel tightness in my throat when I am having erma breathing problem. 3   3. It takes more effort to breathe than it used to. 4   4. Change in weather affect my breathing problem. 3   5. My breathing gets worse with stress. 1   6. I make sound/noise breathing in 3   7. I have to strain to breathe. 3   8. My shortness of breath gets worse with exercise or physical activity 3   9. My breathing problem makes me feel stressed. 3   10. My breathing problem casuses me to restrict my personal and social life. 2   Dyspnea Index Total Score 28        Patient-reported       Perceptual Analysis (01218): Evaluation of Voice / Speech / Non-Communicative Laryngeal Behaviors    The GRBAS is a perceptual rating of voice change. 0 indicates no impairment, 3 indicates a severe impairment. \"C\" and \"I\" may be used to signify if these feature was observed consistently or intermittently respectively. This is a rating based on clinical judgement of disordered voice quality.  G ( 1 ) General Dysphonia     R ( 1 - C ) Roughness     B ( 0-1 ) Breathiness     A ( 0 ) Asthenia     S ( 1 ) Strain    *Validity of this measure may be slightly reduced when performed via acoustic signal from virtual visit*    Laryngeal palpation:   Thyrohyoid space: ***  Suprahyoid region: ***  Laryngeal lateralization: ***    Additional observations:   Cough/ Throat Clear: cough is primary, observed occasionally during today's session, productive, and locus of cough/throat clear sounds " "consistent with lower airway    Breathing patterns: appropriate at rest   Overt tension: \" \"   Habitual pitch: lower compared to age- and gender-matched peers   Pitch range: \" \" ***  Resonance: mid-oral focused  Loudness: appropriate       Laryngoscopy with stroboscopy:    Provider performing exam: Miriam Jacobs MD    Informed consent: Informed verbal consent was obtained, which includes potential side-effects, risks, and benefits of the procedure.     Anesthetic: Topical anesthesia with 3% lidocaine and 0.25% phenylephrine was applied the nostrils bilaterally. Viscous lidocaine 4% was applied to the tip of the endoscope.    Scope type: A distal chip flexible laryngoscope was passed through the nare with halogen and xenon light source(s).    The laryngeal and pharyngeal structures were evaluated for gross appearance, mobility, function, and focal lesions / abnormalities of the associated mucosa.  Velar Function: not assessed today  General Appearance: bumpy interarytenoid pachydermia  Secretions: WNL ***  Subglottis Appearance: visible portion is patent - pink tissue present in the upper to mid trachea when harshly coughing  R Arytenoid Abduction / Adduction: symmetrical and timely ab/adduction with appropriate range of motion   L Arytenoid Abduction / Adduction: \" \"   Mediolateral Compression: WNL ***  Anteroposterior Compression: WNL ***  Vocal Fold Elongation: appropriate   Left (L) Vocal Fold Edge and Mucosa: small, rather translucent white patch at the middle 1/3 surrounded by erythemic tissue  Right (R) Vocal Fold Edge and Mucosa: thick, opaque white lesion observed on the superior and vibratory edges of the true vocal folds at the midfold extending posteriorly surrounded by erythemic tissue  Narrow Band Imaging: demonstrates similar findings as halogen light, highlighting white patches  Glottic Closure: complete ***  Phase Closure: predominantly open phase ***  Vertical Level of Approximation: true vocal fold " "appear to adduct at the same height ***  L Amplitude: WNL ***  R Amplitude: WNL***  L Mucosal Wave: WNL ***  R Mucosal Wave: WNL ***  Phase Symmetry: symmetrical ***  Periodicity: periodic ***  Inhalation Phonation: similar findings to exhalation phonation ***    FEES: Evaluation was performed by Aleksandra Perze and Dr. Jacobs. Impressions from this clinical document indicates: \"***\"       Therapeutic Techniques Attempted (13042 for Individual Speech Therapy):    ***       Impressions and Plan:     *** is a ***-year-old *** presenting today with *** secondary to ***. Perceptually, ***. Laryngoscopy today demonstrated ***. Therefore, *** has been recommended. ***. *** is in agreement with this plan of care.     Fungal treatments because she just finished doxycicline  Full removal with biopsy and egd    RESEARCH: A warm introduction was *** provided regarding participation in laryngology research studies. This patient is *** interested in being contacted.    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 *** is in agreement with this plan of care and plans to check with her insurance about coverage prior to beginning sessions. - Goals: to " improve and maintain a healthy voice quality, to understand the problem and fix it as much as possible, report resolution of symptoms, and use of optimal voice quality and comfort to meet personal, social, and professional needs, 90% of the time during a typical week of vocal activities      Goals:    VCD/PVFM  Gradually decrease VCD episodes to increase quality of life and ability to participate in high level cardio activity without limitations  Perform rescue breathing techniques while asymptomatic to improve automatic response when experiencing dyspnea  Perform rescue breathing techniques before and during exercise to prevent dyspnea/severe VCD attack  Perform rescue breathing during exercise or when exposed to a trigger item to resolve a VCD attack or coughing episode  Continue participating in high level cardio activity while performing rescue breathing to resolve dyspnea  Utilize abdominal breathing techniques in targeted activities (e.g. physical exercise, communication, high-level phonation/pitch range navigation exercises, etc.)  Rebalance laryngeal musculature and strengthen inspiratory muscles resulting in decreased laryngeal sensitivity and decreased frequency of VCD episodes.?  Demonstrate appropriate vocal hygiene including, but not limited to, adequate hydration and integrating behavioral and dietary changes for GERD into their daily life.?   Recoordinate respiratory and laryngeal musculature to resume activities of daily living.?   Patient will perform advanced breathing exercises with the respiratory  focusing on inspiratory muscle strengthening with minimal assistance 90% of the time.  Patient will demonstrate abdominal focused breathing technique in targeted exercises with minimal assistance 90% of the time.?   Patient will demonstrate abdominal focused breathing technique with physical activity with minimal assistance 90% of the time.  Gradually reduce inappropriate and excessive inhaler  use  Demonstrate a 50% reduction in Dyspnea Index score  Patient will express satisfaction with their breathing and their ability to participate in social, personal, and work/school functions without limitation    Chronic Cough  Decrease cough in all activities of daily living using compensation strategies  Independently implement a cough suppression strategy when cough trigger is sensed 90% of the time.?   Decrease the number of coughs per day by 50%   Demonstrate increased tolerance of a chemical and/or environmental irritant as evidenced by increased exposure (decreased proximity and/or increased strength) to that irritant by 50%   Demonstrate a 50% reduction in Cough Severity Index score  Patient will express satisfaction with their coughing and their ability to participate in social, personal, and work/school functions without limitation     Voice  Coordinate phonatory and respiratory subsystems, demonstrating adequate independence for activities of daily communication   Decrease extrinsic laryngeal muscle activity during communication events   Improve voice quality in all activities of daily living using, as measured by a minimum of a 50% point reduction on the Voice Handicap Index-10 or Singing VHI  Demonstrate appropriate vocal hygiene including, but not limited to, adequate hydration, avoiding vocal abuse, integrating behavioral and dietary changes for GERD into their daily life?.   Incorporate behaviors to reduce irritation and promote laryngeal healing and prevent recurrence.?   Implement behavioral strategies to reduce laryngopharyngeal reflux. ?   Independently maintain a forward focus voice placement 90% of the time during conversational speech.  Independently perform the Vocal Function Exercises, rebalancing respiration, phonation, and resonance 90% of the time  Perform High Level Phonation and Pitch Range Navigation Exercises independently 90% of the time  Patient will express satisfaction with their  voice quality and function and their ability to participate in social, personal, and work/school functions without limitation    Perioperative  Adhere to complete vocal rest recommendations in the acute post-operative period  Gradually increase voice use following the complete voice rest period  Adhere to vocal hygiene recommendations including smoking cessation, hydration, avoidance of caffeine and alcohol, and behavioral reflux precautions  Perform rescue breathing techniques several times daily  Incorporate SOVT exercises gradually in the immediate post-operative period   Participate in pre- and post-operative voice therapy      Billed Procedures:    Laryngoscopy without stroboscopy 73332  Laryngoscopy with stroboscopy 53753  Individual speech therapy session 69190  Perceptual voice assessment 35630  Laryngeal function studies 22645 with 52 modifier  Assessment and treatment time: *** minutes  Chart review, interpretation of testing, documentation preparation, etc: 7 minutes      Thank you for allowing me to participate in this patient's care,    Melinda Marquis MS CCC-SLP  Speech-Language Pathologist  [facility]  Department of Otolaryngology - Head and Neck Surgery  [facility]  [email or web address]  Direct: [phone]  [phone]    *This note may have been completed using [id number] dictation software, so errors may exist. Please contact me for clarification if needed*

## 2025-01-23 NOTE — Clinical Note
Shanice gupta! When this patient gets scheduled for surgery, can you please let me know ASAP so I can appropriately schedule her pre and post op tx? Thanks! Melinda

## 2025-01-23 NOTE — TELEPHONE ENCOUNTER
Left Voicemail (1st Attempt) for the patient to call back and schedule the following:     Appointment type: Video Swallow Study (@ [facility])  Provider: Marnie Lucas, Jeanette Trent  Return date: Patients convenience  Specialty phone number: [phone]  Additional appointment(s) needed: No  Additonal Notes: VFSS must be at the [facility]

## 2025-01-29 ENCOUNTER — TELEPHONE (OUTPATIENT)
Dept: OTOLARYNGOLOGY | Facility: CLINIC | Age: 60
End: 2025-01-29
Payer: COMMERCIAL

## 2025-01-29 NOTE — TELEPHONE ENCOUNTER
LVM to schedule      One return visit sometime between 3/10 and 3/21- virtual or in person with any Dysphonia SLP  3 return visits every 2 weeks beginning between 3/31 and 4/4       Gave direct number

## 2025-02-03 ENCOUNTER — PATIENT OUTREACH (OUTPATIENT)
Dept: OTOLARYNGOLOGY | Facility: CLINIC | Age: 60
End: 2025-02-03
Payer: COMMERCIAL

## 2025-02-03 NOTE — PROGRESS NOTES
Called patient regarding questions about her appointment. Let patient know that provider will see her in 3-4 weeks. Patient was agreeable and verbalized understanding of the situation. Day Wilcox RN on 2/3/2025 at 12:43 PM

## 2025-02-06 ASSESSMENT — ASTHMA QUESTIONNAIRES
QUESTION_2 LAST FOUR WEEKS HOW OFTEN HAVE YOU HAD SHORTNESS OF BREATH: MORE THAN ONCE A DAY
QUESTION_5 LAST FOUR WEEKS HOW WOULD YOU RATE YOUR ASTHMA CONTROL: NOT CONTROLLED AT ALL
ACT_TOTALSCORE: 6
ACT_TOTALSCORE: 6
QUESTION_1 LAST FOUR WEEKS HOW MUCH OF THE TIME DID YOUR ASTHMA KEEP YOU FROM GETTING AS MUCH DONE AT WORK, SCHOOL OR AT HOME: ALL OF THE TIME
QUESTION_4 LAST FOUR WEEKS HOW OFTEN HAVE YOU USED YOUR RESCUE INHALER OR NEBULIZER MEDICATION (SUCH AS ALBUTEROL): ONE OR TWO TIMES PER DAY
EMERGENCY_ROOM_LAST_YEAR_TOTAL: TWO
HOSPITALIZATION_OVERNIGHT_LAST_YEAR_TOTAL: TWO
QUESTION_3 LAST FOUR WEEKS HOW OFTEN DID YOUR ASTHMA SYMPTOMS (WHEEZING, COUGHING, SHORTNESS OF BREATH, CHEST TIGHTNESS OR PAIN) WAKE YOU UP AT NIGHT OR EARLIER THAN USUAL IN THE MORNING: FOUR OR MORE NIGHTS A WEEK

## 2025-02-10 ENCOUNTER — PRE VISIT (OUTPATIENT)
Dept: PULMONOLOGY | Facility: CLINIC | Age: 60
End: 2025-02-10
Payer: COMMERCIAL

## 2025-02-10 ENCOUNTER — OFFICE VISIT (OUTPATIENT)
Dept: PULMONOLOGY | Facility: CLINIC | Age: 60
End: 2025-02-10
Attending: INTERNAL MEDICINE
Payer: COMMERCIAL

## 2025-02-10 VITALS
SYSTOLIC BLOOD PRESSURE: 132 MMHG | BODY MASS INDEX: 19.79 KG/M2 | DIASTOLIC BLOOD PRESSURE: 88 MMHG | WEIGHT: 118.8 LBS | OXYGEN SATURATION: 99 % | HEIGHT: 65 IN | HEART RATE: 77 BPM

## 2025-02-10 DIAGNOSIS — R06.02 SHORTNESS OF BREATH: ICD-10-CM

## 2025-02-10 DIAGNOSIS — J45.50 SEVERE PERSISTENT ASTHMA WITHOUT COMPLICATION (H): ICD-10-CM

## 2025-02-10 DIAGNOSIS — J45.50 SEVERE PERSISTENT ALLERGIC ASTHMA (H): ICD-10-CM

## 2025-02-10 PROCEDURE — G0463 HOSPITAL OUTPT CLINIC VISIT: HCPCS

## 2025-02-10 PROCEDURE — 99215 OFFICE O/P EST HI 40 MIN: CPT

## 2025-02-10 PROCEDURE — 99417 PROLNG OP E/M EACH 15 MIN: CPT

## 2025-02-10 RX ORDER — PREDNISONE 5 MG/1
5 TABLET ORAL DAILY
Qty: 30 TABLET | Refills: 1 | Status: SHIPPED | OUTPATIENT
Start: 2025-02-10

## 2025-02-10 RX ORDER — BUDESONIDE AND FORMOTEROL FUMARATE DIHYDRATE 160; 4.5 UG/1; UG/1
2 AEROSOL RESPIRATORY (INHALATION) 2 TIMES DAILY
Qty: 10.2 G | Refills: 11 | Status: SHIPPED | OUTPATIENT
Start: 2025-02-10

## 2025-02-10 RX ORDER — PREDNISONE 20 MG/1
40 TABLET ORAL DAILY
Qty: 14 TABLET | Refills: 0 | Status: SHIPPED | OUTPATIENT
Start: 2025-02-10

## 2025-02-10 RX ORDER — IPRATROPIUM BROMIDE AND ALBUTEROL SULFATE 2.5; .5 MG/3ML; MG/3ML
1 SOLUTION RESPIRATORY (INHALATION) EVERY 6 HOURS PRN
COMMUNITY

## 2025-02-10 ASSESSMENT — PAIN SCALES - GENERAL: PAINLEVEL_OUTOF10: NO PAIN (0)

## 2025-02-10 NOTE — LETTER
2/10/2025      Gracy Bautista  [address]  [place] [address]      Dear Colleague,    Thank you for referring your patient, Gracy Bautista, to the [facility]. Please see a copy of my visit note below.      [facility]  [address] [address]  Phone: [phone]  Fax: [phone]    Patient:  Gracy Bautista, Date of birth 1965  Date of Visit:  02/10/2025  Referring Provider Erica Hunter      Assessment & Plan     Severe persistent asthma, T2 high, with exacerbation  Mild COPD  Emphysema  Current smoker    Currently, she has wheezing on exam, a cough and is short of breath.  She has occasional desaturations of her oxygen at home.  She is only taking short acting beta agonists and occasional bursts of prednisone to manage her lung disease.  I have reviewed previous blood work, and her IgE has been elevated in the past as well as her eosinophils.  Unfortunately, she has not improved on asthma Biologics.  This makes me think that her cigarette smoke exposure is a much bigger problem than she may realize.  The other component of her asthma care that is missing is a stable inhaled corticosteroid dose.  This has been a confusing part of her story as she mentions she is only ever taken Trelegy, but there were lots of notes in the past suggesting that she had been on other inhaled steroids in the past.  She did have urinary retention which could have been due to the muscarinic antagonist component of Trelegy.  After talking about different strategies, she prefers metered dose inhalers, and so I prescribed Symbicort 160 mcg 2 puffs twice a day.  I gave her a spacer to use as well.  I went over inhaler technique in the clinic.    I discussed smoking cessation with her.  Appears that she has a plan in place at the moment.  She has a quit date, she is set  up with nicotine patches, gum, counseling, and a support group.  This will be her first sincere attempt to quit smoking.    I will follow up with her in approximately 6 weeks to see how her symptoms are doing.      Medical Decision Making     I spent a total of 75 minutes on the day of the visit.   Time spent by me today doing chart review, history and exam, documentation and further activities per the note       Enoc Ford MD            Today's visit note:     Chief Complaint: Consult (New Asthma )      HISTORY OF PRESENT ILLNESS:    Gracy Bautista is a 59 year old year old female who is being seen for asthma.     Over the last 4 years, she has noticed breathing problems, but they have quickly progressed over the last year.  She has been working with the pulmonologist on her breathing, and has been referred here for a second opinion.  She gets frequent exacerbations requiring prednisone.  She also has a lot of drug intolerances.    She has a history of nasal polyposis, and follows with Dr. Digna Jaimes.  She has had polyps removed from her nose.  Currently, she is not on any regular nasal therapies that she takes at home.    Going through her asthma history, her asthma is worsened with exertion, cold weather, and strong fragrances.  She also smokes cigarettes.  She is currently smoking about a third of a pack of cigarettes a day.  She has plans to quit in the very near future.  She has patches, gum, and is set up with a support group and a counselor.    I reviewed her previous asthma medications with her.  Some this is a bit unclear to me.  She states that she has only been on Trelegy in the past.  She has never been on any other inhaled steroid.  Previous notes mention inhaled steroid such as Advair in the past, but she does not recognize that name and states she has not been on it.  She stopped Trelegy due to urinary retention.    She takes prednisone at least once a month.  She just took 40 mg of  prednisone a week ago and is tapered it down.  She took her last dose of 10 mg yesterday.    She has tried Nucala, that did not work.  She tried Dupixent, but developed hair loss.  She tried tezspire last year, and that did not help.  She also tried xolair in the  past, but did not help. She has had allergy testing, and stated that everything was negative.    Currently, she is using Xopenex neb just about every 4 hours.  She has nocturnal symptoms, and will notice that sometimes her oxygen saturation is down to 87% at night.    She has smoked since she was 26 years old.    She has been on azithromycin every Monday and Wednesday and Friday for the last 4 years.             Past Medical and Surgical History:     Past Medical History:   Diagnosis Date     Allergic rhinitis      Arthritis      Chronic sinusitis      COPD (chronic obstructive pulmonary disease) (H)      Depressive disorder      Hoarseness      Reduced vision      Uncomplicated asthma      Past Surgical History:   Procedure Laterality Date     BIOPSY       COLONOSCOPY N/A 08/22/2022    Procedure: COLONOSCOPY, WITH POLYPECTOMY AND BIOPSY;  Surgeon: Glen Dyer MD;  Location: [facility] OR     COLONOSCOPY WITH CO2 INSUFFLATION N/A 08/22/2022    Procedure: COLONOSCOPY, WITH CO2 INSUFFLATION;  Surgeon: Glen Dyer MD;  Location: [facility] OR     ESOPHAGOSCOPY, GASTROSCOPY, DUODENOSCOPY (EGD), COMBINED N/A 09/02/2022    Procedure: ESOPHAGOGASTRODUODENOSCOPY, WITH BIOPSY AND POLYPECTOMY;  Surgeon: Colton Sarkar MD;  Location: [facility] OR     GYN SURGERY       IR BONE BIOPSY DEEP RIGHT  2/17/2023     NASAL/SINUS POLYPECTOMY       OPTICAL TRACKING SYSTEM ENDOSCOPIC SINUS SURGERY Bilateral 01/21/2022    Procedure: stealth guided, bilateral ethmoidectomy, bilateral frontal sinusotomy, bilateral maxillary antrostomies;  Surgeon: Digna Jaimes MD;  Location: [facility] OR     ORTHOPEDIC SURGERY             Family History:     Family History   Problem  Relation Age of Onset     Bleeding Disorder Mother      Hypertension Mother      Hyperlipidemia Mother      Diabetes Father      Heart Disease Father      Cerebrovascular Disease Father      Hypertension Father      Hyperlipidemia Father      Cancer Brother      Bleeding Disorder Brother      Anesthesia Reaction No family hx of      Deep Vein Thrombosis (DVT) No family hx of               Social History:     Social History     Socioeconomic History     Marital status:      Spouse name: Not on file     Number of children: Not on file     Years of education: Not on file     Highest education level: Not on file   Occupational History     Occupation: HOMEMAKER   Tobacco Use     Smoking status: Light Smoker     Types: Cigarettes     Start date: 9/17/2021     Smokeless tobacco: Never     Tobacco comments:     I am willing.  I haven't been successful and my mindset is not completely there yet.   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     Drug use: Never     Sexual activity: Not Currently     Partners: Male     Birth control/protection: None     Comment: Hysterectomy   Other Topics Concern     Parent/sibling w/ CABG, MI or angioplasty before 65F 55M? Yes   Social History Narrative    October 7, 2021    ENVIRONMENTAL HISTORY: [place] The home is heated with a electric furnace. They do have central air conditioning. The patient's bedroom is furnished with carpeting in bedroom, allergen mattress cover, and allergen pillowcase cover.  Pets inside the house include 1 dog. There is no history of cockroach or mice infestation. There is/are 0 smokers in the house.  The house does have a damp basement.      Social Drivers of Health     Financial Resource Strain: Low Risk  (2/6/2024)    Received from [facility]    Overall Financial Resource Strain (CARDIA)      Difficulty of Paying Living Expenses: Not hard at all   Food Insecurity: No Food Insecurity (11/7/2024)    Received from [facility]    Hunger Vital Sign      Worried About Running Out of Food in the Last Year: Never true      Ran Out of Food in the Last Year: Never true   Transportation Needs: No Transportation Needs (11/7/2024)    Received from [facility]    PRAPARE - Transportation      Lack of Transportation (Medical): No      Lack of Transportation (Non-Medical): No   Physical Activity: Insufficiently Active (7/26/2019)    Received from [facility]    Exercise Vital Sign      Days of Exercise per Week: 3 days      Minutes of Exercise per Session: 30 min   Stress: No Stress Concern Present (7/26/2019)    Received from [facility]    [place] [employer] - Occupational Stress Questionnaire      Feeling of Stress : Only a little   Social Connections: Socially Integrated (7/26/2019)    Received from [facility]    Social Connection and Isolation Panel [NHANES]      Frequency of Communication with Friends and Family: More than three times a week      Frequency of Social Gatherings with Friends and Family: Once a week      Attends Hinduism Services: More than 4 times per year      Active Member of Clubs or Organizations: Yes      Attends Club or Organization Meetings: More than 4 times per year      Marital Status:    Interpersonal Safety: Not At Risk (11/6/2024)    Received from [facility]    EH IP Custom IPV      Do you feel UNSAFE in any of your personal relationships with your family members or any other acquaintances?: No   Housing Stability: Low Risk  (11/7/2024)    Received from [facility]    Housing Stability Vital Sign      Unable to  "Pay for Housing in the Last Year: No      Number of Times Moved in the Last Year: 0      Homeless in the Last Year: No            Medications:     Current Outpatient Medications   Medication Sig Dispense Refill     acetaminophen (TYLENOL) 500 MG tablet Take 2 tablets (1,000 mg) by mouth every 8 hours as needed for mild pain 10 tablet 0     ADDERALL XR 20 MG 24 hr capsule Take 20 mg by mouth 2 times daily as needed       azithromycin (ZITHROMAX) 250 MG tablet Take 1 tablet (250 mg) by mouth Every Mon, Wed, Fri Morning 36 tablet 3     bisacodyl (DULCOLAX) 5 MG EC tablet Take 5 mg by mouth as needed for constipation       Cholecalciferol (VITAMIN D3) 50 MCG (2000 UT) CAPS Take 4,000 Units by mouth.       CYMBALTA 60 MG capsule Take 2 capsules (120 mg) by mouth every morning       ipratropium - albuterol 0.5 mg/2.5 mg/3 mL (DUONEB) 0.5-2.5 (3) MG/3ML neb solution Take 1 vial by nebulization every 6 hours as needed for shortness of breath, wheezing or cough.       LAMICTAL 200 MG tablet Take 200 mg by mouth every morning        levalbuterol (XOPENEX HFA) 45 MCG/ACT inhaler Inhale 2 puffs into the lungs every 4 hours as needed for shortness of breath or wheezing 45 g 0     levalbuterol (XOPENEX) 1.25 MG/3ML neb solution Take 3 mLs (1.25 mg) by nebulization every 4 hours as needed for shortness of breath or wheezing 1620 mL 3     LORazepam (ATIVAN) 0.5 MG tablet TAKE 1/2-1 TABLET BY MOUTH TWICE A DAY, AS NEEDED.       nicotine polacrilex (NICORETTE) 4 MG gum 4 mg every hour as needed       predniSONE (DELTASONE) 10 MG tablet Take 10 mg by mouth daily.       spacer (OPTICHAMBER ELBA) holding chamber Use with albuterol (PROAIR HFA/PROVENTIL HFA/VENTOLIN HFA) 108 (90 Base) MCG/ACT inhaler 1 each 0     No current facility-administered medications for this visit.           PHYSICAL EXAM:  /88   Pulse 77   Ht 1.651 m (5' 5\")   Wt 53.9 kg (118 lb 12.8 oz)   SpO2 99%   BMI 19.77 kg/m       General: Comfortable, No " apparent distress  Eyes: Anicteric  Ears: Hearing grossly normal  Mouth: Oral mucosa is moist, without any lesions. No oropharyngeal exudate.  Neck: supple, no thyromegaly  Lymphatics: No cervical or supraclavicular nodes  Respiratory: diffuse expiratory wheezing  Cardiac: RRR, normal S1, S2. No murmurs. No JVD  Musculoskeletal: No clubbing. No cyanosis. No edema.  Skin: No rash on limited exam  Neuro: Normal mentation. Normal speech.  Psych:Normal affect           Data:   All laboratory and imaging data reviewed.      PFT:       PFT Interpretation:  Mild airflow obstruction  Hyperinflation and gas trapping  Mild impairment in diffusion capacity  Valid Maneuver    CXR: I have reviewed the CXR report from 11/7/2024:  Bilateral hemidiaphragmatic flattening. Mild diffuse bronchial wall thickening.   No consolidation, pneumothorax, or pleural effusion. Normal cardiac and   mediastinal contours.     Chest CT: I have reviewed the chest CT report from 5/2024:  Airways: Central airways patent. Diffuse bronchial wall thickening.   Lungs: Mild-moderate emphysema. Similar right upper lobe mild clustered   groundglass micronodules (reference series 2, images 15-17). Unchanged right   lower lobe subpleural 3 mm noncalcified nodule (series 2, image 81). Unchanged   left basilar 4 mm noncalcified nodule (series 2, image 90). No suspicious   nodules.   Pleural spaces: Clear.     Heart: Normal cardiac size. No pericardial effusion.   Pulmonary arteries: Normal caliber.   Aorta: Normal caliber.     Lymph nodes: No thoracic lymphadenopathy.   Thyroid: Unremarkable.   Esophagus: Unremarkable.     Imaged upper abdomen: Unremarkable.   Chest wall: Unremarkable.   Osseous: No suspicious lesion.     IMPRESSION:   1. Similar right upper lobe mild clustered micronodules.   2.  No suspicious pulmonary nodules.   3.  Mild-moderate emphysema. Diffuse bronchitis.       Recent Results (from the past week)   Pulmonary function test    Collection  Time: 02/07/25 11:48 AM   Result Value Ref Range    FVC-Pred 3.01 L    FVC-Pre 2.99 L    FVC-%Pred-Pre 99 %    FEV1-Pre 1.80 L    FEV1-%Pred-Pre 75 %    FEV1FVC-Pred 80 %    FEV1FVC-Pre 60 %    FEFMax-Pred 6.44 L/sec    FEFMax-Pre 4.12 L/sec    FEFMax-%Pred-Pre 63 %    FEF2575-Pred 2.19 L/sec    FEF2575-Pre 0.90 L/sec    TCQ3592-%Pred-Pre 40 %    FEF2575-Post 1.42 L/sec    CZC5247-%Pred-Post 64 %    ExpTime-Pre 7.61 sec    FIFMax-Pre 4.71 L/sec    VC-Pred 3.35 L    VC-Pre 2.91 L    VC-%Pred-Pre 87 %    IC-Pred 2.26 L    IC-Pre 2.41 L    IC-%Pred-Pre 106 %    ERV-Pred 1.13 L    ERV-Pre 0.50 L    ERV-%Pred-Pre 44 %    FEV1FEV6-Pred 81 %    FEV1FEV6-Pre 61 %    FRCPleth-Pred 2.95 L    FRCPleth-Pre 4.57 L    FRCPleth-%Pred-Pre 154 %    RVPleth-Pred 1.95 L    RVPleth-Pre 4.06 L    RVPleth-%Pred-Pre 208 %    TLCPleth-Pred 5.11 L    TLCPleth-Pre 6.98 L    TLCPleth-%Pred-Pre 136 %    DLCOunc-Pred 20.24 ml/min/mmHg    DLCOunc-Pre 14.33 ml/min/mmHg    DLCOunc-%Pred-Pre 70 %    VA-Pre 4.61 L    VA-%Pred-Pre 95 %    FEV1SVC-Pred 72 %    FEV1SVC-Pre 62 %                                           Again, thank you for allowing me to participate in the care of your patient.        Sincerely,        Enoc Ford MD    Electronically signed

## 2025-02-10 NOTE — PROGRESS NOTES
[facility] [address] [address]  Phone: [phone]  Fax: [phone]    Patient:  Gracy Bautista, Date of birth 1965  Date of Visit:  02/10/2025  Referring Provider Erica Hunter      Assessment & Plan      Severe persistent asthma, T2 high, with exacerbation  Mild COPD  Emphysema  Current smoker    Currently, she has wheezing on exam, a cough and is short of breath.  She has occasional desaturations of her oxygen at home.  She is only taking short acting beta agonists and occasional bursts of prednisone to manage her lung disease.  I have reviewed previous blood work, and her IgE has been elevated in the past as well as her eosinophils.  Unfortunately, she has not improved on asthma Biologics.  This makes me think that her cigarette smoke exposure is a much bigger problem than she may realize.  The other component of her asthma care that is missing is a stable inhaled corticosteroid dose.  This has been a confusing part of her story as she mentions she is only ever taken Trelegy, but there were lots of notes in the past suggesting that she had been on other inhaled steroids in the past.  She did have urinary retention which could have been due to the muscarinic antagonist component of Trelegy.  After talking about different strategies, she prefers metered dose inhalers, and so I prescribed Symbicort 160 mcg 2 puffs twice a day.  I gave her a spacer to use as well.  I went over inhaler technique in the clinic.    I discussed smoking cessation with her.  Appears that she has a plan in place at the moment.  She has a quit date, she is set up with nicotine patches, gum, counseling, and a support group.  This will be her first sincere attempt to quit smoking.    I will follow up with her in approximately 6 weeks to see how her symptoms are doing.      Medical Decision Making      I spent a total of 75 minutes on the  day of the visit.   Time spent by me today doing chart review, history and exam, documentation and further activities per the note       Enoc Ford MD            Today's visit note:     Chief Complaint: Consult (New Asthma )      HISTORY OF PRESENT ILLNESS:    Gracy Bautista is a 59 year old year old female who is being seen for asthma.     Over the last 4 years, she has noticed breathing problems, but they have quickly progressed over the last year.  She has been working with the pulmonologist on her breathing, and has been referred here for a second opinion.  She gets frequent exacerbations requiring prednisone.  She also has a lot of drug intolerances.    She has a history of nasal polyposis, and follows with Dr. Digna Jaimes.  She has had polyps removed from her nose.  Currently, she is not on any regular nasal therapies that she takes at home.    Going through her asthma history, her asthma is worsened with exertion, cold weather, and strong fragrances.  She also smokes cigarettes.  She is currently smoking about a third of a pack of cigarettes a day.  She has plans to quit in the very near future.  She has patches, gum, and is set up with a support group and a counselor.    I reviewed her previous asthma medications with her.  Some this is a bit unclear to me.  She states that she has only been on Trelegy in the past.  She has never been on any other inhaled steroid.  Previous notes mention inhaled steroid such as Advair in the past, but she does not recognize that name and states she has not been on it.  She stopped Trelegy due to urinary retention.    She takes prednisone at least once a month.  She just took 40 mg of prednisone a week ago and is tapered it down.  She took her last dose of 10 mg yesterday.    She has tried Nucala, that did not work.  She tried Dupixent, but developed hair loss.  She tried tezspire last year, and that did not help.  She also tried xolair in the  past, but did not  help. She has had allergy testing, and stated that everything was negative.    Currently, she is using Xopenex neb just about every 4 hours.  She has nocturnal symptoms, and will notice that sometimes her oxygen saturation is down to 87% at night.    She has smoked since she was 26 years old.    She has been on azithromycin every Monday and Wednesday and Friday for the last 4 years.             Past Medical and Surgical History:     Past Medical History:   Diagnosis Date    Allergic rhinitis     Arthritis     Chronic sinusitis     COPD (chronic obstructive pulmonary disease) (H)     Depressive disorder     Hoarseness     Reduced vision     Uncomplicated asthma      Past Surgical History:   Procedure Laterality Date    BIOPSY      COLONOSCOPY N/A 08/22/2022    Procedure: COLONOSCOPY, WITH POLYPECTOMY AND BIOPSY;  Surgeon: Glen Dyer MD;  Location: [facility] OR    COLONOSCOPY WITH CO2 INSUFFLATION N/A 08/22/2022    Procedure: COLONOSCOPY, WITH CO2 INSUFFLATION;  Surgeon: Glen Dyer MD;  Location: [facility] OR    ESOPHAGOSCOPY, GASTROSCOPY, DUODENOSCOPY (EGD), COMBINED N/A 09/02/2022    Procedure: ESOPHAGOGASTRODUODENOSCOPY, WITH BIOPSY AND POLYPECTOMY;  Surgeon: Colton Sarkar MD;  Location: [facility] OR    GYN SURGERY      IR BONE BIOPSY DEEP RIGHT  2/17/2023    NASAL/SINUS POLYPECTOMY      OPTICAL TRACKING SYSTEM ENDOSCOPIC SINUS SURGERY Bilateral 01/21/2022    Procedure: stealth guided, bilateral ethmoidectomy, bilateral frontal sinusotomy, bilateral maxillary antrostomies;  Surgeon: Digna Jaimes MD;  Location: [facility] OR    ORTHOPEDIC SURGERY             Family History:     Family History   Problem Relation Age of Onset    Bleeding Disorder Mother     Hypertension Mother     Hyperlipidemia Mother     Diabetes Father     Heart Disease Father     Cerebrovascular Disease Father     Hypertension Father     Hyperlipidemia Father     Cancer Brother     Bleeding Disorder Brother     Anesthesia Reaction No  family hx of     Deep Vein Thrombosis (DVT) No family hx of               Social History:     Social History     Socioeconomic History    Marital status:      Spouse name: Not on file    Number of children: Not on file    Years of education: Not on file    Highest education level: Not on file   Occupational History    Occupation: HOMEMAKER   Tobacco Use    Smoking status: Light Smoker     Types: Cigarettes     Start date: 9/17/2021    Smokeless tobacco: Never    Tobacco comments:     I am willing.  I haven't been successful and my mindset is not completely there yet.   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    Drug use: Never    Sexual activity: Not Currently     Partners: Male     Birth control/protection: None     Comment: Hysterectomy   Other Topics Concern    Parent/sibling w/ CABG, MI or angioplasty before 65F 55M? Yes   Social History Narrative    October 7, 2021    ENVIRONMENTAL HISTORY: [place] The home is heated with a electric furnace. They do have central air conditioning. The patient's bedroom is furnished with carpeting in bedroom, allergen mattress cover, and allergen pillowcase cover.  Pets inside the house include 1 dog. There is no history of cockroach or mice infestation. There is/are 0 smokers in the house.  The house does have a damp basement.      Social Drivers of Health     Financial Resource Strain: Low Risk  (2/6/2024)    Received from [facility]    Overall Financial Resource Strain (CARDIA)     Difficulty of Paying Living Expenses: Not hard at all   Food Insecurity: No Food Insecurity (11/7/2024)    Received from [facility]    Hunger Vital Sign     Worried About Running Out of Food in the Last Year: Never true     Ran  Out of Food in the Last Year: Never true   Transportation Needs: No Transportation Needs (11/7/2024)    Received from [facility]    PRAPARE - Transportation     Lack of Transportation (Medical): No     Lack of Transportation (Non-Medical): No   Physical Activity: Insufficiently Active (7/26/2019)    Received from [facility]    Exercise Vital Sign     Days of Exercise per Week: 3 days     Minutes of Exercise per Session: 30 min   Stress: No Stress Concern Present (7/26/2019)    Received from [facility]    [employer] - Occupational Stress Questionnaire     Feeling of Stress : Only a little   Social Connections: Socially Integrated (7/26/2019)    Received from [facility]    Social Connection and Isolation Panel [NHANES]     Frequency of Communication with Friends and Family: More than three times a week     Frequency of Social Gatherings with Friends and Family: Once a week     Attends Taoism Services: More than 4 times per year     Active Member of Clubs or Organizations: Yes     Attends Club or Organization Meetings: More than 4 times per year     Marital Status:    Interpersonal Safety: Not At Risk (11/6/2024)    Received from [facility]    EH IP Custom IPV     Do you feel UNSAFE in any of your personal relationships with your family members or any other acquaintances?: No   Housing Stability: Low Risk  (11/7/2024)    Received from [facility]    Housing Stability Vital Sign     Unable to Pay for Housing in the Last Year: No     Number of Times Moved in the Last Year: 0     Homeless in the Last Year: No            Medications:     Current Outpatient Medications   Medication Sig Dispense Refill    acetaminophen (TYLENOL) 500 MG tablet Take 2 tablets (1,000 mg) by mouth every 8 hours as needed for mild pain 10 tablet 0    ADDERALL  "XR 20 MG 24 hr capsule Take 20 mg by mouth 2 times daily as needed      azithromycin (ZITHROMAX) 250 MG tablet Take 1 tablet (250 mg) by mouth Every Mon, Wed, Fri Morning 36 tablet 3    bisacodyl (DULCOLAX) 5 MG EC tablet Take 5 mg by mouth as needed for constipation      Cholecalciferol (VITAMIN D3) 50 MCG (2000 UT) CAPS Take 4,000 Units by mouth.      CYMBALTA 60 MG capsule Take 2 capsules (120 mg) by mouth every morning      ipratropium - albuterol 0.5 mg/2.5 mg/3 mL (DUONEB) 0.5-2.5 (3) MG/3ML neb solution Take 1 vial by nebulization every 6 hours as needed for shortness of breath, wheezing or cough.      LAMICTAL 200 MG tablet Take 200 mg by mouth every morning       levalbuterol (XOPENEX HFA) 45 MCG/ACT inhaler Inhale 2 puffs into the lungs every 4 hours as needed for shortness of breath or wheezing 45 g 0    levalbuterol (XOPENEX) 1.25 MG/3ML neb solution Take 3 mLs (1.25 mg) by nebulization every 4 hours as needed for shortness of breath or wheezing 1620 mL 3    LORazepam (ATIVAN) 0.5 MG tablet TAKE 1/2-1 TABLET BY MOUTH TWICE A DAY, AS NEEDED.      nicotine polacrilex (NICORETTE) 4 MG gum 4 mg every hour as needed      predniSONE (DELTASONE) 10 MG tablet Take 10 mg by mouth daily.      spacer (OPTICHAMBER ELBA) holding chamber Use with albuterol (PROAIR HFA/PROVENTIL HFA/VENTOLIN HFA) 108 (90 Base) MCG/ACT inhaler 1 each 0     No current facility-administered medications for this visit.           PHYSICAL EXAM:  /88   Pulse 77   Ht 1.651 m (5' 5\")   Wt 53.9 kg (118 lb 12.8 oz)   SpO2 99%   BMI 19.77 kg/m       General: Comfortable, No apparent distress  Eyes: Anicteric  Ears: Hearing grossly normal  Mouth: Oral mucosa is moist, without any lesions. No oropharyngeal exudate.  Neck: supple, no thyromegaly  Lymphatics: No cervical or supraclavicular nodes  Respiratory: diffuse expiratory wheezing  Cardiac: RRR, normal S1, S2. No murmurs. No JVD  Musculoskeletal: No clubbing. No cyanosis. No " edema.  Skin: No rash on limited exam  Neuro: Normal mentation. Normal speech.  Psych:Normal affect           Data:   All laboratory and imaging data reviewed.      PFT:       PFT Interpretation:  Mild airflow obstruction  Hyperinflation and gas trapping  Mild impairment in diffusion capacity  Valid Maneuver    CXR: I have reviewed the CXR report from 11/7/2024:  Bilateral hemidiaphragmatic flattening. Mild diffuse bronchial wall thickening.   No consolidation, pneumothorax, or pleural effusion. Normal cardiac and   mediastinal contours.     Chest CT: I have reviewed the chest CT report from 5/2024:  Airways: Central airways patent. Diffuse bronchial wall thickening.   Lungs: Mild-moderate emphysema. Similar right upper lobe mild clustered   groundglass micronodules (reference series 2, images 15-17). Unchanged right   lower lobe subpleural 3 mm noncalcified nodule (series 2, image 81). Unchanged   left basilar 4 mm noncalcified nodule (series 2, image 90). No suspicious   nodules.   Pleural spaces: Clear.     Heart: Normal cardiac size. No pericardial effusion.   Pulmonary arteries: Normal caliber.   Aorta: Normal caliber.     Lymph nodes: No thoracic lymphadenopathy.   Thyroid: Unremarkable.   Esophagus: Unremarkable.     Imaged upper abdomen: Unremarkable.   Chest wall: Unremarkable.   Osseous: No suspicious lesion.     IMPRESSION:   1. Similar right upper lobe mild clustered micronodules.   2.  No suspicious pulmonary nodules.   3.  Mild-moderate emphysema. Diffuse bronchitis.       Recent Results (from the past week)   Pulmonary function test    Collection Time: 02/07/25 11:48 AM   Result Value Ref Range    FVC-Pred 3.01 L    FVC-Pre 2.99 L    FVC-%Pred-Pre 99 %    FEV1-Pre 1.80 L    FEV1-%Pred-Pre 75 %    FEV1FVC-Pred 80 %    FEV1FVC-Pre 60 %    FEFMax-Pred 6.44 L/sec    FEFMax-Pre 4.12 L/sec    FEFMax-%Pred-Pre 63 %    FEF2575-Pred 2.19 L/sec    FEF2575-Pre 0.90 L/sec    YGO7698-%Pred-Pre 40 %    FEF2575-Post  1.42 L/sec    BHU7522-%Pred-Post 64 %    ExpTime-Pre 7.61 sec    FIFMax-Pre 4.71 L/sec    VC-Pred 3.35 L    VC-Pre 2.91 L    VC-%Pred-Pre 87 %    IC-Pred 2.26 L    IC-Pre 2.41 L    IC-%Pred-Pre 106 %    ERV-Pred 1.13 L    ERV-Pre 0.50 L    ERV-%Pred-Pre 44 %    FEV1FEV6-Pred 81 %    FEV1FEV6-Pre 61 %    FRCPleth-Pred 2.95 L    FRCPleth-Pre 4.57 L    FRCPleth-%Pred-Pre 154 %    RVPleth-Pred 1.95 L    RVPleth-Pre 4.06 L    RVPleth-%Pred-Pre 208 %    TLCPleth-Pred 5.11 L    TLCPleth-Pre 6.98 L    TLCPleth-%Pred-Pre 136 %    DLCOunc-Pred 20.24 ml/min/mmHg    DLCOunc-Pre 14.33 ml/min/mmHg    DLCOunc-%Pred-Pre 70 %    VA-Pre 4.61 L    VA-%Pred-Pre 95 %    FEV1SVC-Pred 72 %    FEV1SVC-Pre 62 %

## 2025-02-10 NOTE — NURSING NOTE
Chief Complaint   Patient presents with    Consult     New Asthma     Medications reviewed and vital signs taken.   Sharon Wolff CMA

## 2025-02-18 ENCOUNTER — OFFICE VISIT (OUTPATIENT)
Dept: OTOLARYNGOLOGY | Facility: CLINIC | Age: 60
End: 2025-02-18
Payer: COMMERCIAL

## 2025-02-18 VITALS
WEIGHT: 118 LBS | HEIGHT: 65 IN | BODY MASS INDEX: 19.66 KG/M2 | HEART RATE: 76 BPM | OXYGEN SATURATION: 99 % | DIASTOLIC BLOOD PRESSURE: 76 MMHG | SYSTOLIC BLOOD PRESSURE: 118 MMHG

## 2025-02-18 DIAGNOSIS — J38.7 LEUKOPLAKIA OF LARYNX: Primary | ICD-10-CM

## 2025-02-18 PROCEDURE — 31575 DIAGNOSTIC LARYNGOSCOPY: CPT | Performed by: OTOLARYNGOLOGY

## 2025-02-18 PROCEDURE — 99213 OFFICE O/P EST LOW 20 MIN: CPT | Mod: 25 | Performed by: OTOLARYNGOLOGY

## 2025-02-18 RX ORDER — BUDESONIDE 0.5 MG/2ML
INHALANT ORAL
COMMUNITY
Start: 2025-02-15

## 2025-02-18 NOTE — LETTER
2025       RE: Gracy Bautista  [address] [facility] [address]     Dear Colleague,    Thank you for referring your patient, Gracy Bautista, to the [facility] [place] at [facility]. Please see a copy of my visit note below.        [facility]   at the [facility]   Otolaryngology Clinic     Patient: Gracy Bautista    MRN: [id number]    : 1965    Age/Gender: 59 year old female  Date of Service: 2025  Rendering Provider:   Miriam Jacobs MD       Impression & Plan     IMPRESSION: Ms. Bautista is a 59 year old female who is being seen for the followin. 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 Dysphagia  - in the setting of prior achalasia per report  -  solids are difficult  - liquids are difficult  - pills get stuck   - weight changes yes  - GI work up had esophagram on 7/2024 which was normal   - scope shows no pooling of saliva  - FEES shows no penetration, no aspiration burping  - symptoms due to esophageal etiology likely  - will obtain Xray Video Swallow Exam with follow through   - will have EGD done at the time of surgery  Plan   - has Xray Video Swallow Exam with esophagram scheduled  - will add EGD at the time of surgery     RETURN VISIT: follow up after surgery    Referring Provider   PCP: Anh Avalos  Referring Physician: Referred MD Tim  No address on file  Reason for Consultation    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 Bautista is a 59 year old female who presents to us today with dysphonia and dysphagia.      she presents today for evaluation. she reports:  - symptoms are the same  - will be finished with tobacco cessation at time of surgery    Dysphagia:  reports    Dyspnea:  reports    Coughing / Throat-clearing:  reports    PAST MEDICAL HISTORY:   Past Medical History:   Diagnosis Date     Allergic rhinitis      Arthritis      Chronic sinusitis      COPD (chronic obstructive pulmonary disease) (H)      Depressive disorder      Hoarseness      Reduced vision      Uncomplicated asthma        PAST SURGICAL HISTORY:   Past Surgical History:   Procedure Laterality Date     BIOPSY       COLONOSCOPY N/A 08/22/2022    Procedure: COLONOSCOPY, WITH POLYPECTOMY AND BIOPSY;  Surgeon: Glen Dyer MD;  Location: [facility] OR     COLONOSCOPY WITH CO2 INSUFFLATION N/A 08/22/2022    Procedure: COLONOSCOPY, WITH CO2 INSUFFLATION;  Surgeon: Glen Dyer MD;  Location: [facility] OR     ESOPHAGOSCOPY, GASTROSCOPY, DUODENOSCOPY (EGD), COMBINED N/A 09/02/2022    Procedure: ESOPHAGOGASTRODUODENOSCOPY, WITH BIOPSY AND POLYPECTOMY;  Surgeon: Mello  Colton PAYTON MD;  Location: [facility] OR     GYN SURGERY       IR BONE BIOPSY DEEP RIGHT  2/17/2023     NASAL/SINUS POLYPECTOMY       OPTICAL TRACKING SYSTEM ENDOSCOPIC SINUS SURGERY Bilateral 01/21/2022    Procedure: stealth guided, bilateral ethmoidectomy, bilateral frontal sinusotomy, bilateral maxillary antrostomies;  Surgeon: Digna Jaimes MD;  Location: [facility] OR     ORTHOPEDIC SURGERY         CURRENT MEDICATIONS:   Current Outpatient Medications:      acetaminophen (TYLENOL) 500 MG tablet, Take 2 tablets (1,000 mg) by mouth every 8 hours as needed for mild pain, Disp: 10 tablet, Rfl: 0     ADDERALL XR 20 MG 24 hr capsule, Take 20 mg by mouth 2 times daily as needed, Disp: , Rfl:      bisacodyl (DULCOLAX) 5 MG EC tablet, Take 5 mg by mouth as needed for constipation, Disp: , Rfl:      budesonide (PULMICORT) 0.5 MG/2ML neb solution, , Disp: , Rfl:      budesonide-formoterol (SYMBICORT/BREYNA) 160-4.5 MCG/ACT Inhaler, Inhale 2 puffs into the lungs 2 times daily., Disp: 10.2 g, Rfl: 11     Cholecalciferol (VITAMIN D3) 50 MCG (2000 UT) CAPS, Take 4,000 Units by mouth., Disp: , Rfl:      CYMBALTA 60 MG capsule, Take 2 capsules (120 mg) by mouth every morning, Disp: , Rfl:      ipratropium - albuterol 0.5 mg/2.5 mg/3 mL (DUONEB) 0.5-2.5 (3) MG/3ML neb solution, Take 1 vial by nebulization every 6 hours as needed for shortness of breath, wheezing or cough., Disp: , Rfl:      LAMICTAL 200 MG tablet, Take 200 mg by mouth every morning , Disp: , Rfl:      levalbuterol (XOPENEX HFA) 45 MCG/ACT inhaler, Inhale 2 puffs into the lungs every 4 hours as needed for shortness of breath or wheezing, Disp: 45 g, Rfl: 0     LORazepam (ATIVAN) 0.5 MG tablet, TAKE 1/2-1 TABLET BY MOUTH TWICE A DAY, AS NEEDED., Disp: , Rfl:      nicotine polacrilex (NICORETTE) 4 MG gum, 4 mg every hour as needed, Disp: , Rfl:      predniSONE (DELTASONE) 10 MG tablet, Take 10 mg by mouth daily., Disp: , Rfl:      predniSONE (DELTASONE) 20 MG tablet, Take 2  tablets (40 mg) by mouth daily., Disp: 14 tablet, Rfl: 0     predniSONE (DELTASONE) 5 MG tablet, Take 1 tablet (5 mg) by mouth daily., Disp: 30 tablet, Rfl: 1     spacer (OPTICHAMBER ELBA) holding chamber, Use with albuterol (PROAIR HFA/PROVENTIL HFA/VENTOLIN HFA) 108 (90 Base) MCG/ACT inhaler, Disp: 1 each, Rfl: 0     levalbuterol (XOPENEX) 1.25 MG/3ML neb solution, Take 3 mLs (1.25 mg) by nebulization every 4 hours as needed for shortness of breath or wheezing, Disp: 1620 mL, Rfl: 3    ALLERGIES: Bee venom, Cefdinir, Levofloxacin, and Prednisone    SOCIAL HISTORY:    Social History     Socioeconomic History     Marital status:      Spouse name: Not on file     Number of children: Not on file     Years of education: Not on file     Highest education level: Not on file   Occupational History     Occupation: HOMEMAKER   Tobacco Use     Smoking status: Light Smoker     Types: Cigarettes     Start date: 9/17/2021     Smokeless tobacco: Never     Tobacco comments:     I am willing.  I haven't been successful and my mindset is not completely there yet.   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     Drug use: Never     Sexual activity: Not Currently     Partners: Male     Birth control/protection: None     Comment: Hysterectomy   Other Topics Concern     Parent/sibling w/ CABG, MI or angioplasty before 65F 55M? Yes   Social History Narrative    October 7, 2021    ENVIRONMENTAL HISTORY: [place] The home is heated with a electric furnace. They do have central air conditioning. The patient's bedroom is furnished with carpeting in bedroom, allergen mattress cover, and allergen pillowcase cover.  Pets inside the house include 1 dog. There is no history of cockroach or mice infestation. There is/are 0 smokers in the house.  The house does have a damp basement.       Social Drivers of Health     Financial Resource Strain: Low Risk  (2/6/2024)    [facility]    Overall Financial Resource Strain (CARDIA)      Difficulty of Paying Living Expenses: Not hard at all   Food Insecurity: No Food Insecurity (11/7/2024)    Received from [facility]    Hunger Vital Sign      Worried About Running Out of Food in the Last Year: Never true      Ran Out of Food in the Last Year: Never true   Transportation Needs: No Transportation Needs (11/7/2024)    Received from [facility]    PRAPARE - Transportation      Lack of Transportation (Medical): No      Lack of Transportation (Non-Medical): No   Physical Activity: Insufficiently Active (7/26/2019)    Received from [facility]    Exercise Vital Sign      Days of Exercise per Week: 3 days      Minutes of Exercise per Session: 30 min   Stress: No Stress Concern Present (7/26/2019)    Received from [facility]    [employer] - Occupational Stress Questionnaire      Feeling of Stress : Only a little   Social Connections: Socially Integrated (7/26/2019)    Received from [facility]    Social Connection and Isolation Panel [NHANES]      Frequency of Communication with Friends and Family: More than three times a week      Frequency of Social Gatherings with Friends and Family: Once a week      Attends Temple Services: More than 4 times per year      Active Member of Clubs or Organizations: Yes      Attends Club or Organization Meetings: More than 4 times per year      Marital Status:    Interpersonal Safety: Not At Risk (11/6/2024)    Received from [facility]    EH IP Custom IPV      Do you feel UNSAFE in any of your personal relationships with your family members or any  other acquaintances?: No   Housing Stability: Low Risk  (11/7/2024)    [facility] from [facility]    Housing Stability Vital Sign      Unable to Pay for Housing in the Last Year: No      Number of Times Moved in the Last Year: 0      Homeless in the Last Year: No         FAMILY HISTORY:   Family History   Problem Relation Age of Onset     Bleeding Disorder Mother      Hypertension Mother      Hyperlipidemia Mother      Diabetes Father      Heart Disease Father      Cerebrovascular Disease Father      Hypertension Father      Hyperlipidemia Father      Cancer Brother      Bleeding Disorder Brother      Anesthesia Reaction No family hx of      Deep Vein Thrombosis (DVT) No family hx of      Non-contributory for problems with anesthesia    REVIEW OF SYSTEMS:   The patient was asked a 14 point review of systems regarding constitutional symptoms, eye symptoms, ears, nose, mouth, throat symptoms, cardiovascular symptoms, respiratory symptoms, gastrointestinal symptoms, genitourinary symptoms, musculoskeletal symptoms, integumentary symptoms, neurological symptoms, psychiatric symptoms, endocrine symptoms, hematologic/lymphatic symptoms, and allergic/ immunologic symptoms.   The pertinent factors have been included in the HPI and below.  Patient Supplied Answers to Review of Systems      2/13/2025     9:02 AM   [facility] E[facility] ROS   Constitutional Unexplained fatigue   Neurology Unexplained weakness   Psychology Frequently feeling depressed or sad   Ears, Nose, Throat Ear pain    Nasal congestion or drainage    Sore throat    Trouble swallowing    Hoarseness   Cardiopulmonary Cough    Breathing problems    Wheezing   Gastrointestinal/Genitourinary Heartburn/indigestion    Unexplained nausea/vomiting    Constipation   Endocrine Heat or cold intolerance       Physical Examination   The patient underwent a physical examination as described below. The pertinent positive and negative findings are  summarized after the description of the examination.  Constitutional: The patient's developmental and nutritional status was assessed. The patient's voice quality was assessed.  Head and Face: The head and face were inspected for deformities. The sinuses were palpated. The salivary glands were palpated. Facial muscle strength was assessed bilaterally.  Eyes: Extraocular movements and primary gaze alignment were assessed.  Ears, Nose, Mouth and Throat: The ears and nose were examined for deformities. The nasal septum, mucosa, and turbinates were inspected by anterior rhinoscopy. The lips, teeth, and gums were examined for abnormalities. The oral mucosa, tongue, palate, tonsils, lateral and posterior pharynx were inspected for the presence of asymmetry or mucosal lesions.    Neck: The tracheal position was noted, and the neck mass palpated to determine if there were any asymmetries, abnormal neck masses, thyromegally, or thyroid nodules.  Respiratory: The nature of the breathing and chest expansion/symmetry was observed.  Cardiovascular: The patient was examined to determine the presence of any edema or jugular venous distension.  Abdomen: The contour of the abdomen was noted.  Lymphatic: The patient was examined for infraclavicular lymphadenopathy.  Musculoskeletal: The patient was inspected for the presence of skeletal deformities.  Extremities: The extremities were examined for any clubbing or cyanosis.  Skin: The skin was examined for inflammatory or neoplastic conditions.  Neurologic: The patient's orientation, mood, and affect were noted. The cranial nerve  functions were examined.  Other pertinent positive and negative findings on physical examination:      OC/OP: no lesions, uvula midline, soft palate elevates symmetrically   Neck: no lesions, right TH tenderness to palpation    All other physical examination findings were within normal limits and noncontributory.  Procedures   Flexible laryngoscopy (CPT  26457)      Pre-procedure diagnosis: dysphonia  Post-procedure diagnosis: same as above  Indication for procedure: Ms. Bautista is a 59 year old female with see above  Procedure(s): Fiberoptic Laryngoscopy    Details of Procedure: After informed consent was obtained, the patient was seated in the examination chair.  The areas of the nasopharynx as well as the hypopharynx were anesthetized with topical 4% lidocaine with 0.25% phenylephrine atomizer.  Examination of the base of tongue was performed first.  Attention was directed to any evidence of masses in the area or evidence of leukoplakia or candidal infection.  Attention was directed to the epiglottis where its size and position was determined and its movement on phonation of the vowel  e .  The piriform sinuses were then inspected for any mass lesions or pooling of secretions.  Attention was then directed to the larynx. The vocal folds were inspected for infection or any areas of leukoplakia, for masses, polypoid degeneration, or hemorrhage.  Having done this, the arytenoids and vocal processes were inspected for erythema or evidence of granuloma formation.  The posterior commissure was then inspected for evidence of inflammatory changes in the mucosa and heaping up of mucosal tissue. The patient was then instructed to say the vowel  e .  Adduction of vocal folds to the midline was observed for any evidence of paresis or paralysis of the larynx or asymmetry in rotation of the larynx to the left or right. The patient was asked to breathe and the degree of abduction was noted bilaterally.  Subglottic view of the larynx was obtained for any additional mass lesions or mucosal changes.  Finally the post cricoid was examined for evidence of pooling of secretions, as well as the pharyngeal wall mucosa.   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, left karen's edema   Right  "vocal process: No restriction of mobility   Left vocal process: No restriction of mobility  Glottal gap: Complete glottal closure  Supraglottic structures: Normal  Hypopharynx: Normal     Estimated Blood Loss: minimal  Complications: None  Disposition: Patient tolerated the procedure well        Review of Relevant Clinical Data   I personally reviewed:    Labs:  Lab Results   Component Value Date    TSH 0.65 10/21/2020     Lab Results   Component Value Date     08/29/2023    CO2 21 (L) 08/29/2023    BUN 19.1 08/29/2023     Lab Results   Component Value Date    WBC 8.3 10/30/2024    HGB 15.8 (H) 10/30/2024    HCT 46.7 10/30/2024    MCV 98 10/30/2024     10/30/2024     Lab Results   Component Value Date    PTT 30 10/21/2020    INR 1.01 02/17/2023     No results found for: \"BRCIE\"  No components found for: \"RHEUMATOIDFACTOR\", \"RF\"  Lab Results   Component Value Date    CRP <2.9 09/21/2020     No components found for: \"CKTOT\", \"URICACID\"  No components found for: \"C3\", \"C4\", \"DSDNAAB\", \"NDNAABIFA\"  No results found for: \"MPOAB\"    Patient reported Quality of Life (QOL) Measures   Patient Supplied Answers To VHI Questionnaire      1/23/2025    10:01 AM   Voice Handicap Index (VHI-10)   My voice makes it difficult for people to hear me 2    People have difficulty understanding me in a noisy room 1    My voice difficulties restrict my personal and social life.  0    I feel left out of conversations because of my voice 0    My voice problem causes me to lose income 0    I feel as though I have to strain to produce voice 0    The clarity of my voice is unpredictable 1    My voice problem upsets me 0    My voice makes me feel handicapped 0    People ask, \"What's wrong with your voice?\" 0    VHI-10 4        Proxy-reported         Patient Supplied Answers To EAT Questionnaire      1/23/2025    10:01 AM   Eating Assessment Tool (EAT-10)   My swallowing problem has caused me to lose weight 0    My swallowing problem " interferes with my ability to go out for meals 0    Swallowing liquids takes extra effort 1    Swallowing solids takes extra effort 2    Swallowing pills takes extra effort 2    Swallowing is painful 2    The pleasure of eating is affected by my swallowing 2    When I swallow food sticks in my throat 2    I cough when I eat 2    Swallowing is stressful 1    EAT-10 14        Proxy-reported         Patient Supplied Answers To CSI Questionnaire      1/23/2025    10:01 AM   Cough Severity Index (CSI)   My cough is worse when I lie down 3    My coughing problem causes me to restrict my personal and social life 2    I tend to avoid places because of my cough problem 2    I feel embarrassed because of my coughing problem 2    People ask, ''What's wrong?'' because I cough a lot 2    I run out of air when I cough 3    My coughing problem affects my voice 2    My coughing problem limits my physical activity 2    My coughing problem upsets me 2    People ask me if I am sick because I cough a lot 2    CSI Score 22        Proxy-reported         Patient Supplied Answers to Dyspnea Index Questionnaire:      1/23/2025    10:01 AM   Dyspnea Index   1. I have trouble getting air in. 3    2. I feel tightness in my throat when I am having erma breathing problem. 3    3. It takes more effort to breathe than it used to. 4    4. Change in weather affect my breathing problem. 3    5. My breathing gets worse with stress. 2    6. I make sound/noise breathing in 3    7. I have to strain to breathe. 3    8. My shortness of breath gets worse with exercise or physical activity 3    9. My breathing problem makes me feel stressed. 3    10. My breathing problem casuses me to restrict my personal and social life. 3    Dyspnea Index Total Score 30        Proxy-reported           Thank you for the kind referral and for allowing me to share in the care of Ms. Bautista. If you have any questions, please do not hesitate to contact me.    Scribe  Disclosure:   I, Charu Dowd, am serving as a scribe; to document services personally performed by Miriam Jacobs MD -based on data collection and the provider's statements to me.     Provider Disclosure:  I agree with above History, Review of Systems, Physical exam and Plan.  I have reviewed the content of the documentation and have edited it as needed. I have personally performed the services documented here and the documentation accurately represents those services and the decisions I have made.      Electronically signed by:    Miriam Jacobs MD    Laryngology    [facility]  Department of  Otolaryngology - Head and Neck Surgery  Clinics & Surgery Center  [address] [place] [address]  Appointment line: [phone]  Fax: [phone]  [email or web address] *    Again, thank you for allowing me to participate in the care of your patient.      Sincerely,    Miriam Jacobs MD

## 2025-02-18 NOTE — PROGRESS NOTES
[facility]   at [facility]   Otolaryngology Clinic     Patient: Gracy Bautista    MRN: [id number]    : 1965    Age/Gender: 59 year old female  Date of Service: 2025  Rendering Provider:   Miriam Jacobs MD         Impression & Plan     IMPRESSION: Ms. Bautista is a 59 year old female who is being seen for the following:      - symptoms 2025 are ***  - scope shows ***  Plan  - ***    RETURN VISIT: ***    Chief Complaint   ***  Dysphonia  Dysphagia  Dyspnea  Interval History   HISTORY OF PRESENT ILLNESS: Ms. Bautista is a 59 year old female is being followed for ***. she was initially seen on ***. Please refer to this note for full history.   Of note ***    Today, she presents for follow up. she reports:  - ***    Dysphonia:  ***  She reports the voice problem began   ago and has   over time. She feels the problem was caused by   and it bothers her  . More specifically, she has been experiencing   . She has   vocal demands with  .    Dysphagia:  ***  She reports that the swallowing problem began   ago, has   over time, and bothers her  . With regard to symptoms, She notes  . She maintains a   and   diet.     Dyspnea:  ***  She reports that the breathing problem began   ago, has   over time, and bothers her  . With regard to symptoms, she notes  . In regards to exertion, her breathing problem can be triggered by  .    Coughing / Throat-clearing:  ***  She reports that the cough / throat-clearing problem began   ago, has   over time, and bothers her  . With regard to symptoms, she notes  . Her cough / throat-clearing can be triggered by  .    GERD/LPRD:  ***     PAST MEDICAL HISTORY:   Past Medical History:   Diagnosis Date    Allergic rhinitis     Arthritis     Chronic sinusitis     COPD (chronic obstructive pulmonary disease) (H)     Depressive disorder     Hoarseness     Reduced vision     Uncomplicated asthma        PAST SURGICAL HISTORY:   Past Surgical History:    Procedure Laterality Date    BIOPSY      COLONOSCOPY N/A 08/22/2022    Procedure: COLONOSCOPY, WITH POLYPECTOMY AND BIOPSY;  Surgeon: Glen Dyer MD;  Location: [facility] OR    COLONOSCOPY WITH CO2 INSUFFLATION N/A 08/22/2022    Procedure: COLONOSCOPY, WITH CO2 INSUFFLATION;  Surgeon: Glen Dyer MD;  Location: [facility] OR    ESOPHAGOSCOPY, GASTROSCOPY, DUODENOSCOPY (EGD), COMBINED N/A 09/02/2022    Procedure: ESOPHAGOGASTRODUODENOSCOPY, WITH BIOPSY AND POLYPECTOMY;  Surgeon: Colton Sarkar MD;  Location: [facility] OR    GYN SURGERY      IR BONE BIOPSY DEEP RIGHT  2/17/2023    NASAL/SINUS POLYPECTOMY      OPTICAL TRACKING SYSTEM ENDOSCOPIC SINUS SURGERY Bilateral 01/21/2022    Procedure: stealth guided, bilateral ethmoidectomy, bilateral frontal sinusotomy, bilateral maxillary antrostomies;  Surgeon: Digna Jaimes MD;  Location: [facility] OR    ORTHOPEDIC SURGERY         CURRENT MEDICATIONS:   Current Outpatient Medications:     acetaminophen (TYLENOL) 500 MG tablet, Take 2 tablets (1,000 mg) by mouth every 8 hours as needed for mild pain, Disp: 10 tablet, Rfl: 0    ADDERALL XR 20 MG 24 hr capsule, Take 20 mg by mouth 2 times daily as needed, Disp: , Rfl:     bisacodyl (DULCOLAX) 5 MG EC tablet, Take 5 mg by mouth as needed for constipation, Disp: , Rfl:     budesonide (PULMICORT) 0.5 MG/2ML neb solution, , Disp: , Rfl:     budesonide-formoterol (SYMBICORT/BREYNA) 160-4.5 MCG/ACT Inhaler, Inhale 2 puffs into the lungs 2 times daily., Disp: 10.2 g, Rfl: 11    Cholecalciferol (VITAMIN D3) 50 MCG (2000 UT) CAPS, Take 4,000 Units by mouth., Disp: , Rfl:     CYMBALTA 60 MG capsule, Take 2 capsules (120 mg) by mouth every morning, Disp: , Rfl:     ipratropium - albuterol 0.5 mg/2.5 mg/3 mL (DUONEB) 0.5-2.5 (3) MG/3ML neb solution, Take 1 vial by nebulization every 6 hours as needed for shortness of breath, wheezing or cough., Disp: , Rfl:     LAMICTAL 200 MG tablet, Take 200 mg by mouth every morning , Disp:  , Rfl:     levalbuterol (XOPENEX HFA) 45 MCG/ACT inhaler, Inhale 2 puffs into the lungs every 4 hours as needed for shortness of breath or wheezing, Disp: 45 g, Rfl: 0    LORazepam (ATIVAN) 0.5 MG tablet, TAKE 1/2-1 TABLET BY MOUTH TWICE A DAY, AS NEEDED., Disp: , Rfl:     nicotine polacrilex (NICORETTE) 4 MG gum, 4 mg every hour as needed, Disp: , Rfl:     predniSONE (DELTASONE) 10 MG tablet, Take 10 mg by mouth daily., Disp: , Rfl:     predniSONE (DELTASONE) 20 MG tablet, Take 2 tablets (40 mg) by mouth daily., Disp: 14 tablet, Rfl: 0    predniSONE (DELTASONE) 5 MG tablet, Take 1 tablet (5 mg) by mouth daily., Disp: 30 tablet, Rfl: 1    spacer (OPTICHAMBER ELBA) holding chamber, Use with albuterol (PROAIR HFA/PROVENTIL HFA/VENTOLIN HFA) 108 (90 Base) MCG/ACT inhaler, Disp: 1 each, Rfl: 0    levalbuterol (XOPENEX) 1.25 MG/3ML neb solution, Take 3 mLs (1.25 mg) by nebulization every 4 hours as needed for shortness of breath or wheezing, Disp: 1620 mL, Rfl: 3    ALLERGIES: Bee venom, Cefdinir, Levofloxacin, and Prednisone    SOCIAL HISTORY:    Social History     Socioeconomic History    Marital status:      Spouse name: Not on file    Number of children: Not on file    Years of education: Not on file    Highest education level: Not on file   Occupational History    Occupation: HOMEMAKER   Tobacco Use    Smoking status: Light Smoker     Types: Cigarettes     Start date: 9/17/2021    Smokeless tobacco: Never    Tobacco comments:     I am willing.  I haven't been successful and my mindset is not completely there yet.   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    Drug use: Never    Sexual activity: Not Currently     Partners: Male     Birth control/protection: None     Comment: Hysterectomy   Other Topics Concern    Parent/sibling w/ CABG, MI or angioplasty before 65F 55M? Yes   Social History Narrative     October 7, 2021    ENVIRONMENTAL HISTORY: [place] The home is heated with a electric furnace. They do have central air conditioning. The patient's bedroom is furnished with carpeting in bedroom, allergen mattress cover, and allergen pillowcase cover.  Pets inside the house include 1 dog. There is no history of cockroach or mice infestation. There is/are 0 smokers in the house.  The house does have a damp basement.      Social Drivers of Health     Financial Resource Strain: Low Risk  (2/6/2024)    [facility]    Overall Financial Resource Strain (CARDIA)     Difficulty of Paying Living Expenses: Not hard at all   Food Insecurity: No Food Insecurity (11/7/2024)    Received from [facility]    Hunger Vital Sign     Worried About Running Out of Food in the Last Year: Never true     Ran Out of Food in the Last Year: Never true   Transportation Needs: No Transportation Needs (11/7/2024)    Received from [facility]    PRAPARE - Transportation     Lack of Transportation (Medical): No     Lack of Transportation (Non-Medical): No   Physical Activity: Insufficiently Active (7/26/2019)    Received from [facility]    Exercise Vital Sign     Days of Exercise per Week: 3 days     Minutes of Exercise per Session: 30 min   Stress: No Stress Concern Present (7/26/2019)    Received from [facility]    [place] [employer] Occup[employer] - Occupational Stress Questionnaire     Feeling of Stress : Only a little   Social Connections: Socially Integrated (7/26/2019)    [facility]    Social Connection and Isolation Panel [NHANES]     Frequency of Communication with Friends and Family: More than three times a week     Frequency of Social Gatherings with Friends and Family:  Once a week     Attends [employer] Services: More than 4 times per year     Active Member of Clubs or Organizations: Yes     Attends Club or Organization Meetings: More than 4 times per year     Marital Status:    Interpersonal Safety: Not At Risk (11/6/2024)    Received from [facility] [employer] [facility]    EH IP Custom IPV     Do you feel UNSAFE in any of your personal relationships with your family members or any other acquaintances?: No   Housing Stability: Low Risk  (11/7/2024)    Received from [facility] [employer] [facility]    Housing Stability Vital Sign     Unable to Pay for Housing in the Last Year: No     Number of Times Moved in the Last Year: 0     Homeless in the Last Year: No         FAMILY HISTORY:   Family History   Problem Relation Age of Onset    Bleeding Disorder Mother     Hypertension Mother     Hyperlipidemia Mother     Diabetes Father     Heart Disease Father     Cerebrovascular Disease Father     Hypertension Father     Hyperlipidemia Father     Cancer Brother     Bleeding Disorder Brother     Anesthesia Reaction No family hx of     Deep Vein Thrombosis (DVT) No family hx of       Non-contributory for problems with anesthesia    REVIEW OF SYSTEMS:   The patient was asked a 14 point review of systems regarding constitutional symptoms, eye symptoms, ears, nose, mouth, throat symptoms, cardiovascular symptoms, respiratory symptoms, gastrointestinal symptoms, genitourinary symptoms, musculoskeletal symptoms, integumentary symptoms, neurological symptoms, psychiatric symptoms, endocrine symptoms, hematologic/lymphatic symptoms, and allergic/ immunologic symptoms.   The pertinent factors have been included in the HPI and below.  Patient Supplied Answers to Review of Systems      2/13/2025     9:02 AM   [facility] ROS   Constitutional Unexplained fatigue   Neurology Unexplained weakness   Psychology Frequently feeling depressed or sad   Ears, Nose, Throat Ear pain     Nasal congestion or drainage    Sore throat    Trouble swallowing    Hoarseness   Cardiopulmonary Cough    Breathing problems    Wheezing   Gastrointestinal/Genitourinary Heartburn/indigestion    Unexplained nausea/vomiting    Constipation   Endocrine Heat or cold intolerance       Physical Examination   The patient underwent a physical examination as described below. The pertinent positive and negative findings are summarized after the description of the examination.  Constitutional: The patient's developmental and nutritional status was assessed. The patient's voice quality was assessed.  Head and Face: The head and face were inspected for deformities. The sinuses were palpated. The salivary glands were palpated. Facial muscle strength was assessed bilaterally.  Eyes: Extraocular movements and primary gaze alignment were assessed.  Ears, Nose, Mouth and Throat: The ears and nose were examined for deformities. The nasal septum, mucosa, and turbinates were inspected by anterior rhinoscopy. The lips, teeth, and gums were examined for abnormalities. The oral mucosa, tongue, palate, tonsils, lateral and posterior pharynx were inspected for the presence of asymmetry or mucosal lesions.    Neck: The tracheal position was noted, and the neck mass palpated to determine if there were any asymmetries, abnormal neck masses, thyromegally, or thyroid nodules.  Respiratory: The nature of the breathing and chest expansion/symmetry was observed.  Cardiovascular: The patient was examined to determine the presence of any edema or jugular venous distension.  Abdomen: The contour of the abdomen was noted.  Lymphatic: The patient was examined for infraclavicular lymphadenopathy.  Musculoskeletal: The patient was inspected for the presence of skeletal deformities.  Extremities: The extremities were examined for any clubbing or cyanosis.  Skin: The skin was examined for inflammatory or neoplastic conditions.  Neurologic: The patient's  "orientation, mood, and affect were noted. The cranial nerve  functions were examined.  Other pertinent positive and negative findings on physical examination:   OC/OP: no lesions, uvula midline, soft palate elevates symmetrically   Neck: no lesions, no TH tenderness to palpation  ***  All other physical examination findings were within normal limits and noncontributory.    Procedures   ***    Review of Relevant Clinical Data   I personally reviewed:  Notes: ***  Radiology: ***  Pathology: ***  Procedures: ***  Labs:  Lab Results   Component Value Date    TSH 0.65 10/21/2020     Lab Results   Component Value Date     08/29/2023    CO2 21 (L) 08/29/2023    BUN 19.1 08/29/2023     Lab Results   Component Value Date    WBC 8.3 10/30/2024    HGB 15.8 (H) 10/30/2024    HCT 46.7 10/30/2024    MCV 98 10/30/2024     10/30/2024     Lab Results   Component Value Date    PTT 30 10/21/2020    INR 1.01 02/17/2023     No results found for: \"BRICE\"  No components found for: \"RHEUMATOIDFACTOR\", \"RF\"  Lab Results   Component Value Date    CRP <2.9 09/21/2020     No components found for: \"CKTOT\", \"URICACID\"  No components found for: \"C3\", \"C4\", \"DSDNAAB\", \"NDNAABIFA\"  No results found for: \"MPOAB\"    Patient reported Quality of Life (QOL) Measures   Patient Supplied Answers To VHI Questionnaire      1/23/2025    10:01 AM   Voice Handicap Index (VHI-10)   My voice makes it difficult for people to hear me 2    People have difficulty understanding me in a noisy room 1    My voice difficulties restrict my personal and social life.  0    I feel left out of conversations because of my voice 0    My voice problem causes me to lose income 0    I feel as though I have to strain to produce voice 0    The clarity of my voice is unpredictable 1    My voice problem upsets me 0    My voice makes me feel handicapped 0    People ask, \"What's wrong with your voice?\" 0    VHI-10 4        Proxy-reported         Patient Supplied Answers To EAT " Questionnaire      1/23/2025    10:01 AM   Eating Assessment Tool (EAT-10)   My swallowing problem has caused me to lose weight 0    My swallowing problem interferes with my ability to go out for meals 0    Swallowing liquids takes extra effort 1    Swallowing solids takes extra effort 2    Swallowing pills takes extra effort 2    Swallowing is painful 2    The pleasure of eating is affected by my swallowing 2    When I swallow food sticks in my throat 2    I cough when I eat 2    Swallowing is stressful 1    EAT-10 14        Proxy-reported         Patient Supplied Answers To CSI Questionnaire      1/23/2025    10:01 AM   Cough Severity Index (CSI)   My cough is worse when I lie down 3    My coughing problem causes me to restrict my personal and social life 2    I tend to avoid places because of my cough problem 2    I feel embarrassed because of my coughing problem 2    People ask, ''What's wrong?'' because I cough a lot 2    I run out of air when I cough 3    My coughing problem affects my voice 2    My coughing problem limits my physical activity 2    My coughing problem upsets me 2    People ask me if I am sick because I cough a lot 2    CSI Score 22        Proxy-reported         Patient Supplied Answers to Dyspnea Index Questionnaire:      1/23/2025    10:01 AM   Dyspnea Index   1. I have trouble getting air in. 3    2. I feel tightness in my throat when I am having erma breathing problem. 3    3. It takes more effort to breathe than it used to. 4    4. Change in weather affect my breathing problem. 3    5. My breathing gets worse with stress. 2    6. I make sound/noise breathing in 3    7. I have to strain to breathe. 3    8. My shortness of breath gets worse with exercise or physical activity 3    9. My breathing problem makes me feel stressed. 3    10. My breathing problem casuses me to restrict my personal and social life. 3    Dyspnea Index Total Score 30        Proxy-reported           Miriam Jacobs,  MD    Laryngology    [facility]  Department of  Otolaryngology - Head and Neck Surgery  [facility]  [address] [place] [id number]  Appointment line: [phone]  Fax: [phone]  [email or web address]

## 2025-02-18 NOTE — PATIENT INSTRUCTIONS
1.  You were seen in the ENT Clinic today by Dr. Jacobs. If you have any questions or concerns after your appointment, please call [phone]. Press option #1 for scheduling related needs. Press option #3 for Nurse advice.    2.  Plan is to return to clinic post-operatively     How to Contact Us:  Send a Flexiroam message to your provider. Our team will respond to you via Flexiroam. Occasionally, we will need to call you to get further information.  For urgent matters (Monday-Friday, 8:00 AM-3:30 PM), call the ENT Clinic: [phone] and speak with a call center team member - they will route your call appropriately.   If you'd like to speak directly with a nurse, please call [phone]. We do our best to check voicemail frequently throughout the day, and will work to call you back within 1-2 days. For urgent matters, please use the general clinic phone numbers listed above.      Day Robert  [phone]  [facility] - Otolaryngology

## 2025-02-18 NOTE — PROGRESS NOTES
[facility]   at the [facility]   Otolaryngology Clinic     Patient: Gracy Bautista    MRN: [id number]    : 1965    Age/Gender: 59 year old female  Date of Service: 2025  Rendering Provider:   Miriam Jacobs MD       Impression & Plan     IMPRESSION: Ms. Bautista is a 59 year old female who is being seen for the followin. 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 Dysphagia  - in the setting of prior achalasia per report  - solids are difficult  - liquids are difficult  - pills get stuck   - weight changes yes  - GI work up had esophagram on 2024 which was normal   - scope shows no pooling of saliva  - FEES shows no penetration, no aspiration burping  - symptoms due to esophageal etiology likely  - will obtain Xray Video Swallow Exam with follow  through   - will have EGD done at the time of surgery  Plan   - has Xray Video Swallow Exam with esophagram scheduled  - will add EGD at the time of surgery     RETURN VISIT: follow up after surgery    Referring Provider   PCP: Anh Avalos  Referring Physician: Michael Dumont MD  No address on file  Reason for Consultation    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 Bautista is a 59 year old female who presents to us today with dysphonia and dysphagia.      she presents today for evaluation. she reports:  - symptoms are the same  - will be finished with tobacco cessation at time of surgery    Dysphagia:  reports    Dyspnea:  reports    Coughing / Throat-clearing:  reports    PAST MEDICAL HISTORY:   Past Medical History:   Diagnosis Date    Allergic rhinitis     Arthritis     Chronic sinusitis     COPD (chronic obstructive pulmonary disease) (H)     Depressive disorder     Hoarseness     Reduced vision     Uncomplicated asthma        PAST SURGICAL HISTORY:   Past Surgical History:   Procedure Laterality Date    BIOPSY      COLONOSCOPY N/A 08/22/2022    Procedure: COLONOSCOPY, WITH POLYPECTOMY AND BIOPSY;  Surgeon: Glen Dyer MD;  Location: [facility] OR    COLONOSCOPY WITH CO2 INSUFFLATION N/A 08/22/2022    Procedure: COLONOSCOPY, WITH CO2 INSUFFLATION;  Surgeon: Glen Dyer MD;  Location: [facility] OR    ESOPHAGOSCOPY, GASTROSCOPY, DUODENOSCOPY (EGD), COMBINED N/A 09/02/2022    Procedure: ESOPHAGOGASTRODUODENOSCOPY, WITH BIOPSY AND POLYPECTOMY;  Surgeon: Colton Sarkar MD;  Location: [facility] OR    GYN SURGERY      IR BONE BIOPSY DEEP RIGHT  2/17/2023    NASAL/SINUS POLYPECTOMY      OPTICAL TRACKING SYSTEM ENDOSCOPIC SINUS SURGERY Bilateral 01/21/2022    Procedure: stealth guided, bilateral ethmoidectomy, bilateral frontal sinusotomy, bilateral maxillary antrostomies;  Surgeon: Digna Jaimes MD;   Location: [facility] OR    ORTHOPEDIC SURGERY         CURRENT MEDICATIONS:   Current Outpatient Medications:     acetaminophen (TYLENOL) 500 MG tablet, Take 2 tablets (1,000 mg) by mouth every 8 hours as needed for mild pain, Disp: 10 tablet, Rfl: 0    ADDERALL XR 20 MG 24 hr capsule, Take 20 mg by mouth 2 times daily as needed, Disp: , Rfl:     bisacodyl (DULCOLAX) 5 MG EC tablet, Take 5 mg by mouth as needed for constipation, Disp: , Rfl:     budesonide (PULMICORT) 0.5 MG/2ML neb solution, , Disp: , Rfl:     budesonide-formoterol (SYMBICORT/BREYNA) 160-4.5 MCG/ACT Inhaler, Inhale 2 puffs into the lungs 2 times daily., Disp: 10.2 g, Rfl: 11    Cholecalciferol (VITAMIN D3) 50 MCG (2000 UT) CAPS, Take 4,000 Units by mouth., Disp: , Rfl:     CYMBALTA 60 MG capsule, Take 2 capsules (120 mg) by mouth every morning, Disp: , Rfl:     ipratropium - albuterol 0.5 mg/2.5 mg/3 mL (DUONEB) 0.5-2.5 (3) MG/3ML neb solution, Take 1 vial by nebulization every 6 hours as needed for shortness of breath, wheezing or cough., Disp: , Rfl:     LAMICTAL 200 MG tablet, Take 200 mg by mouth every morning , Disp: , Rfl:     levalbuterol (XOPENEX HFA) 45 MCG/ACT inhaler, Inhale 2 puffs into the lungs every 4 hours as needed for shortness of breath or wheezing, Disp: 45 g, Rfl: 0    LORazepam (ATIVAN) 0.5 MG tablet, TAKE 1/2-1 TABLET BY MOUTH TWICE A DAY, AS NEEDED., Disp: , Rfl:     nicotine polacrilex (NICORETTE) 4 MG gum, 4 mg every hour as needed, Disp: , Rfl:     predniSONE (DELTASONE) 10 MG tablet, Take 10 mg by mouth daily., Disp: , Rfl:     predniSONE (DELTASONE) 20 MG tablet, Take 2 tablets (40 mg) by mouth daily., Disp: 14 tablet, Rfl: 0    predniSONE (DELTASONE) 5 MG tablet, Take 1 tablet (5 mg) by mouth daily., Disp: 30 tablet, Rfl: 1    spacer (OPTICHAMBER ELBA) holding chamber, Use with albuterol (PROAIR HFA/PROVENTIL HFA/VENTOLIN HFA) 108 (90 Base) MCG/ACT inhaler, Disp: 1 each, Rfl: 0    levalbuterol (XOPENEX) 1.25 MG/3ML neb  solution, Take 3 mLs (1.25 mg) by nebulization every 4 hours as needed for shortness of breath or wheezing, Disp: 1620 mL, Rfl: 3    ALLERGIES: Bee venom, Cefdinir, Levofloxacin, and Prednisone    SOCIAL HISTORY:    Social History     Socioeconomic History    Marital status:      Spouse name: Not on file    Number of children: Not on file    Years of education: Not on file    Highest education level: Not on file   Occupational History    Occupation: HOMEMAKER   Tobacco Use    Smoking status: Light Smoker     Types: Cigarettes     Start date: 9/17/2021    Smokeless tobacco: Never    Tobacco comments:     I am willing.  I haven't been successful and my mindset is not completely there yet.   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    Drug use: Never    Sexual activity: Not Currently     Partners: Male     Birth control/protection: None     Comment: Hysterectomy   Other Topics Concern    Parent/sibling w/ CABG, MI or angioplasty before 65F 55M? Yes   Social History Narrative    October 7, 2021    ENVIRONMENTAL HISTORY: [place] The home is heated with a electric furnace. They do have central air conditioning. The patient's bedroom is furnished with carpeting in bedroom, allergen mattress cover, and allergen pillowcase cover.  Pets inside the house include 1 dog. There is no history of cockroach or mice infestation. There is/are 0 smokers in the house.  The house does have a damp basement.      Social Drivers of Health     Financial Resource Strain: Low Risk  (2/6/2024)    [facility]    Overall Financial Resource Strain (CARDIA)     Difficulty of Paying Living Expenses: Not hard at all   Food Insecurity: No Food Insecurity (11/7/2024)    Received from [facility] and  [facility]    Hunger Vital Sign     Worried About Running Out of Food in the Last Year: Never true     Ran Out of Food in the Last Year: Never true   Transportation Needs: No Transportation Needs (11/7/2024)    Received from [facility] and [facility]    PRAPARE - Transportation     Lack of Transportation (Medical): No     Lack of Transportation (Non-Medical): No   Physical Activity: Insufficiently Active (7/26/2019)    Received from [facility]    Exercise Vital Sign     Days of Exercise per Week: 3 days     Minutes of Exercise per Session: 30 min   Stress: No Stress Concern Present (7/26/2019)    Received from [facility]    [employer] [place] [employer] - Occupational Stress Questionnaire     Feeling of Stress : Only a little   Social Connections: Socially Integrated (7/26/2019)    Received from [facility]    Social Connection and Isolation Panel [NHANES]     Frequency of Communication with Friends and Family: More than three times a week     Frequency of Social Gatherings with Friends and Family: Once a week     Attends [employer] Services: More than 4 times per year     Active Member of Clubs or Organizations: Yes     Attends Club or Organization Meetings: More than 4 times per year     Marital Status:    Interpersonal Safety: Not At Risk (11/6/2024)    Received from [facility] and [facility]    EH IP Custom IPV     Do you feel UNSAFE in any of your personal relationships with your family members or any other acquaintances?: No   Housing Stability: Low Risk  (11/7/2024)    Received from [facility]    Housing Stability Vital Sign     Unable to Pay for Housing in the Last Year: No     Number of Times Moved in the Last Year: 0     Homeless in the Last Year: No         FAMILY HISTORY:   Family History   Problem Relation Age of Onset    Bleeding Disorder Mother      Hypertension Mother     Hyperlipidemia Mother     Diabetes Father     Heart Disease Father     Cerebrovascular Disease Father     Hypertension Father     Hyperlipidemia Father     Cancer Brother     Bleeding Disorder Brother     Anesthesia Reaction No family hx of     Deep Vein Thrombosis (DVT) No family hx of      Non-contributory for problems with anesthesia    REVIEW OF SYSTEMS:   The patient was asked a 14 point review of systems regarding constitutional symptoms, eye symptoms, ears, nose, mouth, throat symptoms, cardiovascular symptoms, respiratory symptoms, gastrointestinal symptoms, genitourinary symptoms, musculoskeletal symptoms, integumentary symptoms, neurological symptoms, psychiatric symptoms, endocrine symptoms, hematologic/lymphatic symptoms, and allergic/ immunologic symptoms.   The pertinent factors have been included in the HPI and below.  Patient Supplied Answers to Review of Systems      2/13/2025     9:02 AM   [facility] ROS   Constitutional Unexplained fatigue   Neurology Unexplained weakness   Psychology Frequently feeling depressed or sad   Ears, Nose, Throat Ear pain    Nasal congestion or drainage    Sore throat    Trouble swallowing    Hoarseness   Cardiopulmonary Cough    Breathing problems    Wheezing   Gastrointestinal/Genitourinary Heartburn/indigestion    Unexplained nausea/vomiting    Constipation   Endocrine Heat or cold intolerance       Physical Examination   The patient underwent a physical examination as described below. The pertinent positive and negative findings are summarized after the description of the examination.  Constitutional: The patient's developmental and nutritional status was assessed. The patient's voice quality was assessed.  Head and Face: The head and face were inspected for deformities. The sinuses were palpated. The salivary glands were palpated. Facial muscle strength was assessed bilaterally.  Eyes: Extraocular movements and primary gaze alignment were  assessed.  Ears, Nose, Mouth and Throat: The ears and nose were examined for deformities. The nasal septum, mucosa, and turbinates were inspected by anterior rhinoscopy. The lips, teeth, and gums were examined for abnormalities. The oral mucosa, tongue, palate, tonsils, lateral and posterior pharynx were inspected for the presence of asymmetry or mucosal lesions.    Neck: The tracheal position was noted, and the neck mass palpated to determine if there were any asymmetries, abnormal neck masses, thyromegally, or thyroid nodules.  Respiratory: The nature of the breathing and chest expansion/symmetry was observed.  Cardiovascular: The patient was examined to determine the presence of any edema or jugular venous distension.  Abdomen: The contour of the abdomen was noted.  Lymphatic: The patient was examined for infraclavicular lymphadenopathy.  Musculoskeletal: The patient was inspected for the presence of skeletal deformities.  Extremities: The extremities were examined for any clubbing or cyanosis.  Skin: The skin was examined for inflammatory or neoplastic conditions.  Neurologic: The patient's orientation, mood, and affect were noted. The cranial nerve  functions were examined.  Other pertinent positive and negative findings on physical examination:      OC/OP: no lesions, uvula midline, soft palate elevates symmetrically   Neck: no lesions, right TH tenderness to palpation    All other physical examination findings were within normal limits and noncontributory.  Procedures   Flexible laryngoscopy (CPT 04989)      Pre-procedure diagnosis: dysphonia  Post-procedure diagnosis: same as above  Indication for procedure: Ms. Bautista is a 59 year old female with see above  Procedure(s): Fiberoptic Laryngoscopy    Details of Procedure: After informed consent was obtained, the patient was seated in the examination chair.  The areas of the nasopharynx as well as the hypopharynx were anesthetized with topical 4% lidocaine  with 0.25% phenylephrine atomizer.  Examination of the base of tongue was performed first.  Attention was directed to any evidence of masses in the area or evidence of leukoplakia or candidal infection.  Attention was directed to the epiglottis where its size and position was determined and its movement on phonation of the vowel  e .  The piriform sinuses were then inspected for any mass lesions or pooling of secretions.  Attention was then directed to the larynx. The vocal folds were inspected for infection or any areas of leukoplakia, for masses, polypoid degeneration, or hemorrhage.  Having done this, the arytenoids and vocal processes were inspected for erythema or evidence of granuloma formation.  The posterior commissure was then inspected for evidence of inflammatory changes in the mucosa and heaping up of mucosal tissue. The patient was then instructed to say the vowel  e .  Adduction of vocal folds to the midline was observed for any evidence of paresis or paralysis of the larynx or asymmetry in rotation of the larynx to the left or right. The patient was asked to breathe and the degree of abduction was noted bilaterally.  Subglottic view of the larynx was obtained for any additional mass lesions or mucosal changes.  Finally the post cricoid was examined for evidence of pooling of secretions, as well as the pharyngeal wall mucosa.   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, left karen's edema   Right vocal process: No restriction of mobility   Left vocal process: No restriction of mobility  Glottal gap: Complete glottal closure  Supraglottic structures: Normal  Hypopharynx: Normal     Estimated Blood Loss: minimal  Complications: None  Disposition: Patient tolerated the procedure well        Review of Relevant Clinical Data   I personally reviewed:    Labs:  Lab Results   Component Value Date    TSH 0.65 10/21/2020  "    Lab Results   Component Value Date     08/29/2023    CO2 21 (L) 08/29/2023    BUN 19.1 08/29/2023     Lab Results   Component Value Date    WBC 8.3 10/30/2024    HGB 15.8 (H) 10/30/2024    HCT 46.7 10/30/2024    MCV 98 10/30/2024     10/30/2024     Lab Results   Component Value Date    PTT 30 10/21/2020    INR 1.01 02/17/2023     No results found for: \"BRICE\"  No components found for: \"RHEUMATOIDFACTOR\", \"RF\"  Lab Results   Component Value Date    CRP <2.9 09/21/2020     No components found for: \"CKTOT\", \"URICACID\"  No components found for: \"C3\", \"C4\", \"DSDNAAB\", \"NDNAABIFA\"  No results found for: \"MPOAB\"    Patient reported Quality of Life (QOL) Measures   Patient Supplied Answers To VHI Questionnaire      1/23/2025    10:01 AM   Voice Handicap Index (VHI-10)   My voice makes it difficult for people to hear me 2    People have difficulty understanding me in a noisy room 1    My voice difficulties restrict my personal and social life.  0    I feel left out of conversations because of my voice 0    My voice problem causes me to lose income 0    I feel as though I have to strain to produce voice 0    The clarity of my voice is unpredictable 1    My voice problem upsets me 0    My voice makes me feel handicapped 0    People ask, \"What's wrong with your voice?\" 0    VHI-10 4        Proxy-reported         Patient Supplied Answers To EAT Questionnaire      1/23/2025    10:01 AM   Eating Assessment Tool (EAT-10)   My swallowing problem has caused me to lose weight 0    My swallowing problem interferes with my ability to go out for meals 0    Swallowing liquids takes extra effort 1    Swallowing solids takes extra effort 2    Swallowing pills takes extra effort 2    Swallowing is painful 2    The pleasure of eating is affected by my swallowing 2    When I swallow food sticks in my throat 2    I cough when I eat 2    Swallowing is stressful 1    EAT-10 14        Proxy-reported         Patient Supplied Answers " To CSI Questionnaire      1/23/2025    10:01 AM   Cough Severity Index (CSI)   My cough is worse when I lie down 3    My coughing problem causes me to restrict my personal and social life 2    I tend to avoid places because of my cough problem 2    I feel embarrassed because of my coughing problem 2    People ask, ''What's wrong?'' because I cough a lot 2    I run out of air when I cough 3    My coughing problem affects my voice 2    My coughing problem limits my physical activity 2    My coughing problem upsets me 2    People ask me if I am sick because I cough a lot 2    CSI Score 22        Proxy-reported         Patient Supplied Answers to Dyspnea Index Questionnaire:      1/23/2025    10:01 AM   Dyspnea Index   1. I have trouble getting air in. 3    2. I feel tightness in my throat when I am having erma breathing problem. 3    3. It takes more effort to breathe than it used to. 4    4. Change in weather affect my breathing problem. 3    5. My breathing gets worse with stress. 2    6. I make sound/noise breathing in 3    7. I have to strain to breathe. 3    8. My shortness of breath gets worse with exercise or physical activity 3    9. My breathing problem makes me feel stressed. 3    10. My breathing problem casuses me to restrict my personal and social life. 3    Dyspnea Index Total Score 30        Proxy-reported           Thank you for the kind referral and for allowing me to share in the care of Ms. Bautista. If you have any questions, please do not hesitate to contact me.    Scribe Disclosure:   I, Charu Dowd, am serving as a scribe; to document services personally performed by Miriam Jacobs MD -based on data collection and the provider's statements to me.     Provider Disclosure:  I agree with above History, Review of Systems, Physical exam and Plan.  I have reviewed the content of the documentation and have edited it as needed. I have personally performed the services documented here and the  documentation accurately represents those services and the decisions I have made.      Electronically signed by:    Miriam Jacobs MD    Laryngology    [facility]  Department of  Otolaryngology - Head and Neck Surgery  [facility] [place] [address]  Appointment line: [phone]  Fax: [phone]  [email or web address] *

## 2025-02-26 NOTE — PROGRESS NOTES
SPEECH LANGUAGE PATHOLOGY EVALUATION       Fall Risk Screen:  Fall screen completed by: SLP  Have you fallen 2 or more times in the past year?: No  Have you fallen and had an injury in the past year?: No  Is patient a fall risk?: No    Subjective        Presenting condition or subjective complaint: Pt presents today for video swallow and esophagram referred by Laryngology.   Date of onset:      Relevant medical history: Anemia; Arthritis; Asthma; Cold or hot arm or leg; COPD; Depression; Fibromyalgia; Osteoarthritis; Significant weakness; Smoking; Unexplained weight loss; Vision problems   Past Medical History:   Diagnosis Date    Allergic rhinitis     Arthritis     Chronic sinusitis     COPD (chronic obstructive pulmonary disease) (H)     Depressive disorder     Hoarseness     Reduced vision     Uncomplicated asthma      Dates & types of surgery: Complete hysterectomy I ve had procedures, colonoscopy,upper and lower GI etc.      Prior diagnostic imaging/testing results: Video fluoroscopic swallow study       Prior therapy history for the same diagnosis, illness or injury: No      Living Environment  Social support: With a significant other or spouse   Help at home: None    Employment: No    Hobbies/Interests: Gardening, walking, biking, spending time with my family walking and playing with my dog hanging out with friends    Patient goals for therapy: Go out to eat, not being afraid to go run errands or go out because I ALWAYS end up with breathing problems and exhaustion and nausea    Pain assessment: Pain denied     Objective     SWALLOW EVALUTION  Dysphagia history: Pt presents today for further evaluation. Please see details from 1/23/25 clinical swallow evaluation for further information.     Current Diet/Method of Nutritional Intake: thin liquids (level 0), regular diet      VIDEOFLUOROSCOPIC SWALLOW STUDY  Radiologist: Resident  Views Taken: left lateral, A/P   Physical location of procedure: [facility]  [facility]  Patient sitting upright on chair/stool     VFSS textures trialed:   VFSS Eval: Thin Liquids  Mode of Presentation: cup, straw, self-fed   Order of Presentation: Series 1, 5, 8, 10AP  Preparatory Phase: WFL  Oral Phase: WFL  Bolus Location When Swallow Initiated: posterior angle of ramus  Pharyngeal Phase: WFL  Rosenbeck's Penetration Aspiration Scale: 2 - contrast enters airway, remains above the vocal cords, no residue remains (penetration)  Strategies and Compensations: not applicable  Diagnostic Statement: Penetration, no aspiration. No significant oropharyngeal residuals. AP reveals symmetric bolus flow through oropharynx.    VFSS Eval: Mildly Thick Liquids  Mode of Presentation: cup, self-fed   Order of Presentation: Series 2  Preparatory Phase: WFL  Oral Phase: WFL  Bolus Location When Swallow Initiated: posterior angle of ramus  Pharyngeal Phase: WFL  Rosenbeck's Penetration Aspiration Scale: 1 - no aspiration, contrast does not enter airway  Strategies and Compensations: not applicable  Diagnostic Statement: No penetration/aspiration or significant residuals.    VFSS Eval: Purees  Mode of Presentation: spoon, self-fed   Order of Presentation: Series 3, 4, 9AP  Preparatory Phase: WFL  Oral Phase: WFL  Bolus Location When Swallow Initiated: posterior angle of ramus  Pharyngeal Phase: WFL  Rosenbeck s Penetration Aspiration Scale: 1 - no aspiration, contrast does not enter airway  Strategies and Compensations: not applicable  Diagnostic Statement: No penetration/aspiration or significant residuals. Of note, pt felt residuals in the throat although none were observed. AP reveals symmetric bolus flow through oropharynx.    VFSS Eval: Solids  Mode of Presentation: self-fed   Order of Presentation: Series 6, 7  Preparatory Phase: WFL  Oral Phase: WFL  Bolus Location When Swallow Initiated: posterior angle of ramus  Pharyngeal Phase: WFL  Rosenbeck s Penetration Aspiration Scale: 1 - no aspiration, contrast  does not enter airway  Strategies and Compensations: not applicable  Diagnostic Statement: No penetration/aspiration or significant residuals. Of note, pt felt residuals in the throat although none were observed.     VFSS Eval: Barium Tablet  Mode of Presentation: whole tablet taken with thin water by cup   Order of Presentation: during esophagram  Preparatory Phase: WFL  Oral Phase: WFL  Bolus Location When Swallow Initiated: posterior angle of ramus  Pharyngeal Phase: WFL  Rosenbeck s Penetration Aspiration Scale: 1 - no aspiration, contrast does not enter airway  Strategies and Compensations: not applicable  Diagnostic Statement: Barium tablet passed through oropharynx without difficulty.    ESOPHAGEAL PHASE OF SWALLOW  Esophagram performed today; please see Radiologist's report for details      SWALLOW ASSESSMENT CLINICAL IMPRESSIONS AND RATIONALE  Diet Consistency Recommendations: thin liquids (level 0), regular diet    Recommended Feeding/Eating Techniques: small bolus size, slow rate of intake, maintain upright sitting position for eating, maintain upright posture during/after eating for 30 minutes, minimize distractions during oral intake   Medication Administration Recommendations: whole with thin liquid one at a time  Instrumental Assessment Recommendations: instrumental evaluation not recommended at this time     Assessment & Plan   CLINICAL IMPRESSIONS   Medical Diagnosis: Oropharyngeal dysphagia    Treatment Diagnosis: Safe, functional oropharyngeal dysphagia   Impression/Assessment: Pt is a 59 year old female with swallow complaints. The following significant findings have been identified:  safe, functional oropharyngeal swallow , characterized by no aspiration or significant oropharyngeal residuals. Pt often felt residuals in her throat, although none were seen. Suspect esophageal dysphagia. Esophagram performed today; please see Radiologist's report for details. POC to be discussed at swallow rounds.  No further SLP services for swallowing are indicated at this time.    PLAN OF CARE  Evaluation only    Recommended Referrals to Other Professionals:  GI    Education Assessment:   Learner/Method: Patient;Pictures/Video;Listening;No Barriers to Learning  Education Comments: Trained pt on anatomy/physiology of swallowing, results of today's study and diet recommendations    Risks and benefits of evaluation/treatment have been explained.   Patient/Family/caregiver agrees with Plan of Care.     Evaluation Time:    SLP Eval: VideoFluoroscopic Swallow function Minutes (55318): 30    Evaluation Only     Signing Clinician: Jeaentte Trent, SLP

## 2025-02-27 ENCOUNTER — THERAPY VISIT (OUTPATIENT)
Dept: SPEECH THERAPY | Facility: CLINIC | Age: 60
End: 2025-02-27
Attending: OTOLARYNGOLOGY
Payer: COMMERCIAL

## 2025-02-27 ENCOUNTER — ANCILLARY PROCEDURE (OUTPATIENT)
Dept: GENERAL RADIOLOGY | Facility: CLINIC | Age: 60
End: 2025-02-27
Attending: OTOLARYNGOLOGY
Payer: COMMERCIAL

## 2025-02-27 DIAGNOSIS — R13.12 OROPHARYNGEAL DYSPHAGIA: Primary | ICD-10-CM

## 2025-02-27 DIAGNOSIS — R13.12 OROPHARYNGEAL DYSPHAGIA: ICD-10-CM

## 2025-02-27 PROCEDURE — 74230 X-RAY XM SWLNG FUNCJ C+: CPT | Mod: GC | Performed by: RADIOLOGY

## 2025-02-27 RX ORDER — BARIUM SULFATE 400 MG/ML
SUSPENSION ORAL ONCE
Status: COMPLETED | OUTPATIENT
Start: 2025-02-27 | End: 2025-02-27

## 2025-02-27 RX ADMIN — BARIUM SULFATE 60 ML: 400 SUSPENSION ORAL at 09:24

## 2025-03-04 ENCOUNTER — DOCUMENTATION ONLY (OUTPATIENT)
Dept: SPEECH THERAPY | Facility: CLINIC | Age: 60
End: 2025-03-04
Payer: COMMERCIAL

## 2025-03-04 NOTE — PROGRESS NOTES
Video Esophagram Review Rounds  Imaging Review of MBSS conducted with attending physician Miriam Jacobs and reviewed/discussed with SLP Aleksandra Perez, Marnie King, and/or Jeanette Trent and with GI representative, LEVAR Felder    Relevant Background: Ongoing dysphagia    Esophagram: 2/27/25: IMPRESSION:  Mild esophageal stasis without convincing evidence of dysmotility.  Otherwise unremarkable single contrast esophagram.    MBSS Date: 2/27/25    Findings:  Pharyngeal Weakness:No  Epiglottic dysfunction: No  Penetration: No  Aspiration: Yes  UES dysfunction: No  Details: Safe functional oropharyngeal swallow      Recommendations:  - EGD with distal and proximal biopsies at the time of her surgery for her leukoplakia

## 2025-03-17 ENCOUNTER — OFFICE VISIT (OUTPATIENT)
Dept: OTOLARYNGOLOGY | Facility: CLINIC | Age: 60
End: 2025-03-17
Payer: COMMERCIAL

## 2025-03-17 VITALS
HEIGHT: 65 IN | HEART RATE: 71 BPM | BODY MASS INDEX: 19.66 KG/M2 | WEIGHT: 118 LBS | DIASTOLIC BLOOD PRESSURE: 67 MMHG | SYSTOLIC BLOOD PRESSURE: 113 MMHG | OXYGEN SATURATION: 99 %

## 2025-03-17 DIAGNOSIS — J32.4 CHRONIC PANSINUSITIS: Primary | ICD-10-CM

## 2025-03-17 ASSESSMENT — PAIN SCALES - GENERAL: PAINLEVEL_OUTOF10: NO PAIN (0)

## 2025-03-17 NOTE — PROGRESS NOTES
[facility] VOICE CLINIC  VOICE / UPPER AIRWAY TREATMENT NOTE (CPT 32717)      Patient's name: Gracy Arenas  Date of Session: 3/18/2025  Providing SLP: Melinda Marquis MS CCC-SLP  Referring Provider: Miriam Jacobs MD  Insurance Coverage: [facility] Individual and Family Plans  Total # of SLP Visits: 2  # of SLP Therapy Sessions: 1  Session Location: Gracy was seen via telehealth today.     The patient has been notified and verbally consented to the following statements:   This video visit will be conducted between you and your provider.  Patient has opted to conduct today's video visit vs an in-person appointment, and is not able to attend due to possible exposure to COVID-19.    If during the course of the call the provider feels a video visit is not appropriate, you will not be charged for this service.     Provider has received verbal consent for billable virtual visit from the patient? 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 Perceptually, her voice is mildly and consistently rough and strained with minimal breathiness and lower than expected pitch. Laryngoscopy today demonstrated white patches on the true vocal folds, which is thicker and more invasive R>L. Therefore, Dr. Jacobs plans to treat this area with antifungal treatments prior to biopsy and hopefully EGD scheduled concurrently. Earnestine-operative speech therapy is required to ensure that her voice is optimized after this procedure. Gracy is in agreement with this plan of care.        SUBJECTIVE:  Pulmonary visit on 2/10/25  PFTs revealed mild airflow obstruction with  "hyperinflation and gas trapping, mild diffusion capacity impairment  Switched to Symbicort  Smoking cessation planned  Vocal fold leukoplakia still present at follow-up with Dr. Jacobs on 2/18/25  VFSS/MBS on 2/27/25 demonstrated safe oropharyngeal swallow and suspicion for esophageal dysphagia due to items feeling stuck in the throat but no visual stasis observed  Undergoing procedure with Dr. Jacobs on 3/26/25: MDL with EGD  Got a notebook to prepare for the surgery  Trying to rest her voice more  Was wondering if she needs to hold her prednisone prior to surgery, and SLP will ask laryngology nurse to contact the patient      OBJECTIVE/ASSESSMENT:        1/20/2025     6:29 PM 1/23/2025    10:01 AM   Voice Handicap Index (VHI-10)   My voice makes it difficult for people to hear me 2 2    People have difficulty understanding me in a noisy room 2 1    My voice difficulties restrict my personal and social life.  0 0    I feel left out of conversations because of my voice 0 0    My voice problem causes me to lose income 0 0    I feel as though I have to strain to produce voice 0 0    The clarity of my voice is unpredictable 2 1    My voice problem upsets me 0 0    My voice makes me feel handicapped 0 0    People ask, \"What's wrong with your voice?\" 1 0    VHI-10 7  4        Patient-reported    Proxy-reported         1/20/2025     6:29 PM 1/23/2025    10:01 AM 3/13/2025    12:41 PM   Cough Severity Index (CSI)   My cough is worse when I lie down 3 3  3   My coughing problem causes me to restrict my personal and social life 2 2  2   I tend to avoid places because of my cough problem 2 2  1   I feel embarrassed because of my coughing problem 2 2  2   People ask, ''What's wrong?'' because I cough a lot 2 2  1   I run out of air when I cough 3 3  2   My coughing problem affects my voice 2 2  2   My coughing problem limits my physical activity 2 2  2   My coughing problem upsets me 2 2  4   People ask me if I am sick because I " cough a lot 2 2  2   CSI Score 22  22  21        Patient-reported    Proxy-reported         1/20/2025     6:29 PM 1/23/2025    10:01 AM 3/13/2025    12:41 PM   Eating Assessment Tool (EAT-10)   My swallowing problem has caused me to lose weight 0 0  2   My swallowing problem interferes with my ability to go out for meals 0 0  0   Swallowing liquids takes extra effort 1 1  1   Swallowing solids takes extra effort 2 2  1   Swallowing pills takes extra effort 2 2  1   Swallowing is painful 2 2  1   The pleasure of eating is affected by my swallowing 1 2  1   When I swallow food sticks in my throat 2 2  2   I cough when I eat 2 2  2   Swallowing is stressful 1 1  1   EAT-10 13  14  12        Patient-reported    Proxy-reported         1/20/2025     6:29 PM 1/23/2025    10:01 AM 3/13/2025    12:41 PM   Dyspnea Index   1. I have trouble getting air in. 3 3  3   2. I feel tightness in my throat when I am having erma breathing problem. 3 3  3   3. It takes more effort to breathe than it used to. 4 4  3   4. Change in weather affect my breathing problem. 3 3  3   5. My breathing gets worse with stress. 1 2  2   6. I make sound/noise breathing in 3 3  3   7. I have to strain to breathe. 3 3  3   8. My shortness of breath gets worse with exercise or physical activity 3 3  3   9. My breathing problem makes me feel stressed. 3 3  3   10. My breathing problem casuses me to restrict my personal and social life. 2 3  3   Dyspnea Index Total Score 28  30  29        Patient-reported    Proxy-reported       THERAPEUTIC ACTIVITIES:  Perioperative voice care as addressed today, as Gracy will be undergoing an MDL with biopsy and CO2 laser ablation procedure with Dr. Jacobs next week. We discussed that complete voice rest (e.g. no talking, whispering, laughing, coughing, sneezing) will be required for about 3-5 days after their surgery, with their physician giving them the exact timeframe after the procedure. In that time, increasing and  maintaining appropriate vocal hygiene will be helpful in promoting healing, including but not limited to increase hydration, reducing caffeine and alcohol due to their drying effects, behavioral reflux precautions, avoiding smoking, etc. Cough suppression techniques were instructed if this is needed immediately post-op, and Gracy performed these with high accuracy following instruction. After this span of complete vocal rest, she will increase their voice use to 1 minute per hour on the first day and then increasing by 2 minute each day (e.g. 1 min/hour on day 1 of voice use, 3 min/hour on day 2, 5 min/hour on day 3, etc.). Using a volume as if speaking to someone at an arm's length distance away is recommended, as well as avoiding telephone conversations. One of these minutes per hour will be spent performing SOVT exercises (e.g. straw phonation with and without water resistance) to aid with resuming appropriate vocal fold vibration/pliability, reduce edema, and lessen risk for scar. These exercises were instructed today, and Gracy performed them with high accuracy following instruction.  Straw phonation without water resistance did yield reduced effort and improved forward focused vibrations. Follow-up appointments with Dr. Jacobs will be scheduled appropriately, and Gracy and I will plan to continue voice therapy about 1 week post-op.      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  Continued voice therapy services are recommended.  Gracy will follow-up for additional sessions on 25. Current goals will continue to be addressed.  Gracy is in agreement with this plan of care.      SLP PLAN IN MULTIDISCIPLINARY CLINIC:  Voice SLP presence at next appointment with laryngologist (e.g. Dr. Jacobs, Dr. Mcdaniel, Dr. Werner) is recommended to monitor laryngeal status post-op. Next scheduled MD appointment is 4/3/25.      BILLING SUMMARY:  Total treatment time: 58 minutes (including 9 minutes of chart review and preparation, interpretation of testing and therapeutic maneuvers, and document writing)  Speech Pathology Treatment (18047)      Melinda Marquis MS CCC-SLP  Speech-Language Pathologist  [facility]  Department of Otolaryngology - Head and Neck Surgery  [facility]  [email or web address]  Direct: [phone]  [phone]    *This note may have been completed using [id number] dictation software, so errors may exist. Please contact me for clarification if needed*

## 2025-03-17 NOTE — LETTER
3/17/2025       RE: Gracy Bautista  [address] [address]     Dear Colleague,    Thank you for referring your patient, Gracy Bautista, to the [facility] [place] at [facility]. Please see a copy of my visit note below.      [facility] E[facility]SE AND THROAT CLIN[facility] [place] [id number]  [address]  [facility] [address]  Phone: [phone]  Fax: [phone]    Patient:  Gracy Bautista, Date of birth 1965  Date of Visit:  03/17/2025  Referring Provider Referred Self    Assessment & Plan     (J32.4) Chronic pansinusitis  Comment: Ongoing sinopulmonary disease with no clear diagnosis. Has eosinophilia, has cultured staph, pseudomonas, fungus. Allergy and rheumatologic work up not remarkable, will see allergy/immunology regarding IgG and subclass levels. Somewhat improved now that she has reduced smoking. Continue budesonide and gent irrigations.  See me in 3 months.    15 minutes spent by me on the date of the encounter doing chart review, history and exam, documentation and further activities per the note. This time is in addition to separately billable procedure.         MD Gracy Bertrand is seeing Dr. Jacobs for her vocal fold lesion, surgery in a couple of weeks. She is down to 1 cigarette every 2 days. Her sinuses feel better    Brother had lung disease, diabetes, kidney failure  Father lung disease, diabetes, EtoH.       Procedure:  Endoscopy indicated for exam of sinuses  Topical anesthetic/decongestant spray applied.  Rigid scope used for visualization.  Findings: much reduced rubber cement like secretions. Middle meatus and posterior meatus visualized. Her maxillary antrum is clear on both sides. Some thick secretions suctioned from anterior middle meatus on left.           Again, thank you for allowing me to participate in the care of your patient.       Sincerely,    Digna Jaimes MD

## 2025-03-17 NOTE — PATIENT INSTRUCTIONS
You were seen in the ENT Clinic today by Dr. Jaimes. If you have any questions or concerns after your appointment, please contact us (see below)       2.   Plan to return to the ENT clinic in 3 months.           How to Contact Us:  Send a [employer] message to your provider. Our team will respond to you via [employer]. Occasionally, we will need to call you to get further information.  For urgent matters (Monday-Friday), call the ENT Clinic: [phone] and speak with a call center team member - they will route your call appropriately.   If you'd like to speak directly with a nurse, please find our contact information below. We do our best to check voicemail frequently throughout the day, and will work to call you back within 1-2 days. For urgent matters, please use the general clinic phone numbers listed above.     Ashley SAUCEDA RN  Direct: [phone]  Rosalinda AGUAYO LPN  Direct: [phone]         [facility]  Department of Otolaryngology

## 2025-03-17 NOTE — NURSING NOTE
"Chief Complaint   Patient presents with    RECHECK   Blood pressure 113/67, pulse 71, height 1.651 m (5' 5\"), weight 53.5 kg (118 lb), SpO2 99%, not currently breastfeeding. Nitesh Reyna, EMT    "

## 2025-03-17 NOTE — PROGRESS NOTES
[facility] [address] [address]  Phone: [phone]  Fax: [phone]    Patient:  Gracy Bautista, Date of birth 1965  Date of Visit:  03/17/2025  Referring Provider Referred Self    Assessment & Plan      (J32.4) Chronic pansinusitis  Comment: Ongoing sinopulmonary disease with no clear diagnosis. Has eosinophilia, has cultured staph, pseudomonas, fungus. Allergy and rheumatologic work up not remarkable, will see allergy/immunology regarding IgG and subclass levels. Somewhat improved now that she has reduced smoking. Continue budesonide and gent irrigations.  See me in 3 months.    15 minutes spent by me on the date of the encounter doing chart review, history and exam, documentation and further activities per the note. This time is in addition to separately billable procedure.         MD Gracy Bertrand is seeing Dr. Jacobs for her vocal fold lesion, surgery in a couple of weeks. She is down to 1 cigarette every 2 days. Her sinuses feel better    Brother had lung disease, diabetes, kidney failure  Father lung disease, diabetes, EtoH.       Procedure:  Endoscopy indicated for exam of sinuses  Topical anesthetic/decongestant spray applied.  Rigid scope used for visualization.  Findings: much reduced rubber cement like secretions. Middle meatus and posterior meatus visualized. Her maxillary antrum is clear on both sides. Some thick secretions suctioned from anterior middle meatus on left.

## 2025-03-18 ENCOUNTER — VIRTUAL VISIT (OUTPATIENT)
Dept: OTOLARYNGOLOGY | Facility: CLINIC | Age: 60
End: 2025-03-18
Payer: COMMERCIAL

## 2025-03-18 DIAGNOSIS — R49.0 DYSPHONIA: Primary | ICD-10-CM

## 2025-03-18 DIAGNOSIS — J38.7 LEUKOPLAKIA OF LARYNX: ICD-10-CM

## 2025-03-18 NOTE — Clinical Note
Angie Hoskinsy is on a very low-dose of prednisone and is unsure if she should be holding it prior to the surgery.  Can you shoot her a MyChart message when you have a chance?  I know you are Tuesdays are busy, so no hoyos.  Thanks!  Melinda

## 2025-03-18 NOTE — LETTER
3/18/2025       RE: Gracy Bautista  [address] [facility] [address]     Dear Colleague,    Thank you for referring your patient, Gracy Bautista, to the [facility] VOICE CLINIC [place] at [facility]. Please see a copy of my visit note below.    [facility]  VOICE / UPPER AIRWAY TREATMENT NOTE (CPT 87321)      Patient's name: Gracy Arenas  Date of Session: 3/18/2025  Providing SLP: Melinda Marquis MS CCC-SLP  Referring Provider: Miriam Jacobs MD  Insurance Coverage: [facility] Individual and Family Plans  Total # of SLP Visits: 2  # of SLP Therapy Sessions: 1  Session Location: Gracy was seen via telehealth today.     The patient has been notified and verbally consented to the following statements:   This video visit will be conducted between you and your provider.  Patient has opted to conduct today's video visit vs an in-person appointment, and is not able to attend due to possible exposure to COVID-19.    If during the course of the call the provider feels a video visit is not appropriate, you will not be charged for this service.     Provider has received verbal consent for billable virtual visit from the patient? 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 Perceptually, her voice is mildly and consistently rough and strained with minimal breathiness and lower than expected pitch. Laryngoscopy today demonstrated white patches on the true vocal folds, which is thicker and more invasive R>L. Therefore, Dr. Jacobs plans to treat this area with  "antifungal treatments prior to biopsy and hopefully EGD scheduled concurrently. Earnestine-operative speech therapy is required to ensure that her voice is optimized after this procedure. Gracy is in agreement with this plan of care.        SUBJECTIVE:  Pulmonary visit on 2/10/25  PFTs revealed mild airflow obstruction with hyperinflation and gas trapping, mild diffusion capacity impairment  Switched to Symbicort  Smoking cessation planned  Vocal fold leukoplakia still present at follow-up with Dr. Jacobs on 2/18/25  VFSS/MBS on 2/27/25 demonstrated safe oropharyngeal swallow and suspicion for esophageal dysphagia due to items feeling stuck in the throat but no visual stasis observed  Undergoing procedure with Dr. Jacobs on 3/26/25: MDL with EGD  Got a notebook to prepare for the surgery  Trying to rest her voice more  Was wondering if she needs to hold her prednisone prior to surgery, and SLP will ask laryngology nurse to contact the patient      OBJECTIVE/ASSESSMENT:        1/20/2025     6:29 PM 1/23/2025    10:01 AM   Voice Handicap Index (VHI-10)   My voice makes it difficult for people to hear me 2 2    People have difficulty understanding me in a noisy room 2 1    My voice difficulties restrict my personal and social life.  0 0    I feel left out of conversations because of my voice 0 0    My voice problem causes me to lose income 0 0    I feel as though I have to strain to produce voice 0 0    The clarity of my voice is unpredictable 2 1    My voice problem upsets me 0 0    My voice makes me feel handicapped 0 0    People ask, \"What's wrong with your voice?\" 1 0    VHI-10 7  4        Patient-reported    Proxy-reported         1/20/2025     6:29 PM 1/23/2025    10:01 AM 3/13/2025    12:41 PM   Cough Severity Index (CSI)   My cough is worse when I lie down 3 3  3   My coughing problem causes me to restrict my personal and social life 2 2  2   I tend to avoid places because of my cough problem 2 2  1   I feel embarrassed " because of my coughing problem 2 2  2   People ask, ''What's wrong?'' because I cough a lot 2 2  1   I run out of air when I cough 3 3  2   My coughing problem affects my voice 2 2  2   My coughing problem limits my physical activity 2 2  2   My coughing problem upsets me 2 2  4   People ask me if I am sick because I cough a lot 2 2  2   CSI Score 22  22  21        Patient-reported    Proxy-reported         1/20/2025     6:29 PM 1/23/2025    10:01 AM 3/13/2025    12:41 PM   Eating Assessment Tool (EAT-10)   My swallowing problem has caused me to lose weight 0 0  2   My swallowing problem interferes with my ability to go out for meals 0 0  0   Swallowing liquids takes extra effort 1 1  1   Swallowing solids takes extra effort 2 2  1   Swallowing pills takes extra effort 2 2  1   Swallowing is painful 2 2  1   The pleasure of eating is affected by my swallowing 1 2  1   When I swallow food sticks in my throat 2 2  2   I cough when I eat 2 2  2   Swallowing is stressful 1 1  1   EAT-10 13  14  12        Patient-reported    Proxy-reported         1/20/2025     6:29 PM 1/23/2025    10:01 AM 3/13/2025    12:41 PM   Dyspnea Index   1. I have trouble getting air in. 3 3  3   2. I feel tightness in my throat when I am having erma breathing problem. 3 3  3   3. It takes more effort to breathe than it used to. 4 4  3   4. Change in weather affect my breathing problem. 3 3  3   5. My breathing gets worse with stress. 1 2  2   6. I make sound/noise breathing in 3 3  3   7. I have to strain to breathe. 3 3  3   8. My shortness of breath gets worse with exercise or physical activity 3 3  3   9. My breathing problem makes me feel stressed. 3 3  3   10. My breathing problem casuses me to restrict my personal and social life. 2 3  3   Dyspnea Index Total Score 28  30  29        Patient-reported    Proxy-reported       THERAPEUTIC ACTIVITIES:  Perioperative voice care as addressed today, as Gracy will be undergoing an MDL with biopsy  and CO2 laser ablation procedure with Dr. Jacobs next week. We discussed that complete voice rest (e.g. no talking, whispering, laughing, coughing, sneezing) will be required for about 3-5 days after their surgery, with their physician giving them the exact timeframe after the procedure. In that time, increasing and maintaining appropriate vocal hygiene will be helpful in promoting healing, including but not limited to increase hydration, reducing caffeine and alcohol due to their drying effects, behavioral reflux precautions, avoiding smoking, etc. Cough suppression techniques were instructed if this is needed immediately post-op, and Gracy performed these with high accuracy following instruction. After this span of complete vocal rest, she will increase their voice use to 1 minute per hour on the first day and then increasing by 2 minute each day (e.g. 1 min/hour on day 1 of voice use, 3 min/hour on day 2, 5 min/hour on day 3, etc.). Using a volume as if speaking to someone at an arm's length distance away is recommended, as well as avoiding telephone conversations. One of these minutes per hour will be spent performing SOVT exercises (e.g. straw phonation with and without water resistance) to aid with resuming appropriate vocal fold vibration/pliability, reduce edema, and lessen risk for scar. These exercises were instructed today, and Gracy performed them with high accuracy following instruction.  Straw phonation without water resistance did yield reduced effort and improved forward focused vibrations. Follow-up appointments with Dr. Jacobs will be scheduled appropriately, and Gracy and I will plan to continue voice therapy about 1 week post-op.      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  Continued voice therapy services are recommended.  Gracy will follow-up for additional sessions on 25. Current goals will continue to be addressed.  Gracy is in agreement with this plan of care.      SLP PLAN IN MULTIDISCIPLINARY CLINIC:  Voice SLP presence at next appointment with laryngologist (e.g. Dr. Jacobs, Dr. Mcdaniel, Dr. Werner) is recommended to monitor laryngeal status post-op. Next scheduled MD appointment is 4/3/25.      BILLING SUMMARY:  Total treatment time: 58 minutes (including 9 minutes of chart review and preparation, interpretation of testing and therapeutic maneuvers, and document writing)  Speech Pathology Treatment (71430)      Melinda Marquis MS CCC-SLP  Speech-Language Pathologist  [facility]  Department of Otolaryngology - Head and Neck Surgery  [facility]  [email or web address]  Direct: [phone]  [phone]    *This note may have been completed using [id number] dictation software, so errors may exist. Please contact me for clarification if needed*      Again, thank you for allowing me to participate in the care of your patient.      Sincerely,    Melinda Marquis, AGATA

## 2025-03-18 NOTE — PATIENT INSTRUCTIONS
"Perioperative voice use instructions:     For the 3 to 5 days after your surgery, you will be on complete voice rest!!!  Dr. Urias will give you the exact number after your surgery.  No talking, whispering, laughing, coughing, or sneezing  If you feel the urge to cough, use your \"Quaker throat clears\" to help move mucus (e.g. whispering \"eh eh eh\").  Otherwise, swallow hard with or without water to resolve any throat irritation  No heavy lifting  Stay well-hydrated (e.g. 64 oz + of plain water)  Avoid any laryngeal irritation, smoking/vaping of any kind, allergens, reflux, caffeine, etc.  Avoid refluxogenic foods: spicy, fatty/oily/greasy, citrus, tomato products, chocolate, mint, caffeine, etc.  Avoid eating or drinking 2-3 hours before you go to bed so those items have had time to work through your GI tract before you lie down  Elevate the head of your bed  Wedge pillow, blocks under bed posts, etc.  We want to keep your head/throat higher than your stomach to allow gravity to keep your stomach acid from coming up to your throat and causing irritation     After your recommended complete voice rest...  On day 1 of being able to use her voice, you can speak for 1 minute/hour.   You should also perform your  - you're going to do just the straw by itself instead- lip vibrations - no straining!) for 1 minute/hour to aid in recovery and tissue mobility to keep scar tissue from forming at the surgical site  Continue performing your rescue breathing exercises as above  Speaking volume: as if you were speaking to someone at arm's length away - quiet but not whispering  Every day after this, you can increase your speaking per hour by 2 minutes.  For example: on the second day of using your voice, you can use your voice for 3 minutes/hour, and on the third day of using your voice, you can use your voice for 5 minutes/hour, etc.  Continue vocal hygiene recommendations   "

## 2025-03-24 ENCOUNTER — VIRTUAL VISIT (OUTPATIENT)
Dept: PULMONOLOGY | Facility: CLINIC | Age: 60
End: 2025-03-24
Payer: COMMERCIAL

## 2025-03-24 ENCOUNTER — TELEPHONE (OUTPATIENT)
Dept: PULMONOLOGY | Facility: CLINIC | Age: 60
End: 2025-03-24
Payer: COMMERCIAL

## 2025-03-24 DIAGNOSIS — J45.50 SEVERE PERSISTENT ASTHMA WITHOUT COMPLICATION (H): ICD-10-CM

## 2025-03-24 DIAGNOSIS — R06.02 SHORTNESS OF BREATH: ICD-10-CM

## 2025-03-24 PROCEDURE — 98006 SYNCH AUDIO-VIDEO EST MOD 30: CPT

## 2025-03-24 RX ORDER — LEVALBUTEROL TARTRATE 45 UG/1
2 AEROSOL, METERED ORAL EVERY 4 HOURS PRN
Qty: 15 G | Refills: 3 | Status: SHIPPED | OUTPATIENT
Start: 2025-03-24

## 2025-03-24 RX ORDER — LEVALBUTEROL INHALATION SOLUTION 1.25 MG/3ML
1 SOLUTION RESPIRATORY (INHALATION) EVERY 4 HOURS PRN
Qty: 90 ML | Refills: 3 | Status: SHIPPED | OUTPATIENT
Start: 2025-03-24

## 2025-03-24 NOTE — PROGRESS NOTES
Virtual Visit Details    Type of service:  Video Visit     Originating Location (pt. Location): Home    Distant Location (provider location):  On-site  Platform used for Video Visit: [employer]    18 total minutes spent on the video visit      [facility] [address] [address]  Phone: [phone]  Fax: [phone]    Patient:  Gracy Bautista, Date of birth 1965  Date of Visit:  03/25/2025  Referring Provider Erica Hunter      Assessment & Plan      Severe persistent asthma, T2 high  Mild COPD  Emphysema  Current smoker    We talked about a lot of things including her upcoming surgery, progression of COPD, and the importance of smoking cessation.  She is wondering about a lot of medications.  Such as what she should do with her Symbicort, getting separate refills of her Xopenex inhalers, whether or not she needs to be on azithromycin again or maybe add something like Roflumilast.  I let her know that it is my impression that none of these medications have made major improvement in her respiratory symptoms, and I think that the most important thing that she can do is quit smoking.  I congratulated her on the 5 days that she has remained abstinent from cigarettes.  This is can be far more important than any medication that I have to offer for her.  Given the fact that she does have some bronchodilator response, evidence of T2 high asthma, I do think that she should remain on an inhaled steroid.  I will continue Symbicort for now.  I refilled her Xopenex for her.  I let her know that we should meet up again a few months after she has recovered from her surgery.  Hopefully, she will still be free from cigarettes at that time.    Medical Decision Making      I spent a total of 30 minutes on the day of the visit.   Time spent by me today doing chart review, history and exam, documentation and further activities per the note        Enoc Ford MD            Today's visit note:     Chief Complaint: shortness of breath    HISTORY OF PRESENT ILLNESS:    Gracy Bautista is a 59 year old year old female who is being seen for asthma.     She is struggling.  She has a surgery scheduled on Wednesday to evaluate an area on her vocal cords.  She is concerned might represent a cancer.    She still has shortness of breath, and is waking up in the middle the night sometimes gasping for air and in need of rescue inhaler.  She has albuterol nebulized medication as well as Xopenex inhaler at home.  She has not noticed any major change on switching to Symbicort.  She is very concerned that she is going to have shortness of breath symptoms for the rest of her life.    She quit smoking 5 days ago, and it has been a struggle to stay away from cigarettes over these last 5 days.         Past Medical and Surgical History:     Past Medical History:   Diagnosis Date    Allergic rhinitis     Arthritis     Chronic sinusitis     COPD (chronic obstructive pulmonary disease) (H)     Depressive disorder     Hoarseness     Reduced vision     Uncomplicated asthma      Past Surgical History:   Procedure Laterality Date    BIOPSY      COLONOSCOPY N/A 08/22/2022    Procedure: COLONOSCOPY, WITH POLYPECTOMY AND BIOPSY;  Surgeon: Glen Dyer MD;  Location: [facility] OR    COLONOSCOPY WITH CO2 INSUFFLATION N/A 08/22/2022    Procedure: COLONOSCOPY, WITH CO2 INSUFFLATION;  Surgeon: Glen Dyer MD;  Location: [facility] OR    ESOPHAGOSCOPY, GASTROSCOPY, DUODENOSCOPY (EGD), COMBINED N/A 09/02/2022    Procedure: ESOPHAGOGASTRODUODENOSCOPY, WITH BIOPSY AND POLYPECTOMY;  Surgeon: Colton Sarkar MD;  Location: [facility] OR    GYN SURGERY      IR BONE BIOPSY DEEP RIGHT  2/17/2023    NASAL/SINUS POLYPECTOMY      OPTICAL TRACKING SYSTEM ENDOSCOPIC SINUS SURGERY Bilateral 01/21/2022    Procedure: stealth guided, bilateral ethmoidectomy, bilateral frontal sinusotomy,  bilateral maxillary antrostomies;  Surgeon: Digna Jaimes MD;  Location: [facility] OR    ORTHOPEDIC SURGERY             Family History:     Family History   Problem Relation Age of Onset    Bleeding Disorder Mother     Hypertension Mother     Hyperlipidemia Mother     Diabetes Father     Heart Disease Father     Cerebrovascular Disease Father     Hypertension Father     Hyperlipidemia Father     Cancer Brother     Bleeding Disorder Brother     Anesthesia Reaction No family hx of     Deep Vein Thrombosis (DVT) No family hx of               Social History:     Social History     Socioeconomic History    Marital status:      Spouse name: Not on file    Number of children: Not on file    Years of education: Not on file    Highest education level: Not on file   Occupational History    Occupation: HOMEMAKER   Tobacco Use    Smoking status: Light Smoker     Types: Cigarettes     Start date: 9/17/2021    Smokeless tobacco: Never    Tobacco comments:     I am willing.  I haven't been successful and my mindset is not completely there yet. 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    Drug use: Never    Sexual activity: Not Currently     Partners: Male     Birth control/protection: None     Comment: Hysterectomy   Other Topics Concern    Parent/sibling w/ CABG, MI or angioplasty before 65F 55M? Yes   Social History Narrative    October 7, 2021    ENVIRONMENTAL HISTORY: [place] The home is heated with a electric furnace. They do have central air conditioning. The patient's bedroom is furnished with carpeting in bedroom, allergen mattress cover, and allergen pillowcase cover.  Pets inside the house include 1 dog. There is no history of cockroach or mice infestation. There is/are 0 smokers in the house.  The house does have a  damp basement.      Social Drivers of Health     Financial Resource Strain: Low Risk  (2/6/2024)    [facility]    Overall Financial Resource Strain (CARDIA)     Difficulty of Paying Living Expenses: Not hard at all   Food Insecurity: No Food Insecurity (11/7/2024)    Received from [facility]    Hunger Vital Sign     Worried About Running Out of Food in the Last Year: Never true     Ran Out of Food in the Last Year: Never true   Transportation Needs: No Transportation Needs (11/7/2024)    Received from [facility]    PRAPARE - Transportation     Lack of Transportation (Medical): No     Lack of Transportation (Non-Medical): No   Physical Activity: Insufficiently Active (7/26/2019)    Received from [facility]    Exercise Vital Sign     Days of Exercise per Week: 3 days     Minutes of Exercise per Session: 30 min   Stress: No Stress Concern Present (7/26/2019)    Received from [facility]    [employer] [employer] - Occupational Stress Questionnaire     Feeling of Stress : Only a little   Social Connections: Socially Integrated (7/26/2019)    Received from [facility]    Social Connection and Isolation Panel [NHANES]     Frequency of Communication with Friends and Family: More than three times a week     Frequency of Social Gatherings with Friends and Family: Once a week     Attends Jew Services: More than 4 times per year     Active Member of Clubs or Organizations: Yes     Attends Club or Organization Meetings: More than 4 times per year     Marital Status:    Interpersonal Safety: Not At Risk (11/6/2024)    Received from [facility]    EH IP Custom IPV     Do you feel UNSAFE in any of your personal relationships with your family members or any  other acquaintances?: No   Housing Stability: Low Risk  (11/7/2024)    [facility] from [facility]    Housing Stability Vital Sign     Unable to Pay for Housing in the Last Year: No     Number of Times Moved in the Last Year: 0     Homeless in the Last Year: No            Medications:     Current Outpatient Medications   Medication Sig Dispense Refill    acetaminophen (TYLENOL) 500 MG tablet Take 2 tablets (1,000 mg) by mouth every 8 hours as needed for mild pain 10 tablet 0    CYMBALTA 60 MG capsule Take 2 capsules (120 mg) by mouth every morning      ipratropium - albuterol 0.5 mg/2.5 mg/3 mL (DUONEB) 0.5-2.5 (3) MG/3ML neb solution Take 1 vial (3 mLs) by nebulization every 6 hours as needed for shortness of breath, wheezing or cough. 90 mL 3    LAMICTAL 200 MG tablet Take 200 mg by mouth every morning       levalbuterol (XOPENEX HFA) 45 MCG/ACT inhaler Inhale 2 puffs into the lungs every 4 hours as needed for shortness of breath or wheezing. 15 g 3    levalbuterol (XOPENEX HFA) 45 MCG/ACT inhaler Inhale 2 puffs into the lungs every 4 hours as needed for shortness of breath or wheezing 45 g 0    levalbuterol (XOPENEX) 1.25 MG/3ML neb solution Take 3 mLs (1.25 mg) by nebulization every 4 hours as needed for shortness of breath or wheezing. 90 mL 3    nicotine polacrilex (NICORETTE) 4 MG gum 4 mg every hour as needed      spacer (OPTICHAMBER ELBA) holding chamber Use with albuterol (PROAIR HFA/PROVENTIL HFA/VENTOLIN HFA) 108 (90 Base) MCG/ACT inhaler 1 each 0    ADDERALL XR 20 MG 24 hr capsule Take 20 mg by mouth 2 times daily as needed (Patient not taking: Reported on 3/24/2025)      bisacodyl (DULCOLAX) 5 MG EC tablet Take 5 mg by mouth as needed for constipation (Patient not taking: Reported on 3/24/2025)      budesonide-formoterol (SYMBICORT/BREYNA) 160-4.5 MCG/ACT Inhaler Inhale 2 puffs into the lungs 2 times daily. (Patient not taking: Reported on 3/24/2025) 10.2 g 11     Cholecalciferol (VITAMIN D3) 50 MCG (2000 UT) CAPS Take 4,000 Units by mouth. (Patient not taking: Reported on 3/24/2025)      ipratropium - albuterol 0.5 mg/2.5 mg/3 mL (DUONEB) 0.5-2.5 (3) MG/3ML neb solution Take 1 vial by nebulization every 6 hours as needed for shortness of breath, wheezing or cough.      levalbuterol (XOPENEX) 1.25 MG/3ML neb solution Take 3 mLs (1.25 mg) by nebulization every 4 hours as needed for shortness of breath or wheezing 1620 mL 3    LORazepam (ATIVAN) 0.5 MG tablet TAKE 1/2-1 TABLET BY MOUTH TWICE A DAY, AS NEEDED. (Patient not taking: Reported on 3/24/2025)      predniSONE (DELTASONE) 20 MG tablet Take 2 tablets (40 mg) by mouth daily. 14 tablet 0     No current facility-administered medications for this visit.           PHYSICAL EXAM:  There were no vitals taken for this visit.     Video visit exam           Data:   All laboratory and imaging data reviewed.      PFT:       PFT Interpretation:  Mild airflow obstruction  Hyperinflation and gas trapping  Mild impairment in diffusion capacity  Valid Maneuver    CXR: I have reviewed the CXR report from 11/7/2024:  Bilateral hemidiaphragmatic flattening. Mild diffuse bronchial wall thickening.   No consolidation, pneumothorax, or pleural effusion. Normal cardiac and   mediastinal contours.     Chest CT: I have reviewed the chest CT report from 5/2024:  Airways: Central airways patent. Diffuse bronchial wall thickening.   Lungs: Mild-moderate emphysema. Similar right upper lobe mild clustered   groundglass micronodules (reference series 2, images 15-17). Unchanged right   lower lobe subpleural 3 mm noncalcified nodule (series 2, image 81). Unchanged   left basilar 4 mm noncalcified nodule (series 2, image 90). No suspicious   nodules.   Pleural spaces: Clear.     Heart: Normal cardiac size. No pericardial effusion.   Pulmonary arteries: Normal caliber.   Aorta: Normal caliber.     Lymph nodes: No thoracic lymphadenopathy.   Thyroid:  Unremarkable.   Esophagus: Unremarkable.     Imaged upper abdomen: Unremarkable.   Chest wall: Unremarkable.   Osseous: No suspicious lesion.     IMPRESSION:   1. Similar right upper lobe mild clustered micronodules.   2.  No suspicious pulmonary nodules.   3.  Mild-moderate emphysema. Diffuse bronchitis.       No results found for this or any previous visit (from the past week).

## 2025-03-24 NOTE — NURSING NOTE
Current patient location: [address] [place] [id number]    Is the patient currently in the state of [place]? YES    Visit mode: VIDEO    If the visit is dropped, the patient can be reconnected by:VIDEO VISIT: Send to e-mail at: [email or web address]    Will anyone else be joining the visit? NO  (If patient encounters technical issues they should call [phone]    Are changes needed to the allergy or medication list? Pt stated no changes to allergies and Pt stated no med changes    Are refills needed on medications prescribed by this physician? Discuss with provider    Rooming Documentation:  Questionnaire(s) completed    Reason for visit: RECHECK    Lindsey CONNERF

## 2025-03-24 NOTE — TELEPHONE ENCOUNTER
Pt called requesting appt today to be switched to virtual.     RN called and let pt know that it was switched to a virtual visit.     Alyssa Yoder RN  Pulmonary Nurse Care Coordinator

## 2025-03-24 NOTE — LETTER
3/24/2025      Gracy Bautista  [address] [facility] [address]      Dear Colleague,    Thank you for referring your patient, Gracy Bautista, to the [facility]. Please see a copy of my visit note below.    Virtual Visit Details    Type of service:  Video Visit     Originating Location (pt. Location): Home    Distant Location (provider location):  On-site  Platform used for Video Visit: Cambly    18 total minutes spent on the video visit      [facility]  [address] [place] [id number]  Phone: [phone]  Fax: [phone]    Patient:  Gracy Bautista, Date of birth 1965  Date of Visit:  03/25/2025  Referring Provider Erica Hunter      Assessment & Plan     Severe persistent asthma, T2 high  Mild COPD  Emphysema  Current smoker    We talked about a lot of things including her upcoming surgery, progression of COPD, and the importance of smoking cessation.  She is wondering about a lot of medications.  Such as what she should do with her Symbicort, getting separate refills of her Xopenex inhalers, whether or not she needs to be on azithromycin again or maybe add something like Roflumilast.  I let her know that it is my impression that none of these medications have made major improvement in her respiratory symptoms, and I think that the most important thing that she can do is quit smoking.  I congratulated her on the 5 days that she has remained abstinent from cigarettes.  This is can be far more important than any medication that I have to offer for her.  Given the fact that she does have some bronchodilator response, evidence of T2 high asthma, I do think that she should remain on an inhaled steroid.  I will continue Symbicort for now.  I refilled her Xopenex for her.  I let her know that we should meet up again a few months after she has recovered from  her surgery.  Hopefully, she will still be free from cigarettes at that time.    Medical Decision Making     I spent a total of 30 minutes on the day of the visit.   Time spent by me today doing chart review, history and exam, documentation and further activities per the note       Enoc Ford MD            Today's visit note:     Chief Complaint: shortness of breath    HISTORY OF PRESENT ILLNESS:    Gracy Bautista is a 59 year old year old female who is being seen for asthma.     She is struggling.  She has a surgery scheduled on Wednesday to evaluate an area on her vocal cords.  She is concerned might represent a cancer.    She still has shortness of breath, and is waking up in the middle the night sometimes gasping for air and in need of rescue inhaler.  She has albuterol nebulized medication as well as Xopenex inhaler at home.  She has not noticed any major change on switching to Symbicort.  She is very concerned that she is going to have shortness of breath symptoms for the rest of her life.    She quit smoking 5 days ago, and it has been a struggle to stay away from cigarettes over these last 5 days.         Past Medical and Surgical History:     Past Medical History:   Diagnosis Date     Allergic rhinitis      Arthritis      Chronic sinusitis      COPD (chronic obstructive pulmonary disease) (H)      Depressive disorder      Hoarseness      Reduced vision      Uncomplicated asthma      Past Surgical History:   Procedure Laterality Date     BIOPSY       COLONOSCOPY N/A 08/22/2022    Procedure: COLONOSCOPY, WITH POLYPECTOMY AND BIOPSY;  Surgeon: Glen Dyer MD;  Location: [facility] OR     COLONOSCOPY WITH CO2 INSUFFLATION N/A 08/22/2022    Procedure: COLONOSCOPY, WITH CO2 INSUFFLATION;  Surgeon: Glen Dyer MD;  Location: [facility] OR     ESOPHAGOSCOPY, GASTROSCOPY, DUODENOSCOPY (EGD), COMBINED N/A 09/02/2022    Procedure: ESOPHAGOGASTRODUODENOSCOPY, WITH BIOPSY AND POLYPECTOMY;   Surgeon: Colton Sarkar MD;  Location: [facility] OR     GYN SURGERY       IR BONE BIOPSY DEEP RIGHT  2/17/2023     NASAL/SINUS POLYPECTOMY       OPTICAL TRACKING SYSTEM ENDOSCOPIC SINUS SURGERY Bilateral 01/21/2022    Procedure: stealth guided, bilateral ethmoidectomy, bilateral frontal sinusotomy, bilateral maxillary antrostomies;  Surgeon: Digna Jaimes MD;  Location: [facility] OR     ORTHOPEDIC SURGERY             Family History:     Family History   Problem Relation Age of Onset     Bleeding Disorder Mother      Hypertension Mother      Hyperlipidemia Mother      Diabetes Father      Heart Disease Father      Cerebrovascular Disease Father      Hypertension Father      Hyperlipidemia Father      Cancer Brother      Bleeding Disorder Brother      Anesthesia Reaction No family hx of      Deep Vein Thrombosis (DVT) No family hx of               Social History:     Social History     Socioeconomic History     Marital status:      Spouse name: Not on file     Number of children: Not on file     Years of education: Not on file     Highest education level: Not on file   Occupational History     Occupation: HOMEMAKER   Tobacco Use     Smoking status: Light Smoker     Types: Cigarettes     Start date: 9/17/2021     Smokeless tobacco: Never     Tobacco comments:     I am willing.  I haven't been successful and my mindset is not completely there yet. 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     Drug use: Never     Sexual activity: Not Currently     Partners: Male     Birth control/protection: None     Comment: Hysterectomy   Other Topics Concern     Parent/sibling w/ CABG, MI or angioplasty before 65F 55M? Yes   Social History Narrative    October 7, 2021    ENVIRONMENTAL HISTORY: The [place] The home is heated with a electric  furnace. They do have central air conditioning. The patient's bedroom is furnished with carpeting in bedroom, allergen mattress cover, and allergen pillowcase cover.  Pets inside the house include 1 dog. There is no history of cockroach or mice infestation. There is/are 0 smokers in the house.  The house does have a damp basement.      Social Drivers of Health     Financial Resource Strain: Low Risk  (2/6/2024)    [facility]    Overall Financial Resource Strain (CARDIA)      Difficulty of Paying Living Expenses: Not hard at all   Food Insecurity: No Food Insecurity (11/7/2024)    Received from [facility]    Hunger Vital Sign      Worried About Running Out of Food in the Last Year: Never true      Ran Out of Food in the Last Year: Never true   Transportation Needs: No Transportation Needs (11/7/2024)    Received from [facility]    PRAPARE - Transportation      Lack of Transportation (Medical): No      Lack of Transportation (Non-Medical): No   Physical Activity: Insufficiently Active (7/26/2019)    Received from [facility]    Exercise Vital Sign      Days of Exercise per Week: 3 days      Minutes of Exercise per Session: 30 min   Stress: No Stress Concern Present (7/26/2019)    Received from [facility]    [employer] [employer] - Occupational Stress Questionnaire      Feeling of Stress : Only a little   Social Connections: Socially Integrated (7/26/2019)    Received from [facility]    Social Connection and Isolation Panel [NHANES]      Frequency of Communication with Friends and Family: More than three times a week      Frequency of Social Gatherings with Friends and Family: Once a week      Attends [employer] Services: More than 4 times per year      Active Member of Clubs or Organizations:  Yes      Attends Club or Organization Meetings: More than 4 times per year      Marital Status:    Interpersonal Safety: Not At Risk (11/6/2024)    Received from [facility]    EH IP Custom IPV      Do you feel UNSAFE in any of your personal relationships with your family members or any other acquaintances?: No   Housing Stability: Low Risk  (11/7/2024)    Received from [facility]    Housing Stability Vital Sign      Unable to Pay for Housing in the Last Year: No      Number of Times Moved in the Last Year: 0      Homeless in the Last Year: No            Medications:     Current Outpatient Medications   Medication Sig Dispense Refill     acetaminophen (TYLENOL) 500 MG tablet Take 2 tablets (1,000 mg) by mouth every 8 hours as needed for mild pain 10 tablet 0     CYMBALTA 60 MG capsule Take 2 capsules (120 mg) by mouth every morning       ipratropium - albuterol 0.5 mg/2.5 mg/3 mL (DUONEB) 0.5-2.5 (3) MG/3ML neb solution Take 1 vial (3 mLs) by nebulization every 6 hours as needed for shortness of breath, wheezing or cough. 90 mL 3     LAMICTAL 200 MG tablet Take 200 mg by mouth every morning        levalbuterol (XOPENEX HFA) 45 MCG/ACT inhaler Inhale 2 puffs into the lungs every 4 hours as needed for shortness of breath or wheezing. 15 g 3     levalbuterol (XOPENEX HFA) 45 MCG/ACT inhaler Inhale 2 puffs into the lungs every 4 hours as needed for shortness of breath or wheezing 45 g 0     levalbuterol (XOPENEX) 1.25 MG/3ML neb solution Take 3 mLs (1.25 mg) by nebulization every 4 hours as needed for shortness of breath or wheezing. 90 mL 3     nicotine polacrilex (NICORETTE) 4 MG gum 4 mg every hour as needed       spacer (OPTICHAMBER ELBA) holding chamber Use with albuterol (PROAIR HFA/PROVENTIL HFA/VENTOLIN HFA) 108 (90 Base) MCG/ACT inhaler 1 each 0     ADDERALL XR 20 MG 24 hr capsule Take 20 mg by mouth 2 times daily as needed  (Patient not taking: Reported on 3/24/2025)       bisacodyl (DULCOLAX) 5 MG EC tablet Take 5 mg by mouth as needed for constipation (Patient not taking: Reported on 3/24/2025)       budesonide-formoterol (SYMBICORT/BREYNA) 160-4.5 MCG/ACT Inhaler Inhale 2 puffs into the lungs 2 times daily. (Patient not taking: Reported on 3/24/2025) 10.2 g 11     Cholecalciferol (VITAMIN D3) 50 MCG (2000 UT) CAPS Take 4,000 Units by mouth. (Patient not taking: Reported on 3/24/2025)       ipratropium - albuterol 0.5 mg/2.5 mg/3 mL (DUONEB) 0.5-2.5 (3) MG/3ML neb solution Take 1 vial by nebulization every 6 hours as needed for shortness of breath, wheezing or cough.       levalbuterol (XOPENEX) 1.25 MG/3ML neb solution Take 3 mLs (1.25 mg) by nebulization every 4 hours as needed for shortness of breath or wheezing 1620 mL 3     LORazepam (ATIVAN) 0.5 MG tablet TAKE 1/2-1 TABLET BY MOUTH TWICE A DAY, AS NEEDED. (Patient not taking: Reported on 3/24/2025)       predniSONE (DELTASONE) 20 MG tablet Take 2 tablets (40 mg) by mouth daily. 14 tablet 0     No current facility-administered medications for this visit.           PHYSICAL EXAM:  There were no vitals taken for this visit.     Video visit exam           Data:   All laboratory and imaging data reviewed.      PFT:       PFT Interpretation:  Mild airflow obstruction  Hyperinflation and gas trapping  Mild impairment in diffusion capacity  Valid Maneuver    CXR: I have reviewed the CXR report from 11/7/2024:  Bilateral hemidiaphragmatic flattening. Mild diffuse bronchial wall thickening.   No consolidation, pneumothorax, or pleural effusion. Normal cardiac and   mediastinal contours.     Chest CT: I have reviewed the chest CT report from 5/2024:  Airways: Central airways patent. Diffuse bronchial wall thickening.   Lungs: Mild-moderate emphysema. Similar right upper lobe mild clustered   groundglass micronodules (reference series 2, images 15-17). Unchanged right   lower lobe  subpleural 3 mm noncalcified nodule (series 2, image 81). Unchanged   left basilar 4 mm noncalcified nodule (series 2, image 90). No suspicious   nodules.   Pleural spaces: Clear.     Heart: Normal cardiac size. No pericardial effusion.   Pulmonary arteries: Normal caliber.   Aorta: Normal caliber.     Lymph nodes: No thoracic lymphadenopathy.   Thyroid: Unremarkable.   Esophagus: Unremarkable.     Imaged upper abdomen: Unremarkable.   Chest wall: Unremarkable.   Osseous: No suspicious lesion.     IMPRESSION:   1. Similar right upper lobe mild clustered micronodules.   2.  No suspicious pulmonary nodules.   3.  Mild-moderate emphysema. Diffuse bronchitis.       No results found for this or any previous visit (from the past week).                                          Again, thank you for allowing me to participate in the care of your patient.        Sincerely,        Enoc Ford MD    Electronically signed

## 2025-03-25 ENCOUNTER — ANESTHESIA EVENT (OUTPATIENT)
Dept: SURGERY | Facility: CLINIC | Age: 60
End: 2025-03-25
Payer: COMMERCIAL

## 2025-03-25 ASSESSMENT — LIFESTYLE VARIABLES: TOBACCO_USE: 1

## 2025-03-25 ASSESSMENT — COPD QUESTIONNAIRES: COPD: 1

## 2025-03-25 NOTE — ANESTHESIA PREPROCEDURE EVALUATION
Anesthesia Pre-Procedure Evaluation    Patient: Gracy Bautista   MRN: [id number] : 1965        Procedure : Procedure(s):  MICROLARYNGOSCOPY CO2 laser resection of vocal fold lesion  possible steroid injection  flexible esophagoscopy  Esophagoscopy, gastroscopy, duodenoscopy (EGD)        This is a 59F with relatively severe asthma / reactive airway disease, depression, and vocal fold leukoplakia.  The patient will be intubated with a 6.0 laser ETT and receive a TIVA general anesthetic.  Past Medical History:   Diagnosis Date    Allergic rhinitis     Arthritis     Chronic sinusitis     COPD (chronic obstructive pulmonary disease) (H)     Depressive disorder     Hoarseness     Reduced vision     Uncomplicated asthma       Past Surgical History:   Procedure Laterality Date    BIOPSY      COLONOSCOPY N/A 2022    Procedure: COLONOSCOPY, WITH POLYPECTOMY AND BIOPSY;  Surgeon: Glen Dyer MD;  Location: [facility] OR    COLONOSCOPY WITH CO2 INSUFFLATION N/A 2022    Procedure: COLONOSCOPY, WITH CO2 INSUFFLATION;  Surgeon: Glen Dyer MD;  Location: [facility] OR    ESOPHAGOSCOPY, GASTROSCOPY, DUODENOSCOPY (EGD), COMBINED N/A 2022    Procedure: ESOPHAGOGASTRODUODENOSCOPY, WITH BIOPSY AND POLYPECTOMY;  Surgeon: Colton Sarkar MD;  [place] [facility] [place]    GYN SURGERY      IR BONE BIOPSY DEEP RIGHT  2023    NASAL/SINUS POLYPECTOMY      OPTICAL TRACKING SYSTEM ENDOSCOPIC SINUS SURGERY Bilateral 2022    Procedure: stealth guided, bilateral ethmoidectomy, bilateral frontal sinusotomy, bilateral maxillary antrostomies;  Surgeon: Digna Jaimes MD;  [place] [facility] [place]    ORTHOPEDIC SURGERY        Allergies   Allergen Reactions    Bee Venom Angioedema, Swelling and Hives     Other reaction(s): Angioedema      Cefdinir Diarrhea and GI Disturbance             Levofloxacin Muscle Pain (Myalgia)    Prednisone Anxiety and Other (See Comments)     Hyperactivity and moodiness. Doesn't like  how it makes her feel.        Social History     Tobacco Use    Smoking status: Light Smoker     Types: Cigarettes     Start date: 9/17/2021    Smokeless tobacco: Never    Tobacco comments:     I am willing.  I haven't been successful and my mindset is not completely there yet. 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      Wt Readings from Last 1 Encounters:   03/17/25 53.5 kg (118 lb)        Anesthesia Evaluation            ROS/MED HX  ENT/Pulmonary: Comment: PFT 2/2025 Fev1/FVC 60%  Impression:  Mild Airflow Obstruction with a significant bronchodilator response.     There is hyperinflation with air trapping.     Mild diffusion defect.     #R Vocal Cord Leukoplakia           (+)           allergic rhinitis,     tobacco use, Current use,  7  Pack-Year Hx,   Severe Persistent, asthma    COPD,              Neurologic: Comment: #ADD - neg neurologic ROS     Cardiovascular:  - neg cardiovascular ROS   (+)  - -   -  - -                                 Previous cardiac testing   Echo: Date: 6/2023 Results:  Final Impressions    1. LVEF estimate 60-65%. Normal LV size and wall thickness.    2. Normal RV size and global function.    3. No significant valvular abnormalities.    4. IVC respiratory variability not well seen.    5. No pericardial effusion.       Stress Test:  Date: Results:    ECG Reviewed:  Date: Results:    Cath:  Date: Results:      METS/Exercise Tolerance:     Hematologic:  - neg hematologic  ROS     Musculoskeletal:  - neg musculoskeletal ROS     GI/Hepatic:  - neg GI/hepatic ROS     Renal/Genitourinary:  - neg Renal ROS     Endo:  - neg endo ROS     Psychiatric/Substance Use:  - neg psychiatric ROS   (+) psychiatric history depression       Infectious Disease:  - neg infectious disease ROS     Malignancy:  - neg malignancy ROS     Other:  - neg other ROS          Physical Exam    Airway       "  Mallampati: II   TM distance: > 3 FB   Neck ROM: full   Mouth opening: > 3 cm    Respiratory Devices and Support         Dental       (+) Minor Abnormalities - some fillings, tiny chips      Cardiovascular   cardiovascular exam normal          Pulmonary           (+) rhonchi and stridor           OUTSIDE LABS:  CBC:   Lab Results   Component Value Date    WBC 8.3 10/30/2024    WBC 8.1 04/23/2024    HGB 15.8 (H) 10/30/2024    HGB 16.1 (H) 04/23/2024    HCT 46.7 10/30/2024    HCT 47.0 04/23/2024     10/30/2024     04/23/2024     BMP:   Lab Results   Component Value Date     08/29/2023     09/30/2022    POTASSIUM 4.1 08/29/2023    POTASSIUM 4.2 09/30/2022    CHLORIDE 101 08/29/2023    CHLORIDE 108 09/30/2022    CO2 21 (L) 08/29/2023    CO2 30 09/30/2022    BUN 19.1 08/29/2023    BUN 13 09/30/2022    CR 0.86 08/29/2023    CR 0.70 09/30/2022     (H) 08/29/2023     (H) 09/30/2022     COAGS:   Lab Results   Component Value Date    PTT 30 10/21/2020    INR 1.01 02/17/2023    FIBR 328 10/21/2020     POC: No results found for: \"BGM\", \"HCG\", \"HCGS\"  HEPATIC:   Lab Results   Component Value Date    ALBUMIN 3.6 09/30/2022    PROTTOTAL 6.8 09/30/2022    ALT 27 09/30/2022    AST 22 09/30/2022    ALKPHOS 126 09/30/2022    BILITOTAL 0.3 09/30/2022     OTHER:   Lab Results   Component Value Date    LACT 0.5 08/29/2023    A1C 6.1 (H) 09/30/2022    YOAN 9.9 08/29/2023    TSH 0.65 10/21/2020    CRP <2.9 09/21/2020    SED 2 10/30/2024       Anesthesia Plan    ASA Status:  3    NPO Status:  NPO Appropriate    Anesthesia Type: General.     - Airway: Tracheostomy   Induction: Intravenous.   Maintenance: Balanced.   Techniques and Equipment:     - Airway: Video-Laryngoscope     - Lines/Monitors: 2nd IV, BIS     Consents    Anesthesia Plan(s) and associated risks, benefits, and realistic alternatives discussed. Questions answered and patient/representative(s) expressed understanding.     - Discussed: " Risks, Benefits and Alternatives for BOTH SEDATION and the PROCEDURE were discussed     - Discussed with:  Patient      - Extended Intubation/Ventilatory Support Discussed: No.      - Patient is DNR/DNI Status: No     Use of blood products discussed: No .     Postoperative Care    Pain management: IV analgesics, Oral pain medications, Multi-modal analgesia.   PONV prophylaxis: Ondansetron (or other 5HT-3), Dexamethasone or Solumedrol     Comments:               Kirsten Fontanez DO    Clinically Significant Risk Factors Present on Admission                                 # Asthma: noted on problem list

## 2025-03-26 ENCOUNTER — HOSPITAL ENCOUNTER (OUTPATIENT)
Facility: CLINIC | Age: 60
Discharge: HOME | End: 2025-03-26
Attending: OTOLARYNGOLOGY | Admitting: OTOLARYNGOLOGY
Payer: COMMERCIAL

## 2025-03-26 ENCOUNTER — ANESTHESIA (OUTPATIENT)
Dept: SURGERY | Facility: CLINIC | Age: 60
End: 2025-03-26
Payer: COMMERCIAL

## 2025-03-26 VITALS
TEMPERATURE: 98.6 F | BODY MASS INDEX: 20.1 KG/M2 | OXYGEN SATURATION: 95 % | HEIGHT: 64 IN | HEART RATE: 72 BPM | WEIGHT: 117.73 LBS | RESPIRATION RATE: 17 BRPM | SYSTOLIC BLOOD PRESSURE: 94 MMHG | DIASTOLIC BLOOD PRESSURE: 72 MMHG

## 2025-03-26 DIAGNOSIS — J38.7 LEUKOPLAKIA OF LARYNX: Primary | ICD-10-CM

## 2025-03-26 LAB — UPPER GI ENDOSCOPY: NORMAL

## 2025-03-26 PROCEDURE — 272N000002 HC OR SUPPLY OTHER OPNP: Performed by: OTOLARYNGOLOGY

## 2025-03-26 PROCEDURE — 370N000017 HC ANESTHESIA TECHNICAL FEE, PER MIN: Performed by: OTOLARYNGOLOGY

## 2025-03-26 PROCEDURE — 88305 TISSUE EXAM BY PATHOLOGIST: CPT | Mod: 26 | Performed by: PATHOLOGY

## 2025-03-26 PROCEDURE — 258N000003 HC RX IP 258 OP 636

## 2025-03-26 PROCEDURE — 250N000011 HC RX IP 250 OP 636: Performed by: OTOLARYNGOLOGY

## 2025-03-26 PROCEDURE — 250N000009 HC RX 250

## 2025-03-26 PROCEDURE — 710N000010 HC RECOVERY PHASE 1, LEVEL 2, PER MIN: Performed by: OTOLARYNGOLOGY

## 2025-03-26 PROCEDURE — 250N000011 HC RX IP 250 OP 636

## 2025-03-26 PROCEDURE — 88305 TISSUE EXAM BY PATHOLOGIST: CPT | Mod: TC | Performed by: OTOLARYNGOLOGY

## 2025-03-26 PROCEDURE — 272N000001 HC OR GENERAL SUPPLY STERILE: Performed by: OTOLARYNGOLOGY

## 2025-03-26 PROCEDURE — 250N000025 HC SEVOFLURANE, PER MIN: Performed by: OTOLARYNGOLOGY

## 2025-03-26 PROCEDURE — 710N000012 HC RECOVERY PHASE 2, PER MINUTE: Performed by: OTOLARYNGOLOGY

## 2025-03-26 PROCEDURE — 360N000077 HC SURGERY LEVEL 4, PER MIN: Performed by: OTOLARYNGOLOGY

## 2025-03-26 PROCEDURE — 999N000141 HC STATISTIC PRE-PROCEDURE NURSING ASSESSMENT: Performed by: OTOLARYNGOLOGY

## 2025-03-26 PROCEDURE — 250N000009 HC RX 250: Performed by: OTOLARYNGOLOGY

## 2025-03-26 RX ORDER — NALOXONE HYDROCHLORIDE 0.4 MG/ML
0.1 INJECTION, SOLUTION INTRAMUSCULAR; INTRAVENOUS; SUBCUTANEOUS
Status: DISCONTINUED | OUTPATIENT
Start: 2025-03-26 | End: 2025-03-26 | Stop reason: HOSPADM

## 2025-03-26 RX ORDER — PROPOFOL 10 MG/ML
INJECTION, EMULSION INTRAVENOUS CONTINUOUS PRN
Status: DISCONTINUED | OUTPATIENT
Start: 2025-03-26 | End: 2025-03-26

## 2025-03-26 RX ORDER — ONDANSETRON 2 MG/ML
4 INJECTION INTRAMUSCULAR; INTRAVENOUS EVERY 30 MIN PRN
Status: DISCONTINUED | OUTPATIENT
Start: 2025-03-26 | End: 2025-03-26 | Stop reason: HOSPADM

## 2025-03-26 RX ORDER — OXYCODONE HYDROCHLORIDE 5 MG/1
5 TABLET ORAL
Status: DISCONTINUED | OUTPATIENT
Start: 2025-03-26 | End: 2025-03-26 | Stop reason: HOSPADM

## 2025-03-26 RX ORDER — ONDANSETRON 2 MG/ML
INJECTION INTRAMUSCULAR; INTRAVENOUS PRN
Status: DISCONTINUED | OUTPATIENT
Start: 2025-03-26 | End: 2025-03-26

## 2025-03-26 RX ORDER — DEXAMETHASONE SODIUM PHOSPHATE 4 MG/ML
INJECTION, SOLUTION INTRA-ARTICULAR; INTRALESIONAL; INTRAMUSCULAR; INTRAVENOUS; SOFT TISSUE PRN
Status: DISCONTINUED | OUTPATIENT
Start: 2025-03-26 | End: 2025-03-26

## 2025-03-26 RX ORDER — LIDOCAINE HYDROCHLORIDE AND EPINEPHRINE 10; 10 MG/ML; UG/ML
INJECTION, SOLUTION INFILTRATION; PERINEURAL PRN
Status: DISCONTINUED | OUTPATIENT
Start: 2025-03-26 | End: 2025-03-26 | Stop reason: HOSPADM

## 2025-03-26 RX ORDER — DEXAMETHASONE SODIUM PHOSPHATE 10 MG/ML
INJECTION, SOLUTION INTRA-ARTICULAR; INTRALESIONAL; INTRAMUSCULAR; INTRAVENOUS; SOFT TISSUE PRN
Status: DISCONTINUED | OUTPATIENT
Start: 2025-03-26 | End: 2025-03-26 | Stop reason: HOSPADM

## 2025-03-26 RX ORDER — ONDANSETRON 4 MG/1
4 TABLET, ORALLY DISINTEGRATING ORAL EVERY 30 MIN PRN
Status: DISCONTINUED | OUTPATIENT
Start: 2025-03-26 | End: 2025-03-26 | Stop reason: HOSPADM

## 2025-03-26 RX ORDER — SODIUM CHLORIDE, SODIUM LACTATE, POTASSIUM CHLORIDE, CALCIUM CHLORIDE 600; 310; 30; 20 MG/100ML; MG/100ML; MG/100ML; MG/100ML
INJECTION, SOLUTION INTRAVENOUS CONTINUOUS
Status: DISCONTINUED | OUTPATIENT
Start: 2025-03-26 | End: 2025-03-26 | Stop reason: HOSPADM

## 2025-03-26 RX ORDER — FENTANYL CITRATE 50 UG/ML
INJECTION, SOLUTION INTRAMUSCULAR; INTRAVENOUS PRN
Status: DISCONTINUED | OUTPATIENT
Start: 2025-03-26 | End: 2025-03-26

## 2025-03-26 RX ORDER — DEXAMETHASONE SODIUM PHOSPHATE 4 MG/ML
4 INJECTION, SOLUTION INTRA-ARTICULAR; INTRALESIONAL; INTRAMUSCULAR; INTRAVENOUS; SOFT TISSUE
Status: DISCONTINUED | OUTPATIENT
Start: 2025-03-26 | End: 2025-03-26 | Stop reason: HOSPADM

## 2025-03-26 RX ORDER — FENTANYL CITRATE 50 UG/ML
25 INJECTION, SOLUTION INTRAMUSCULAR; INTRAVENOUS EVERY 5 MIN PRN
Status: DISCONTINUED | OUTPATIENT
Start: 2025-03-26 | End: 2025-03-26 | Stop reason: HOSPADM

## 2025-03-26 RX ORDER — LIDOCAINE HYDROCHLORIDE 40 MG/ML
SOLUTION TOPICAL PRN
Status: DISCONTINUED | OUTPATIENT
Start: 2025-03-26 | End: 2025-03-26 | Stop reason: HOSPADM

## 2025-03-26 RX ORDER — HYDROMORPHONE HCL IN WATER/PF 6 MG/30 ML
0.4 PATIENT CONTROLLED ANALGESIA SYRINGE INTRAVENOUS EVERY 5 MIN PRN
Status: DISCONTINUED | OUTPATIENT
Start: 2025-03-26 | End: 2025-03-26 | Stop reason: HOSPADM

## 2025-03-26 RX ORDER — KETAMINE HYDROCHLORIDE 10 MG/ML
INJECTION INTRAMUSCULAR; INTRAVENOUS PRN
Status: DISCONTINUED | OUTPATIENT
Start: 2025-03-26 | End: 2025-03-26

## 2025-03-26 RX ORDER — LIDOCAINE HYDROCHLORIDE 20 MG/ML
INJECTION, SOLUTION INFILTRATION; PERINEURAL PRN
Status: DISCONTINUED | OUTPATIENT
Start: 2025-03-26 | End: 2025-03-26

## 2025-03-26 RX ORDER — LIDOCAINE 40 MG/G
CREAM TOPICAL
Status: DISCONTINUED | OUTPATIENT
Start: 2025-03-26 | End: 2025-03-26 | Stop reason: HOSPADM

## 2025-03-26 RX ORDER — HYDROMORPHONE HCL IN WATER/PF 6 MG/30 ML
0.2 PATIENT CONTROLLED ANALGESIA SYRINGE INTRAVENOUS EVERY 5 MIN PRN
Status: DISCONTINUED | OUTPATIENT
Start: 2025-03-26 | End: 2025-03-26 | Stop reason: HOSPADM

## 2025-03-26 RX ORDER — ACETAMINOPHEN 325 MG/1
650 TABLET ORAL EVERY 4 HOURS PRN
Qty: 50 TABLET | Refills: 0 | Status: SHIPPED | OUTPATIENT
Start: 2025-03-26

## 2025-03-26 RX ORDER — PROPOFOL 10 MG/ML
INJECTION, EMULSION INTRAVENOUS PRN
Status: DISCONTINUED | OUTPATIENT
Start: 2025-03-26 | End: 2025-03-26

## 2025-03-26 RX ORDER — OXYCODONE HYDROCHLORIDE 10 MG/1
10 TABLET ORAL
Status: DISCONTINUED | OUTPATIENT
Start: 2025-03-26 | End: 2025-03-26 | Stop reason: HOSPADM

## 2025-03-26 RX ORDER — ESMOLOL HYDROCHLORIDE 10 MG/ML
INJECTION INTRAVENOUS PRN
Status: DISCONTINUED | OUTPATIENT
Start: 2025-03-26 | End: 2025-03-26

## 2025-03-26 RX ORDER — FENTANYL CITRATE 50 UG/ML
50 INJECTION, SOLUTION INTRAMUSCULAR; INTRAVENOUS EVERY 5 MIN PRN
Status: DISCONTINUED | OUTPATIENT
Start: 2025-03-26 | End: 2025-03-26 | Stop reason: HOSPADM

## 2025-03-26 RX ORDER — OXYMETAZOLINE HYDROCHLORIDE 0.05 G/100ML
SPRAY NASAL PRN
Status: DISCONTINUED | OUTPATIENT
Start: 2025-03-26 | End: 2025-03-26 | Stop reason: HOSPADM

## 2025-03-26 RX ADMIN — HYDROMORPHONE HYDROCHLORIDE 0.2 MG: 0.2 INJECTION, SOLUTION INTRAMUSCULAR; INTRAVENOUS; SUBCUTANEOUS at 09:49

## 2025-03-26 RX ADMIN — SODIUM CHLORIDE, SODIUM LACTATE, POTASSIUM CHLORIDE, AND CALCIUM CHLORIDE: .6; .31; .03; .02 INJECTION, SOLUTION INTRAVENOUS at 07:25

## 2025-03-26 RX ADMIN — MIDAZOLAM 2 MG: 1 INJECTION INTRAMUSCULAR; INTRAVENOUS at 07:19

## 2025-03-26 RX ADMIN — ONDANSETRON 4 MG: 2 INJECTION INTRAMUSCULAR; INTRAVENOUS at 09:20

## 2025-03-26 RX ADMIN — FENTANYL CITRATE 100 MCG: 50 INJECTION INTRAMUSCULAR; INTRAVENOUS at 08:30

## 2025-03-26 RX ADMIN — Medication 300 MG: at 09:20

## 2025-03-26 RX ADMIN — PROPOFOL 90 MCG/KG/MIN: 10 INJECTION, EMULSION INTRAVENOUS at 07:39

## 2025-03-26 RX ADMIN — Medication 50 MG: at 07:31

## 2025-03-26 RX ADMIN — ESMOLOL HYDROCHLORIDE 20 MG: 10 INJECTION, SOLUTION INTRAVENOUS at 07:50

## 2025-03-26 RX ADMIN — Medication 20 MG: at 08:10

## 2025-03-26 RX ADMIN — LIDOCAINE HYDROCHLORIDE 80 MG: 20 INJECTION, SOLUTION INFILTRATION; PERINEURAL at 07:31

## 2025-03-26 RX ADMIN — ONDANSETRON 4 MG: 2 INJECTION INTRAMUSCULAR; INTRAVENOUS at 09:49

## 2025-03-26 RX ADMIN — Medication 10 MG: at 08:46

## 2025-03-26 RX ADMIN — PROPOFOL 150 MG: 10 INJECTION, EMULSION INTRAVENOUS at 07:31

## 2025-03-26 RX ADMIN — Medication 10 MG: at 08:25

## 2025-03-26 RX ADMIN — Medication 20 MG: at 08:57

## 2025-03-26 RX ADMIN — PHENYLEPHRINE HYDROCHLORIDE 100 MCG: 10 INJECTION INTRAVENOUS at 07:31

## 2025-03-26 RX ADMIN — Medication 10 MG: at 08:34

## 2025-03-26 RX ADMIN — Medication 20 MG: at 07:31

## 2025-03-26 RX ADMIN — DEXAMETHASONE SODIUM PHOSPHATE 10 MG: 4 INJECTION, SOLUTION INTRA-ARTICULAR; INTRALESIONAL; INTRAMUSCULAR; INTRAVENOUS; SOFT TISSUE at 08:19

## 2025-03-26 RX ADMIN — FENTANYL CITRATE 100 MCG: 50 INJECTION INTRAMUSCULAR; INTRAVENOUS at 07:31

## 2025-03-26 ASSESSMENT — ACTIVITIES OF DAILY LIVING (ADL)
ADLS_ACUITY_SCORE: 35
ADLS_ACUITY_SCORE: 36
ADLS_ACUITY_SCORE: 35

## 2025-03-26 ASSESSMENT — ENCOUNTER SYMPTOMS: STRIDOR: 1

## 2025-03-26 NOTE — ANESTHESIA CARE TRANSFER NOTE
Patient: Gracy Bautista    Procedure: Procedure(s):  MICROLARYNGOSCOPY CO2 laser resection of vocal fold lesion and biopsy  steroid injection  Esophagoscopy, gastroscopy, duodenoscopy (EGD) with biopsies       Diagnosis: Lesion of larynx [J38.7]  Diagnosis Additional Information: No value filed.    Anesthesia Type:   General     Note:    Oropharynx: oropharynx clear of all foreign objects and spontaneously breathing  Level of Consciousness: drowsy  Oxygen Supplementation: face mask  Level of Supplemental Oxygen (L/min / FiO2): 8  Independent Airway: airway patency satisfactory and stable  Dentition: dentition unchanged  Vital Signs Stable: post-procedure vital signs reviewed and stable  Report to RN Given: handoff report given  Patient transferred to: PACU    Handoff Report: Identifed the Patient, Identified the Reponsible Provider, Reviewed the pertinent medical history, Discussed the surgical course, Reviewed Intra-OP anesthesia mangement and issues during anesthesia, Set expectations for post-procedure period and Allowed opportunity for questions and acknowledgement of understanding    Vitals:  Vitals Value Taken Time   /93 03/26/25 0930   Temp     Pulse 84 03/26/25 0936   Resp 18 03/26/25 0936   SpO2 100 % 03/26/25 0936   Vitals shown include unfiled device data.    Electronically Signed By: Kirsten Fontanez DO  March 26, 2025  9:36 AM

## 2025-03-26 NOTE — PROGRESS NOTES
Pt and  state that she has pretty severe asthma. O2 sats hanging around 88-91%. Pt used 2 puffs of her levalbuteral inhaler to try to open her airways. She is also using the I.S. in attempt to bring her saturations up.

## 2025-03-26 NOTE — DISCHARGE INSTRUCTIONS
Contacting your Doctor -   To contact a doctor, call Dr Muniz's clinic at [phone]  or:  [phone] and ask for the resident on call for Gastroenterology (answered 24 hours a day)   Emergency Department:  [facility]: [phone]  [facility]: [phone] 911 if you are in need of immediate or emergent help

## 2025-03-26 NOTE — ANESTHESIA POSTPROCEDURE EVALUATION
Patient: Gracy Bautista    Procedure: Procedure(s):  MICROLARYNGOSCOPY CO2 laser resection of vocal fold lesion and biopsy  steroid injection  Esophagoscopy, gastroscopy, duodenoscopy (EGD) with biopsies       Anesthesia Type:  General    Note:  Disposition: Outpatient   Postop Pain Control: Uneventful            Sign Out: Well controlled pain   PONV: No   Neuro/Psych: Uneventful            Sign Out: Acceptable/Baseline neuro status   Airway/Respiratory: Uneventful            Sign Out: Acceptable/Baseline resp. status   CV/Hemodynamics: Uneventful            Sign Out: Acceptable CV status; No obvious hypovolemia; No obvious fluid overload   Other NRE: NONE   DID A NON-ROUTINE EVENT OCCUR? No           Last vitals:  Vitals Value Taken Time   /90 03/26/25 1000   Temp     Pulse 76 03/26/25 1012   Resp 20 03/26/25 1012   SpO2 95 % 03/26/25 1012   Vitals shown include unfiled device data.    Electronically Signed By: Valdo Agustin MD  March 26, 2025  10:12 AM

## 2025-03-26 NOTE — OP NOTE
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 Transoral laser microsurgery with endoscopic right type Type II - subligamental cordectomy Resection per ELS classification) of squamous cell carcinoma of the larynx.  2. Microsuspension direct laryngoscopy with injection of steroid.      ANESTHESIA  Type: General   ETT: 5.0 laser tenax    SURGEON:   Miriam Jacobs MD    ASSISTANT SURGEON(S):   Mervat Pepe MD     INDICATIONS FOR PROCEDURE:   The patient is a 59 year old female with a recently diagnosed laryngeal mass. This was consistent with laryngeal cancer, and she is being brought to the Operating Room for resection of this cancer. Risks, benefits, alternatives, and rationale for the procedure(s) listed above were discussed with the patient, and the patient wishes to proceed with surgery and has signed an informed consent.     FINDINGS:   1. Exposure was achieved with an ossoff laryngoscope  2. Right medial surface growth removed  3. Contralateral white changes as well - very superficial        DESCRIPTION OF PROCEDURE:   The patient was brought into the operating room and placed supine on the operating room table. A time-out was performed. General anesthesia was induced. The patient was mask ventilated. Then the patient was intubated with a cmac and 5.0 laser tenax ETT.     Then the patient was turned 90 degrees from the anesthesiologist. The head was drapped in the usual fashion. Then the dentition and mucosa were inspected prior to start. A reinforced tooth guard was placed on the upper dentition. The ossoff laryngoscope was carefully introduced into the oral cavity and gently passed to the oropharynx. Here the larynx was exposed and the patient was placed in suspension.     This revealed the tumor to be on the  right. The contralateral vocal fold had superficial white changes. Initial photodocumentation was performed with a 0 and 70 degree Spear sara telescope. The flexible bronchoscope was used with narrow band imaging light to evaluate the edges of the lesion for hypervascularity as well as to identify other areas of hypervascularity.     Laser time out was performed and the patient was appropriately protected, the safety protocols were confirmed and the FiO2 was brought down to a minimal setting. Saline soaked pledgets were placed in the subglottis.     Subsequently, the microscope was brought in and the CO2 laser via micromanipulator was used for the resection. The resection was started beginning posteriorly and the lesion was resected in one piece. Care was taken to ensure a 2-3 mm margin around the tumor. Hemostasis was achieved in with afrin.    Multiple margins were sent for permanent histopathologic evaluation via biopsy.    Then 1.0 ml of steroid  was injected to the anterior commissure to reduce chance of web and scar formation.    Photodocumentation was again performed.     Dr Muniz performed an EGD that is dictated in a different note.     This was the end of our procedure. Afrin soaked pledgets were applied to the surgical site for hemostasis.  The surgical site was then inspected and no residual bleeding was seen. The patient was taken out of suspension. The instrumentation was carefully removed, examining the mucosa and dentition on the way it. The dental guard was removed, and the mucosa and dentition were seen to be in their preoperative state at the conclusion of the procedure. The patient was then handed back to the care of anesthesia who awoke and extubated the patient without complication.    COMPLICATIONS:   None.  .   ESTIMATED BLOOD LOSS:   Less than 10cc    DISPOSITION:   PACU.    SPECIMENS:  ID Type Source Tests Collected by Time Destination   1 : distal esophageal biopsy Tissue Esophagus,  Distal SURGICAL PATHOLOGY EXAM Miriam Jacobs MD 3/26/2025  7:54 AM    2 : mid esophageal biopsy Tissue Esophagus, Mid SURGICAL PATHOLOGY EXAM Prabhu Muniz MD 3/26/2025  7:54 AM    3 : right vocal fold mass Tissue Throat SURGICAL PATHOLOGY EXAM Miriam Jacobs MD 3/26/2025  8:57 AM    4 : Anterior margin Tissue Throat SURGICAL PATHOLOGY EXAM Miriam Jacobs MD 3/26/2025  9:08 AM    5 : Inferior margin Tissue Throat SURGICAL PATHOLOGY EXAM Miriam Jacobs MD 3/26/2025  9:09 AM    6 : Superior margin Tissue Throat SURGICAL PATHOLOGY EXAM Miriam Jacobs MD 3/26/2025  9:11 AM    7 : Posterior inferior margin Tissue Throat SURGICAL PATHOLOGY EXAM Miriam Jacobs MD 3/26/2025  9:11 AM    8 : Deep margin Tissue Throat SURGICAL PATHOLOGY EXAM Miriam Jacobs MD 3/26/2025  9:19 AM           PHOTODOCUMENTATION:

## 2025-03-28 LAB
PATH REPORT.COMMENTS IMP SPEC: NORMAL
PATH REPORT.COMMENTS IMP SPEC: NORMAL
PATH REPORT.FINAL DX SPEC: NORMAL
PATH REPORT.GROSS SPEC: NORMAL
PATH REPORT.MICROSCOPIC SPEC OTHER STN: NORMAL
PHOTO IMAGE: NORMAL

## 2025-03-31 ENCOUNTER — TELEPHONE (OUTPATIENT)
Dept: OTOLARYNGOLOGY | Facility: CLINIC | Age: 60
End: 2025-03-31
Payer: COMMERCIAL

## 2025-03-31 DIAGNOSIS — J32.4 CHRONIC PANSINUSITIS: ICD-10-CM

## 2025-03-31 RX ORDER — BUDESONIDE 0.5 MG/2ML
0.5 INHALANT ORAL DAILY
Qty: 360 ML | Refills: 1 | Status: SHIPPED | OUTPATIENT
Start: 2025-03-31

## 2025-03-31 RX ORDER — LEVOFLOXACIN 25 MG/ML
500 SOLUTION ORAL DAILY
Qty: 280 ML | Refills: 0 | Status: SHIPPED | OUTPATIENT
Start: 2025-03-31

## 2025-03-31 NOTE — TELEPHONE ENCOUNTER
Writer ROBERT stating that a prescription for budesonide and levaquin have been sent to pts pharmacy.    Ashley Rosen RN

## 2025-03-31 NOTE — TELEPHONE ENCOUNTER
Writer received call from pt stating that she thinks she has a sinus infection. Pt recently underwent surgery with Dr. Jaocbs due to leukoplakia of larynx. Pt stating that she has significant sinus pressure and drainage. Pt requesting abx and budesonide refill, writer will discuss with Dr. Jaimes and reach out to pt.    Ashley Rosen RN

## 2025-04-01 ENCOUNTER — VIRTUAL VISIT (OUTPATIENT)
Dept: OTOLARYNGOLOGY | Facility: CLINIC | Age: 60
End: 2025-04-01
Payer: COMMERCIAL

## 2025-04-01 DIAGNOSIS — J38.7 LEUKOPLAKIA OF LARYNX: ICD-10-CM

## 2025-04-01 DIAGNOSIS — R49.0 DYSPHONIA: Primary | ICD-10-CM

## 2025-04-01 NOTE — PROGRESS NOTES
[facility] VOICE [facility]INIC  VOICE / UPPER AIRWAY TREATMENT NOTE (CPT 03411)      Patient's name: Gracy Arenas  Date of Session: 4/1/2025  Providing SLP: Melinda Marquis MS CCC-SLP  Referring Provider: Miriam Jacobs MD  Insurance Coverage: [facility] Individual and Family Plans  Total # of SLP Visits: 3  # of SLP Therapy Sessions: 2  Session Location: Gracy was seen via telehealth today.     The patient has been notified and verbally consented to the following statements:   This video visit will be conducted between you and your provider.  Patient has opted to conduct today's video visit vs an in-person appointment, and is not able to attend due to possible exposure to COVID-19.    If during the course of the call the provider feels a video visit is not appropriate, you will not be charged for this service.     Provider has received verbal consent for billable virtual visit from the patient? 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 Perceptually, her voice is mildly and consistently rough and strained with minimal breathiness and lower than expected pitch. Laryngoscopy today demonstrated white patches on the true vocal folds, which is thicker and more invasive R>L. Therefore, Dr. Jacobs plans to treat this area with antifungal treatments prior to biopsy and hopefully EGD scheduled concurrently. Earnestine-operative speech therapy is required to ensure that her voice is optimized after this procedure. Gracy is in agreement with this plan of care.        SUBJECTIVE:  Underwent MDL with CO2 laser resection, biopsy of vocal fold lesion,  "steroid injection, and EGD on 3/26/25  Using confidential voice with good accuracy per clinician  Still has her normal cough and tries to suppress this is much as possible  Had 5 days of voice rest  A little bit of talking on Saturday by accident  Voice is a little sore and tired during today's session, as this is as much talking as she has done since her surgery  Some intermittent but gentle throat clearing observed today, and Gracy reports waking up with some throat clearing, which is normal for her  Happy regarding her biopsy results      OBJECTIVE/ASSESSMENT:        1/20/2025     6:29 PM 1/23/2025    10:01 AM   Voice Handicap Index (VHI-10)   My voice makes it difficult for people to hear me 2 2    People have difficulty understanding me in a noisy room 2 1    My voice difficulties restrict my personal and social life.  0 0    I feel left out of conversations because of my voice 0 0    My voice problem causes me to lose income 0 0    I feel as though I have to strain to produce voice 0 0    The clarity of my voice is unpredictable 2 1    My voice problem upsets me 0 0    My voice makes me feel handicapped 0 0    People ask, \"What's wrong with your voice?\" 1 0    VHI-10 7  4        Patient-reported    Proxy-reported         1/20/2025     6:29 PM 1/23/2025    10:01 AM 3/13/2025    12:41 PM   Cough Severity Index (CSI)   My cough is worse when I lie down 3 3  3   My coughing problem causes me to restrict my personal and social life 2 2  2   I tend to avoid places because of my cough problem 2 2  1   I feel embarrassed because of my coughing problem 2 2  2   People ask, ''What's wrong?'' because I cough a lot 2 2  1   I run out of air when I cough 3 3  2   My coughing problem affects my voice 2 2  2   My coughing problem limits my physical activity 2 2  2   My coughing problem upsets me 2 2  4   People ask me if I am sick because I cough a lot 2 2  2   CSI Score 22  22  21        Patient-reported    Proxy-reported "         1/20/2025     6:29 PM 1/23/2025    10:01 AM 3/13/2025    12:41 PM   Eating Assessment Tool (EAT-10)   My swallowing problem has caused me to lose weight 0 0  2   My swallowing problem interferes with my ability to go out for meals 0 0  0   Swallowing liquids takes extra effort 1 1  1   Swallowing solids takes extra effort 2 2  1   Swallowing pills takes extra effort 2 2  1   Swallowing is painful 2 2  1   The pleasure of eating is affected by my swallowing 1 2  1   When I swallow food sticks in my throat 2 2  2   I cough when I eat 2 2  2   Swallowing is stressful 1 1  1   EAT-10 13  14  12        Patient-reported    Proxy-reported         1/20/2025     6:29 PM 1/23/2025    10:01 AM 3/13/2025    12:41 PM   Dyspnea Index   1. I have trouble getting air in. 3 3  3   2. I feel tightness in my throat when I am having erma breathing problem. 3 3  3   3. It takes more effort to breathe than it used to. 4 4  3   4. Change in weather affect my breathing problem. 3 3  3   5. My breathing gets worse with stress. 1 2  2   6. I make sound/noise breathing in 3 3  3   7. I have to strain to breathe. 3 3  3   8. My shortness of breath gets worse with exercise or physical activity 3 3  3   9. My breathing problem makes me feel stressed. 3 3  3   10. My breathing problem casuses me to restrict my personal and social life. 2 3  3   Dyspnea Index Total Score 28  30  29        Patient-reported    Proxy-reported       THERAPEUTIC ACTIVITIES:  Semi-Occluded Vocal Tract Exercises (SOVTs) are a series of exercises aimed to recoordinate respiration, phonation, and resonance to produce an effortless, clear voice. Such exercises include straw phonation with or without water resistance, lip trills, humming/Basic Training Gesture for Resonant Voice Therapy, and transoral lip buzz/Basic Training Gesture for Vocal Function Exercises at comfortable speaking pitches and glissando tasks. These exercises were reviewed today, and Gracy  performed them with 70% accuracy with minimal clinician cueing and modeling. Adjustments were made to ensure that there is no effort whatsoever and include glee glissando tasks for additional tissue mobilization with in a comfortable range.  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  She demonstrated appropriate technique with confidential voice techniques while speaking with the clinician and high accuracy with straw phonation tasks.  She will incorporate small voice glissando's as healing continues to aid with tissue mobilization    Progress towards goals:  Appropriate given number of sessions       PLAN:  Gracy will adhere to postop voice rest, vocal hygiene, confidential voice, and straw phonation exercises following her procedure  Written instructions were provided to aid home programming via [employer] and patient's handwritten notes  Gracy continues to demonstrate adequate progress toward long- and short-term goals.  Continued voice therapy services are recommended.  Gracy will follow-up for additional sessions on 4/15/25. Current goals will continue to be addressed.  Gracy is in agreement with this plan of care.      SLP PLAN IN MULTIDISCIPLINARY CLINIC:  Voice SLP presence at next appointment with laryngologist (e.g. Dr. Jacobs, Dr. Mcdaniel, Dr. Werner) is recommended to monitor laryngeal status post-op. Next scheduled MD appointment is 4/3/25.      BILLING SUMMARY:  Total treatment time: 27 minutes (including 5 minutes of chart review and preparation, interpretation of testing and therapeutic maneuvers, and document writing)  Speech Pathology Treatment (06422)      Melinda Marquis MS CCC-SLP  Speech-Language Pathologist  [facility]  Department of Otolaryngology - Head and Neck Surgery  [facility]  Physicians  [email or web address]  Direct: [phone]  [phone]    *This note may have been completed using [id number] dictation software, so errors may exist. Please contact me for clarification if needed*

## 2025-04-01 NOTE — LETTER
4/1/2025       RE: Gracy Bautista  [address] [place] [address]     Dear Colleague,    Thank you for referring your patient, Gracy Bautista, to the [facility] VOICE CLINIC [place] at [facility]. Please see a copy of my visit note below.    [facility]  VOICE / UPPER AIRWAY TREATMENT NOTE (CPT 31785)      Patient's name: Gracy Arenas  Date of Session: 4/1/2025  Providing SLP: Melinda Marquis MS CCC-SLP  Referring Provider: Miriam Jacobs MD  Insurance Coverage: [facility] Individual and Family Plans  Total # of SLP Visits: 3  # of SLP Therapy Sessions: 2  Session Location: Gracy was seen via telehealth today.     The patient has been notified and verbally consented to the following statements:   This video visit will be conducted between you and your provider.  Patient has opted to conduct today's video visit vs an in-person appointment, and is not able to attend due to possible exposure to COVID-19.    If during the course of the call the provider feels a video visit is not appropriate, you will not be charged for this service.     Provider has received verbal consent for billable virtual visit from the patient? 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 Perceptually, her voice is mildly and consistently rough and strained with minimal breathiness and lower than expected pitch. Laryngoscopy today demonstrated white patches on the true vocal folds, which is thicker and more invasive R>L. Therefore, Dr. Jacobs plans to treat this area with  "antifungal treatments prior to biopsy and hopefully EGD scheduled concurrently. Earnestine-operative speech therapy is required to ensure that her voice is optimized after this procedure. Gracy is in agreement with this plan of care.        SUBJECTIVE:  Underwent MDL with CO2 laser resection, biopsy of vocal fold lesion, steroid injection, and EGD on 3/26/25  Using confidential voice with good accuracy per clinician  Still has her normal cough and tries to suppress this is much as possible  Had 5 days of voice rest  A little bit of talking on Saturday by accident  Voice is a little sore and tired during today's session, as this is as much talking as she has done since her surgery  Some intermittent but gentle throat clearing observed today, and Gracy reports waking up with some throat clearing, which is normal for her  Happy regarding her biopsy results      OBJECTIVE/ASSESSMENT:        1/20/2025     6:29 PM 1/23/2025    10:01 AM   Voice Handicap Index (VHI-10)   My voice makes it difficult for people to hear me 2 2    People have difficulty understanding me in a noisy room 2 1    My voice difficulties restrict my personal and social life.  0 0    I feel left out of conversations because of my voice 0 0    My voice problem causes me to lose income 0 0    I feel as though I have to strain to produce voice 0 0    The clarity of my voice is unpredictable 2 1    My voice problem upsets me 0 0    My voice makes me feel handicapped 0 0    People ask, \"What's wrong with your voice?\" 1 0    VHI-10 7  4        Patient-reported    Proxy-reported         1/20/2025     6:29 PM 1/23/2025    10:01 AM 3/13/2025    12:41 PM   Cough Severity Index (CSI)   My cough is worse when I lie down 3 3  3   My coughing problem causes me to restrict my personal and social life 2 2  2   I tend to avoid places because of my cough problem 2 2  1   I feel embarrassed because of my coughing problem 2 2  2   People ask, ''What's wrong?'' because I cough a " lot 2 2  1   I run out of air when I cough 3 3  2   My coughing problem affects my voice 2 2  2   My coughing problem limits my physical activity 2 2  2   My coughing problem upsets me 2 2  4   People ask me if I am sick because I cough a lot 2 2  2   CSI Score 22  22  21        Patient-reported    Proxy-reported         1/20/2025     6:29 PM 1/23/2025    10:01 AM 3/13/2025    12:41 PM   Eating Assessment Tool (EAT-10)   My swallowing problem has caused me to lose weight 0 0  2   My swallowing problem interferes with my ability to go out for meals 0 0  0   Swallowing liquids takes extra effort 1 1  1   Swallowing solids takes extra effort 2 2  1   Swallowing pills takes extra effort 2 2  1   Swallowing is painful 2 2  1   The pleasure of eating is affected by my swallowing 1 2  1   When I swallow food sticks in my throat 2 2  2   I cough when I eat 2 2  2   Swallowing is stressful 1 1  1   EAT-10 13  14  12        Patient-reported    Proxy-reported         1/20/2025     6:29 PM 1/23/2025    10:01 AM 3/13/2025    12:41 PM   Dyspnea Index   1. I have trouble getting air in. 3 3  3   2. I feel tightness in my throat when I am having erma breathing problem. 3 3  3   3. It takes more effort to breathe than it used to. 4 4  3   4. Change in weather affect my breathing problem. 3 3  3   5. My breathing gets worse with stress. 1 2  2   6. I make sound/noise breathing in 3 3  3   7. I have to strain to breathe. 3 3  3   8. My shortness of breath gets worse with exercise or physical activity 3 3  3   9. My breathing problem makes me feel stressed. 3 3  3   10. My breathing problem casuses me to restrict my personal and social life. 2 3  3   Dyspnea Index Total Score 28  30  29        Patient-reported    Proxy-reported       THERAPEUTIC ACTIVITIES:  Semi-Occluded Vocal Tract Exercises (SOVTs) are a series of exercises aimed to recoordinate respiration, phonation, and resonance to produce an effortless, clear voice. Such  exercises include straw phonation with or without water resistance, lip trills, humming/Basic Training Gesture for Resonant Voice Therapy, and transoral lip buzz/Basic Training Gesture for Vocal Function Exercises at comfortable speaking pitches and glissando tasks. These exercises were reviewed today, and Gracy performed them with 70% accuracy with minimal clinician cueing and modeling. Adjustments were made to ensure that there is no effort whatsoever and include glee glissando tasks for additional tissue mobilization with in a comfortable range.  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  She demonstrated appropriate technique with confidential voice techniques while speaking with the clinician and high accuracy with straw phonation tasks.  She will incorporate small voice glissando's as healing continues to aid with tissue mobilization    Progress towards goals:  Appropriate given number of sessions       PLAN:  Gracy will adhere to postop voice rest, vocal hygiene, confidential voice, and straw phonation exercises following her procedure  Written instructions were provided to aid home programming via [employer] and patient's handwritten notes  Gracy continues to demonstrate adequate progress toward long- and short-term goals.  Continued voice therapy services are recommended.  Gracy will follow-up for additional sessions on 4/15/25. Current goals will continue to be addressed.  Gracy is in agreement with this plan of care.      SLP PLAN IN MULTIDISCIPLINARY CLINIC:  Voice SLP presence at next appointment with laryngologist (e.g. Dr. Jacobs, Dr. Mcdaniel, Dr. Werner) is recommended to monitor laryngeal status post-op. Next scheduled MD appointment is 4/3/25.      BILLING SUMMARY:  Total treatment time: 27 minutes (including 5 minutes of chart  review and preparation, interpretation of testing and therapeutic maneuvers, and document writing)  Speech Pathology Treatment (07923)      Melinda Marquis MS CCC-SLP  Speech-Language Pathologist  [facility]  Department of Otolaryngology - Head and Neck Surgery  [facility] Physicians  [email or web address]  Direct: [phone]  [phone]    *This note may have been completed using [id number] dictation software, so errors may exist. Please contact me for clarification if needed*      Again, thank you for allowing me to participate in the care of your patient.      Sincerely,    Melinda Marquis, SLP

## 2025-04-03 ENCOUNTER — TELEPHONE (OUTPATIENT)
Dept: PULMONOLOGY | Facility: CLINIC | Age: 60
End: 2025-04-03

## 2025-04-03 ENCOUNTER — OFFICE VISIT (OUTPATIENT)
Dept: OTOLARYNGOLOGY | Facility: CLINIC | Age: 60
End: 2025-04-03
Payer: COMMERCIAL

## 2025-04-03 DIAGNOSIS — R49.0 DYSPHONIA: Primary | ICD-10-CM

## 2025-04-03 DIAGNOSIS — J38.7 LEUKOPLAKIA OF LARYNX: ICD-10-CM

## 2025-04-03 NOTE — PROGRESS NOTES
[facility]  Clinical Voice and Upper Airway Evaluation Report    Patient's Name: Gracy Bautista  Date of Evaluation: 4/3/2025  Providing SLP: Melinda Marquis MS CCC-SLP  Seen in Conjunction With: Miriam Jacobs MD  Insurance Coverage: [facility] Individual   Chief Complaint: Dysphonia  Evaluation Location: [facility]  Time of Evaluation: 10:29 - 10:45 AM      Impressions from most recent evaluation on 1/23/25 by Melinda Marquis MS CCC-SLP:     Gracy is a 59-year-old female presenting today with 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 Perceptually, her voice is mildly and consistently rough and strained with minimal breathiness and lower than expected pitch. Laryngoscopy today demonstrated white patches on the true vocal folds, which is thicker and more invasive R>L. Therefore, Dr. Jacobs plans to treat this area with antifungal treatments prior to biopsy and hopefully EGD scheduled concurrently. Earnestine-operative speech therapy is required to ensure that her voice is optimized after this procedure. Gracy is in agreement with this plan of care.       Impressions from most recent voice therapy session on 4/1/25 with Melinda Marquis MS CCC-SLP:    Gracy is doing a great job participating in appropriate perioperative care.  She demonstrated appropriate technique with confidential voice techniques while speaking with the clinician and high accuracy with straw phonation tasks.  She will incorporate small voice glissando's as healing continues to aid with tissue mobilization       Patient History:     Continues to monitor her voice use and conserve post-op  Utilizing confidential voice  Vocal fatigue and soreness is improving  Feels like glissando tasks are very helpful  Still dealing with a sinus infection currently      Quality of Life Questionnaires:        1/20/2025     6:29 PM 1/23/2025    10:01 AM 4/1/2025     7:43 AM   Voice Handicap Index (VHI-10)   My voice  "makes it difficult for people to hear me 2 2  3   People have difficulty understanding me in a noisy room 2 1  4   My voice difficulties restrict my personal and social life.  0 0  4   I feel left out of conversations because of my voice 0 0  4   My voice problem causes me to lose income 0 0  0   I feel as though I have to strain to produce voice 0 0  3   The clarity of my voice is unpredictable 2 1  3   My voice problem upsets me 0 0  2   My voice makes me feel handicapped 0 0  2   People ask, \"What's wrong with your voice?\" 1 0  1   VHI-10 7  4  26        Patient-reported    Proxy-reported         1/20/2025     6:29 PM 1/23/2025    10:01 AM 3/13/2025    12:41 PM   Cough Severity Index (CSI)   My cough is worse when I lie down 3 3  3   My coughing problem causes me to restrict my personal and social life 2 2  2   I tend to avoid places because of my cough problem 2 2  1   I feel embarrassed because of my coughing problem 2 2  2   People ask, ''What's wrong?'' because I cough a lot 2 2  1   I run out of air when I cough 3 3  2   My coughing problem affects my voice 2 2  2   My coughing problem limits my physical activity 2 2  2   My coughing problem upsets me 2 2  4   People ask me if I am sick because I cough a lot 2 2  2   CSI Score 22  22  21        Patient-reported    Proxy-reported         1/20/2025     6:29 PM 1/23/2025    10:01 AM 3/13/2025    12:41 PM   Eating Assessment Tool (EAT-10)   My swallowing problem has caused me to lose weight 0 0  2   My swallowing problem interferes with my ability to go out for meals 0 0  0   Swallowing liquids takes extra effort 1 1  1   Swallowing solids takes extra effort 2 2  1   Swallowing pills takes extra effort 2 2  1   Swallowing is painful 2 2  1   The pleasure of eating is affected by my swallowing 1 2  1   When I swallow food sticks in my throat 2 2  2   I cough when I eat 2 2  2   Swallowing is stressful 1 1  1   EAT-10 13  14  12        Patient-reported    " "Proxy-reported         1/20/2025     6:29 PM 1/23/2025    10:01 AM 3/13/2025    12:41 PM   Dyspnea Index   1. I have trouble getting air in. 3 3  3   2. I feel tightness in my throat when I am having erma breathing problem. 3 3  3   3. It takes more effort to breathe than it used to. 4 4  3   4. Change in weather affect my breathing problem. 3 3  3   5. My breathing gets worse with stress. 1 2  2   6. I make sound/noise breathing in 3 3  3   7. I have to strain to breathe. 3 3  3   8. My shortness of breath gets worse with exercise or physical activity 3 3  3   9. My breathing problem makes me feel stressed. 3 3  3   10. My breathing problem casuses me to restrict my personal and social life. 2 3  3   Dyspnea Index Total Score 28  30  29        Patient-reported    Proxy-reported       Laryngoscopy with stroboscopy:    Provider performing exam: Miriam Jacobs MD     Informed consent: Informed verbal consent was obtained, which includes potential side-effects, risks, and benefits of the procedure.     Anesthetic: Topical anesthesia with 3% lidocaine and 0.25% phenylephrine was applied the nostrils bilaterally. Viscous lidocaine 4% was applied to the tip of the endoscope.    Scope type: A distal chip flexible laryngoscope was passed through the nare with halogen and xenon light source(s).    The laryngeal and pharyngeal structures were evaluated for gross appearance, mobility, function, and focal lesions / abnormalities of the associated mucosa.  Velar Function: not assessed today  General Appearance: moderate interarytenoid pachydermia  Secretions: increased in the nasal cavity  Subglottis Appearance: visible portion is patent   R Arytenoid Abduction / Adduction: symmetrical and timely ab/adduction with appropriate range of motion   L Arytenoid Abduction / Adduction: \" \"   Mediolateral Compression: mild L>R  Anteroposterior Compression: WNL   Vocal Fold Elongation: appropriate   Left (L) Vocal Fold Edge and Mucosa: " diffusely erythemic with somewhat opaque white patches on the superior surface, more clustered at the midfold  Right (R) Vocal Fold Edge and Mucosa: light yellow, blister-like lesion covering a majority of the superior surface - appropriate post-op changes  Narrow Band Imaging: demonstrates similar findings as halogen light   Glottic Closure: complete with appropriate posterior glottic gap  Phase Closure: predominantly open phase   Vertical Level of Approximation: true vocal fold appear to adduct at the same height   L Amplitude: mild to moderately reduced  R Amplitude: significantly impaired  L Mucosal Wave: mild to moderately reduced  R Mucosal Wave: significantly impaired  Phase Symmetry: asymmetrical due to R vocal fold stiffness post-op  Periodicity: periodic   Inhalation Phonation: similar findings to exhalation phonation        Impressions and Plan:     Gracy is a 59-year-old female presenting today with 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 Perceptually, her voice is rather clear as she utilizes confidential voice today. Laryngoscopy today demonstrated appropriate blistered appearance of the majority of the R vocal fold with erythema and white, opaque patches on the L vocal fold. Therefore, continued awais-operative care has been recommended. Next voice therapy session in 4/15/25, followed by post-op laryngoscopy with Dr. Jacobs in 8 weeks. Gracy is in agreement with this plan of care.       Billed Procedures:    No charge facility fee, as no skilled services were provided  Assessment and treatment time: 16 minutes  Chart review, interpretation of testing, documentation preparation, etc: 11 minutes      Thank you for allowing me to participate in this patient's care,    Melinda Marquis MS CCC-SLP  Speech-Language Pathologist  [facility]  Department of Otolaryngology - Head and Neck Surgery  [facility]  Physicians  [email or web address]  Direct: [phone]  [phone]    *This note may have been completed using [id number] dictation software, so errors may exist. Please contact me for clarification if needed*

## 2025-04-03 NOTE — TELEPHONE ENCOUNTER
Left Voicemail (1st Attempt) for the patient to call back and schedule the following:    Appointment type: RETURN PULM  Provider: HIREN  Return date: 7/25/25  Specialty phone number: [phone]  Additional appointment(s) needed: N/A  Additonal Notes: N/A

## 2025-04-07 ENCOUNTER — TELEPHONE (OUTPATIENT)
Dept: PULMONOLOGY | Facility: CLINIC | Age: 60
End: 2025-04-07
Payer: COMMERCIAL

## 2025-04-07 RX ORDER — BUDESONIDE 0.5 MG/2ML
0.5 INHALANT ORAL DAILY
Qty: 360 ML | Refills: 1 | Status: SHIPPED | OUTPATIENT
Start: 2025-04-07

## 2025-04-07 NOTE — TELEPHONE ENCOUNTER
RN called and spoke with the pt.     Pt is concerned that her concerns aren't being met. She would like Dr. Ford to call her old pulmonologist, Dr. Hunter to get more background information on the pt.    Pt reports that she is experiencing wheezing, SOB, not getting good sleep due to a dry cough, and is exhausted. She is nebbing every 4 hours and using her Symbicort as prescribed but doesn't think it is helping. Pt doesn't want steroids. Pt reports that she isn't smoking at this time. Pt has been working on quitting smoking since September. Pt quit smoking in the middle of February. Pt does not want to wait 3 months to be seen.     Pt has tried Nucala, Tezspire, Xolair, and Dupixent. She did report side effects for one of them but cannot remember which one.     Alyssa AUGUSTINE RN  Pulmonary Nurse Care Coordinator

## 2025-04-14 ENCOUNTER — TELEPHONE (OUTPATIENT)
Dept: PULMONOLOGY | Facility: CLINIC | Age: 60
End: 2025-04-14
Payer: COMMERCIAL

## 2025-04-14 ENCOUNTER — PATIENT OUTREACH (OUTPATIENT)
Dept: OTOLARYNGOLOGY | Facility: CLINIC | Age: 60
End: 2025-04-14
Payer: COMMERCIAL

## 2025-04-14 ENCOUNTER — DOCUMENTATION ONLY (OUTPATIENT)
Dept: OTHER | Facility: CLINIC | Age: 60
End: 2025-04-14
Payer: COMMERCIAL

## 2025-04-14 ENCOUNTER — TELEPHONE (OUTPATIENT)
Dept: OTOLARYNGOLOGY | Facility: CLINIC | Age: 60
End: 2025-04-14
Payer: COMMERCIAL

## 2025-04-14 NOTE — Clinical Note
Hey guys,  FYI. She was pretty sad about the impact on her voice  Day, can you help and give her a call or send her a MyChart message and let her know I got her voice mail and recommend just resting and feeling better? I will cancel her appointment with me tomorrow and will plan on seeing her on the 29th.  Thanks! Melinda

## 2025-04-14 NOTE — PROGRESS NOTES
Called patient to let her know that care team got her voicemail, and recommended canceling appointment for tomorrow and would resume therapy on 4/29 as scheduled. LVM and provided direct number for call back. Day Wilcox RN on 4/14/2025 at 1:30 PM

## 2025-04-14 NOTE — TELEPHONE ENCOUNTER
Patient called and left a voicemail with the clinician on Monday morning at 8:38 AM indicating that she was currently in the hospital with an asthma exacerbation. Her voice was doing poorly, and she was coughing rather significantly.    Patient inquired about voice therapy appointment. Clinican would recommend forgoing this appointment until she is feeling better and recommends vocal rest at this time    Patient requested that this information be shared with Dr. Jacobs and her nurse as well

## 2025-04-14 NOTE — TELEPHONE ENCOUNTER
Pt called and LVM stating that pt is in the hospital for severe asthma.     Alyssa AUGUSTINE RN  Pulmonary Nurse Care Coordinator

## 2025-04-14 NOTE — TELEPHONE ENCOUNTER
RN called spoke with the pt. Pt said they would be available. Pt is available at any time since pt is in patient. Pt is at the [facility]    Alyssa AUGUSTINE RN  Pulmonary Nurse Care Coordinator

## 2025-04-28 ENCOUNTER — TELEPHONE (OUTPATIENT)
Dept: PULMONOLOGY | Facility: CLINIC | Age: 60
End: 2025-04-28
Payer: COMMERCIAL

## 2025-04-28 NOTE — TELEPHONE ENCOUNTER
Pt called and LVM for call back.     RN called spoke with the pt. Pt was discharged on 4/19. Pt would like Dr. Ford to review the notes from hospital stay.     Alyssa AUGUSTINE RN  Pulmonary Nurse Care Coordinator

## 2025-04-29 ENCOUNTER — VIRTUAL VISIT (OUTPATIENT)
Dept: OTOLARYNGOLOGY | Facility: CLINIC | Age: 60
End: 2025-04-29
Payer: COMMERCIAL

## 2025-04-29 DIAGNOSIS — J38.7 LEUKOPLAKIA OF LARYNX: ICD-10-CM

## 2025-04-29 DIAGNOSIS — R49.0 DYSPHONIA: Primary | ICD-10-CM

## 2025-04-29 NOTE — PROGRESS NOTES
[facility]  VOICE / UPPER AIRWAY TREATMENT NOTE (CPT 64056)      Patient's name: Gracy Bautista  Date of Session: 4/1/2025  Providing SLP: Melinda Marquis MS CCC-SLP  Referring Provider: Miriam Jacobs MD  Insurance Coverage: [facility] Individual and Family Plans  Total # of SLP Visits: 5  # of SLP Therapy Sessions: 3  Session Location: Gracy was seen via telehealth today.     The patient has been notified and verbally consented to the following statements:   This video visit will be conducted between you and your provider.  Patient has opted to conduct today's video visit vs an in-person appointment, and is not able to attend due to possible exposure to COVID-19.    If during the course of the call the provider feels a video visit is not appropriate, you will not be charged for this service.     Provider has received verbal consent for billable virtual visit from the patient? 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 Perceptually, her voice is mildly and consistently rough and strained with minimal breathiness and lower than expected pitch. Laryngoscopy today demonstrated white patches on the true vocal folds, which is thicker and more invasive R>L. Therefore, Dr. Jacobs plans to treat this area with antifungal treatments prior to biopsy and hopefully EGD scheduled concurrently. Earnestine-operative speech therapy is required to ensure that her voice is optimized after this procedure. Gracy is in agreement with this plan of care.        SUBJECTIVE:  Last appointment cancelled due to inpatient admission for dyspnea  On  "steroids still  Also had unilateral sore throat and otalgia and feeling poorly  Was in the hospital for 6 days!  No voice exercises - understandable!  Throat hurts consistently with speak  Voice fatigues  Unilateral otalgia persists  Sore throat  Has to take voice breaks      OBJECTIVE/ASSESSMENT:        4/1/2025     7:43 AM 4/10/2025     9:29 AM 4/24/2025    11:14 AM   Voice Handicap Index (VHI-10)   My voice makes it difficult for people to hear me 3 3 2   People have difficulty understanding me in a noisy room 4 2 2   My voice difficulties restrict my personal and social life.  4 2 2   I feel left out of conversations because of my voice 4 0 1   My voice problem causes me to lose income 0 0 0   I feel as though I have to strain to produce voice 3 2 2   The clarity of my voice is unpredictable 3 2 2   My voice problem upsets me 2 2 2   My voice makes me feel handicapped 2 0 0   People ask, \"What's wrong with your voice?\" 1 0 2   VHI-10 26  13  15        Patient-reported         3/13/2025    12:41 PM 4/10/2025     9:29 AM 4/24/2025    11:14 AM   Cough Severity Index (CSI)   My cough is worse when I lie down 3 3 3   My coughing problem causes me to restrict my personal and social life 2 1 1   I tend to avoid places because of my cough problem 1 1 1   I feel embarrassed because of my coughing problem 2 2 2   People ask, ''What's wrong?'' because I cough a lot 1 1 2   I run out of air when I cough 2 3 2   My coughing problem affects my voice 2 3 4   My coughing problem limits my physical activity 2 3 2   My coughing problem upsets me 4 3 2   People ask me if I am sick because I cough a lot 2 1 3   CSI Score 21  21  22        Patient-reported         1/20/2025     6:29 PM 1/23/2025    10:01 AM 3/13/2025    12:41 PM   Eating Assessment Tool (EAT-10)   My swallowing problem has caused me to lose weight 0 0  2   My swallowing problem interferes with my ability to go out for meals 0 0  0   Swallowing liquids takes extra " effort 1 1  1   Swallowing solids takes extra effort 2 2  1   Swallowing pills takes extra effort 2 2  1   Swallowing is painful 2 2  1   The pleasure of eating is affected by my swallowing 1 2  1   When I swallow food sticks in my throat 2 2  2   I cough when I eat 2 2  2   Swallowing is stressful 1 1  1   EAT-10 13  14  12        Patient-reported    Proxy-reported         1/23/2025    10:01 AM 3/13/2025    12:41 PM 4/10/2025     9:29 AM   Dyspnea Index   1. I have trouble getting air in. 3  3 3   2. I feel tightness in my throat when I am having erma breathing problem. 3  3 3   3. It takes more effort to breathe than it used to. 4  3 3   4. Change in weather affect my breathing problem. 3  3 3   5. My breathing gets worse with stress. 2  2 2   6. I make sound/noise breathing in 3  3 3   7. I have to strain to breathe. 3  3 3   8. My shortness of breath gets worse with exercise or physical activity 3  3 3   9. My breathing problem makes me feel stressed. 3  3 2   10. My breathing problem casuses me to restrict my personal and social life. 3  3 4   Dyspnea Index Total Score 30  29  29        Patient-reported    Proxy-reported       THERAPEUTIC ACTIVITIES:  Semi-Occluded Vocal Tract Exercises (SOVTs) are a series of exercises aimed to recoordinate respiration, phonation, and resonance to produce an effortless, clear voice. Such exercises include straw phonation with or without water resistance, lip trills, humming/Basic Training Gesture for Resonant Voice Therapy, and transoral lip buzz/Basic Training Gesture for Vocal Function Exercises at comfortable speaking pitches and glissando tasks. These exercises were reviewed today, and Gracy performed them with high independent accuracy.  Despite superb technique, she endorsed throat discomfort while performing these.       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  Since then, she has had increased vocal fatigue, throat discomfort, and dysphonia.  At this time, it has been recommended that she continue straw phonation with water resistance techniques with her high levels of accuracy and technique, as well as return to voice rest that is slightly behind schedule given how ill she was and persistent throat discomfort.  She will continue to avoid long conversations and avoid overusing her voice    Progress towards goals:  Appropriate given number of sessions  Was healing well until she became ill and was hospitalized       PLAN:  Gracy will adhere to postop voice rest, vocal hygiene, confidential voice, and straw phonation exercises following her procedure  Written instructions were provided to aid home programming via [employer] and patient's handwritten notes  Gracy continues to demonstrate adequate progress toward long- and short-term goals.  Continued voice therapy services are recommended.  Gracy will follow-up for additional sessions on 25. Current goals will continue to be addressed.  Gracy is in agreement with this plan of care.      SLP PLAN IN MULTIDISCIPLINARY CLINIC:  Voice SLP presence at next appointment with laryngologist (e.g. Dr. Jacobs, Dr. Mcdaniel, Dr. Werner) is recommended to monitor laryngeal status post-op. Next scheduled MD appointment is 25.      BILLING SUMMARY:  Total treatment time: 37 minutes (including 6 minutes of chart review and preparation, interpretation of testing and therapeutic maneuvers, and document writing)  Speech Pathology Treatment ([id number])      Melinda Marquis MS CCC-SLP  Speech-Language Pathologist  [facility]  Department of Otolaryngology - Head and Neck Surgery  [facility] Physicians  [email or web address]  Direct: [phone]  [phone]    *This note may have been completed using [id number]-[id number]-glax  dictation software, so errors may exist. Please contact me for clarification if needed*

## 2025-04-29 NOTE — LETTER
4/29/2025       RE: Gracy Bautista  [address] [facility] [address]     Dear Colleague,    Thank you for referring your patient, Gracy Bautista, to the [facility] VOICE CLINIC [place] at [facility]. Please see a copy of my visit note below.    [facility]  VOICE / UPPER AIRWAY TREATMENT NOTE (CPT 76761)      Patient's name: Gracy Bautista  Date of Session: 4/1/2025  Providing SLP: Melinda Marquis MS CCC-SLP  Referring Provider: Miriam Jacobs MD  Insurance Coverage: [employer] Individual and Family Plans  Total # of SLP Visits: 5  # of SLP Therapy Sessions: 3  Session Location: Gracy was seen via telehealth today.     The patient has been notified and verbally consented to the following statements:   This video visit will be conducted between you and your provider.  Patient has opted to conduct today's video visit vs an in-person appointment, and is not able to attend due to possible exposure to COVID-19.    If during the course of the call the provider feels a video visit is not appropriate, you will not be charged for this service.     Provider has received verbal consent for billable virtual visit from the patient? 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 Perceptually, her voice is mildly and consistently rough and strained with minimal breathiness and lower than expected pitch. Laryngoscopy today demonstrated white patches on the true vocal folds, which is thicker and more invasive R>L. Therefore, Dr. Jacobs plans to treat this area with  "antifungal treatments prior to biopsy and hopefully EGD scheduled concurrently. Earnestine-operative speech therapy is required to ensure that her voice is optimized after this procedure. Gracy is in agreement with this plan of care.        SUBJECTIVE:  Last appointment cancelled due to inpatient admission for dyspnea  On steroids still  Also had unilateral sore throat and otalgia and feeling poorly  Was in the hospital for 6 days!  No voice exercises - understandable!  Throat hurts consistently with speak  Voice fatigues  Unilateral otalgia persists  Sore throat  Has to take voice breaks      OBJECTIVE/ASSESSMENT:        4/1/2025     7:43 AM 4/10/2025     9:29 AM 4/24/2025    11:14 AM   Voice Handicap Index (VHI-10)   My voice makes it difficult for people to hear me 3 3 2   People have difficulty understanding me in a noisy room 4 2 2   My voice difficulties restrict my personal and social life.  4 2 2   I feel left out of conversations because of my voice 4 0 1   My voice problem causes me to lose income 0 0 0   I feel as though I have to strain to produce voice 3 2 2   The clarity of my voice is unpredictable 3 2 2   My voice problem upsets me 2 2 2   My voice makes me feel handicapped 2 0 0   People ask, \"What's wrong with your voice?\" 1 0 2   VHI-10 26  13  15        Patient-reported         3/13/2025    12:41 PM 4/10/2025     9:29 AM 4/24/2025    11:14 AM   Cough Severity Index (CSI)   My cough is worse when I lie down 3 3 3   My coughing problem causes me to restrict my personal and social life 2 1 1   I tend to avoid places because of my cough problem 1 1 1   I feel embarrassed because of my coughing problem 2 2 2   People ask, ''What's wrong?'' because I cough a lot 1 1 2   I run out of air when I cough 2 3 2   My coughing problem affects my voice 2 3 4   My coughing problem limits my physical activity 2 3 2   My coughing problem upsets me 4 3 2   People ask me if I am sick because I cough a lot 2 1 3   CSI " Score 21  21  22        Patient-reported         1/20/2025     6:29 PM 1/23/2025    10:01 AM 3/13/2025    12:41 PM   Eating Assessment Tool (EAT-10)   My swallowing problem has caused me to lose weight 0 0  2   My swallowing problem interferes with my ability to go out for meals 0 0  0   Swallowing liquids takes extra effort 1 1  1   Swallowing solids takes extra effort 2 2  1   Swallowing pills takes extra effort 2 2  1   Swallowing is painful 2 2  1   The pleasure of eating is affected by my swallowing 1 2  1   When I swallow food sticks in my throat 2 2  2   I cough when I eat 2 2  2   Swallowing is stressful 1 1  1   EAT-10 13  14  12        Patient-reported    Proxy-reported         1/23/2025    10:01 AM 3/13/2025    12:41 PM 4/10/2025     9:29 AM   Dyspnea Index   1. I have trouble getting air in. 3  3 3   2. I feel tightness in my throat when I am having erma breathing problem. 3  3 3   3. It takes more effort to breathe than it used to. 4  3 3   4. Change in weather affect my breathing problem. 3  3 3   5. My breathing gets worse with stress. 2  2 2   6. I make sound/noise breathing in 3  3 3   7. I have to strain to breathe. 3  3 3   8. My shortness of breath gets worse with exercise or physical activity 3  3 3   9. My breathing problem makes me feel stressed. 3  3 2   10. My breathing problem casuses me to restrict my personal and social life. 3  3 4   Dyspnea Index Total Score 30  29  29        Patient-reported    Proxy-reported       THERAPEUTIC ACTIVITIES:  Semi-Occluded Vocal Tract Exercises (SOVTs) are a series of exercises aimed to recoordinate respiration, phonation, and resonance to produce an effortless, clear voice. Such exercises include straw phonation with or without water resistance, lip trills, humming/Basic Training Gesture for Resonant Voice Therapy, and transoral lip buzz/Basic Training Gesture for Vocal Function Exercises at comfortable speaking pitches and glissando tasks. These  exercises were reviewed today, and Gracy performed them with high independent accuracy.  Despite superb technique, she endorsed throat discomfort while performing these.       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  Since then, she has had increased vocal fatigue, throat discomfort, and dysphonia.  At this time, it has been recommended that she continue straw phonation with water resistance techniques with her high levels of accuracy and technique, as well as return to voice rest that is slightly behind schedule given how ill she was and persistent throat discomfort.  She will continue to avoid long conversations and avoid overusing her voice    Progress towards goals:  Appropriate given number of sessions  Was healing well until she became ill and was hospitalized       PLAN:  Gracy will adhere to postop voice rest, vocal hygiene, confidential voice, and straw phonation exercises following her procedure  Written instructions were provided to aid home programming via CueThinkhart and patient's handwritten notes  Gracy continues to demonstrate adequate progress toward long- and short-term goals.  Continued voice therapy services are recommended.  Gracy will follow-up for additional sessions on 5/20/25. Current goals will continue to be addressed.  Gracy is in agreement with this plan of care.      SLP PLAN IN MULTIDISCIPLINARY CLINIC:  Voice SLP presence at next appointment with laryngologist (e.g. Dr. Jacobs, Dr. Mcdaniel, Dr. Werner) is recommended to monitor laryngeal status post-op. Next scheduled MD appointment is 5/29/25.      BILLING SUMMARY:  Total treatment time: 37 minutes (including 6 minutes of chart review and preparation, interpretation of testing and therapeutic maneuvers, and document writing)  Speech Pathology Treatment  ([id number])      Melinda Marquis MS CCC-SLP  Speech-Language Pathologist  [facility]  Department of Otolaryngology - Head and Neck Surgery  [facility]  [email or web address]  Direct: [phone]  [phone]    *This note may have been completed using [id number] dictation software, so errors may exist. Please contact me for clarification if needed*      Again, thank you for allowing me to participate in the care of your patient.      Sincerely,    Melinda Marquis, SLP

## 2025-04-30 ENCOUNTER — VIRTUAL VISIT (OUTPATIENT)
Dept: ENDOCRINOLOGY | Facility: CLINIC | Age: 60
End: 2025-04-30
Attending: FAMILY MEDICINE
Payer: COMMERCIAL

## 2025-04-30 DIAGNOSIS — Z79.51 LONG TERM CURRENT USE OF INHALED STEROID: ICD-10-CM

## 2025-04-30 DIAGNOSIS — R94.6 THYROID FUNCTION TEST ABNORMAL: ICD-10-CM

## 2025-04-30 DIAGNOSIS — R73.9 HYPERGLYCEMIA: Primary | ICD-10-CM

## 2025-04-30 DIAGNOSIS — J44.9: ICD-10-CM

## 2025-04-30 DIAGNOSIS — Z79.52: ICD-10-CM

## 2025-04-30 PROBLEM — M35.00 SJOGREN'S DISEASE: Status: ACTIVE | Noted: 2017-06-23

## 2025-04-30 PROBLEM — R73.03 PREDIABETES: Status: ACTIVE | Noted: 2023-07-31

## 2025-04-30 PROBLEM — F32.A DEPRESSION: Status: ACTIVE | Noted: 2017-06-23

## 2025-04-30 PROBLEM — R73.09 INCREASED GLUCOSE LEVEL: Status: ACTIVE | Noted: 2017-06-12

## 2025-04-30 PROBLEM — E61.1 IRON DEFICIENCY: Status: ACTIVE | Noted: 2023-08-24

## 2025-04-30 PROBLEM — M19.90 INFLAMMATORY ARTHRITIS: Status: ACTIVE | Noted: 2017-06-06

## 2025-04-30 PROBLEM — M85.80 OSTEOPENIA, UNSPECIFIED LOCATION: Status: ACTIVE | Noted: 2024-04-09

## 2025-04-30 PROBLEM — E44.0 MODERATE PROTEIN-CALORIE MALNUTRITION: Status: ACTIVE | Noted: 2023-05-03

## 2025-04-30 NOTE — PROGRESS NOTES
Virtual Visit Details    Type of service:  Video Visit     Originating Location (pt. Location): Home    Distant Location (provider location):  Off-site  Platform used for Video Visit: Suly

## 2025-04-30 NOTE — NURSING NOTE
Current patient location: [place]    Is the patient currently in the state of [place]? YES    Visit mode: VIDEO    If the visit is dropped, the patient can be reconnected by:VIDEO VISIT: Send to e-mail at: [email or web address]    Will anyone else be joining the visit? NO  (If patient encounters technical issues they should call [phone]    Are changes needed to the allergy or medication list? E-check in was reviewed/completed for today's visit. VF did not review e-check in information again with Gracy due to this.       Are refills needed on medications prescribed by this physician? Discuss with provider    Rooming Documentation:  Not applicable    Reason for visit: Consult    Rebecca PUCKETT

## 2025-04-30 NOTE — PROGRESS NOTES
Assessment/Plan :   Hyperglycemia. We discussed how steroids can affect blood sugars. We also reviewed the impact of diet. Gracy does have a strong family history of diabetes, which means that she is predisposed to the disease. She would really like to try and manage through diet. We discussed the importance of protein. She is vegetarian and she has a history of protein malnutrition but she has been trying to supplement with protein shakes and greek yogurt. I encouraged her to keep up the good work. She is overdue for repeat laboratory testing and I would like to check a hemoglobin A1C. If her A1C remains under 7%, we will continue to try and manage through diet. I will contact her with the results.  Abnormal thyroid testing. We discussed how prednisone can affect the thyroid gland. We also reviewed the signs and symptoms of low and excess thyroid hormone. We will repeat thyroid testing.   Adrenal insufficiency. Gracy has been on and off prednisone over the years. She is also in the process of tapering off a high dose. This does lead to some level of adrenal suppression and symptoms of fatigue and weakness. However, there is no evidence that her adrenal glands are no longer functioning. We did discuss the signs and symptoms of adrenal crisis and if she has any problems over the weekend, she needs to go to the ER. We will check cortisol and ACTH levels next week. I will contact her with the results.     Due to the COVID 19 pandemic this visit was a telephone/video visit in order to help prevent spread of infection in this patient and the general population. The patient gave verbal consent for the visit today. I have independently reviewed and interpreted labs, imaging as indicated.       Distant Location (provider location):  Off-site  Mode of Communication:  Video Conference via bMobilized  Chart review/prep time 10    Joined the call at 4/30/2025, 7:23:46 am.  Left the call at 4/30/2025, 7:52:10 am.  You were on  the call for 28 minutes 24 seconds.  42 minutes spent on the date of the encounter doing chart review, history and exam, documentation and further activities as noted above.      Chief complaint:  Gracy is a 59 year old female seen in consultation at the request of Dr. Schneider for chronic prednisone use and hyperglycemia.    I have reviewed Care Everywhere including [facility], [facility], [facility], [facility], [place], [facility], [facility] , [facility], [place] lab reports, imaging reports and provider notes as indicated.      HISTORY OF PRESENT ILLNESS  Gracy comes to our clinic to discuss a few issues. She has a complicated medical history that includes prediabetes, hyperlipidemia, osteopenia, Sjogren's disease, PTSD, moderate persistent asthma, insomnia, and peripheral vascular disease. She has a long history of prednisone use along with inhaled corticosteroids. Over the last few years, the on and off of prednisone has led to persistent hyperglycemia. She also has a family history of diabetes, so there is a concern that she may need medication to help manage blood sugars. Gracy would prefer to manage through diet and exercise. She is vegetarian and she feels like she eats healthy but admits that she does like her pasta.    Gracy also reports that she has a history of abnormal thyroid testing. When she considers her current symptoms, she does worry that she may have thyroid disease, as well. She is often very anxious and irritable. She is exhausted but also struggles to sleep. Her TSH was low last year but repeat testing indicated that her levels were normal. They have continued to monitor. She wonders if the abnormal testing may have been due to prednisone. She has been on and off high doses of prednisone due to ongoing issues with SOB and wheezing. She was hospitalized April 13th of 2025 for asthma with COPD exacerbation. She was started on 60 mg of prednisone IV while in the hospital and she has been  slowing tapering off. She has three days left. She does worry about the prednisone's affect on her adrenal glands, as well. She knows that low cortisol can also affect energy.     Endocrine relevant labs are as follows:  Specimen: Blood - Blood specimen (specimen)  Component  Ref Range & Units 11 mo ago   Hemoglobin A1C  4.8 - 5.6 % 5.9 High    Resulting Agency [facility]   Narrative  Performed by [facility]  5.7-6.4%:  Increased risk for diabetes  6.5% or higher:  Diagnostic for diabetes  For diabetic patients, A1C goals should be discussed with clinician.  Specimen Collected: 05/21/24  9:55 AM    Performed by: [facility] Last Resulted: 05/21/24  1:53 PM   Specimen: Blood - Blood specimen (specimen)  Component  Ref Range & Units 5 mo ago   Thyroid Stimulating hormone  0.47 - 4.68 mIU/L 1.05   Resulting Agency [facility]   Specimen Collected: 11/14/24 10:45 AM    Performed by: [facility] Last Resulted: 11/14/24  3:30 PM   Specimen: Blood - Blood specimen (specimen)  Component  Ref Range & Units 11 mo ago   Thyroid Stimulating hormone  0.47 - 4.68 mIU/L <0.02 Low    Resulting Agency [facility]   Specimen Collected: 05/21/24  9:55 AM    Performed by: [facility] Last Resulted: 05/21/24  2:42 PM   Specimen: Blood - Blood specimen (specimen)  Component  Ref Range & Units 11 mo ago   Cortisol  4.5 - 22.7 ug/dL 15.6   Resulting Agency [facility]   Narrative  Performed by [facility]  Cortisol Reference Ranges    Specimen drawn before 10am: 4.45-22.70 ug/dL    Specimen drawn after 5pm: 1.70-14.10 ug/dL  Specimen Collected: 05/21/24  9:55 AM    Performed by: [facility] Last Resulted: 05/21/24  2:38 PM     REVIEW OF SYSTEMS    Endocrine: positive for chronic prednisone use,  hyperglycemia, adrenal insufficiency, and history of abnormal thyroid testing  Skin: negative  Eyes: negative for, visual blurring, redness, tearing  Ears/Nose/Throat: negative  Respiratory: No shortness of breath, dyspnea on exertion, cough, or hemoptysis  Cardiovascular: negative for, chest pain, dyspnea on exertion, lower extremity edema, and exercise intolerance  Gastrointestinal: positive for poor appetite and excessive gas or bloating, negative for, nausea, vomiting, constipation, and diarrhea  Genitourinary: negative for, nocturia, dysuria, frequency, and urgency  Musculoskeletal: negative for, muscular weakness, nocturnal cramping, and foot pain  Neurologic: negative for, local weakness, numbness or tingling of hands, and numbness or tingling of feet  Psychiatric: positive for sleep disturbance  Hematologic/Lymphatic/Immunologic: positive for weight loss and leukoplakia and asthma    Past Medical History  Past Medical History:   Diagnosis Date    Allergic rhinitis     Arthritis     Chronic sinusitis     COPD (chronic obstructive pulmonary disease) (H)     Depressive disorder     Hoarseness     Reduced vision     Uncomplicated asthma        Medications  Current Outpatient Medications   Medication Sig Dispense Refill    acetaminophen (TYLENOL) 325 MG tablet Take 2 tablets (650 mg) by mouth every 4 hours as needed for mild pain. 50 tablet 0    acetaminophen (TYLENOL) 500 MG tablet Take 2 tablets (1,000 mg) by mouth every 8 hours as needed for mild pain 10 tablet 0    ADDERALL XR 20 MG 24 hr capsule Take 20 mg by mouth 2 times daily as needed.      bisacodyl (DULCOLAX) 5 MG EC tablet Take 5 mg by mouth as needed for constipation.      budesonide (PULMICORT) 0.5 MG/2ML neb solution SPRAY 2 MLS (0.5 MG) IN NOSTRIL DAILY. 360 mL 1    budesonide-formoterol (SYMBICORT/BREYNA) 160-4.5 MCG/ACT Inhaler Inhale 2 puffs into the lungs 2 times daily. 10.2 g 11    Cholecalciferol (VITAMIN D3) 50 MCG (2000 UT) CAPS Take 4,000  Units by mouth.      CYMBALTA 60 MG capsule Take 2 capsules (120 mg) by mouth every morning      ipratropium - albuterol 0.5 mg/2.5 mg/3 mL (DUONEB) 0.5-2.5 (3) MG/3ML neb solution Take 1 vial (3 mLs) by nebulization every 6 hours as needed for shortness of breath, wheezing or cough. 90 mL 3    ipratropium - albuterol 0.5 mg/2.5 mg/3 mL (DUONEB) 0.5-2.5 (3) MG/3ML neb solution Take 1 vial by nebulization every 6 hours as needed for shortness of breath, wheezing or cough.      LAMICTAL 200 MG tablet Take 200 mg by mouth every morning       levalbuterol (XOPENEX HFA) 45 MCG/ACT inhaler Inhale 2 puffs into the lungs every 4 hours as needed for shortness of breath or wheezing. 15 g 3    levalbuterol (XOPENEX HFA) 45 MCG/ACT inhaler Inhale 2 puffs into the lungs every 4 hours as needed for shortness of breath or wheezing 45 g 0    levalbuterol (XOPENEX) 1.25 MG/3ML neb solution Take 3 mLs (1.25 mg) by nebulization every 4 hours as needed for shortness of breath or wheezing. 270 mL 3    levalbuterol (XOPENEX) 1.25 MG/3ML neb solution Take 3 mLs (1.25 mg) by nebulization every 4 hours as needed for shortness of breath or wheezing 1620 mL 3    levofloxacin (LEVAQUIN) 25 MG/ML solution Take 20 mLs (500 mg) by mouth daily. 280 mL 0    LORazepam (ATIVAN) 0.5 MG tablet       nicotine polacrilex (NICORETTE) 4 MG gum 4 mg every hour as needed      predniSONE (DELTASONE) 20 MG tablet Take 2 tablets (40 mg) by mouth daily. 14 tablet 0    spacer (OPTICHAMBER ELBA) holding chamber Use with albuterol (PROAIR HFA/PROVENTIL HFA/VENTOLIN HFA) 108 (90 Base) MCG/ACT inhaler 1 each 0       Allergies  Allergies   Allergen Reactions    Bee Venom Angioedema, Swelling and Hives     Other reaction(s): Angioedema      Cefdinir Diarrhea and GI Disturbance             Levofloxacin Muscle Pain (Myalgia)    Prednisone Anxiety and Other (See Comments)     Hyperactivity and moodiness. Doesn't like how it makes her feel.         Family  History  family history includes Bleeding Disorder in her brother and mother; Cancer in her brother; Cerebrovascular Disease in her father; Diabetes in her father; Heart Disease in her father; Hyperlipidemia in her father and mother; Hypertension in her father and mother.    Social History  Social History     Tobacco Use    Smoking status: Light Smoker     Types: Cigarettes     Start date: 9/17/2021    Smokeless tobacco: Never    Tobacco comments:     I am willing.  I haven't been successful and my mindset is not completely there yet. 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    Drug use: Never       Physical Exam  There is no height or weight on file to calculate BMI.  GENERAL: no distress  SKIN: Visible skin clear. No significant rash, abnormal pigmentation or lesions.  EYES: Eyes grossly normal to inspection.  No discharge or erythema, or obvious scleral/conjunctival abnormalities.  NECK: visible goiter is not present; no visible neck masses  RESP: No audible wheeze, cough, or visible cyanosis.  No visible retractions or increased work of breathing.    NEURO: Awake, alert, responds appropriately to questions.  Mentation and speech fluent.  PSYCH:affect normal and appearance well-groomed.

## 2025-05-08 ENCOUNTER — LAB (OUTPATIENT)
Dept: LAB | Facility: CLINIC | Age: 60
End: 2025-05-08
Payer: COMMERCIAL

## 2025-05-08 DIAGNOSIS — R73.9 HYPERGLYCEMIA: ICD-10-CM

## 2025-05-08 DIAGNOSIS — R94.6 THYROID FUNCTION TEST ABNORMAL: ICD-10-CM

## 2025-05-08 DIAGNOSIS — J44.9: ICD-10-CM

## 2025-05-08 DIAGNOSIS — Z79.52: ICD-10-CM

## 2025-05-08 LAB
ALBUMIN SERPL BCG-MCNC: 4.7 G/DL (ref 3.5–5.2)
ALP SERPL-CCNC: 86 U/L (ref 40–150)
ALT SERPL W P-5'-P-CCNC: 21 U/L (ref 0–50)
ANION GAP SERPL CALCULATED.3IONS-SCNC: 11 MMOL/L (ref 7–15)
AST SERPL W P-5'-P-CCNC: 24 U/L (ref 0–45)
BILIRUB SERPL-MCNC: 0.3 MG/DL
BUN SERPL-MCNC: 14.7 MG/DL (ref 8–23)
CALCIUM SERPL-MCNC: 10 MG/DL (ref 8.8–10.4)
CHLORIDE SERPL-SCNC: 102 MMOL/L (ref 98–107)
CORTIS SERPL-MCNC: 10.1 UG/DL
CREAT SERPL-MCNC: 0.69 MG/DL (ref 0.51–0.95)
EGFRCR SERPLBLD CKD-EPI 2021: >90 ML/MIN/1.73M2
EST. AVERAGE GLUCOSE BLD GHB EST-MCNC: 123 MG/DL
GLUCOSE SERPL-MCNC: 116 MG/DL (ref 70–99)
HBA1C MFR BLD: 5.9 % (ref 0–5.6)
HCO3 SERPL-SCNC: 25 MMOL/L (ref 22–29)
POTASSIUM SERPL-SCNC: 4.7 MMOL/L (ref 3.4–5.3)
PROT SERPL-MCNC: 7.1 G/DL (ref 6.4–8.3)
SODIUM SERPL-SCNC: 138 MMOL/L (ref 135–145)
T4 FREE SERPL-MCNC: 1.33 NG/DL (ref 0.9–1.7)
TSH SERPL DL<=0.005 MIU/L-ACNC: 0.75 UIU/ML (ref 0.3–4.2)

## 2025-05-20 ENCOUNTER — VIRTUAL VISIT (OUTPATIENT)
Dept: OTOLARYNGOLOGY | Facility: CLINIC | Age: 60
End: 2025-05-20
Payer: COMMERCIAL

## 2025-05-20 DIAGNOSIS — R49.0 DYSPHONIA: Primary | ICD-10-CM

## 2025-05-20 DIAGNOSIS — J38.7 LEUKOPLAKIA OF LARYNX: ICD-10-CM

## 2025-05-20 NOTE — LETTER
5/20/2025       RE: Gracy Bautista  [address] [place] [facility] [address]     Dear Colleague,    Thank you for referring your patient, Gracy Bautista, to the [facility] VOICE CLINIC [place] at [facility]. Please see a copy of my visit note below.    [facility] VOICE [facility]  VOICE / UPPER AIRWAY TREATMENT NOTE (CPT 74848)      Patient's name: Gracy Bautista  Date of Session: 5/20/2025   Providing SLP: Melinda Marquis MS CCC-SLP  Referring Provider: Miriam Jacobs MD  Insurance Coverage: [facility] Individual and Family Plans  Total # of SLP Visits: 6  # of SLP Therapy Sessions: 4  Session Location: Gracy was seen via telehealth today.     The patient has been notified and verbally consented to the following statements:   This video visit will be conducted between you and your provider.  Patient has opted to conduct today's video visit vs an in-person appointment, and is not able to attend due to possible exposure to COVID-19.    If during the course of the call the provider feels a video visit is not appropriate, you will not be charged for this service.     Provider has received verbal consent for billable virtual visit from the patient? 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 Perceptually, her voice is mildly and consistently rough and strained with minimal breathiness and lower than expected pitch. Laryngoscopy today demonstrated white patches on the true vocal folds, which is thicker and more invasive R>L. Therefore,  "Dr. Jacobs plans to treat this area with antifungal treatments prior to biopsy and hopefully EGD scheduled concurrently. Earnestine-operative speech therapy is required to ensure that her voice is optimized after this procedure. Gracy is in agreement with this plan of care.        SUBJECTIVE:  Was back in the hospital on Thursday for shortness of breath  Was out in the yard gardening the weekend before when it was hot outside  Was coughing quite a bit  Tries to be as gentle as she can  Coughing never really subsided after last hospitalization but is now more productive  Voice is doing better  Does have a morning voice today  More normal voice coming out  Rating her voice about 65% of normal today  Better predictability  Denies vocal fatigue at this time and not requiring voice rest  Does notice some intermittent soreness  Doing straw exercises and finds them very helpful      OBJECTIVE/ASSESSMENT:        4/10/2025     9:29 AM 4/24/2025    11:14 AM 5/20/2025     7:49 AM   Voice Handicap Index (VHI-10)   My voice makes it difficult for people to hear me 3 2 2    People have difficulty understanding me in a noisy room 2 2 0    My voice difficulties restrict my personal and social life.  2 2 0    I feel left out of conversations because of my voice 0 1 0    My voice problem causes me to lose income 0 0 0    I feel as though I have to strain to produce voice 2 2 0    The clarity of my voice is unpredictable 2 2 2    My voice problem upsets me 2 2 0    My voice makes me feel handicapped 0 0 0    People ask, \"What's wrong with your voice?\" 0 2 0    VHI-10 13  15  4        Patient-reported    Proxy-reported         3/13/2025    12:41 PM 4/10/2025     9:29 AM 4/24/2025    11:14 AM   Cough Severity Index (CSI)   My cough is worse when I lie down 3 3 3   My coughing problem causes me to restrict my personal and social life 2 1 1   I tend to avoid places because of my cough problem 1 1 1   I feel embarrassed because of my coughing " problem 2 2 2   People ask, ''What's wrong?'' because I cough a lot 1 1 2   I run out of air when I cough 2 3 2   My coughing problem affects my voice 2 3 4   My coughing problem limits my physical activity 2 3 2   My coughing problem upsets me 4 3 2   People ask me if I am sick because I cough a lot 2 1 3   CSI Score 21  21  22        Patient-reported         1/20/2025     6:29 PM 1/23/2025    10:01 AM 3/13/2025    12:41 PM   Eating Assessment Tool (EAT-10)   My swallowing problem has caused me to lose weight 0 0  2   My swallowing problem interferes with my ability to go out for meals 0 0  0   Swallowing liquids takes extra effort 1 1  1   Swallowing solids takes extra effort 2 2  1   Swallowing pills takes extra effort 2 2  1   Swallowing is painful 2 2  1   The pleasure of eating is affected by my swallowing 1 2  1   When I swallow food sticks in my throat 2 2  2   I cough when I eat 2 2  2   Swallowing is stressful 1 1  1   EAT-10 13  14  12        Patient-reported    Proxy-reported         1/23/2025    10:01 AM 3/13/2025    12:41 PM 4/10/2025     9:29 AM   Dyspnea Index   1. I have trouble getting air in. 3  3 3   2. I feel tightness in my throat when I am having erma breathing problem. 3  3 3   3. It takes more effort to breathe than it used to. 4  3 3   4. Change in weather affect my breathing problem. 3  3 3   5. My breathing gets worse with stress. 2  2 2   6. I make sound/noise breathing in 3  3 3   7. I have to strain to breathe. 3  3 3   8. My shortness of breath gets worse with exercise or physical activity 3  3 3   9. My breathing problem makes me feel stressed. 3  3 2   10. My breathing problem casuses me to restrict my personal and social life. 3  3 4   Dyspnea Index Total Score 30  29  29        Patient-reported    Proxy-reported       THERAPEUTIC ACTIVITIES:  Resonant Voice Therapy (RVT) involves training voice-disordered individuals to produce voice in an easier, more resonant and forward-focused  manner throughout the speech hierarchy. The objective of this approach is to achieve the strongest possible voice with the least effort and impact stress between the vocal folds to minimize the likelihood of injury. These exercises were instructed today from the phoneme to /m/-loaded word level, and Gracy performed them with 60% accuracy with maximal clinician cueing and modeling. Adjustments were made to use a gentler, more air-powered voice quality. 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 Her voice is gradually improving despite this, noting 65% of normal with intermittent soreness and denying vocal fatigue. RVT exercises were instructed today and she began with strong technique; however, vocal fatigue and soreness did occur more quickly than anticipated.    Progress towards goals:  Appropriate given number of sessions  Healing appears to be going at a slower pace than expected given recent asthma exacerbations and productive coughing       PLAN:  Gracy will adhere to postop voice rest, vocal hygiene, confidential voice, and straw phonation/humming RVT exercises following her procedure  Written instructions were provided to aid home programming via [employer] and patient's handwritten notes  Gracy continues to demonstrate adequate progress toward long- and short-term goals.  Continued voice therapy services are recommended.  Gracy will follow-up for additional sessions on 6/10/25. Current goals will continue to be addressed.  Gracy is in agreement with this plan of care.      SLP PLAN IN MULTIDISCIPLINARY CLINIC:  Voice SLP presence at next appointment with laryngologist (e.g. Dr. Jacobs, Dr. Mcdaniel,   Debbi) is recommended to monitor laryngeal status post-op. Next scheduled MD appointment is 25.      BILLING SUMMARY:  Total treatment time: 47 minutes (including 4 minutes of chart review and preparation, interpretation of testing and therapeutic maneuvers, and document writing)  Speech Pathology Treatment (53234)      Melinda Marquis MS CCC-SLP  Speech-Language Pathologist  [facility]  Department of Otolaryngology - Head and Neck Surgery  [facility]  [email or web address]  Direct: [phone]  [phone]    *This note may have been completed using [id number] dictation software, so errors may exist. Please contact me for clarification if needed*    Again, thank you for allowing me to participate in the care of your patient.      Sincerely,    Melinda Marquis, AGATA

## 2025-05-20 NOTE — PATIENT INSTRUCTIONS
"Semi-Occluded Vocal Tract Exercises         Sustain for 5-7 seconds each, total of 2-3 minutes, 5-6  mini  practice sessions per day (essentially dividing 15 minutes up throughout the day with no more than 5 minutes at a time)    (You can do these as often as 1 minute per hour)         Straw Phonation with Water Resistance / Cup Bubbles  (1 inch of water) OR without water  Voice   Air stays on, add voice using  No   with round lips and forward placement  Goal: consistent, small bubbles  Pitch glides  Goal: No increase of bubbles when going up or down. Remember the  buzzy sound  - keep the voice forward  Humming / Resonant Voice Therapy  Hummmm: feel vibration on the lips without throat straining  [place]-yd-mb-mo (use any vowels you want - we did ma, mo, me, but feel free to do me may my mo moo): keep the vibrations forward and use the \"m\" as check-ins  Hummmm-Monday/money/many      "

## 2025-05-20 NOTE — PROGRESS NOTES
[facility] VOICE CLINIC  VOICE / UPPER AIRWAY TREATMENT NOTE (CPT 17310)      Patient's name: Gracy Bautista  Date of Session: 5/20/2025   Providing SLP: Melinda Marquis MS CCC-SLP  Referring Provider: Miriam Jacobs MD  Insurance Coverage: [facility] Individual and Family Plans  Total # of SLP Visits: 6  # of SLP Therapy Sessions: 4  Session Location: Gracy was seen via telehealth today.     The patient has been notified and verbally consented to the following statements:   This video visit will be conducted between you and your provider.  Patient has opted to conduct today's video visit vs an in-person appointment, and is not able to attend due to possible exposure to COVID-19.    If during the course of the call the provider feels a video visit is not appropriate, you will not be charged for this service.     Provider has received verbal consent for billable virtual visit from the patient? 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 Perceptually, her voice is mildly and consistently rough and strained with minimal breathiness and lower than expected pitch. Laryngoscopy today demonstrated white patches on the true vocal folds, which is thicker and more invasive R>L. Therefore, Dr. Jacobs plans to treat this area with antifungal treatments prior to biopsy and hopefully EGD scheduled concurrently. Earnestine-operative speech therapy is required to ensure that her voice is optimized after this procedure. Gracy is in agreement with this plan of care.        SUBJECTIVE:  Was back in the hospital on  "Thursday for shortness of breath  Was out in the yard gardening the weekend before when it was hot outside  Was coughing quite a bit  Tries to be as gentle as she can  Coughing never really subsided after last hospitalization but is now more productive  Voice is doing better  Does have a morning voice today  More normal voice coming out  Rating her voice about 65% of normal today  Better predictability  Denies vocal fatigue at this time and not requiring voice rest  Does notice some intermittent soreness  Doing straw exercises and finds them very helpful      OBJECTIVE/ASSESSMENT:        4/10/2025     9:29 AM 4/24/2025    11:14 AM 5/20/2025     7:49 AM   Voice Handicap Index (VHI-10)   My voice makes it difficult for people to hear me 3 2 2    People have difficulty understanding me in a noisy room 2 2 0    My voice difficulties restrict my personal and social life.  2 2 0    I feel left out of conversations because of my voice 0 1 0    My voice problem causes me to lose income 0 0 0    I feel as though I have to strain to produce voice 2 2 0    The clarity of my voice is unpredictable 2 2 2    My voice problem upsets me 2 2 0    My voice makes me feel handicapped 0 0 0    People ask, \"What's wrong with your voice?\" 0 2 0    VHI-10 13  15  4        Patient-reported    Proxy-reported         3/13/2025    12:41 PM 4/10/2025     9:29 AM 4/24/2025    11:14 AM   Cough Severity Index (CSI)   My cough is worse when I lie down 3 3 3   My coughing problem causes me to restrict my personal and social life 2 1 1   I tend to avoid places because of my cough problem 1 1 1   I feel embarrassed because of my coughing problem 2 2 2   People ask, ''What's wrong?'' because I cough a lot 1 1 2   I run out of air when I cough 2 3 2   My coughing problem affects my voice 2 3 4   My coughing problem limits my physical activity 2 3 2   My coughing problem upsets me 4 3 2   People ask me if I am sick because I cough a lot 2 1 3   CSI Score " 21  21  22        Patient-reported         1/20/2025     6:29 PM 1/23/2025    10:01 AM 3/13/2025    12:41 PM   Eating Assessment Tool (EAT-10)   My swallowing problem has caused me to lose weight 0 0  2   My swallowing problem interferes with my ability to go out for meals 0 0  0   Swallowing liquids takes extra effort 1 1  1   Swallowing solids takes extra effort 2 2  1   Swallowing pills takes extra effort 2 2  1   Swallowing is painful 2 2  1   The pleasure of eating is affected by my swallowing 1 2  1   When I swallow food sticks in my throat 2 2  2   I cough when I eat 2 2  2   Swallowing is stressful 1 1  1   EAT-10 13  14  12        Patient-reported    Proxy-reported         1/23/2025    10:01 AM 3/13/2025    12:41 PM 4/10/2025     9:29 AM   Dyspnea Index   1. I have trouble getting air in. 3  3 3   2. I feel tightness in my throat when I am having erma breathing problem. 3  3 3   3. It takes more effort to breathe than it used to. 4  3 3   4. Change in weather affect my breathing problem. 3  3 3   5. My breathing gets worse with stress. 2  2 2   6. I make sound/noise breathing in 3  3 3   7. I have to strain to breathe. 3  3 3   8. My shortness of breath gets worse with exercise or physical activity 3  3 3   9. My breathing problem makes me feel stressed. 3  3 2   10. My breathing problem casuses me to restrict my personal and social life. 3  3 4   Dyspnea Index Total Score 30  29  29        Patient-reported    Proxy-reported       THERAPEUTIC ACTIVITIES:  Resonant Voice Therapy (RVT) involves training voice-disordered individuals to produce voice in an easier, more resonant and forward-focused manner throughout the speech hierarchy. The objective of this approach is to achieve the strongest possible voice with the least effort and impact stress between the vocal folds to minimize the likelihood of injury. These exercises were instructed today from the phoneme to /m/-loaded word level, and Gracy schmidt  them with 60% accuracy with maximal clinician cueing and modeling. Adjustments were made to use a gentler, more air-powered voice quality. 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 Her voice is gradually improving despite this, noting 65% of normal with intermittent soreness and denying vocal fatigue. RVT exercises were instructed today and she began with strong technique; however, vocal fatigue and soreness did occur more quickly than anticipated.    Progress towards goals:  Appropriate given number of sessions  Healing appears to be going at a slower pace than expected given recent asthma exacerbations and productive coughing       PLAN:  Gracy will adhere to postop voice rest, vocal hygiene, confidential voice, and straw phonation/humming RVT exercises following her procedure  Written instructions were provided to aid home programming via [employer] and patient's handwritten notes  Gracy continues to demonstrate adequate progress toward long- and short-term goals.  Continued voice therapy services are recommended.  Gracy will follow-up for additional sessions on 6/10/25. Current goals will continue to be addressed.  Gracy is in agreement with this plan of care.      SLP PLAN IN MULTIDISCIPLINARY CLINIC:  Voice SLP presence at next appointment with laryngologist (e.g. Dr. Jacobs, Dr. Mcdaniel, Dr. Werner) is recommended to monitor laryngeal status post-op. Next scheduled MD appointment is 5/29/25.      BILLING SUMMARY:  Total treatment time: 47 minutes (including 4 minutes of chart review and preparation, interpretation of testing and therapeutic maneuvers, and document writing)  Speech Pathology Treatment  (51068)      Melinda Marquis MS CCC-SLP  Speech-Language Pathologist  [facility]  Department of Otolaryngology - Head and Neck Surgery  [facility]  [email or web address]  Direct: [phone]  [phone]    *This note may have been completed using [id number] dictation software, so errors may exist. Please contact me for clarification if needed*

## 2025-05-22 ENCOUNTER — TELEPHONE (OUTPATIENT)
Dept: OTOLARYNGOLOGY | Facility: CLINIC | Age: 60
End: 2025-05-22
Payer: COMMERCIAL

## 2025-05-22 NOTE — TELEPHONE ENCOUNTER
Anar received VM from pt stating she is having on going symptoms and hoping to move up her 6/16 appointment with Dr. Jaimes. Requesting call back from writer.    Ashley Rosen RN

## 2025-05-22 NOTE — TELEPHONE ENCOUNTER
Writer LVM for pt requesting call back or mychart message with symptoms. Writer stated at this time Dr. Jaimes has no sooner appointments but pt will be placed on a wait list.    Ashley Rosen RN

## 2025-05-29 ENCOUNTER — OFFICE VISIT (OUTPATIENT)
Dept: OTOLARYNGOLOGY | Facility: CLINIC | Age: 60
End: 2025-05-29
Payer: COMMERCIAL

## 2025-05-29 DIAGNOSIS — J38.7 LEUKOPLAKIA OF LARYNX: ICD-10-CM

## 2025-05-29 DIAGNOSIS — R49.0 DYSPHONIA: Primary | ICD-10-CM

## 2025-05-29 PROCEDURE — 99207 PR NO BILLABLE SERVICE THIS VISIT: CPT | Performed by: SPEECH-LANGUAGE PATHOLOGIST

## 2025-05-29 NOTE — PROGRESS NOTES
Better R vocal fold - L still middle  Still coughing but better  Reflux often  Continue pantoprazole but add famotidine and reflux gourmet gum and gel  6-8 weeks with dutch   "voice?\" 2 0  0   VHI-10 15  4  6        Patient-reported    Proxy-reported         4/10/2025     9:29 AM 4/24/2025    11:14 AM 6/9/2025    12:23 PM   Cough Severity Index (CSI)   My cough is worse when I lie down 3 3 3   My coughing problem causes me to restrict my personal and social life 1 1 0   I tend to avoid places because of my cough problem 1 1 0   I feel embarrassed because of my coughing problem 2 2 1   People ask, ''What's wrong?'' because I cough a lot 1 2 0   I run out of air when I cough 3 2 1   My coughing problem affects my voice 3 4 2   My coughing problem limits my physical activity 3 2 2   My coughing problem upsets me 3 2 1   People ask me if I am sick because I cough a lot 1 3 2   CSI Score 21  22  12        Patient-reported         1/20/2025     6:29 PM 1/23/2025    10:01 AM 3/13/2025    12:41 PM   Eating Assessment Tool (EAT-10)   My swallowing problem has caused me to lose weight 0 0  2   My swallowing problem interferes with my ability to go out for meals 0 0  0   Swallowing liquids takes extra effort 1 1  1   Swallowing solids takes extra effort 2 2  1   Swallowing pills takes extra effort 2 2  1   Swallowing is painful 2 2  1   The pleasure of eating is affected by my swallowing 1 2  1   When I swallow food sticks in my throat 2 2  2   I cough when I eat 2 2  2   Swallowing is stressful 1 1  1   EAT-10 13  14  12        Patient-reported    Proxy-reported         3/13/2025    12:41 PM 4/10/2025     9:29 AM 6/9/2025    12:23 PM   Dyspnea Index   1. I have trouble getting air in. 3 3 2   2. I feel tightness in my throat when I am having erma breathing problem. 3 3 3   3. It takes more effort to breathe than it used to. 3 3 2   4. Change in weather affect my breathing problem. 3 3 2   5. My breathing gets worse with stress. 2 2 1   6. I make sound/noise breathing in 3 3 2   7. I have to strain to breathe. 3 3 2   8. My shortness of breath gets worse with exercise or physical activity 3 3 2   9. " "My breathing problem makes me feel stressed. 3 2 2   10. My breathing problem casuses me to restrict my personal and social life. 3 4 2   Dyspnea Index Total Score 29  29  20        Patient-reported       Laryngoscopy with stroboscopy:    Provider performing exam: Miriam Jacobs MD    Informed consent: Informed verbal consent was obtained, which includes potential side-effects, risks, and benefits of the procedure.     Anesthetic: Topical anesthesia with 3% lidocaine and 0.25% phenylephrine was applied the nostrils bilaterally. Viscous lidocaine 4% was applied to the tip of the endoscope.    Scope type: A distal chip flexible laryngoscope was passed through the nare with halogen and xenon light source(s).    The laryngeal and pharyngeal structures were evaluated for gross appearance, mobility, function, and focal lesions / abnormalities of the associated mucosa.  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  Mild cobblestoning of the posterior pharyngeal wall  Secretions: WNL   Subglottis Appearance: visible portion is patent   R Arytenoid Abduction / Adduction: symmetrical and timely ab/adduction with appropriate range of motion   L Arytenoid Abduction / Adduction: \" \"   Mediolateral Compression: mild with phonation  Anteroposterior Compression: WNL   Vocal Fold Elongation: appropriate   Left (L) Vocal Fold Edge and Mucosa: diffusely erythemic with blushed varices and opaque fullness at the midfold vibratory edge and superior surface  Right (R) Vocal Fold Edge and Mucosa: \" \" - mild severity compared to L  Narrow Band Imaging: demonstrates similar findings as halogen light   Glottic Closure: complete with appropriate glottic gap  Phase Closure: predominantly open phase   Vertical Level of Approximation: true vocal fold appear to adduct at the same height   L Amplitude: reduced at opacity  R Amplitude: WNL  L Mucosal Wave: reduced at opacity  R Mucosal Wave: " WNL   Phase Symmetry: mildly asymmetrical due to mildly impaired vibration of L vocal fold  Periodicity: periodic   Inhalation Phonation: similar findings to exhalation phonation   Therapeutic Probes:   Humming and BTG for RVT resulted in moderate anteroposterior compression  Transoral lip buzz resulted in resolution of anteroposterior compression       Impressions and Plan:     Gracy is a 59 year old female presenting today with 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 Perceptually, her voice is mildly weak, strained, and rough. Laryngoscopy today demonstrated continued irregularity of the L vocal fold with erythema, white opacity at the midfold, and impaired vibration. Therefore, continued voice therapy has been recommended to address previously-established goals, along with adding additional reflux treatments (e.g. famotidine, Reflux Gourmet as instructed by Dr. Jacobs). She will return for repeat laryngoscopy in 6-8 weeks. Gracy is in agreement with this plan of care.     RESEARCH: A warm introduction was not provided regarding participation in laryngology research studies.       Billed Procedures:    No charge facility fee, as no skilled services were provided  Assessment and treatment time: 10 minutes  Chart review, interpretation of testing, documentation preparation, etc: 17 minutes      Thank you for allowing me to participate in this patient's care,    Melinda Marquis MS CCC-SLP  Speech-Language Pathologist  [facility]  Department of Otolaryngology - Head and Neck Surgery  [facility]  [email or web address]  Direct: [phone]  [phone]    *This note may have been completed using [id number] dictation software, so errors may exist. Please contact me for clarification if needed*

## 2025-06-09 NOTE — PROGRESS NOTES
[facility] VOICE CLINIC  VOICE / UPPER AIRWAY TREATMENT NOTE (CPT 62545)      Patient's name: Gracy Bautista  Date of Session: 6/10/2025   Providing SLP: Melinda Marquis MS CCC-SLP  Referring Provider: Miriam Jacobs MD  Insurance Coverage: [facility] Individual and Family Plans  Total # of SLP Visits: 8  # of SLP Therapy Sessions: 5  Session Location: Gracy was seen via telehealth today.     The patient has been notified and verbally consented to the following statements:   This video visit will be conducted between you and your provider.  Patient has opted to conduct today's video visit vs an in-person appointment, and is not able to attend due to possible exposure to COVID-19.    If during the course of the call the provider feels a video visit is not appropriate, you will not be charged for this service.     Provider has received verbal consent for billable virtual visit from the patient? 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 Perceptually, her voice is mildly and consistently rough and strained with minimal breathiness and lower than expected pitch. Laryngoscopy today demonstrated white patches on the true vocal folds, which is thicker and more invasive R>L. Therefore, Dr. Jacobs plans to treat this area with antifungal treatments prior to biopsy and hopefully EGD scheduled concurrently. Earnestine-operative speech therapy is required to ensure that her voice is optimized after this procedure. Gracy is in agreement with this plan of care.       Impressions from most recent evaluation on 5/29/25 with Melinda Marquis MS CCC-SLP:  To  "be added at a later date       SUBJECTIVE:  Voice doing better  Has some soreness but hard to tell if it's because of coughing  Coughing in her sleep and still productive  Has supplemental oxygen and has been in the mid 80s  Still has some hoarseness  Has some globus sensation after eating toast right now and just started famotidine      OBJECTIVE/ASSESSMENT:        4/24/2025    11:14 AM 5/20/2025     7:49 AM 5/26/2025    12:48 PM   Voice Handicap Index (VHI-10)   My voice makes it difficult for people to hear me 2 2  0   People have difficulty understanding me in a noisy room 2 0  1   My voice difficulties restrict my personal and social life.  2 0  0   I feel left out of conversations because of my voice 1 0  0   My voice problem causes me to lose income 0 0  0   I feel as though I have to strain to produce voice 2 0  2   The clarity of my voice is unpredictable 2 2  2   My voice problem upsets me 2 0  1   My voice makes me feel handicapped 0 0  0   People ask, \"What's wrong with your voice?\" 2 0  0   VHI-10 15  4  6        Patient-reported    Proxy-reported         4/10/2025     9:29 AM 4/24/2025    11:14 AM 6/9/2025    12:23 PM   Cough Severity Index (CSI)   My cough is worse when I lie down 3 3 3   My coughing problem causes me to restrict my personal and social life 1 1 0   I tend to avoid places because of my cough problem 1 1 0   I feel embarrassed because of my coughing problem 2 2 1   People ask, ''What's wrong?'' because I cough a lot 1 2 0   I run out of air when I cough 3 2 1   My coughing problem affects my voice 3 4 2   My coughing problem limits my physical activity 3 2 2   My coughing problem upsets me 3 2 1   People ask me if I am sick because I cough a lot 1 3 2   CSI Score 21  22  12        Patient-reported         1/20/2025     6:29 PM 1/23/2025    10:01 AM 3/13/2025    12:41 PM   Eating Assessment Tool (EAT-10)   My swallowing problem has caused me to lose weight 0 0  2   My swallowing problem " interferes with my ability to go out for meals 0 0  0   Swallowing liquids takes extra effort 1 1  1   Swallowing solids takes extra effort 2 2  1   Swallowing pills takes extra effort 2 2  1   Swallowing is painful 2 2  1   The pleasure of eating is affected by my swallowing 1 2  1   When I swallow food sticks in my throat 2 2  2   I cough when I eat 2 2  2   Swallowing is stressful 1 1  1   EAT-10 13  14  12        Patient-reported    Proxy-reported         3/13/2025    12:41 PM 4/10/2025     9:29 AM 6/9/2025    12:23 PM   Dyspnea Index   1. I have trouble getting air in. 3 3 2   2. I feel tightness in my throat when I am having erma breathing problem. 3 3 3   3. It takes more effort to breathe than it used to. 3 3 2   4. Change in weather affect my breathing problem. 3 3 2   5. My breathing gets worse with stress. 2 2 1   6. I make sound/noise breathing in 3 3 2   7. I have to strain to breathe. 3 3 2   8. My shortness of breath gets worse with exercise or physical activity 3 3 2   9. My breathing problem makes me feel stressed. 3 2 2   10. My breathing problem casuses me to restrict my personal and social life. 3 4 2   Dyspnea Index Total Score 29  29  20        Patient-reported       THERAPEUTIC ACTIVITIES:  Resonant Voice Therapy (RVT) involves training voice-disordered individuals to produce voice in an easier, more resonant and forward-focused manner throughout the speech hierarchy. The objective of this approach is to achieve the strongest possible voice with the least effort and impact stress between the vocal folds to minimize the likelihood of injury. These exercises were reviewed today from the phoneme to automatic speech level with chant and natural intonation, and Gracy performed them with 70% accuracy with moderate clinician cueing and modeling. Adjustments were made to ensure that she is taking a full breath prior to long utterances.  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  She will continue to treat her asthma and allergies as appropriate, as well as continue RVT exercises.  We were able to increase to automatic speech tasks today, and she performed them with good accuracy despite noticeable breathing troubles    Progress towards goals:  Appropriate given number of sessions  Healing appears to be going at a slower pace than expected given recent asthma exacerbations and productive coughing       PLAN:  Gracy will adhere to postop voice rest, vocal hygiene, confidential voice, and straw phonation/humming RVT exercises following her procedure  Written instructions were provided to aid home programming via [employer] and patient's handwritten notes  Gracy continues to demonstrate adequate progress toward long- and short-term goals.  Continued voice therapy services are recommended.  Gracy will follow-up for additional sessions on 25. Current goals will continue to be addressed.  Gracy is in agreement with this plan of care.      SLP PLAN IN MULTIDISCIPLINARY CLINIC:  Voice SLP presence at next appointment with laryngologist (e.g. Dr. Jacobs, Dr. Mcdaniel, Dr. Werner) is recommended to monitor laryngeal status post-op. Next scheduled MD appointment is 7/10/25.      BILLING SUMMARY:  Total treatment time: 41 minutes (including 4 minutes of chart review and preparation, interpretation of testing and therapeutic maneuvers, and document writing)  Speech Pathology Treatment (11401)      Melinda Marquis MS CCC-SLP  Speech-Language Pathologist  [facility]  Department of Otolaryngology - Head and Neck Surgery  [facility]  [email or web address]  Direct: [phone]  [phone]    *This note may have been completed  using [id number] dictation software, so errors may exist. Please contact me for clarification if needed*

## 2025-06-10 ENCOUNTER — VIRTUAL VISIT (OUTPATIENT)
Dept: OTOLARYNGOLOGY | Facility: CLINIC | Age: 60
End: 2025-06-10
Payer: COMMERCIAL

## 2025-06-10 DIAGNOSIS — R49.0 DYSPHONIA: Primary | ICD-10-CM

## 2025-06-10 DIAGNOSIS — J38.7 LEUKOPLAKIA OF LARYNX: ICD-10-CM

## 2025-06-10 NOTE — PATIENT INSTRUCTIONS
Resonant Voice Therapy Exercises    Targets:   Feel vibration in mid face or in mouth (lips, teeth, cheek bones, nose, etc.) - Maximize the vibration   Make it easy - Minimize vocal effort       Spend as much time as you need on each of the following levels to have a consistently clear voice - when it is consistently clear and across variety in that step move on to the next level. You don t necessarily have to spend much time, spend as much or as little time as you need for each level. Except for Step 6 (reading aloud), spend 2-5 minutes reading out loud maintaining the clear voice.         Do all exercises twice daily, spending 5-10 minutes total on average across the exercises.  Sound level humming ( m  sound) or holding  n,   ng,   z,   v,   oh  or  oo  with lip buzz, etc, at your normal speaking pitch and normal speaking loudness.  Syllable repetition level -  joycelyn joycelyn joycelyn   momomomo   moomoomoo   mamamama , etc. or  n  + vowels, etc.  Word repetition level - money money money, Monday Monday Monday, many, many, many, many (see list of /m/ words/phrases and pick a few words)  Phrase level - Many men on the moon. Mail my money. One Monday morning, etc. (see list of /m/ words/phrases and pick a few phrases). Start with chanting and gradually add more natural intonation.  Automatic speech (over learned/rote) - counting, days of week, months of the year, ABCs, nursery rhymes, lyrics to songs you know well, etc. Start with chanting if needed as you did with exercise 4.  Read out loud - any material will do: newspaper, magazine, book, junk mail, etc. Try to spend the majority of the practice time with reading but ensure you re maintaining the improved/strong voice throughout.       Throughout the day:  Notice your voice as you re saying yes/no responses (right, all right, sure, okay, yes, no, etc.) and pull-out/fix the voice if needed using the same yes/no responses as spring boards to improve the voice. If needed, sip  water, yawn, shift how you re standing/sitting, stretch, etc.        Additional /m/-loaded sentences:  Many miles  Mail my money  Marcie  Daniele.  Mean Marge made me mad.  Purnima marinates meat.  Maybe Marcie measured.  Rakan makes much money.  My mom made marmalade  Many munchkins made merry.  Hernandez mongrels may maul mice.  Meager meals made my mom moan.  More mayhem made managers mad.  Move my mom s many magazines.  Major migraines made me miserable.  My meddling may mean major misery.  Madam Odom mangled magnolias.  Mercy me!  Meet my mother.  Yonny made moccasins.  David Hay met Max.  My mother makes muffins.  Carla marched many miles.  My mother mailed me merchandise.  Make more kati marmalade.  My mother makes much money.  Monday morning must mean mishaps.  Mario Alberto memorized measurements.  Mosquitoes may mean malaria.  Mend Dejah s many mismatched mittens.  Mass media mocks many matters.  Modest mannerisms make me marvel.  Molten magma might migrate many miles.  Misconduct may mean many misdemeanors.

## 2025-06-10 NOTE — LETTER
6/10/2025       RE: Gracy Bautista  [address] [facility] [address]     Dear Colleague,    Thank you for referring your patient, Gracy Bautista, to the [facility] VOICE CLINIC [place] [facility]. Please see a copy of my visit note below.    [facility]  VOICE / UPPER AIRWAY TREATMENT NOTE (CPT 38752)      Patient's name: Gracy Bautista  Date of Session: 6/10/2025   Providing SLP: Melinda Marquis MS CCC-SLP  Referring Provider: Miriam Jacobs MD  Insurance Coverage: [employer] Individual [employer] Family [employer]  Total # of SLP Visits: 8  # of SLP Therapy Sessions: 5  Session Location: Gracy was seen via telehealth today.     The patient has been notified and verbally consented to the following statements:   This video visit will be conducted between you and your provider.  Patient has opted to conduct today's video visit vs an in-person appointment, and is not able to attend due to possible exposure to COVID-19.    If during the course of the call the provider feels a video visit is not appropriate, you will not be charged for this service.     Provider has received verbal consent for billable virtual visit from the patient? 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 Perceptually, her voice is mildly and consistently rough and strained with minimal breathiness and lower than expected pitch. Laryngoscopy today demonstrated white patches on the true vocal folds, which is thicker and more invasive R>L. Therefore, Dr. Jacobs plans to treat this area with  "antifungal treatments prior to biopsy and hopefully EGD scheduled concurrently. Earnestine-operative speech therapy is required to ensure that her voice is optimized after this procedure. Gracy is in agreement with this plan of care.       Impressions from most recent evaluation on 5/29/25 with Melinda Marquis, MS CCC-SLP:  To be added at a later date       SUBJECTIVE:  Voice doing better  Has some soreness but hard to tell if it's because of coughing  Coughing in her sleep and still productive  Has supplemental oxygen and has been in the mid 80s  Still has some hoarseness  Has some globus sensation after eating toast right now and just started famotidine      OBJECTIVE/ASSESSMENT:        4/24/2025    11:14 AM 5/20/2025     7:49 AM 5/26/2025    12:48 PM   Voice Handicap Index (VHI-10)   My voice makes it difficult for people to hear me 2 2  0   People have difficulty understanding me in a noisy room 2 0  1   My voice difficulties restrict my personal and social life.  2 0  0   I feel left out of conversations because of my voice 1 0  0   My voice problem causes me to lose income 0 0  0   I feel as though I have to strain to produce voice 2 0  2   The clarity of my voice is unpredictable 2 2  2   My voice problem upsets me 2 0  1   My voice makes me feel handicapped 0 0  0   People ask, \"What's wrong with your voice?\" 2 0  0   VHI-10 15  4  6        Patient-reported    Proxy-reported         4/10/2025     9:29 AM 4/24/2025    11:14 AM 6/9/2025    12:23 PM   Cough Severity Index (CSI)   My cough is worse when I lie down 3 3 3   My coughing problem causes me to restrict my personal and social life 1 1 0   I tend to avoid places because of my cough problem 1 1 0   I feel embarrassed because of my coughing problem 2 2 1   People ask, ''What's wrong?'' because I cough a lot 1 2 0   I run out of air when I cough 3 2 1   My coughing problem affects my voice 3 4 2   My coughing problem limits my physical activity 3 2 2   My " coughing problem upsets me 3 2 1   People ask me if I am sick because I cough a lot 1 3 2   CSI Score 21  22  12        Patient-reported         1/20/2025     6:29 PM 1/23/2025    10:01 AM 3/13/2025    12:41 PM   Eating Assessment Tool (EAT-10)   My swallowing problem has caused me to lose weight 0 0  2   My swallowing problem interferes with my ability to go out for meals 0 0  0   Swallowing liquids takes extra effort 1 1  1   Swallowing solids takes extra effort 2 2  1   Swallowing pills takes extra effort 2 2  1   Swallowing is painful 2 2  1   The pleasure of eating is affected by my swallowing 1 2  1   When I swallow food sticks in my throat 2 2  2   I cough when I eat 2 2  2   Swallowing is stressful 1 1  1   EAT-10 13  14  12        Patient-reported    Proxy-reported         3/13/2025    12:41 PM 4/10/2025     9:29 AM 6/9/2025    12:23 PM   Dyspnea Index   1. I have trouble getting air in. 3 3 2   2. I feel tightness in my throat when I am having erma breathing problem. 3 3 3   3. It takes more effort to breathe than it used to. 3 3 2   4. Change in weather affect my breathing problem. 3 3 2   5. My breathing gets worse with stress. 2 2 1   6. I make sound/noise breathing in 3 3 2   7. I have to strain to breathe. 3 3 2   8. My shortness of breath gets worse with exercise or physical activity 3 3 2   9. My breathing problem makes me feel stressed. 3 2 2   10. My breathing problem casuses me to restrict my personal and social life. 3 4 2   Dyspnea Index Total Score 29  29  20        Patient-reported       THERAPEUTIC ACTIVITIES:  Resonant Voice Therapy (RVT) involves training voice-disordered individuals to produce voice in an easier, more resonant and forward-focused manner throughout the speech hierarchy. The objective of this approach is to achieve the strongest possible voice with the least effort and impact stress between the vocal folds to minimize the likelihood of injury. These exercises were reviewed  today from the phoneme to automatic speech level with chant and natural intonation, and Gracy performed them with 70% accuracy with moderate clinician cueing and modeling. Adjustments were made to ensure that she is taking a full breath prior to long utterances.  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  She will continue to treat her asthma and allergies as appropriate, as well as continue RVT exercises.  We were able to increase to automatic speech tasks today, and she performed them with good accuracy despite noticeable breathing troubles    Progress towards goals:  Appropriate given number of sessions  Healing appears to be going at a slower pace than expected given recent asthma exacerbations and productive coughing       PLAN:  Gracy will adhere to postop voice rest, vocal hygiene, confidential voice, and straw phonation/humming RVT exercises following her procedure  Written instructions were provided to aid home programming via [employer] and patient's handwritten notes  Gracy continues to demonstrate adequate progress toward long- and short-term goals.  Continued voice therapy services are recommended.  Gracy will follow-up for additional sessions on 7/16/25. Current goals will continue to be addressed.  Gracy is in agreement with this plan of care.      SLP PLAN IN MULTIDISCIPLINARY CLINIC:  Voice SLP presence at next appointment with laryngologist (e.g. Dr. Jacobs, Dr. Mcdaniel, Dr. Werner) is recommended to monitor laryngeal status post-op. Next scheduled MD appointment is 7/10/25.      BILLING SUMMARY:  Total treatment time: 41 minutes (including 4 minutes of chart review and preparation, interpretation of testing and therapeutic maneuvers, and document  writing)  Speech Pathology Treatment (16261)      Melinda Marquis MS CCC-SLP  Speech-Language Pathologist  [facility]  Department of Otolaryngology - Head and Neck Surgery  [facility]  [email or web address]  Direct: [phone]  [phone]    *This note may have been completed using [id number] dictation software, so errors may exist. Please contact me for clarification if needed*    Again, thank you for allowing me to participate in the care of your patient.      Sincerely,    Melinda Marquis, SLP

## 2025-06-11 ENCOUNTER — TELEPHONE (OUTPATIENT)
Dept: OTOLARYNGOLOGY | Facility: CLINIC | Age: 60
End: 2025-06-11
Payer: COMMERCIAL

## 2025-06-11 NOTE — TELEPHONE ENCOUNTER
Lvm to r/s visit with LS on 7/16 to adjust to fit within template changes effective 7/1. Pt originally scheduled to start virtual visit at 3:30pm, provider's new template requires start time of 4pm. Gave direct call back number for questions or if change does not work.  Mari Talbert on 6/11/2025 at 2:19 PM

## 2025-06-25 ENCOUNTER — TELEPHONE (OUTPATIENT)
Dept: OTOLARYNGOLOGY | Facility: CLINIC | Age: 60
End: 2025-06-25
Payer: COMMERCIAL

## 2025-07-01 NOTE — TELEPHONE ENCOUNTER
"Provider Dr Jaimes started on Bactrim. Worried with positive results from sinus infection cultures. Fungal cultures from 9/17 positive for Alternaria species and respiratory aerobic bacterial culture on 9/17 positive for Streptococcus pseudopneumoniae, Staphylococcus lugdunensis, Alternaria species.     Patient reports head hurts from coughing and cannot sleep. Feels sick since May. Chest hurts due to coughing and cannot breathe. Coughing and short of breath often. ENT is sending to allergist to determine if this may help. Scan of facial bones. Per patient, complete blockage in sinuses. \"mucus is foam bubbles, white sputum and thick\".  Denies fever but does not have thermometer.    Prednisone side effects are terrible. States she has never liked prednisone but willing if it would help. Does not help with symptoms. Reports not much relief at all.     Bactrim prescribed for 14 days for positive cultures, started yesterday (10/5). Causes nauseas/vomiting often, needs IV in past due to side effects.  Reports she tried taking OTC cough medicine, Desilym. States does not help for her to sleep.     Asking if we would like scan of chest while she is getting facial tomorrow in [place] and if Dr Bean has any other recommendations. Routing to Dr Bean.    Natacha Pabon, RN, BSN  GI/Pulmonary Nurse Care Coordinator  Phone: [phone]  Fax: [phone]              " No

## 2025-07-05 ENCOUNTER — HEALTH MAINTENANCE LETTER (OUTPATIENT)
Age: 60
End: 2025-07-05

## 2025-07-10 ENCOUNTER — OFFICE VISIT (OUTPATIENT)
Dept: OTOLARYNGOLOGY | Facility: CLINIC | Age: 60
End: 2025-07-10
Payer: COMMERCIAL

## 2025-07-10 VITALS — WEIGHT: 123 LBS | BODY MASS INDEX: 21 KG/M2 | HEIGHT: 64 IN

## 2025-07-10 DIAGNOSIS — R49.0 DYSPHONIA: Primary | ICD-10-CM

## 2025-07-10 DIAGNOSIS — K21.9 GASTROESOPHAGEAL REFLUX DISEASE, UNSPECIFIED WHETHER ESOPHAGITIS PRESENT: ICD-10-CM

## 2025-07-10 DIAGNOSIS — J38.7 LEUKOPLAKIA OF LARYNX: ICD-10-CM

## 2025-07-10 DIAGNOSIS — R13.12 OROPHARYNGEAL DYSPHAGIA: ICD-10-CM

## 2025-07-10 PROCEDURE — 99207 PR NO BILLABLE SERVICE THIS VISIT: CPT | Performed by: SPEECH-LANGUAGE PATHOLOGIST

## 2025-07-10 NOTE — PATIENT INSTRUCTIONS
You were seen in the ENT Clinic today by Dr. Vasques. If you have any questions or concerns after your appointment, please contact us (see below)       2.   The following recommendations have been made based upon your appointment today:   - reflux gourmet after meals and at bedtime  - reflux gum as needed  - continue Pepcid 20 mg twice daily       3.   Plan to return to the ENT clinic in 3 months           How to Contact Us:  Send a [employer] message to your provider. Our team will respond to you via [employer]. Occasionally, we will need to call you to get further information.  For urgent matters (Monday-Friday), call the ENT Clinic: [phone] and speak with a call center team member - they will route your call appropriately.   If you'd like to speak directly with a nurse, please find our contact information below. We do our best to check voicemail frequently throughout the day, and will work to call you back within 1-2 days. For urgent matters, please use the general clinic phone numbers listed above.       Anh Trujillo RN  [phone]        [facility]  Department of Otolaryngology

## 2025-07-10 NOTE — PROGRESS NOTES
[facility]  Clinical Voice and Upper Airway Evaluation Report    Patient's Name: Gracy Bautista  Date of Evaluation: 7/10/2025   Providing SLP: Melinda Marquis MS CCC-SLP  Seen in Conjunction With: Miriam Jacobs MD  Insurance Coverage: [facility] Individual  Chief Complaint: Dysphonia  Evaluation Location: [facility]  Time of Evaluation: 11:40 AM - 12:07 PM      Impressions from most recent evaluation on 1/23/25 by Melinda Marquis M.S., CCC-SLP:    Gracy is a 59-year-old female presenting today with 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 Perceptually, her voice is mildly and consistently rough and strained with minimal breathiness and lower than expected pitch. Laryngoscopy today demonstrated white patches on the true vocal folds, which is thicker and more invasive R>L. Therefore, Dr. Jacobs plans to treat this area with antifungal treatments prior to biopsy and hopefully EGD scheduled concurrently. Earnestine-operative speech therapy is required to ensure that her voice is optimized after this procedure. Gracy is in agreement with this plan of care.       Patient History:     Voice gradually improving  Continues voice exercises  Coughing is still present but improving  Experiences reflux symptoms often  ***  Throat has been sore still  Unsure if it's inhalers vs voice vs coughing  Just started Dupixent  Hasn't used or need her nebulizer in a couple of weeks  Voice exercises intermittently  Not sure where she's at but still hoarse  Stayed out of the hospital  Dry foods sometimes feels stuck  R otalgia  Seeing caty  prednisone      Quality of Life Questionnaires:        4/24/2025    11:14 AM 5/20/2025     7:49 AM 5/26/2025    12:48 PM   Voice Handicap Index (VHI-10)   My voice makes it difficult for people to hear me 2 2  0   People have difficulty understanding me in a noisy room 2 0  1   My voice difficulties restrict my personal and social life.  2 0  0   I  "feel left out of conversations because of my voice 1 0  0   My voice problem causes me to lose income 0 0  0   I feel as though I have to strain to produce voice 2 0  2   The clarity of my voice is unpredictable 2 2  2   My voice problem upsets me 2 0  1   My voice makes me feel handicapped 0 0  0   People ask, \"What's wrong with your voice?\" 2 0  0   VHI-10 15  4  6        Patient-reported    Proxy-reported         4/10/2025     9:29 AM 4/24/2025    11:14 AM 6/9/2025    12:23 PM   Cough Severity Index (CSI)   My cough is worse when I lie down 3 3 3   My coughing problem causes me to restrict my personal and social life 1 1 0   I tend to avoid places because of my cough problem 1 1 0   I feel embarrassed because of my coughing problem 2 2 1   People ask, ''What's wrong?'' because I cough a lot 1 2 0   I run out of air when I cough 3 2 1   My coughing problem affects my voice 3 4 2   My coughing problem limits my physical activity 3 2 2   My coughing problem upsets me 3 2 1   People ask me if I am sick because I cough a lot 1 3 2   CSI Score 21  22  12        Patient-reported         1/20/2025     6:29 PM 1/23/2025    10:01 AM 3/13/2025    12:41 PM   Eating Assessment Tool (EAT-10)   My swallowing problem has caused me to lose weight 0 0  2   My swallowing problem interferes with my ability to go out for meals 0 0  0   Swallowing liquids takes extra effort 1 1  1   Swallowing solids takes extra effort 2 2  1   Swallowing pills takes extra effort 2 2  1   Swallowing is painful 2 2  1   The pleasure of eating is affected by my swallowing 1 2  1   When I swallow food sticks in my throat 2 2  2   I cough when I eat 2 2  2   Swallowing is stressful 1 1  1   EAT-10 13  14  12        Patient-reported    Proxy-reported         3/13/2025    12:41 PM 4/10/2025     9:29 AM 6/9/2025    12:23 PM   Dyspnea Index   1. I have trouble getting air in. 3 3 2   2. I feel tightness in my throat when I am having erma breathing problem. 3 3 " "3   3. It takes more effort to breathe than it used to. 3 3 2   4. Change in weather affect my breathing problem. 3 3 2   5. My breathing gets worse with stress. 2 2 1   6. I make sound/noise breathing in 3 3 2   7. I have to strain to breathe. 3 3 2   8. My shortness of breath gets worse with exercise or physical activity 3 3 2   9. My breathing problem makes me feel stressed. 3 2 2   10. My breathing problem casuses me to restrict my personal and social life. 3 4 2   Dyspnea Index Total Score 29  29  20        Patient-reported       Laryngoscopy with stroboscopy: ***    Provider performing exam: Miriam Jacobs MD    Informed consent: Informed verbal consent was obtained, which includes potential side-effects, risks, and benefits of the procedure.     Anesthetic: Topical anesthesia with 3% lidocaine and 0.25% phenylephrine was applied the nostrils bilaterally. Viscous lidocaine 4% was applied to the tip of the endoscope.    Scope type: A distal chip flexible laryngoscope was passed through the nare with halogen and xenon light source(s).    The laryngeal and pharyngeal structures were evaluated for gross appearance, mobility, function, and focal lesions / abnormalities of the associated mucosa.  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  Mild cobblestoning of the posterior pharyngeal wall  Secretions: WNL   Subglottis Appearance: visible portion is patent   R Arytenoid Abduction / Adduction: symmetrical and timely ab/adduction with appropriate range of motion   L Arytenoid Abduction / Adduction: \" \"   Mediolateral Compression: mild with phonation  Anteroposterior Compression: WNL   Vocal Fold Elongation: appropriate   Left (L) Vocal Fold Edge and Mucosa: diffusely erythemic with blushed varices and opaque fullness at the midfold vibratory edge and superior surface  Right (R) Vocal Fold Edge and Mucosa: \" \" - mild severity compared to L  Narrow Band " Imaging: demonstrates similar findings as halogen light   Glottic Closure: complete with appropriate glottic gap  Phase Closure: predominantly open phase   Vertical Level of Approximation: true vocal fold appear to adduct at the same height   L Amplitude: reduced at opacity  R Amplitude: WNL  L Mucosal Wave: reduced at opacity  R Mucosal Wave: WNL   Phase Symmetry: mildly asymmetrical due to mildly impaired vibration of L vocal fold  Periodicity: periodic   Inhalation Phonation: similar findings to exhalation phonation   Therapeutic Probes:   Humming and BTG for RVT resulted in moderate anteroposterior compression  Transoral lip buzz resulted in resolution of anteroposterior compression       Impressions and Plan:     Gracy is a 59 year old female presenting today with 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 Perceptually, her voice is mildly weak, strained, and rough. Laryngoscopy today demonstrated continued irregularity of the L vocal fold with erythema, white opacity at the midfold, and impaired vibration. Therefore, continued voice therapy has been recommended to address previously-established goals, along with adding additional reflux treatments (e.g. famotidine, Reflux Gourmet as instructed by Dr. Jacobs). She will return for repeat laryngoscopy in 6-8 weeks. Gracy is in agreement with this plan of care. ***    RESEARCH: A warm introduction was not provided regarding participation in laryngology research studies.       Billed Procedures:    No charge facility fee, as no skilled services were provided ***  Assessment and treatment time: 27 minutes  Chart review, interpretation of testing, documentation preparation, etc: 1 minutes      Thank you for allowing me to participate in this patient's care,    Melinda Marquis MS CCC-SLP  Speech-Language Pathologist  [facility]  Department of Otolaryngology - Head and Neck Surgery  [facility]  [email or web address]  Direct: [phone]  [phone]    *This note may have been completed using [id number] dictation software, so errors may exist. Please contact me for clarification if needed*

## 2025-07-10 NOTE — LETTER
7/10/2025       RE: Gracy Bautista  [address] [address]     Dear Colleague,    Thank you for referring your patient, Gracy Bautista, to the [facility] EAR NOSE AND THROAT [facility]INIC [place] at [facility]. Please see a copy of my visit note below.        [facility]   at the [facility]   Otolaryngology Clinic     Patient: Gracy Bautista    MRN: [id number]    : 1965    Age/Gender: 59 year old female  Date of Service: 7/10/2025  Rendering Provider:   Miriam Jacobs MD         Impression & Plan     IMPRESSION: Ms. Bautista is a 59 year old female who is being seen for the followin. 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, left karen's edema  - 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  - will need to rule out fungal infection (given abx and steroid use) - will treat with fluconazole and have close follow up   - will also preschedule surgery with EGD  - saw pulm on 2/10   - symptoms 2025 are the same, reports she will be finished with tobacco cessation by time of surgery  - scope shows 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, left karen's edema  - discussed the lesion is still present so will continue with planned surgery  - s/p MDL with type 2 cordectomy, RIGHT, steroid injection 3/26/25  - pathology with  hyperkeratosis, negative cancer   - symptoms 4/3/2025 are improved, not smoking  - scope shows right vocal fold with significant white changes but is healing well, left vocal fold with leukoplakia over superior surface  - discussed continuing voice therapy   - symptoms 5/29/2025 are voice improving but still has significant pain with prolonged talking, describes significant reflux   - scope shows right vocal fold much improved, left vocal fold still with superior superficial changes, mild redness and Kiki's appearance  - discussed that vocal fold is healing and she does appear to have some reflux changes and reports significant reflux  - will add to her current regiment with reflux gourmet and gum as well as Pepcid  - if no improvement at follow up will refer to GI  - symptoms 7/10/2025 are improved, voice pretty good, however still has reflux, started Dupixent and is on steroid  - scope shows right vocal fold well healed, left vocal fold stable small leukoplakia, mild Kiki's edema  - discussed continuing voice exercises  - discussed left vocal fold has stable leukoplakia, will continue to monitor this  Plan  - continue voice exercises  - reflux gourmet after meals and at bedtime  - reflux gum as needed  - continue Pepcid 20 mg twice daily      2. Dysphagia  - in the setting of prior achalasia per report  - solids are difficult  - liquids are difficult  - pills get stuck   - weight changes yes  - GI work up had esophagram on 7/2024 which was normal   - scope shows no pooling of saliva  - FEES shows no penetration, no aspiration burping  - symptoms due to esophageal etiology likely  - will obtain Xray Video Swallow Exam with follow through   - will have EGD done at the time of surgery  - EGD was normal 03/26/2025  - symptoms 7/10/2025 are continues to have difficulty with dry foods and reflux   - scope shows right vocal fold well healed, left vocal fold stable small leukoplakia, mild Kiki's edema  - discussed  since reflux is not controlled and she continues to have dysphagia will refer her to GI   - patient in agreement  Plan   - referral to GI for reflux and dysphagia    RETURN VISIT: 2-3 months    Chief Complaint   Dysphonia   Dysphagia   S/p MDL with type 2 cordectomy, RIGHT, steroid injection 3/26/25  Interval History   HISTORY OF PRESENT ILLNESS: Ms. Bautista is a 59 year old female who presents to us today with dysphonia and dysphagia.     Today, she presents for follow up. she reports:  - has been taking pepcid as previously directed with some improvement in GERD. Still having a sore throat and intermittent reflux symptoms.   - has been doing therapy and exercises with gradual improvement in voice.     Dysphonia:  reports    Dysphagia:  denies    Dyspnea:  denies    Coughing / Throat-clearing:  reports    GERD/LPRD:  reports     PAST MEDICAL HISTORY:   Past Medical History:   Diagnosis Date     Allergic rhinitis      Arthritis      Chronic sinusitis      COPD (chronic obstructive pulmonary disease) (H)      Depressive disorder      Hoarseness      Reduced vision      Uncomplicated asthma        PAST SURGICAL HISTORY:   Past Surgical History:   Procedure Laterality Date     BIOPSY       COLONOSCOPY N/A 08/22/2022    Procedure: COLONOSCOPY, WITH POLYPECTOMY AND BIOPSY;  Surgeon: Glen Dyer MD;  Location: [facility] OR     COLONOSCOPY WITH CO2 INSUFFLATION N/A 08/22/2022    Procedure: COLONOSCOPY, WITH CO2 INSUFFLATION;  Surgeon: Glen Dyer MD;  Location: [facility] OR     ESOPHAGOSCOPY, GASTROSCOPY, DUODENOSCOPY (EGD), COMBINED N/A 09/02/2022    Procedure: ESOPHAGOGASTRODUODENOSCOPY, WITH BIOPSY AND POLYPECTOMY;  Surgeon: Colton Sarkar MD;  Location: [facility] OR     ESOPHAGOSCOPY, GASTROSCOPY, DUODENOSCOPY (EGD), COMBINED N/A 3/26/2025    Procedure: Esophagoscopy, gastroscopy, duodenoscopy (EGD) with biopsies;  Surgeon: Prabhu Muniz MD;  Location: [facility] OR     GYN SURGERY       INJECT STEROID  (LOCATION) N/A 3/26/2025    Procedure: steroid injection;  Surgeon: Miriam Jacobs MD;  Location: [facility] OR     IR BONE BIOPSY DEEP RIGHT  2/17/2023     LASER CO2 LARYNGOSCOPY N/A 3/26/2025    Procedure: MICROLARYNGOSCOPY CO2 laser resection of vocal fold lesion and biopsy;  Surgeon: Miriam Jacobs MD;  Location: [facility] OR     NASAL/SINUS POLYPECTOMY       OPTICAL TRACKING SYSTEM ENDOSCOPIC SINUS SURGERY Bilateral 01/21/2022    Procedure: stealth guided, bilateral ethmoidectomy, bilateral frontal sinusotomy, bilateral maxillary antrostomies;  Surgeon: Digna Jaimes MD;  Location: [facility] OR     ORTHOPEDIC SURGERY         CURRENT MEDICATIONS:   Current Outpatient Medications:      acetaminophen (TYLENOL) 500 MG tablet, Take 2 tablets (1,000 mg) by mouth every 8 hours as needed for mild pain, Disp: 10 tablet, Rfl: 0     amphetamine-dextroamphetamine (ADDERALL XR) 20 MG 24 hr capsule, Take 40 mg by mouth., Disp: , Rfl:      bisacodyl (DULCOLAX) 5 MG EC tablet, Take 5 mg by mouth as needed for constipation., Disp: , Rfl:      budesonide (PULMICORT) 0.5 MG/2ML neb solution, SPRAY 2 MLS (0.5 MG) IN NOSTRIL DAILY., Disp: 360 mL, Rfl: 1     Cholecalciferol (VITAMIN D3) 50 MCG (2000 UT) CAPS, Take 4,000 Units by mouth., Disp: , Rfl:      CYMBALTA 60 MG capsule, Take 2 capsules (120 mg) by mouth every morning, Disp: , Rfl:      famotidine (PEPCID) 20 MG tablet, Take 1 tablet (20 mg) by mouth 2 times daily., Disp: 60 tablet, Rfl: 3     ipratropium - albuterol 0.5 mg/2.5 mg/3 mL (DUONEB) 0.5-2.5 (3) MG/3ML neb solution, Take 1 vial (3 mLs) by nebulization every 6 hours as needed for shortness of breath, wheezing or cough., Disp: 90 mL, Rfl: 3     LAMICTAL 200 MG tablet, Take 200 mg by mouth every morning , Disp: , Rfl:      levalbuterol (XOPENEX HFA) 45 MCG/ACT inhaler, Inhale 2 puffs into the lungs every 4 hours as needed for shortness of breath or wheezing., Disp: 15 g, Rfl: 3     levalbuterol (XOPENEX HFA) 45 MCG/ACT inhaler, Inhale 2  puffs into the lungs every 4 hours as needed for shortness of breath or wheezing, Disp: 45 g, Rfl: 0     levalbuterol (XOPENEX) 1.25 MG/3ML neb solution, Take 3 mLs (1.25 mg) by nebulization every 4 hours as needed for shortness of breath or wheezing., Disp: 270 mL, Rfl: 3     LORazepam (ATIVAN) 0.5 MG tablet, , Disp: , Rfl:      spacer (OPTICHAMBER ELBA) holding chamber, Use with albuterol (PROAIR HFA/PROVENTIL HFA/VENTOLIN HFA) 108 (90 Base) MCG/ACT inhaler, Disp: 1 each, Rfl: 0     budesonide-formoterol (SYMBICORT/BREYNA) 160-4.5 MCG/ACT Inhaler, Inhale 2 puffs into the lungs 2 times daily., Disp: 10.2 g, Rfl: 11     nicotine polacrilex (NICORETTE) 4 MG gum, 4 mg every hour as needed, Disp: , Rfl:     ALLERGIES: Bee venom, Cefdinir, Levofloxacin, and Prednisone    SOCIAL HISTORY:    Social History     Socioeconomic History     Marital status:      Spouse name: Not on file     Number of children: Not on file     Years of education: Not on file     Highest education level: Not on file   Occupational History     Occupation: HOMEMAKER   Tobacco Use     Smoking status: Light Smoker     Types: Cigarettes     Start date: 9/17/2021     Smokeless tobacco: Never     Tobacco comments:     I am willing.  I haven't been successful and my mindset is not completely there yet. 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     Drug use: Never     Sexual activity: Not Currently     Partners: Male     Birth control/protection: None     Comment: Hysterectomy   Other Topics Concern     Parent/sibling w/ CABG, MI or angioplasty before 65F 55M? Yes   Social History Narrative    October 7, 2021    ENVIRONMENTAL HISTORY: [place] The home is heated with a electric furnace. They do have central air conditioning. The patient's bedroom  is furnished with carpeting in bedroom, allergen mattress cover, and allergen pillowcase cover.  Pets inside the house include 1 dog. There is no history of cockroach or mice infestation. There is/are 0 smokers in the house.  The house does have a damp basement.      Social Drivers of Health     Financial Resource Strain: Low Risk  (2/6/2024)    Received from [facility]    Overall Financial Resource Strain (CARDIA)      Difficulty of Paying Living Expenses: Not hard at all   Food Insecurity: No Food Insecurity (5/15/2025)    Received from [facility]    Hunger Vital Sign      Worried About Running Out of Food in the Last Year: Never true      Ran Out of Food in the Last Year: Never true   Transportation Needs: No Transportation Needs (5/15/2025)    Received from [facility]    PRAPARE - Transportation      Lack of Transportation (Medical): No      Lack of Transportation (Non-Medical): No   Physical Activity: Insufficiently Active (7/26/2019)    Received from [facility]    Exercise Vital Sign      Days of Exercise per Week: 3 days      Minutes of Exercise per Session: 30 min   Stress: No Stress Concern Present (7/26/2019)    Received from [facility]    [employer] [place] [employer] - Occupational Stress Questionnaire      Feeling of Stress : Only a little   Social Connections: Socially Integrated (7/26/2019)    Received from [facility]    Social Connection and Isolation Panel [NHANES]      Frequency of Communication with Friends and Family: More than three times a week      Frequency of Social Gatherings with Friends and Family: Once a week      Attends Jew Services: More than 4 times per year      Active Member of Clubs or Organizations: Yes      Attends Club or Organization Meetings: More than 4 times per year      Marital Status:    Interpersonal Safety: Not At Risk (5/15/2025)     Received from [facility] IP Custom IPV      Do you feel UNSAFE in any of your personal relationships with your family members or any other acquaintances?: No   Housing Stability: Low Risk  (5/15/2025)    Received from [facility]    Housing Stability Vital Sign      Unable to Pay for Housing in the Last Year: No      Number of Times Moved in the Last Year: 0      Homeless in the Last Year: No         FAMILY HISTORY:   Family History   Problem Relation Age of Onset     Bleeding Disorder Mother      Hypertension Mother      Hyperlipidemia Mother      Diabetes Father      Heart Disease Father      Cerebrovascular Disease Father      Hypertension Father      Hyperlipidemia Father      Cancer Brother      Bleeding Disorder Brother      Anesthesia Reaction No family hx of      Deep Vein Thrombosis (DVT) No family hx of       Non-contributory for problems with anesthesia    REVIEW OF SYSTEMS:   The patient was asked a 14 point review of systems regarding constitutional symptoms, eye symptoms, ears, nose, mouth, throat symptoms, cardiovascular symptoms, respiratory symptoms, gastrointestinal symptoms, genitourinary symptoms, musculoskeletal symptoms, integumentary symptoms, neurological symptoms, psychiatric symptoms, endocrine symptoms, hematologic/lymphatic symptoms, and allergic/ immunologic symptoms.   The pertinent factors have been included in the HPI and below.  Patient Supplied Answers to Review of Systems      7/5/2025     9:21 AM   [facility] ROS   Constitutional Unexplained fatigue   Neurology Headache   Ears, Nose, Throat Ear pain    Nasal congestion or drainage    Sore throat    Hoarseness   Cardiopulmonary Cough    Wheezing   Gastrointestinal/Genitourinary Heartburn/indigestion    Constipation   Musculoskeletal Swollen joints    Swollen legs/feet   Hematologic Bleeding problems    Easy bruising   Endocrine Heat or cold intolerance   Skin  Skin lesions       Physical Examination   The patient underwent a physical examination as described below. The pertinent positive and negative findings are summarized after the description of the examination.  Constitutional: The patient's developmental and nutritional status was assessed. The patient's voice quality was assessed.  Head and Face: The head and face were inspected for deformities. The sinuses were palpated. The salivary glands were palpated. Facial muscle strength was assessed bilaterally.  Eyes: Extraocular movements and primary gaze alignment were assessed.  Ears, Nose, Mouth and Throat: The ears and nose were examined for deformities. The nasal septum, mucosa, and turbinates were inspected by anterior rhinoscopy. The lips, teeth, and gums were examined for abnormalities. The oral mucosa, tongue, palate, tonsils, lateral and posterior pharynx were inspected for the presence of asymmetry or mucosal lesions.    Neck: The tracheal position was noted, and the neck mass palpated to determine if there were any asymmetries, abnormal neck masses, thyromegally, or thyroid nodules.  Respiratory: The nature of the breathing and chest expansion/symmetry was observed.  Cardiovascular: The patient was examined to determine the presence of any edema or jugular venous distension.  Abdomen: The contour of the abdomen was noted.  Lymphatic: The patient was examined for infraclavicular lymphadenopathy.  Musculoskeletal: The patient was inspected for the presence of skeletal deformities.  Extremities: The extremities were examined for any clubbing or cyanosis.  Skin: The skin was examined for inflammatory or neoplastic conditions.  Neurologic: The patient's orientation, mood, and affect were noted. The cranial nerve  functions were examined.  Other pertinent positive and negative findings on physical examination:   OC/OP: no lesions, uvula midline, soft palate elevates symmetrically   Neck: no lesions, no TH tenderness  to palpation     All other physical examination findings were within normal limits and noncontributory.    Procedures   Video Laryngoscopy with Stroboscopy (CPT 74460)    Preoperative Diagnosis:  Dysphonia and throat symptoms  Postoperative Diagnosis: Dysphonia and throat symptoms  Indication:  Patient has new or persistent dysphonia and throat symptoms.  This requires evaluation by stroboscopy to fully delineate the laryngeal functioning; especially mucosal wave assessment, ultrasharp visualization of lesions on the vocal folds, and overall functioning of the larynx.  Details of Procedure: A 70 degree rigid telescopic laryngoscope or a distal chip flexible scope was used. The lighting was obtained via a light cable connected to a stroboscopic unit. The telescope was inserted either transorally or transnasally until the vocal folds could be visualized. The patient was instructed to sustain the vowel  ee  at a comfortable pitch and loudness as the voice was monitored by a microphone connected to a fundamental frequency tracker. This circuit tracked vocal periodicity, allowing the light to flash in synchrony with the glottal cycles. A setting on the stroboscope was set to change the phase of light strobing with relation to the vocal fundamental frequency, producing an image of 1 to 2 glottal cycles every second. The video images were recorded for analysis. Use of the variable speed, slow and stop scan allowed careful study of pertinent segments of laryngeal function to increase accuracy of clinical assessments of function and dysfunction.  In particular, features of glottal closure, mucosal wave on the vocal fold cover and laryngeal symmetry were analyzed. Lastly, the patient was asked to phonate speech samples and auditory/perceptual evaluation of voice and resonance were performed.  The vocal quality was carefully evaluated for hoarseness, breathiness, loudness, phrase length and intelligibility to determine the  "source of dysphonia.     Findings:       B. LARYNGOVIDEOSTROBOSCOPY   Anatomic/Physiological Deviations:  RNC, right vocal fold well healed, left vocal fold stable small leukoplakia, mild Kiki's edema  Mucosal wave: Right:     No restriction                             Left:     No restriction  Bilateral Vocal Fold Vibration: Symmetric  Vocal Process: Right:     No restriction    Left:    No restriction  Vocal Fold closure: Complete glottal closure     Complication(s): None  Disposition: Patient tolerated the procedure well              Review of Relevant Clinical Data   I personally reviewed:  Notes:     06/10/2025 Melinda Marquis, SLP  SUBJECTIVE:  Voice doing better  Has some soreness but hard to tell if it's because of coughing  Coughing in her sleep and still productive  Has supplemental oxygen and has been in the mid 80s  Still has some hoarseness  Has some globus sensation after eating toast right now and just started famotidine  ___________________  Labs:  Lab Results   Component Value Date    TSH 0.75 05/08/2025     Lab Results   Component Value Date     05/08/2025    CO2 25 05/08/2025    BUN 14.7 05/08/2025     Lab Results   Component Value Date    WBC 8.3 10/30/2024    HGB 15.8 (H) 10/30/2024    HCT 46.7 10/30/2024    MCV 98 10/30/2024     10/30/2024     Lab Results   Component Value Date    PTT 30 10/21/2020    INR 1.01 02/17/2023     No results found for: \"BRICE\"  No components found for: \"RHEUMATOIDFACTOR\", \"RF\"  Lab Results   Component Value Date    CRP <2.9 09/21/2020     No components found for: \"CKTOT\", \"URICACID\"  No components found for: \"C3\", \"C4\", \"DSDNAAB\", \"NDNAABIFA\"  No results found for: \"MPOAB\"    Patient reported Quality of Life (QOL) Measures   Patient Supplied Answers To VHI Questionnaire      5/26/2025    12:48 PM   Voice Handicap Index (VHI-10)   My voice makes it difficult for people to hear me 0   People have difficulty understanding me in a noisy room 1   My voice " "difficulties restrict my personal and social life.  0   I feel left out of conversations because of my voice 0   My voice problem causes me to lose income 0   I feel as though I have to strain to produce voice 2   The clarity of my voice is unpredictable 2   My voice problem upsets me 1   My voice makes me feel handicapped 0   People ask, \"What's wrong with your voice?\" 0   VHI-10 6        Patient-reported         Patient Supplied Answers To EAT Questionnaire      3/13/2025    12:41 PM   Eating Assessment Tool (EAT-10)   My swallowing problem has caused me to lose weight 2   My swallowing problem interferes with my ability to go out for meals 0   Swallowing liquids takes extra effort 1   Swallowing solids takes extra effort 1   Swallowing pills takes extra effort 1   Swallowing is painful 1   The pleasure of eating is affected by my swallowing 1   When I swallow food sticks in my throat 2   I cough when I eat 2   Swallowing is stressful 1   EAT-10 12        Patient-reported         Patient Supplied Answers To CSI Questionnaire      6/9/2025    12:23 PM   Cough Severity Index (CSI)   My cough is worse when I lie down 3   My coughing problem causes me to restrict my personal and social life 0   I tend to avoid places because of my cough problem 0   I feel embarrassed because of my coughing problem 1   People ask, ''What's wrong?'' because I cough a lot 0   I run out of air when I cough 1   My coughing problem affects my voice 2   My coughing problem limits my physical activity 2   My coughing problem upsets me 1   People ask me if I am sick because I cough a lot 2   CSI Score 12        Patient-reported         Patient Supplied Answers to Dyspnea Index Questionnaire:      6/9/2025    12:23 PM   Dyspnea Index   1. I have trouble getting air in. 2   2. I feel tightness in my throat when I am having erma breathing problem. 3   3. It takes more effort to breathe than it used to. 2   4. Change in weather affect my breathing " problem. 2   5. My breathing gets worse with stress. 1   6. I make sound/noise breathing in 2   7. I have to strain to breathe. 2   8. My shortness of breath gets worse with exercise or physical activity 2   9. My breathing problem makes me feel stressed. 2   10. My breathing problem casuses me to restrict my personal and social life. 2   Dyspnea Index Total Score 20        Patient-reported         Scribe Disclosure:   I, Lidya Guerrero, am serving as a scribe; to document services personally performed by Miriam Jacobs MD -based on data collection and the provider's statements to me.     Provider Disclosure:  I agree with above History, Review of Systems, Physical exam and Plan.  I have reviewed the content of the documentation and have edited it as needed. I have personally performed the services documented here and the documentation accurately represents those services and the decisions I have made.        Electronically signed by:  Miriam Jacobs MD    Laryngology    [facility]  Department of  Otolaryngology - Head and Neck Surgery  Clinics [place] [address]  Appointment line: [phone]  Fax: [phone]  [email or web address]         Again, thank you for allowing me to participate in the care of your patient.      Sincerely,    Miriam Jacobs MD

## 2025-07-17 ENCOUNTER — TELEPHONE (OUTPATIENT)
Dept: GASTROENTEROLOGY | Facility: CLINIC | Age: 60
End: 2025-07-17
Payer: COMMERCIAL

## 2025-07-17 NOTE — TELEPHONE ENCOUNTER
REFERRAL INFORMATION:  Referring Provider:  Miriam Jacobs MD   Referring Clinic:  [facility]   Reason for Visit/Diagnosis:   K21.9 (ICD-10-CM) - Esophageal reflux   R13.10 (ICD-10-CM) - Dysphagia        FUTURE VISIT INFORMATION:  Appointment Date: 8/4/25     NOTES STATUS DETAILS   OFFICE NOTE from Referring Provider Internal 7/10/25   OFFICE NOTE from Other Specialist Internal 3/18/25-Melinda Marquis SLP   MEDICATION LIST Internal    PROCEDURES     ENDOSCOPY  Internal 3/26/25  9/2/22   COLONOSCOPY Internal 8/22/22   STOOL TESTING     LABS     PERTINENT LABS Care Everywhere    PATHOLOGY REPORTS (RELATED) Internal EGD 3/26/25, 9/2/22  Colonoscopy 8/22/22   IMAGES     CT In process [place] 4/15/25-CT chest   XRAY Internal 4/16/25-XR UGI/Esoph-[place]  5/16/25-XR chest-[place]  2/27/25-XR Esophagus  2/27/25-XR video swallow     Records Requested   July 17, 2025 10:43 AM  [id number]   Facility  [place]   Outcome Requested images

## 2025-07-17 NOTE — TELEPHONE ENCOUNTER
[facility] Call Center    Phone Message    May a detailed message be left on voicemail: Yes    Reason for Call: Other: Patient is currently scheduled on 8/4, as visit type New Esophageal Emergent. This is outside the expected timeline for this referral. Patient has been added to the waitlist.          Action Taken: Message routed to:  Other: GI REFERRAL TRIAGE POOL     Travel Screening: Not Applicable

## 2025-07-21 ENCOUNTER — OFFICE VISIT (OUTPATIENT)
Dept: OTOLARYNGOLOGY | Facility: CLINIC | Age: 60
End: 2025-07-21
Attending: OTOLARYNGOLOGY
Payer: COMMERCIAL

## 2025-07-21 VITALS — HEIGHT: 64 IN | WEIGHT: 123 LBS | BODY MASS INDEX: 21 KG/M2

## 2025-07-21 DIAGNOSIS — J32.4 CHRONIC PANSINUSITIS: ICD-10-CM

## 2025-07-21 PROCEDURE — 1125F AMNT PAIN NOTED PAIN PRSNT: CPT | Performed by: OTOLARYNGOLOGY

## 2025-07-21 PROCEDURE — 87205 SMEAR GRAM STAIN: CPT | Performed by: OTOLARYNGOLOGY

## 2025-07-21 PROCEDURE — 99212 OFFICE O/P EST SF 10 MIN: CPT | Mod: 25 | Performed by: OTOLARYNGOLOGY

## 2025-07-21 PROCEDURE — 31231 NASAL ENDOSCOPY DX: CPT | Mod: 52 | Performed by: OTOLARYNGOLOGY

## 2025-07-21 PROCEDURE — 99000 SPECIMEN HANDLING OFFICE-LAB: CPT | Performed by: PATHOLOGY

## 2025-07-21 RX ORDER — DUPILUMAB 300 MG/2ML
INJECTION, SOLUTION SUBCUTANEOUS
COMMUNITY
Start: 2025-07-03

## 2025-07-21 ASSESSMENT — PAIN SCALES - GENERAL: PAINLEVEL_OUTOF10: MODERATE PAIN (6)

## 2025-07-21 NOTE — PROGRESS NOTES
[facility] [address] [address]  Phone: [phone]  Fax: [phone]     Patient:  Gracy Bautista, Date of birth 1965  Date of Visit:  07/21/2025   Referring Provider Referred Self     Assessment & Plan  (J32.4) Chronic pansinusitis  Comment: Ongoing sinopulmonary disease. Patient was recently started on Dupixent (2 doses) and is doing well. History of eosinophilia, has cultured staph, pseudomonas, fungus. Ongoing work-up regarding IgG and subclass levels. Allergy and rheumatologic work up otherwise unremarkable. Today patient reported some right-sided facial pain. Her endoscopic exam showed purulent discharge from the right superior meatus. This was cultured. Plan to follow-up culture prior to initiating Abx therapy. Plan to Continue budesonide and gent irrigations and see me back in 3 months. For her nasal dryness she should continue frequent saline rinses and could consider saline spray.      15 minutes spent by me on the date of the encounter doing chart review, history and exam, documentation and further activities per the note. This time is in addition to separately billable procedure.     I, Digna Jaimes MD, was present with the medical student who participated in the service and in the documentation of the note.  I have verified the history and personally performed the physical exam and medical decision making.  I agree with the assessment and plan of care as documented in the note.     Digna Jaimes MD       HPI:   Reports that she has been doing well. Some nasal dryness and pain on the right side of her face. Her sinuses feel better. She continues to do saline rinsese 4 times a day, and gent + budesonide rinses once a day. Patient was recently started on Dupixent (2 doses). Ongoing work-up regarding IgG and subclass levels- plan to reassess following recent Prevnar 20.       Brother had lung disease,  diabetes, kidney failure  Father lung disease, diabetes, EtoH.      Procedure:  Endoscopy indicated for exam of sinuses  Topical anesthetic/decongestant spray applied.  Rigid scope used for visualization.  Findings: significantly reduced rubber cement like secretions. Right anterior middle meatus with purulent discharge and local mucosal inflammation. This was cultured. Rest of middle meatus and posterior meatus visualized. Her maxillary antrum is clear on both sides. Some thick secretions suctioned from anterior middle meatus on the left.

## 2025-07-21 NOTE — PATIENT INSTRUCTIONS
You were seen in the ENT Clinic today by Dr. Jaimes. If you have any questions or concerns after your appointment, please contact us (see below)       2.   The following recommendations have been made based upon your appointment today:  Cultures of your sinuses have been sent to the lab. We will follow up with you on results once your provider has had the opportunity to view them.      3.   Plan to return to the ENT clinic in 3 months.           How to Contact Us:  Send a Scayl message to your provider. Our team will respond to you via Scayl. Occasionally, we will need to call you to get further information.  For urgent matters (Monday-Friday), call the ENT Clinic: [phone] and speak with a call center team member - they will route your call appropriately.   If you'd like to speak directly with a nurse, please find our contact information below. We do our best to check voicemail frequently throughout the day, and will work to call you back within 1-2 days. For urgent matters, please use the general clinic phone numbers listed above.     Ashley SAUCEDA RN  Direct: [phone]  Rosalinda AGUAYO LPN  Direct: [phone]         [facility]  Department of Otolaryngology

## 2025-07-21 NOTE — LETTER
7/21/2025       RE: Gracy Bautista  [address]  [place] [address]     Dear Colleague,    Thank you for referring your patient, Gracy Bautista, to the [facility] [place] [facility]. Please see a copy of my visit note below.      [facility] [place] [address]  [address] FLO[address]  [facility] [address]  Phone: [phone]  Fax: [phone]     Patient:  Gracy Bautista, Date of birth 1965  Date of Visit:  07/21/2025   Referring Provider Referred Self     Assessment & Plan  (J32.4) Chronic pansinusitis  Comment: Ongoing sinopulmonary disease. Patient was recently started on Dupixent (2 doses) and is doing well. History of eosinophilia, has cultured staph, pseudomonas, fungus. Ongoing work-up regarding IgG and subclass levels. Allergy and rheumatologic work up otherwise unremarkable. Today patient reported some right-sided facial pain. Her endoscopic exam showed purulent discharge from the right superior meatus. This was cultured. Plan to follow-up culture prior to initiating Abx therapy. Plan to Continue budesonide and gent irrigations and see me back in 3 months. For her nasal dryness she should continue frequent saline rinses and could consider saline spray.      15 minutes spent by me on the date of the encounter doing chart review, history and exam, documentation and further activities per the note. This time is in addition to separately billable procedure.     I, Digna Jaimes MD, was present with the medical student who participated in the service and in the documentation of the note.  I have verified the history and personally performed the physical exam and medical decision making.  I agree with the assessment and plan of care as documented in the note.     Digna Jaimes MD       HPI:   Reports that she has been doing well. Some nasal dryness and pain  on the right side of her face. Her sinuses feel better. She continues to do saline rinsese 4 times a day, and gent + budesonide rinses once a day. Patient was recently started on Dupixent (2 doses). Ongoing work-up regarding IgG and subclass levels- plan to reassess following recent Prevnar 20.       Brother had lung disease, diabetes, kidney failure  Father lung disease, diabetes, EtoH.      Procedure:  Endoscopy indicated for exam of sinuses  Topical anesthetic/decongestant spray applied.  Rigid scope used for visualization.  Findings: significantly reduced rubber cement like secretions. Right anterior middle meatus with purulent discharge and local mucosal inflammation. This was cultured. Rest of middle meatus and posterior meatus visualized. Her maxillary antrum is clear on both sides. Some thick secretions suctioned from anterior middle meatus on the left.     Again, thank you for allowing me to participate in the care of your patient.      Sincerely,    Digna Jaimes MD

## 2025-07-22 NOTE — TELEPHONE ENCOUNTER
The Pt is now scheduled within the appropriate time frame of two weeks for emergent triaged referrals. No rescheduling is needed anymore. Closing encounter.

## 2025-07-24 ENCOUNTER — TELEPHONE (OUTPATIENT)
Dept: OTOLARYNGOLOGY | Facility: CLINIC | Age: 60
End: 2025-07-24
Payer: COMMERCIAL

## 2025-07-24 DIAGNOSIS — J32.4 CHRONIC PANSINUSITIS: ICD-10-CM

## 2025-07-24 DIAGNOSIS — J32.4 CHRONIC PANSINUSITIS: Primary | ICD-10-CM

## 2025-07-24 LAB
BACTERIA SINUS CULT: ABNORMAL
GRAM STAIN RESULT: ABNORMAL
GRAM STAIN RESULT: ABNORMAL

## 2025-07-24 RX ORDER — LEVOFLOXACIN 500 MG/1
500 TABLET, FILM COATED ORAL DAILY
Qty: 14 TABLET | Refills: 0 | Status: SHIPPED | OUTPATIENT
Start: 2025-07-24 | End: 2025-08-07

## 2025-07-24 RX ORDER — LEVOFLOXACIN 500 MG/1
500 TABLET, FILM COATED ORAL DAILY
Qty: 14 TABLET | Refills: 0 | Status: SHIPPED | OUTPATIENT
Start: 2025-07-24 | End: 2025-07-24

## 2025-07-24 NOTE — TELEPHONE ENCOUNTER
Writer received call from pt stating that her symptoms have gotten worse since Monday. Hoping to have an abx prescribed. Requesting call back.    Ashley Rosen RN

## 2025-07-24 NOTE — TELEPHONE ENCOUNTER
Writer LVM for pt stating that a prescription for Levaquin has been sent to pts pharmacy. Writer left direct line for pt to call with questions or concerns.    Ashley Rosen RN

## 2025-07-29 ENCOUNTER — TELEPHONE (OUTPATIENT)
Dept: OTOLARYNGOLOGY | Facility: CLINIC | Age: 60
End: 2025-07-29
Payer: COMMERCIAL

## 2025-07-29 DIAGNOSIS — J32.4 CHRONIC PANSINUSITIS: Primary | ICD-10-CM

## 2025-07-29 RX ORDER — PREDNISONE 20 MG/1
TABLET ORAL
Qty: 15 TABLET | Refills: 0 | Status: SHIPPED | OUTPATIENT
Start: 2025-07-29 | End: 2025-08-08

## 2025-07-29 NOTE — CONFIDENTIAL NOTE
Writer received call from pt regarding current symptoms. Pt stated that she has been taking the Levaquin that was prescribed but is having severe inflammation inside her nostrils. Pt had to stop budesonide sinus rinses due to pain. Writer will discuss with Dr. Jaimes and call back later.     Ashley Rosen RN

## 2025-07-29 NOTE — CONFIDENTIAL NOTE
Problem: Breathing Pattern Ineffective  Goal: Air exchange is effective, demonstrated by Sp02 sat of greater then or = 92% (or as ordered)  Outcome: Monitoring/Evaluating progress  Goal: Respiratory pattern is quiet and regular without report of SOB  Outcome: Monitoring/Evaluating progress  Goal: Breathing pattern demonstrates minimal apnea during sleep with appropriate use of airway pressure support devices  Outcome: Monitoring/Evaluating progress  Goal: Verbalizes/demonstrates effective breathing management strategies  Description: Document education using the patient education activity.   Outcome: Monitoring/Evaluating progress  Goal: Minimize respiratory effort related to dyspnea/shortness of breath (Hospice)  Outcome: Monitoring/Evaluating progress     Problem: Pneumonia  Goal: S/S of acute pneumonia are resolved  Description: If acute pneumonia is present, monitor for resolution of fever, cough, secretions and other test values based on presentation.  Outcome: Monitoring/Evaluating progress  Goal: Verbalizes understanding of pneumonia, treatment, and ongoing prevention  Description: Document on Patient Education Activity  Outcome: Monitoring/Evaluating progress     Problem: Artificial Airway Management  Goal: # Maintains effective artificial airway  Outcome: Monitoring/Evaluating progress  Goal: # Maintains skin integrity around the airway  Outcome: Monitoring/Evaluating progress  Goal: # Tolerates Activity/ADL without s/s of intolerance  Description: Monitor patient tolerance of the artificial airway while they perform activities and ADLs include bathing, showering, shaving, and eating.  Outcome: Monitoring/Evaluating progress  Goal: Verbalizes understanding of artifical airway function and care  Description: Document on Patient Education Activity  Outcome: Monitoring/Evaluating progress  Goal: Demonstrates ability to manage Trach: site care, suctioning, trach talbert care & inner cannula care if  Writer LVM stating that a prescription for prednisone to pts pharmacy. Writer educated pt that taking prednisone with an antibiotic can increase chances of tendon rupture. Writer educated on signs and symptoms and encouraged pt to call with questions and concerns.    Ashley Rosen RN   needed.  Description: Document on Patient Education Activity    Outcome: Monitoring/Evaluating progress  Goal: Demonstrates ability to perform Trach cuff maintenance  Description: Document on Patient Education Activity    Outcome: Monitoring/Evaluating progress  Goal: Demonstrates ability to manage Laryngectomy: stoma care, suctioning, and humidification  Description: Document on Patient Education Activity  Outcome: Monitoring/Evaluating progress  Goal: Demonstrates ability to manage communication (speaking valve or cork insertion)  Description: Document on Patient Education Activity  Outcome: Monitoring/Evaluating progress     Problem: Pain  Goal: Acceptable pain level achieved/maintained at rest using appropriate pain scale for the patient  Outcome: Monitoring/Evaluating progress  Goal: Acceptable pain level achieved/maintained with activity using appropriate pain scale for the patient  Outcome: Monitoring/Evaluating progress  Goal: Acceptable pain level achieved/maintained without oversedation  Outcome: Monitoring/Evaluating progress     Problem: Impaired Physical Mobility  Goal: Functional status is maintained or returned to baseline during hospitalization  Outcome: Monitoring/Evaluating progress  Goal: Tolerates activity for discharge setting with no abnormal symptoms  Outcome: Monitoring/Evaluating progress

## 2025-08-04 ENCOUNTER — OFFICE VISIT (OUTPATIENT)
Dept: GASTROENTEROLOGY | Facility: CLINIC | Age: 60
End: 2025-08-04
Payer: COMMERCIAL

## 2025-08-04 VITALS
HEART RATE: 72 BPM | SYSTOLIC BLOOD PRESSURE: 106 MMHG | DIASTOLIC BLOOD PRESSURE: 70 MMHG | BODY MASS INDEX: 20.33 KG/M2 | OXYGEN SATURATION: 97 % | WEIGHT: 122 LBS | HEIGHT: 65 IN

## 2025-08-04 DIAGNOSIS — R49.0 DYSPHONIA: ICD-10-CM

## 2025-08-04 DIAGNOSIS — R13.19 ESOPHAGEAL DYSPHAGIA: Primary | ICD-10-CM

## 2025-08-04 DIAGNOSIS — K21.9 GASTROESOPHAGEAL REFLUX DISEASE, UNSPECIFIED WHETHER ESOPHAGITIS PRESENT: ICD-10-CM

## 2025-08-04 PROCEDURE — 3078F DIAST BP <80 MM HG: CPT | Performed by: PHYSICIAN ASSISTANT

## 2025-08-04 PROCEDURE — 3074F SYST BP LT 130 MM HG: CPT | Performed by: PHYSICIAN ASSISTANT

## 2025-08-04 PROCEDURE — 99215 OFFICE O/P EST HI 40 MIN: CPT | Performed by: PHYSICIAN ASSISTANT

## 2025-08-04 PROCEDURE — 1126F AMNT PAIN NOTED NONE PRSNT: CPT | Performed by: PHYSICIAN ASSISTANT

## 2025-08-04 RX ORDER — PANTOPRAZOLE SODIUM 40 MG/1
40 TABLET, DELAYED RELEASE ORAL
COMMUNITY
Start: 2025-06-16 | End: 2025-08-04

## 2025-08-04 RX ORDER — AZITHROMYCIN 250 MG/1
250 TABLET, FILM COATED ORAL DAILY
COMMUNITY

## 2025-08-04 RX ORDER — PANTOPRAZOLE SODIUM 40 MG/1
40 TABLET, DELAYED RELEASE ORAL 2 TIMES DAILY
Qty: 180 TABLET | Refills: 1 | Status: SHIPPED | OUTPATIENT
Start: 2025-08-04 | End: 2025-11-02

## 2025-08-04 ASSESSMENT — PAIN SCALES - GENERAL: PAINLEVEL_OUTOF10: NO PAIN (0)

## 2025-08-06 ENCOUNTER — TELEPHONE (OUTPATIENT)
Dept: GASTROENTEROLOGY | Facility: CLINIC | Age: 60
End: 2025-08-06
Payer: COMMERCIAL

## 2025-08-18 ENCOUNTER — TELEPHONE (OUTPATIENT)
Dept: OTOLARYNGOLOGY | Facility: CLINIC | Age: 60
End: 2025-08-18
Payer: COMMERCIAL

## 2025-08-18 DIAGNOSIS — J32.4 CHRONIC PANSINUSITIS: Primary | ICD-10-CM

## 2025-08-25 ENCOUNTER — TELEPHONE (OUTPATIENT)
Dept: OTOLARYNGOLOGY | Facility: CLINIC | Age: 60
End: 2025-08-25
Payer: COMMERCIAL

## 2025-08-25 ENCOUNTER — TELEPHONE (OUTPATIENT)
Dept: GASTROENTEROLOGY | Facility: CLINIC | Age: 60
End: 2025-08-25
Payer: COMMERCIAL

## 2025-10-09 ENCOUNTER — PRE VISIT (OUTPATIENT)
Dept: GASTROENTEROLOGY | Facility: CLINIC | Age: 60
End: 2025-10-09

## (undated) DEVICE — LINEN TOWEL PACK X5 5464

## (undated) DEVICE — ENDO ADPT BRONCH SWIVEL Y A1002

## (undated) DEVICE — KIT ENDO FIRST STEP DISINFECTANT 200ML W/POUCH EP-4

## (undated) DEVICE — SYR 30ML LL W/O NDL 302832

## (undated) DEVICE — KIT CONNECTOR FOR OLYMPUS ENDOSCOPES DEFENDO 100310

## (undated) DEVICE — SYR PISTON URETHRAL 60ML 68000

## (undated) DEVICE — PITCHER STERILE 1000ML  SSK9004A

## (undated) DEVICE — ENDO VALVE BX EVIS MAJ-210

## (undated) DEVICE — TRACKER PATIENT NON-INVASIVE AXIEM 9734887XOM

## (undated) DEVICE — SPECIMEN TRAP STRYKER NEPTUNE IN-LINE 0700-050-000

## (undated) DEVICE — SOL WATER IRRIG 500ML BOTTLE 2F7113

## (undated) DEVICE — SUCTION MANIFOLD NEPTUNE 2 SYS 1 PORT 702-025-000

## (undated) DEVICE — GOWN IMPERVIOUS 2XL BLUE

## (undated) DEVICE — SOL NACL 0.9% IRRIG 500ML BOTTLE 2F7123

## (undated) DEVICE — DRSG HOLDER NASAL DALE 600

## (undated) DEVICE — PACK ENT ENDOSCOPY UMMC

## (undated) DEVICE — SPONGE COTTONOID 1/2X3" 80-1407

## (undated) DEVICE — ENDO SNARE EXACTO COLD 9MM LOOP 2.4MMX230CM 00711115

## (undated) DEVICE — PACK ENT ENDOSCOPY CUSTOM ASC

## (undated) DEVICE — TUBING SUCTION 12"X1/4" N612

## (undated) DEVICE — KIT ENDO TURNOVER/PROCEDURE CARRY-ON 101822

## (undated) DEVICE — SPONGE COTTONOID 2X1/2" 80-1406

## (undated) DEVICE — WIPE INSTRUMENT MEROCEL 400200

## (undated) DEVICE — DRAPE WARMER 66X44" ORS-300

## (undated) DEVICE — NDL BUTTERFLY 25GA .75" 367298

## (undated) DEVICE — NDL 25GA 2"  8881200441

## (undated) DEVICE — ENDO TRAP POLYP E-TRAP 00711099

## (undated) DEVICE — DRSG TELFA 3X8" 1238

## (undated) DEVICE — ACUSPOT 712 MICROMANIPULATOR

## (undated) DEVICE — SYR 30ML SLIP TIP W/O NDL 302833

## (undated) DEVICE — SPECIMEN CONTAINER 3OZ W/FORMALIN 59901

## (undated) DEVICE — ENDO SHEATH STORZ SHARPSITE ENDOSCRUB 0DEG 4MM 1912000

## (undated) DEVICE — TAPE DURAPORE 1"X1.5YD SILK 1538S-1

## (undated) DEVICE — Device

## (undated) DEVICE — TUBING SUCTION MEDI-VAC SOFT 3/16"X20' N520A

## (undated) DEVICE — ENDO BITE BLOCK ADULT OMNI-BLOC

## (undated) DEVICE — SOL WATER IRRIG 1000ML BOTTLE 2F7114

## (undated) DEVICE — PAD CHUX UNDERPAD 23X24" 7136

## (undated) DEVICE — BLADE SINUS TRICUT STR 4MMX13CM FUSION ROTATE W/TRACKING

## (undated) DEVICE — SYR 03ML SLIP TIP W/O NDL LATEX FREE 309656

## (undated) DEVICE — SYR 01ML 27GA 0.5" NDL TBC 309623

## (undated) DEVICE — TUBING SMOKE EVAC .635CMX3M SEA3705

## (undated) DEVICE — ACUPULSE DUO CO2 LASER

## (undated) DEVICE — JELLY LUBRICATING SURGILUBE 2OZ TUBE

## (undated) DEVICE — DRSG EYE PAD STERILE 1.63X2.63" NON21600

## (undated) DEVICE — SUCTION MANIFOLD NEPTUNE 2 SYS 4 PORT 0702-020-000

## (undated) DEVICE — GLOVE EXAM NITRILE LG PF LATEX FREE 5064

## (undated) DEVICE — BITE BLOCK BLOX ADJ STRAP 60FR SBT-546-100

## (undated) DEVICE — SOL NACL 0.9% INJ 1000ML BAG 2B1324X

## (undated) DEVICE — SYR 10ML LL W/O NDL 302995

## (undated) DEVICE — ENDO VALVE SUCTION BRONCH EVIS MAJ-209

## (undated) DEVICE — ENDO DEVICE LOCKING AND BIOPSY CAP M00545261

## (undated) DEVICE — GLOVE PROTEXIS POWDER FREE SMT 6.5  2D72PT65X

## (undated) DEVICE — GLOVE BIOGEL PI ULTRATOUCH SZ 6.5 41165

## (undated) DEVICE — NDL BLUNT 18GA 1" W/O FILTER 305181

## (undated) DEVICE — ESU GROUND PAD ADULT W/CORD E7507

## (undated) DEVICE — ENDO FORCEP BX CAPTURA PRO SPIKE G50696

## (undated) DEVICE — SYR 03ML LL W/O NDL 309657

## (undated) DEVICE — SOL NACL 0.9% IRRIG 1000ML BOTTLE 2F7124

## (undated) DEVICE — ENDO FORCEP ENDOJAW BIOPSY 2.8MMX230CM FB-220U

## (undated) DEVICE — GLOVE BIOGEL PI ULTRATOUCH G SZ 7.5 42175

## (undated) DEVICE — SUCTION CATH AIRLIFE TRI-FLO W/CONTROL PORT 14FR  T60C

## (undated) DEVICE — SPONGE RAY-TEC 4X8" 7318

## (undated) DEVICE — PACK ENDOSCOPY GI CUSTOM UMMC

## (undated) DEVICE — ENDO TUBING CO2 SMARTCAP STERILE DISP 100145CO2EXT

## (undated) DEVICE — TRACKER ENT OTS INSTRUMENT FUSION 9733533

## (undated) DEVICE — TUBING IRRIGATOR STRAIGHTSHOT XPS 1895522

## (undated) RX ORDER — OXYMETAZOLINE HYDROCHLORIDE 0.05 G/100ML
SPRAY NASAL
Status: DISPENSED
Start: 2025-03-26

## (undated) RX ORDER — DIPHENHYDRAMINE HYDROCHLORIDE 50 MG/ML
INJECTION INTRAMUSCULAR; INTRAVENOUS
Status: DISPENSED
Start: 2023-02-17

## (undated) RX ORDER — DEXAMETHASONE SODIUM PHOSPHATE 10 MG/ML
INJECTION, SOLUTION INTRAMUSCULAR; INTRAVENOUS
Status: DISPENSED
Start: 2022-01-21

## (undated) RX ORDER — PROPOFOL 10 MG/ML
INJECTION, EMULSION INTRAVENOUS
Status: DISPENSED
Start: 2025-03-26

## (undated) RX ORDER — ONDANSETRON 2 MG/ML
INJECTION INTRAMUSCULAR; INTRAVENOUS
Status: DISPENSED
Start: 2022-08-22

## (undated) RX ORDER — ONDANSETRON 2 MG/ML
INJECTION INTRAMUSCULAR; INTRAVENOUS
Status: DISPENSED
Start: 2022-01-21

## (undated) RX ORDER — LIDOCAINE HYDROCHLORIDE 20 MG/ML
SOLUTION OROPHARYNGEAL
Status: DISPENSED
Start: 2022-09-20

## (undated) RX ORDER — HYDROMORPHONE HYDROCHLORIDE 1 MG/ML
INJECTION, SOLUTION INTRAMUSCULAR; INTRAVENOUS; SUBCUTANEOUS
Status: DISPENSED
Start: 2022-01-21

## (undated) RX ORDER — LIDOCAINE HYDROCHLORIDE AND EPINEPHRINE 10; 10 MG/ML; UG/ML
INJECTION, SOLUTION INFILTRATION; PERINEURAL
Status: DISPENSED
Start: 2025-03-26

## (undated) RX ORDER — LIDOCAINE HYDROCHLORIDE AND EPINEPHRINE 10; 10 MG/ML; UG/ML
INJECTION, SOLUTION INFILTRATION; PERINEURAL
Status: DISPENSED
Start: 2022-01-21

## (undated) RX ORDER — SODIUM CHLORIDE 9 MG/ML
INJECTION, SOLUTION INTRAVENOUS
Status: DISPENSED
Start: 2023-02-17

## (undated) RX ORDER — ESMOLOL HYDROCHLORIDE 10 MG/ML
INJECTION INTRAVENOUS
Status: DISPENSED
Start: 2025-03-26

## (undated) RX ORDER — OXYMETAZOLINE HYDROCHLORIDE 0.05 G/100ML
SPRAY NASAL
Status: DISPENSED
Start: 2022-01-21

## (undated) RX ORDER — LIDOCAINE HYDROCHLORIDE 10 MG/ML
INJECTION, SOLUTION EPIDURAL; INFILTRATION; INTRACAUDAL; PERINEURAL
Status: DISPENSED
Start: 2023-02-17

## (undated) RX ORDER — ONDANSETRON 2 MG/ML
INJECTION INTRAMUSCULAR; INTRAVENOUS
Status: DISPENSED
Start: 2025-03-26

## (undated) RX ORDER — GABAPENTIN 300 MG/1
CAPSULE ORAL
Status: DISPENSED
Start: 2022-01-21

## (undated) RX ORDER — FENTANYL CITRATE 50 UG/ML
INJECTION, SOLUTION INTRAMUSCULAR; INTRAVENOUS
Status: DISPENSED
Start: 2022-01-21

## (undated) RX ORDER — HYDROCODONE BITARTRATE AND ACETAMINOPHEN 5; 325 MG/1; MG/1
TABLET ORAL
Status: DISPENSED
Start: 2022-01-21

## (undated) RX ORDER — FENTANYL CITRATE 50 UG/ML
INJECTION, SOLUTION INTRAMUSCULAR; INTRAVENOUS
Status: DISPENSED
Start: 2025-03-26

## (undated) RX ORDER — GLYCOPYRROLATE 0.2 MG/ML
INJECTION INTRAMUSCULAR; INTRAVENOUS
Status: DISPENSED
Start: 2022-01-21

## (undated) RX ORDER — EPINEPHRINE 1 MG/ML
INJECTION, SOLUTION INTRAMUSCULAR; SUBCUTANEOUS
Status: DISPENSED
Start: 2022-01-21

## (undated) RX ORDER — HYDROMORPHONE HCL IN WATER/PF 6 MG/30 ML
PATIENT CONTROLLED ANALGESIA SYRINGE INTRAVENOUS
Status: DISPENSED
Start: 2025-03-26

## (undated) RX ORDER — ACETAMINOPHEN 325 MG/1
TABLET ORAL
Status: DISPENSED
Start: 2022-01-21

## (undated) RX ORDER — FENTANYL CITRATE 50 UG/ML
INJECTION, SOLUTION INTRAMUSCULAR; INTRAVENOUS
Status: DISPENSED
Start: 2023-02-17

## (undated) RX ORDER — IPRATROPIUM BROMIDE AND ALBUTEROL SULFATE 2.5; .5 MG/3ML; MG/3ML
SOLUTION RESPIRATORY (INHALATION)
Status: DISPENSED
Start: 2022-01-21

## (undated) RX ORDER — PROPOFOL 10 MG/ML
INJECTION, EMULSION INTRAVENOUS
Status: DISPENSED
Start: 2022-01-21

## (undated) RX ORDER — LIDOCAINE HYDROCHLORIDE 20 MG/ML
INJECTION, SOLUTION EPIDURAL; INFILTRATION; INTRACAUDAL; PERINEURAL
Status: DISPENSED
Start: 2022-01-21

## (undated) RX ORDER — ALBUTEROL SULFATE 0.83 MG/ML
SOLUTION RESPIRATORY (INHALATION)
Status: DISPENSED
Start: 2022-08-10

## (undated) RX ORDER — DEXAMETHASONE SODIUM PHOSPHATE 10 MG/ML
INJECTION, SOLUTION INTRAMUSCULAR; INTRAVENOUS
Status: DISPENSED
Start: 2025-03-26